# Patient Record
Sex: FEMALE | Race: WHITE | Employment: UNEMPLOYED | URBAN - METROPOLITAN AREA
[De-identification: names, ages, dates, MRNs, and addresses within clinical notes are randomized per-mention and may not be internally consistent; named-entity substitution may affect disease eponyms.]

---

## 2017-01-06 RX ORDER — LEVOTHYROXINE SODIUM 75 UG/1
TABLET ORAL
Qty: 90 TAB | Refills: 1 | Status: SHIPPED | OUTPATIENT
Start: 2017-01-06 | End: 2017-07-25 | Stop reason: SDUPTHER

## 2017-01-06 RX ORDER — CLOPIDOGREL BISULFATE 75 MG/1
75 TABLET ORAL DAILY
Qty: 90 TAB | Refills: 1 | Status: SHIPPED | OUTPATIENT
Start: 2017-01-06 | End: 2017-07-25 | Stop reason: SDUPTHER

## 2017-01-06 RX ORDER — FAMOTIDINE 40 MG/1
TABLET, FILM COATED ORAL
Qty: 180 TAB | Refills: 1 | Status: SHIPPED | OUTPATIENT
Start: 2017-01-06 | End: 2018-03-09 | Stop reason: SDUPTHER

## 2017-02-13 ENCOUNTER — OFFICE VISIT (OUTPATIENT)
Dept: INTERNAL MEDICINE CLINIC | Age: 77
End: 2017-02-13

## 2017-02-13 VITALS
TEMPERATURE: 97.7 F | WEIGHT: 154 LBS | HEIGHT: 62 IN | OXYGEN SATURATION: 98 % | HEART RATE: 53 BPM | BODY MASS INDEX: 28.34 KG/M2 | SYSTOLIC BLOOD PRESSURE: 145 MMHG | DIASTOLIC BLOOD PRESSURE: 84 MMHG | RESPIRATION RATE: 12 BRPM

## 2017-02-13 DIAGNOSIS — E03.9 HYPOTHYROIDISM, UNSPECIFIED TYPE: ICD-10-CM

## 2017-02-13 DIAGNOSIS — I10 ESSENTIAL HYPERTENSION: ICD-10-CM

## 2017-02-13 DIAGNOSIS — D50.9 IRON DEFICIENCY ANEMIA, UNSPECIFIED IRON DEFICIENCY ANEMIA TYPE: ICD-10-CM

## 2017-02-13 DIAGNOSIS — E55.9 VITAMIN D DEFICIENCY: ICD-10-CM

## 2017-02-13 DIAGNOSIS — R05.3 CHRONIC COUGH: Primary | ICD-10-CM

## 2017-02-13 DIAGNOSIS — E78.2 MIXED HYPERLIPIDEMIA: ICD-10-CM

## 2017-02-13 RX ORDER — FLUTICASONE FUROATE AND VILANTEROL TRIFENATATE 200; 25 UG/1; UG/1
1 POWDER RESPIRATORY (INHALATION) DAILY
Qty: 1 INHALER | Refills: 2 | Status: SHIPPED | OUTPATIENT
Start: 2017-02-13 | End: 2017-10-17

## 2017-02-13 NOTE — PROGRESS NOTES
HISTORY OF PRESENT ILLNESS    Chief Complaint   Patient presents with    Cholesterol Problem       Presents for follow-up    Hypertension  Hypertension ROS: taking medications as instructed, no medication side effects noted, no TIA's, no chest pain on exertion, no dyspnea on exertion, no swelling of ankles     reports that she has quit smoking. She smoked 1.00 pack per day. She quit smokeless tobacco use about 27 years ago. reports that she drinks about 5.0 oz of alcohol per week    BP Readings from Last 2 Encounters:   02/13/17 149/59   12/30/16 142/70     Reports ongoing cough for over 1 year. It is mildly productive. No SOB. Was given advair in 2003 and it helped for another cough. Hx of allergies. Went to [facility] in Dec and zpak helped. Hyperlipidemia  Currently she takes simvastatin 40 mg  ROS: taking medications as instructed, no medication side effects noted  No new myalgias, no joint pains, no weakness  No TIA's, no chest pain on exertion, no dyspnea on exertion, no swelling of ankles. Lab Results   Component Value Date/Time    Cholesterol, total 126 07/13/2016 12:00 AM    HDL Cholesterol 47 07/13/2016 12:00 AM    LDL, calculated 55 07/13/2016 12:00 AM    VLDL, calculated 24 07/13/2016 12:00 AM    Triglyceride 121 07/13/2016 12:00 AM     Hypothyroidism follow-up  Reports chronic fatigue  denies heat/cold intolerance, bowel/skin changes or CVS symptoms, losing hair, feeling excessive energy, tremulousness, palpitations. Thyroid medication has been unchanged since last medication check and labs.    Lab Results   Component Value Date/Time    TSH 2.400 07/13/2016 12:00 AM    T4, Free 1.87 08/12/2015 09:55 AM     Wt Readings from Last 3 Encounters:   02/13/17 154 lb (69.9 kg)   12/30/16 149 lb (67.6 kg)   07/13/16 150 lb (68 kg)         Review of Systems   All other systems reviewed and are negative, except as noted in HPI    Past Medical and Surgical History   has a past medical history of Basal cell carcinoma (7/2012, 7/2015); Chronic cough; Colon polyp (5/2010); Diverticulitis of colon (5/2010); DJD (degenerative joint disease), lumbar (`); Endometr hyperplasia w/atypia (1997); Family history of skin cancer; GERD (gastroesophageal reflux disease) (5/2010); H/O bone density study (2000?); Hematuria; Hiatal hernia (5/2010); Hypertension (1996); Hypomagnesemia; Hypothyroidism (2008); Iron deficiency anemia; Melanoma ([facility] [address] (12/2011); Mixed hyperlipidemia; Nephrolithiasis; Stroke ([facility] [address] (7/06/07); Sun-damaged skin; Sunburn, blistering; Vitamin D deficiency; and Zoster. She also has no past medical history of Arsenic suspected exposure; Radiation exposure; or Tanning bed exposure. has a past surgical history that includes edilberto and bso (1997); tonsillectomy; gi (5/2010); and colonoscopy (5/2010). reports that she has quit smoking. She smoked 1.00 pack per day. She quit smokeless tobacco use about 27 years ago. She reports that she drinks about 5.0 oz of alcohol per week  She reports that she does not use illicit drugs. family history includes Cancer in her brother, father, and mother; Hypertension in her brother. Physical Exam   Nursing note and vitals reviewed. Blood pressure 149/59, pulse (!) 53, temperature 97.7 °F (36.5 °C), temperature source Oral, resp. rate 12, height 5' 2\" (1.575 m), weight 154 lb (69.9 kg), SpO2 98 %. Constitutional:  No distress. Eyes: Conjunctivae are normal.   Ears:  Hearing grossly intact  Cardiovascular: Normal rate. regular rhythm, no murmurs or gallops  No edema  Pulmonary/Chest: Effort normal.   CTAB  Musculoskeletal: moves all 4 extremities   Neurological: Alert and oriented to person, place, and time. Skin: No rash noted. Psychiatric: Normal mood and affect. Behavior is normal.     ASSESSMENT and PLAN  Faraz Perdomo was seen today for cholesterol problem. Diagnoses and all orders for this visit:    Chronic cough- suspect this is due to allergies.   That said, I think CT is a good idea to eval for mass. Will also start trial of Breo for 1 month. Took advair in the past for cough and it helped  -     CT CHEST W WO CONT; Future    Hypothyroidism, unspecified type- Controlled on current regimen. Continue current medications as written in chart.  -     TSH 3RD GENERATION    Mixed hyperlipidemia- Controlled on current regimen. Continue current medications as written in chart.  -     LIPID PANEL  -     METABOLIC PANEL, COMPREHENSIVE    Iron deficiency anemia, unspecified iron deficiency anemia type  -     CBC W/O DIFF  -     IRON PROFILE    Essential hypertension- Controlled on current regimen. Continue current medications as written in chart. Vitamin D deficiency  -     VITAMIN D, 25 HYDROXY    Other orders  -     BREO ELLIPTA 200-25 mcg/dose inhaler; Take 1 Puff by inhalation daily. There are no Patient Instructions on file for this visit.    lab results and schedule of future lab studies reviewed with patient  reviewed medications and side effects in detail    Return to clinic for further evaluation if new symptoms develop    Follow-up Disposition: Not on File    Current Outpatient Prescriptions   Medication Sig    BREO ELLIPTA 200-25 mcg/dose inhaler Take 1 Puff by inhalation daily.  simvastatin (ZOCOR) 40 mg tablet Take 1 tablet by mouth  nightly    BYSTOLIC 5 mg tablet Take 1 tablet by mouth  daily for blood pressure ,  pulse, fatigue (replaces  atenolol)    ergocalciferol (ERGOCALCIFEROL) 50,000 unit capsule Take 1 capsule by mouth  every 7 days    levothyroxine (SYNTHROID) 75 mcg tablet Take 1 tablet by mouth  daily before breakfast    clopidogrel (PLAVIX) 75 mg tab Take 1 Tab by mouth daily.  famotidine (PEPCID) 40 mg tablet Take 1 tablet by mouth  twice a day    RESTASIS 0.05 % ophthalmic emulsion     cetirizine (ZYRTEC) 10 mg tablet Take  by mouth.     acetaminophen (TYLENOL ARTHRITIS PAIN) 650 mg CR tablet Take 650 mg by mouth every six (6) hours as needed. No current facility-administered medications for this visit.

## 2017-02-13 NOTE — MR AVS SNAPSHOT
Visit Information Date & Time Provider Department Dept. Phone Encounter #  
 2/13/2017  8:15 AM Sandra Bennett MD [facility] [address] [id number] Upcoming Health Maintenance Date Due DTaP/Tdap/Td series (1 - Tdap) 1/2/2011 MEDICARE YEARLY EXAM 7/14/2017 GLAUCOMA SCREENING Q2Y 6/10/2018 COLONOSCOPY 5/4/2020 Allergies as of 2/13/2017  Review Complete On: 2/13/2017 By: Sandra Bennett MD  
  
 Severity Noted Reaction Type Reactions Iron  09/14/2012    Diarrhea Pcn [Penicillins]  07/19/2012   Systemic Rash PCN only. Can take cephalosporins Sulfacetamide  07/19/2012    Swelling Current Immunizations  Reviewed on 12/14/2015 Name Date Hep A Vaccine 1/3/2010 Hepatitis B Vaccine 1/1/2010 Influenza High Dose Vaccine PF 10/13/2016, 10/30/2015, 10/14/2013 Influenza Vaccine 9/23/2014 Influenza Vaccine Whole 10/1/2012 Pneumococcal Conjugate (PCV-13) 7/27/2016 Pneumococcal Vaccine (Unspecified Type) 6/29/2010 TD Vaccine 1/1/2011 Not reviewed this visit You Were Diagnosed With   
  
 Codes Comments Chronic cough    -  Primary ICD-10-CM: P50 ICD-9-CM: 786.2 Hypothyroidism, unspecified type     ICD-10-CM: E03.9 ICD-9-CM: 244.9 Mixed hyperlipidemia     ICD-10-CM: E78.2 ICD-9-CM: 272.2 Iron deficiency anemia, unspecified iron deficiency anemia type     ICD-10-CM: D50.9 ICD-9-CM: 280.9 Essential hypertension     ICD-10-CM: I10 
ICD-9-CM: 401.9 Vitamin D deficiency     ICD-10-CM: E55.9 ICD-9-CM: 268.9 Vitals BP Pulse Temp Resp Height(growth percentile) Weight(growth percentile) 145/84 (BP 1 Location: Left arm, BP Patient Position: Sitting) (!) 53 97.7 °F (36.5 °C) (Oral) 12 5' 2\" (1.575 m) 154 lb (69.9 kg) SpO2 BMI OB Status Smoking Status 98% 28.17 kg/m2 Hysterectomy Former Smoker Vitals History BMI and BSA Data Body Mass Index Body Surface Area 28.17 kg/m 2 1.75 m 2 Preferred Pharmacy Pharmacy Name Phone [facility] [address] [phone] Your Updated Medication List  
  
   
This list is accurate as of: 2/13/17  8:51 AM.  Always use your most recent med list.  
  
  
  
  
 BREO ELLIPTA 200-25 mcg/dose inhaler Generic drug:  fluticasone-vilanterol Take 1 Puff by inhalation daily. BYSTOLIC 5 mg tablet Generic drug:  nebivolol Take 1 tablet by mouth  daily for blood pressure ,  pulse, fatigue (replaces  atenolol) clopidogrel 75 mg Tab Commonly known as:  PLAVIX Take 1 Tab by mouth daily. ergocalciferol 50,000 unit capsule Commonly known as:  ERGOCALCIFEROL Take 1 capsule by mouth  every 7 days  
  
 famotidine 40 mg tablet Commonly known as:  PEPCID Take 1 tablet by mouth  twice a day  
  
 levothyroxine 75 mcg tablet Commonly known as:  SYNTHROID Take 1 tablet by mouth  daily before breakfast  
  
 RESTASIS 0.05 % ophthalmic emulsion Generic drug:  cycloSPORINE  
  
 simvastatin 40 mg tablet Commonly known as:  ZOCOR Take 1 tablet by mouth  nightly TYLENOL ARTHRITIS PAIN 650 mg CR tablet Generic drug:  acetaminophen Take 650 mg by mouth every six (6) hours as needed. ZyrTEC 10 mg tablet Generic drug:  cetirizine Take  by mouth. Prescriptions Sent to Pharmacy Refills BREO ELLIPTA 200-25 mcg/dose inhaler 2 Sig: Take 1 Puff by inhalation daily. Class: Normal  
 Pharmacy: [facility] [address] Ph #: [phone] Route: Inhalation We Performed the Following CBC W/O DIFF [90596 CPT(R)] IRON PROFILE [id number] CPT(R)] LIPID PANEL [84080 CPT(R)] METABOLIC PANEL, COMPREHENSIVE [26940 CPT(R)] TSH 3RD GENERATION [26891 CPT(R)] VITAMIN D, 25 HYDROXY [id number] CPT(R)] To-Do List   
 02/13/2017 Imaging:  CT CHEST W WO CONT Referral Information Referral ID Referred By Referred To  
  
 [id number] May Bunn Not Available Visits Status Start Date End Date 1 New Request 2/13/17 2/13/18 If your referral has a status of pending review or denied, additional information will be sent to support the outcome of this decision. Introducing [facility]! Dear Daryle Bienenstock: 
Thank you for requesting a [employer] account. Our records indicate that you already have an active [employer] account. You can access your account anytime at [email or web address] Did you know that you can access your hospital and ER discharge instructions at any time in [employer]? You can also review all of your test results from your hospital stay or ER visit. Additional Information If you have questions, please visit the Frequently Asked Questions section of the [employer] website at [email or web address] Remember, [employer] is NOT to be used for urgent needs. For medical emergencies, dial 911. Now available from your iPhone and Android! Please provide this summary of care documentation to your next provider. Your primary care clinician is listed as Barbara Anthony. If you have any questions after today's visit, please call [phone].

## 2017-02-14 LAB
25(OH)D3+25(OH)D2 SERPL-MCNC: 41.6 NG/ML (ref 30–100)
ALBUMIN SERPL-MCNC: 4.3 G/DL (ref 3.5–4.8)
ALBUMIN/GLOB SERPL: 2.3 {RATIO} (ref 1.1–2.5)
ALP SERPL-CCNC: 72 IU/L (ref 39–117)
ALT SERPL-CCNC: 26 IU/L (ref 0–32)
AST SERPL-CCNC: 23 IU/L (ref 0–40)
BILIRUB SERPL-MCNC: 1 MG/DL (ref 0–1.2)
BUN SERPL-MCNC: 18 MG/DL (ref 8–27)
BUN/CREAT SERPL: 19 (ref 11–26)
CALCIUM SERPL-MCNC: 8.9 MG/DL (ref 8.7–10.3)
CHLORIDE SERPL-SCNC: 103 MMOL/L (ref 96–106)
CHOLEST SERPL-MCNC: 115 MG/DL (ref 100–199)
CO2 SERPL-SCNC: 23 MMOL/L (ref 18–29)
CREAT SERPL-MCNC: 0.97 MG/DL (ref 0.57–1)
ERYTHROCYTE [DISTWIDTH] IN BLOOD BY AUTOMATED COUNT: 14.8 % (ref 12.3–15.4)
GLOBULIN SER CALC-MCNC: 1.9 G/DL (ref 1.5–4.5)
GLUCOSE SERPL-MCNC: 97 MG/DL (ref 65–99)
HCT VFR BLD AUTO: 33.4 % (ref 34–46.6)
HDLC SERPL-MCNC: 36 MG/DL
HGB BLD-MCNC: 10.5 G/DL (ref 11.1–15.9)
INTERPRETATION, 910389: NORMAL
INTERPRETATION: NORMAL
IRON SATN MFR SERPL: 40 % (ref 15–55)
IRON SERPL-MCNC: 99 UG/DL (ref 27–139)
LDLC SERPL CALC-MCNC: 45 MG/DL (ref 0–99)
MCH RBC QN AUTO: 27.9 PG (ref 26.6–33)
MCHC RBC AUTO-ENTMCNC: 31.4 G/DL (ref 31.5–35.7)
MCV RBC AUTO: 89 FL (ref 79–97)
PDF IMAGE, 910387: NORMAL
PLATELET # BLD AUTO: 227 X10E3/UL (ref 150–379)
POTASSIUM SERPL-SCNC: 4.5 MMOL/L (ref 3.5–5.2)
PROT SERPL-MCNC: 6.2 G/DL (ref 6–8.5)
RBC # BLD AUTO: 3.76 X10E6/UL (ref 3.77–5.28)
SODIUM SERPL-SCNC: 143 MMOL/L (ref 134–144)
TIBC SERPL-MCNC: 248 UG/DL (ref 250–450)
TRIGL SERPL-MCNC: 172 MG/DL (ref 0–149)
TSH SERPL DL<=0.005 MIU/L-ACNC: 3.08 UIU/ML (ref 0.45–4.5)
UIBC SERPL-MCNC: 149 UG/DL (ref 118–369)
VLDLC SERPL CALC-MCNC: 34 MG/DL (ref 5–40)
WBC # BLD AUTO: 8.2 X10E3/UL (ref 3.4–10.8)

## 2017-03-05 RX ORDER — BENZONATATE 200 MG/1
200 CAPSULE ORAL
Qty: 21 CAP | Refills: 0 | Status: SHIPPED | OUTPATIENT
Start: 2017-03-05 | End: 2017-03-12

## 2017-03-09 RX ORDER — LEVOFLOXACIN 500 MG/1
500 TABLET, FILM COATED ORAL DAILY
Qty: 10 TAB | Refills: 0 | Status: SHIPPED | OUTPATIENT
Start: 2017-03-09 | End: 2017-03-19

## 2017-03-09 RX ORDER — PREDNISONE 20 MG/1
TABLET ORAL
Qty: 26 TAB | Refills: 0 | Status: SHIPPED | OUTPATIENT
Start: 2017-03-09 | End: 2017-03-25

## 2017-03-15 ENCOUNTER — HOSPITAL ENCOUNTER (OUTPATIENT)
Dept: CT IMAGING | Age: 77
Discharge: HOME | End: 2017-03-15
Attending: INTERNAL MEDICINE
Payer: MEDICARE

## 2017-03-15 DIAGNOSIS — R05.3 CHRONIC COUGH: ICD-10-CM

## 2017-03-15 PROCEDURE — 71260 CT THORAX DX C+: CPT

## 2017-03-15 PROCEDURE — 71270 CT THORAX DX C-/C+: CPT

## 2017-03-15 PROCEDURE — 74011636320 HC RX REV CODE- 636/320: Performed by: RADIOLOGY

## 2017-03-15 RX ADMIN — IOPAMIDOL 100 ML: 612 INJECTION, SOLUTION INTRAVENOUS at 14:49

## 2017-07-04 RX ORDER — CLINDAMYCIN HYDROCHLORIDE 300 MG/1
300 CAPSULE ORAL 3 TIMES DAILY
Qty: 30 CAP | Refills: 0 | Status: SHIPPED | OUTPATIENT
Start: 2017-07-04 | End: 2017-07-14

## 2017-07-26 RX ORDER — LEVOTHYROXINE SODIUM 75 UG/1
TABLET ORAL
Qty: 90 TAB | Refills: 1 | Status: SHIPPED | OUTPATIENT
Start: 2017-07-26 | End: 2017-11-19 | Stop reason: SDUPTHER

## 2017-07-26 RX ORDER — CLOPIDOGREL BISULFATE 75 MG/1
TABLET ORAL
Qty: 90 TAB | Refills: 1 | Status: SHIPPED | OUTPATIENT
Start: 2017-07-26 | End: 2018-01-27 | Stop reason: SDUPTHER

## 2017-08-11 ENCOUNTER — OFFICE VISIT (OUTPATIENT)
Dept: INTERNAL MEDICINE CLINIC | Age: 77
End: 2017-08-11

## 2017-08-11 VITALS
BODY MASS INDEX: 28.16 KG/M2 | DIASTOLIC BLOOD PRESSURE: 52 MMHG | HEIGHT: 62 IN | HEART RATE: 52 BPM | TEMPERATURE: 97.9 F | WEIGHT: 153 LBS | SYSTOLIC BLOOD PRESSURE: 135 MMHG | RESPIRATION RATE: 12 BRPM

## 2017-08-11 DIAGNOSIS — R22.9 SINGLE SKIN NODULE: Primary | ICD-10-CM

## 2017-08-11 DIAGNOSIS — Z85.828 HISTORY OF BASAL CELL CANCER: ICD-10-CM

## 2017-08-11 NOTE — PROGRESS NOTES
HISTORY OF PRESENT ILLNESS  Sydna Dance is a 69 y.o. female. HPI  Presents with nodule on left shin for 2 months. It was a bit red at first, so took clindamycin 7/4 due to concern of infection. This did not change it. It is still enlarging slowly and is a little red and a little warm. She scratched it, so it has a scab on it. Hx of Beata Magdalene Henderson and made appt for mid-September (first Maryjo Pain)  Saw Dr. Caroline Vazquez for [facility] in 2015    Review of Systems   Constitutional: Negative for chills, fever, malaise/fatigue and weight loss. Physical Exam   Constitutional: She is oriented to person, place, and time. She appears well-developed and well-nourished. No distress. Cardiovascular: Normal rate. Pulmonary/Chest: Effort normal.   Musculoskeletal:        Legs:  2 cm nodule w mild surrounding eryrthema, no significant warmth, no fluctuance. Neurological: She is alert and oriented to person, place, and time. Psychiatric: She has a normal mood and affect. Nursing note and vitals reviewed. ASSESSMENT and PLAN  Diagnoses and all orders for this visit:    1. Single skin nodule  2. History of basal cell cancer  It is a little inflamed, but this persists for about 2 month. Suspect BCC, r/o SCC. I have contacted Dr. Benjamin Browne office for evaluation.

## 2017-08-11 NOTE — PROGRESS NOTES
Patient has a skin lesion on her LLE for the last 2 months. States she made an appointment with Dr Leander Salmeron for a colonoscopy on 9/18/2017.

## 2017-08-22 ENCOUNTER — HOSPITAL ENCOUNTER (OUTPATIENT)
Dept: LAB | Age: 77
Discharge: HOME | End: 2017-08-22

## 2017-08-22 ENCOUNTER — OFFICE VISIT (OUTPATIENT)
Dept: DERMATOLOGY | Facility: AMBULATORY SURGERY CENTER | Age: 77
End: 2017-08-22

## 2017-08-22 VITALS
RESPIRATION RATE: 18 BRPM | OXYGEN SATURATION: 98 % | BODY MASS INDEX: 28.16 KG/M2 | WEIGHT: 153 LBS | HEIGHT: 62 IN | TEMPERATURE: 98 F | HEART RATE: 57 BPM | DIASTOLIC BLOOD PRESSURE: 70 MMHG | SYSTOLIC BLOOD PRESSURE: 125 MMHG

## 2017-08-22 DIAGNOSIS — D48.5 NEOPLASM OF UNCERTAIN BEHAVIOR OF SKIN OF LOWER LEG: Primary | ICD-10-CM

## 2017-08-22 DIAGNOSIS — Z85.828 HISTORY OF NONMELANOMA SKIN CANCER: ICD-10-CM

## 2017-08-22 DIAGNOSIS — L82.1 SEBORRHEIC KERATOSES: ICD-10-CM

## 2017-08-22 NOTE — PROGRESS NOTES
Name: Tracy Maza       Age: 69 y.o. Date: 8/22/2017    Chief Complaint:   Chief Complaint   Patient presents with    Skin Exam     lesion on left shin       Subjective:    HPI:  Ms. Jovan Villalba is a 69 y.o. female who presents for the evaluation of a lesion on the left anterior shin. She states that the lesion appeared 2 months ago. The patient has not had prior treatment for this lesion. Associated symptoms include persistent, slowly enlarging, tender bump. She was encouraged to make this visit by Dr. Max Briceno. The patient states she initially thought it was a staph infection due to the tenderness. She has a personal history of NMSC and history of Mohs. She presents with her . ROS: Consitutional: Negative  Dermatological : positive for - skin lesion changes      Social History     Social History    Marital status:      Spouse name: Alejandro Headings Number of children: 2    Years of education: N/A     Occupational History    Not on file. Social History Main Topics    Smoking status: Former Smoker     Packs/day: 1.00    Smokeless tobacco: Former User     Quit date: 1/1/1990    Alcohol use 5.0 oz/week     10 Glasses of wine per week    Drug use: No    Sexual activity: No     Other Topics Concern    Not on file     Social History Narrative       Family History   Problem Relation Age of Onset    Cancer Mother     Cancer Father     Hypertension Brother     Cancer Brother      myeloma       Past Medical History:   Diagnosis Date    Basal cell carcinoma 7/2012, 7/2015    Dr. Lawrence Harvey. saw Dr. Young Tomas cough     Colon polyp 5/2010    Dr. Grover Hammans, tubular adenoma    Diverticulitis of colon 5/2010    dx on colonoscopy by Dr. Grover Hammans. Took flagyl, cipro    DJD (degenerative joint disease), lumbar `    L5?  Endometr hyperplasia w/atypia 0    Family history of skin cancer     Brother - melanoma    GERD (gastroesophageal reflux disease) 5/2010    H/O bone density study 2000? normal per pt.  Hematuria     hx stones and \"nephritis\" since childhood    Hiatal hernia 5/2010    Hypertension 1996    Hypomagnesemia     level was 1.5 on mg ox 6/2012    Hypothyroidism 2008    TSH 1.1 6/7/12    Iron deficiency anemia     EGD, colon 5/2010.  hgb 11.3 6/2012    Melanoma ([facility] [address] 12/2011    Dr. Vijay Manjarrez.  right shoulder, left thigh. Dr. Shruti Fisher Mixed hyperlipidemia      Tg 166 LDL 87 HDL55 6/2012    Nephrolithiasis     Stroke ([facility] [address] 7/06/07    R facial droop and R weakness. completely resolved.  Sun-damaged skin     Sunburn, blistering     Vitamin D deficiency     improved to 35.3 6/2012    Zoster        Past Surgical History:   Procedure Laterality Date    HX COLONOSCOPY  5/2010    diverticuLITIS, tubular adenoma  Dr. Milligan Eye HX GI  5/2010    EGD  - hiatal hernia. Dr. Jaydon Young    endometrial pre-cancer on biopsy    HX TONSILLECTOMY         Current Outpatient Prescriptions   Medication Sig Dispense Refill    MULTIVITAMIN PO       GUAIFENESIN (MUCINEX PO) Take  by mouth.  clopidogrel (PLAVIX) 75 mg tab TAKE 1 TABLET EVERY DAY 90 Tab 1    levothyroxine (SYNTHROID) 75 mcg tablet TAKE 1 TABLET BY MOUTH  DAILY BEFORE BREAKFAST 90 Tab 1    simvastatin (ZOCOR) 40 mg tablet Take 1 tablet by mouth  nightly 90 Tab 1    BYSTOLIC 5 mg tablet Take 1 tablet by mouth  daily for blood pressure ,  pulse, fatigue (replaces  atenolol) 90 Tab 1    ergocalciferol (ERGOCALCIFEROL) 50,000 unit capsule Take 1 capsule by mouth  every 7 days 13 Cap 3    famotidine (PEPCID) 40 mg tablet Take 1 tablet by mouth  twice a day (Patient taking differently: Take 40 mg by mouth daily. Take 1 tablet by mouth  twice a day) 180 Tab 1    RESTASIS 0.05 % ophthalmic emulsion       cetirizine (ZYRTEC) 10 mg tablet Take  by mouth.  acetaminophen (TYLENOL ARTHRITIS PAIN) 650 mg CR tablet Take 650 mg by mouth every six (6) hours as needed.         BREO ELLIPTA 200-25 mcg/dose inhaler Take 1 Puff by inhalation daily. 1 Inhaler 2       Allergies   Allergen Reactions    Iron Diarrhea    Pcn [Penicillins] Rash     PCN only. Can take cephalosporins    Sulfacetamide Swelling         Objective:    Visit Vitals    /70 (BP 1 Location: Left arm, BP Patient Position: Sitting)    Pulse (!) 57    Temp 98 °F (36.7 °C) (Oral)    Resp 18    Ht 5' 2\" (1.575 m)    Wt 69.4 kg (153 lb)    SpO2 98%    BMI 27.98 kg/m2       Maximo Leon is a 69 y.o. female who appears well and in no distress. She is awake, alert, and oriented. There is no popliteal lymphadenopathy. A limited skin examination was completed including the left lower leg. She has scattered tan waxy macules consistent with seborrheic keratoses, a few that are pink with inflammation. There is a 1.2 x 1.0 cm erythematous papule with central crusting, tender to palpation. This is most consistent with squamous cell carcinoma. There is about another 1 cm of surrounding pink skin change that is mildly tender as well-consistent with inflammation. There is no evidence of infection. Assessment/Plan:  1. Neoplasm of Uncertain Behavior, left anterior shin. The differential diagnoses were discussed. A shave biopsy was advised to sample this lesion. The procedure was reviewed and verbal and written consent were obtained. The risks of pain, bleeding, infection, and scar were discussed. The patient is aware that this is a sample and is intended for diagnosis and not therapy of the skin lesion. I performed the procedure. The site was cleansed and anesthetized with 1% Lidocaine with Epinephrine 1:100,000. A shave biopsy was performed to sample the lesion. Drysol was used for hemostasis. The wound was bandaged and care reviewed. The specimen was sent to pathology. I will contact the patient with the results and any further treatment that may be necessary. She will use vinegar soaks to decrease inflammation. 2.Seborrheic keratoses. The diagnosis was reviewed and the patient was reassured that no treatment is needed for these benign lesions. 3.Personal history of skin cancer. I discussed sun protection, sunscreen use, the warning signs of skin cancer, the need for self-skin examinations, and the need for regular practitioner exams every 6 months. The patient should follow up sooner as needed if new, changing, or symptomatic skin lesions arise. [facility]   OFFICE PROCEDURE PROGRESS NOTE   Chart reviewed for the following:   [place], [address], have reviewed the History, Physical and updated the Allergic reactions for Blima Moat. TIME OUT performed immediately prior to start of procedure:   I, Jodie Mora, have performed the following reviews on Blima Moat   prior to the start of the procedure:     * Patient was identified by name and date of birth   * Agreement on procedure being performed was verified   * Risks and Benefits explained to the patient   * Procedure site verified and marked as necessary   * Patient was positioned for comfort   * Consent was signed and verified     Time: 0945  Date of procedure: 8/22/2017  Procedure performed by: Lew Mora  Provider assisted by: lpn   Patient assisted by: self   How tolerated by patient: tolerated the procedure well with no complications   Comments: none

## 2017-08-22 NOTE — MR AVS SNAPSHOT
Visit Information Date & Time Provider Department Dept. Phone Encounter #  
 8/22/2017  9:30 AM Breana Vaz NP [facility] [phone] [id number] Follow-up Instructions Return for Mohs surgery. Routing History Follow-up and Disposition History Upcoming Health Maintenance Date Due DTaP/Tdap/Td series (1 - Tdap) 1/2/2011 MEDICARE YEARLY EXAM 7/14/2017 INFLUENZA AGE 9 TO ADULT 8/1/2017 GLAUCOMA SCREENING Q2Y 6/10/2018 COLONOSCOPY 5/4/2020 Allergies as of 8/22/2017  Review Complete On: 8/22/2017 By: Breana Vaz NP Severity Noted Reaction Type Reactions Iron  09/14/2012    Diarrhea Pcn [Penicillins]  07/19/2012   Systemic Rash PCN only. Can take cephalosporins Sulfacetamide  07/19/2012    Swelling Current Immunizations  Reviewed on 12/14/2015 Name Date Hep A Vaccine 1/3/2010 Hepatitis B Vaccine 1/1/2010 Influenza High Dose Vaccine PF 10/13/2016, 10/30/2015, 10/14/2013 Influenza Vaccine 9/23/2014 Influenza Vaccine Whole 10/1/2012 Pneumococcal Conjugate (PCV-13) 7/27/2016 TD Vaccine 1/1/2011 ZZZ-RETIRED (DO NOT USE) Pneumococcal Vaccine (Unspecified Type) 6/29/2010 Not reviewed this visit You Were Diagnosed With   
  
 Codes Comments Neoplasm of uncertain behavior of skin of lower leg    -  Primary ICD-10-CM: D48.5 ICD-9-CM: 238.2 Seborrheic keratoses     ICD-10-CM: L82.1 ICD-9-CM: 702.19 History of nonmelanoma skin cancer     ICD-10-CM: Z02.915 ICD-9-CM: V10.83 Vitals BP Pulse Temp Resp Height(growth percentile) Weight(growth percentile) 125/70 (BP 1 Location: Left arm, BP Patient Position: Sitting) (!) 57 98 °F (36.7 °C) (Oral) 18 5' 2\" (1.575 m) 153 lb (69.4 kg) SpO2 BMI OB Status Smoking Status 98% 27.98 kg/m2 Hysterectomy Former Smoker BMI and BSA Data  Body Mass Index Body Surface Area  
 27.98 kg/m 2 1.74 m 2  
 Preferred Pharmacy Pharmacy Name Phone [facility] [address] [place] [address] [facility] [address] [phone] Your Updated Medication List  
  
   
This list is accurate as of: 8/22/17  2:05 PM.  Always use your most recent med list.  
  
  
  
  
 BREO ELLIPTA 200-25 mcg/dose inhaler Generic drug:  fluticasone-vilanterol Take 1 Puff by inhalation daily. BYSTOLIC 5 mg tablet Generic drug:  nebivolol Take 1 tablet by mouth  daily for blood pressure ,  pulse, fatigue (replaces  atenolol) clopidogrel 75 mg Tab Commonly known as:  PLAVIX TAKE 1 TABLET EVERY DAY  
  
 ergocalciferol 50,000 unit capsule Commonly known as:  ERGOCALCIFEROL Take 1 capsule by mouth  every 7 days  
  
 famotidine 40 mg tablet Commonly known as:  PEPCID Take 1 tablet by mouth  twice a day  
  
 levothyroxine 75 mcg tablet Commonly known as:  SYNTHROID  
TAKE 1 TABLET BY MOUTH  DAILY BEFORE BREAKFAST MUCINEX PO Take  by mouth. MULTIVITAMIN PO  
  
 RESTASIS 0.05 % ophthalmic emulsion Generic drug:  cycloSPORINE  
  
 simvastatin 40 mg tablet Commonly known as:  ZOCOR Take 1 tablet by mouth  nightly TYLENOL ARTHRITIS PAIN 650 mg CR tablet Generic drug:  acetaminophen Take 650 mg by mouth every six (6) hours as needed. ZyrTEC 10 mg tablet Generic drug:  cetirizine Take  by mouth. We Performed the Following BIOPSY OF SKIN LESION [20288 CPT(R)] SURGICAL PATHOLOGY [ICL8933 Custom] Comments: Favor SCC Follow-up Instructions Return for Mohs surgery. Introducing [facility] & HEALTH SERVICES! Dear Rashaad Workman: 
Thank you for requesting a [employer] account. Our records indicate that you already have an active [employer] account. You can access your account anytime at [email or web address] Did you know that you can access your hospital and ER discharge instructions at any time in [employer]? You can also review all of your test results from your hospital stay or ER visit. Additional Information If you have questions, please visit the Frequently Asked Questions section of the [employer] website at [email or web address] Remember, [employer] is NOT to be used for urgent needs. For medical emergencies, dial 911. Now available from your iPhone and Android! Please provide this summary of care documentation to your next provider. Your primary care clinician is listed as Kang Rocha. If you have any questions after today's visit, please call [phone].

## 2017-08-25 NOTE — PROGRESS NOTES
I spoke with the patient who is aware of the biopsy results of SCC and need for Mohs. She is scheduled for Oct 19 but would come sooner. She states the vinegar soaks are helping and the area is doing fine.

## 2017-09-14 ENCOUNTER — OFFICE VISIT (OUTPATIENT)
Dept: DERMATOLOGY | Facility: AMBULATORY SURGERY CENTER | Age: 77
End: 2017-09-14

## 2017-09-14 VITALS
DIASTOLIC BLOOD PRESSURE: 70 MMHG | TEMPERATURE: 98.1 F | HEART RATE: 58 BPM | WEIGHT: 153 LBS | OXYGEN SATURATION: 99 % | SYSTOLIC BLOOD PRESSURE: 118 MMHG | HEIGHT: 62 IN | RESPIRATION RATE: 16 BRPM | BODY MASS INDEX: 28.16 KG/M2

## 2017-09-14 DIAGNOSIS — C44.729 SQUAMOUS CELL CARCINOMA OF LOWER LEG, LEFT: Primary | ICD-10-CM

## 2017-09-14 NOTE — MR AVS SNAPSHOT
Visit Information Date & Time Provider Department Dept. Phone Encounter #  
 9/14/2017  9:30 AM Heriberto Rocha MD [facility] 8057 [phone] [id number] Upcoming Health Maintenance Date Due DTaP/Tdap/Td series (1 - Tdap) 1/2/2011 MEDICARE YEARLY EXAM 7/14/2017 INFLUENZA AGE 9 TO ADULT 8/1/2017 GLAUCOMA SCREENING Q2Y 6/10/2018 COLONOSCOPY 5/4/2020 Allergies as of 9/14/2017  Review Complete On: 9/14/2017 By: Heriberto Rocha MD  
  
 Severity Noted Reaction Type Reactions Iron  09/14/2012    Diarrhea Pcn [Penicillins]  07/19/2012   Systemic Rash PCN only. Can take cephalosporins Sulfacetamide  07/19/2012    Swelling Current Immunizations  Reviewed on 12/14/2015 Name Date Hep A Vaccine 1/3/2010 Hepatitis B Vaccine 1/1/2010 Influenza High Dose Vaccine PF 10/13/2016, 10/30/2015, 10/14/2013 Influenza Vaccine 9/23/2014 Influenza Vaccine Whole 10/1/2012 Pneumococcal Conjugate (PCV-13) 7/27/2016 TD Vaccine 1/1/2011 ZZZ-RETIRED (DO NOT USE) Pneumococcal Vaccine (Unspecified Type) 6/29/2010 Not reviewed this visit You Were Diagnosed With   
  
 Codes Comments Squamous cell carcinoma of lower leg, left    -  Primary ICD-10-CM: P00.865 ICD-9-CM: 173.72 Vitals BP Pulse Temp Resp Height(growth percentile) Weight(growth percentile) 118/70 (BP 1 Location: Right arm, BP Patient Position: Sitting) (!) 58 98.1 °F (36.7 °C) (Oral) 16 5' 2\" (1.575 m) 153 lb (69.4 kg) SpO2 BMI OB Status Smoking Status 99% 27.98 kg/m2 Hysterectomy Former Smoker BMI and BSA Data Body Mass Index Body Surface Area  
 27.98 kg/m 2 1.74 m 2 Preferred Pharmacy Pharmacy Name Phone Kailash Graham, [facility] - [address] [facility] [address] [place] [address] [phone] Your Updated Medication List  
  
   
This list is accurate as of: 9/14/17 11:08 AM.  Always use your most recent med list.  
  
  
  
  
 BREO ELLIPTA 200-25 mcg/dose inhaler Generic drug:  fluticasone-vilanterol Take 1 Puff by inhalation daily. BYSTOLIC 5 mg tablet Generic drug:  nebivolol Take 1 tablet by mouth  daily for blood pressure ,  pulse, fatigue (replaces  atenolol) clopidogrel 75 mg Tab Commonly known as:  PLAVIX TAKE 1 TABLET EVERY DAY  
  
 ergocalciferol 50,000 unit capsule Commonly known as:  ERGOCALCIFEROL Take 1 capsule by mouth  every 7 days  
  
 famotidine 40 mg tablet Commonly known as:  PEPCID Take 1 tablet by mouth  twice a day  
  
 levothyroxine 75 mcg tablet Commonly known as:  SYNTHROID  
TAKE 1 TABLET BY MOUTH  DAILY BEFORE BREAKFAST MUCINEX PO Take  by mouth. MULTIVITAMIN PO  
  
 RESTASIS 0.05 % ophthalmic emulsion Generic drug:  cycloSPORINE  
  
 simvastatin 40 mg tablet Commonly known as:  ZOCOR Take 1 tablet by mouth  nightly TYLENOL ARTHRITIS PAIN 650 mg CR tablet Generic drug:  acetaminophen Take 650 mg by mouth every six (6) hours as needed. ZyrTEC 10 mg tablet Generic drug:  cetirizine Take  by mouth. Patient Instructions WOUND CARE INSTRUCTIONS 1. Keep the dressing clean and dry and do not remove for 48 hours. 2. Then change the dressing once a day as follows: 
a. Wash hands before and after each dressing change. b. Remove dressing and wash site gently with mild soap and water, rinse, and pat dry. 
c. Apply an ointment (Bacitracin, Polysporin, Neosporin, Petroleum jelly or Aquaphor). d. Apply a non-stick (Telfa) dressing or Band-Aid to cover the wound. Do not remove dressing until appt next week 3. Watch for: BLEEDING: A small amount of drainage may occur. If bleeding occurs, elevate and rest the surgery site. Apply gauze and steady pressure for 15 minutes. If bleeding continues, call this office.  
 
INFECTION: Signs of infection include increased redness, pain, warmth, drainage of pus, and fever. If this occurs, call this office. 4. Special Instructions (follow any that are checked): ·  You have stitches that DO need to be removed. ·  Avoid bending at the waist and heavy lifting for two days. ·  Sleep with your head elevated for the next two nights. ·  Rest the surgery site and keep it elevated as much as possible for two days. ·  You may apply an ice-pack for 10-15 minutes every waking hour for the rest of the day. ·  Eat a soft diet and avoid hot food and hot drinks for the rest of the day. ·  Other instructions: Follow up as directed. Take Tylenol for pain as needed. Once the site is healed with no remaining bandages or open areas, protect your surgical site and scar from the sun, as this area will be more sensitive. Use a broad spectrum sunscreen SPF 30 or higher daily, and a chemical free product (one containing zinc oxide or titanium dioxide) is a good choice if the area is sensitive. You may begin to gently massage the surgical site in 2-3 weeks, rubbing in a circular motion along the scar. This can help reduce swelling and thickness of a scar. A scar cream may be used beginnning 1 month after the surgery. If you have any questions or concerns, please call our office Monday through Friday at [phone]. Introducing [facility] & HEALTH SERVICES! Dear Amy Weinberg: 
Thank you for requesting a [employer] account. Our records indicate that you already have an active [employer] account. You can access your account anytime at [email or web address] Did you know that you can access your hospital and ER discharge instructions at any time in [employer]? You can also review all of your test results from your hospital stay or ER visit. Additional Information If you have questions, please visit the Frequently Asked Questions section of the [employer] website at [email or web address] Remember, [employer] is NOT to be used for urgent needs. For medical emergencies, dial 911. Now available from your iPhone and Android! Please provide this summary of care documentation to your next provider. Your primary care clinician is listed as Yung Cornejo. If you have any questions after today's visit, please call [phone].

## 2017-09-14 NOTE — PROGRESS NOTES
This note is written by Lillian Ferrell, as dictated by Lawton Primrose. Dex Fenton MD.    CC: Squamous cell carcinoma on the left anterior shin     History of present illness:     Carmen Stone is a 69 y.o. female referred by Ivana Lyles DNP. 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 This is a new squamous cell carcinoma present for three months described as a non healing, growing, painful lesion with no prior treatment. She has been utilizing vinegar soaks in order to decrease inflammation. Biopsy confirmed the diagnosis of squamous cell carcinoma, and I reviewed the written pathology. She is feeling well and in her usual state of health today. She has no pain, no current illnesses, no other skin concerns. Her allergies, medications, medical, and social history are reviewed by me today. Exam:     She is an awake, alert, and oriented 69 y.o. female who appears well and in no distress. There is no preauricular, submandibular, or cervical lymphadenopathy. I examined her left shin. She has an 18 x 16 firm keratotic nodule on her left anterior shin. She confirms location. Assessment/plan:    1. Squamous cell carcinoma, left anterior shin. I discussed the diagnosis of squamous cell carcinoma and summarized the pathology report. Mohs surgery is indicated by site, size, histology, and rapid growth. The procedure was discussed, verbal and written consent were obtained. I performed the procedure. One stage was required to reach a tumor free plane. The surgical defect was managed with intermediate repair. There were no complications. She will follow up in five days for a wound check because she will be going on a cruise in the near future. She will also follow up as needed as the site heals. Indications, risks, and options were discussed with Carmen Stone preoperatively.  Risks including, but not limited to: pain, bleeding, infection, tumor recurrence, scarring and damage to motor and/or sensory nerves, were discussed. Mehreen Dodson chose Mohs surgery. Mehreen Dodson was an acceptable surgery candidate. Mehreen Dodson was placed in the appropriate position on the operating table in the Mohs surgery procedure room. The area was prepped and draped in the standard manner. Gentian violet was used to outline the clinical margins of the tumor. Local anesthesia was then obtained. The grossly visible tumor was then removed, an underlying layer was excised and mapped according to the Mohs technique, and the individual specimens examined microscopically. The process was repeated until microscopic examination of the tissue specimens confirmed a tumor-free plane. Hemostasis was obtained with electrosurgery and pressure. The wound was covered between stages with moist saline gauze. The wound management options of second intent healing, layered closure, local flap, and/or full thickness skin graft were discussed. Mehreen Dodson understands the aims, risks, alternatives, and possible complications and elects to proceed with an intermediate layered closure. Wound margins were made vertical, edges undermined in the subcutaneous plane, standing cones corrected at both poles followed by layered closure. The wound was closed with buried 4-0 polysorb suture in the subcutis to reduce tension on the skin edges, and skin edges were approximated with 4-0 prolene suture in the dermis to reduce tension on the epidermis. The final closure length was 35 mm. The wound was bandaged with Vaseline, Telfa, gauze and coban. Wound care instructions (written and verbal) and a follow up appointment were given to Mehreen Dodson before discharge. Mehreen Ddoson was discharged in good condition. 2. History of non melanoma skin cancer. I discussed the diagnosis and recommend routine examinations with Cristobal Lew DNP for surveillance.     The documentation recorded by the tres accurately reflects the service I personally performed and the decisions made by me. [facility]   OFFICE PROCEDURE PROGRESS NOTE     Chart reviewed for the following:     Wally Baure MD, have reviewed the History, Physical and updated the Allergic reactions for [address] [place] [address] performed immediately prior to start of procedure:     Wally Bauer MD, have performed the following reviews on Summer Styles prior to the start of the procedure:     * Patient was identified by name and date of birth   * Agreement on procedure being performed was verified   * Risks and Benefits explained to the patient   * Procedure site verified and marked as necessary   * Patient was positioned for comfort   * Consent was signed and verified     Time: 10:00 AM   Date of procedure: 9/14/2017  Procedure performed by: Valerie Mccartney.  Lashawn Bauer MD   Provider assisted by: LPN   Patient assisted by: self   How tolerated by patient: tolerated the procedure well with no complications   Comments: none

## 2017-09-14 NOTE — PATIENT INSTRUCTIONS
WOUND CARE INSTRUCTIONS    1. Keep the dressing clean and dry and do not remove for 48 hours. 2. Then change the dressing once a day as follows:  a. Wash hands before and after each dressing change. b. Remove dressing and wash site gently with mild soap and water, rinse, and pat dry.  c. Apply an ointment (Bacitracin, Polysporin, Neosporin, Petroleum jelly or Aquaphor). d. Apply a non-stick (Telfa) dressing or Band-Aid to cover the wound. Do not remove dressing until appt next week    3. Watch for:  BLEEDING: A small amount of drainage may occur. If bleeding occurs, elevate and rest the surgery site. Apply gauze and steady pressure for 15 minutes. If bleeding continues, call this office. INFECTION: Signs of infection include increased redness, pain, warmth, drainage of pus, and fever. If this occurs, call this office. 4. Special Instructions (follow any that are checked):  · [x] You have stitches that DO need to be removed. · [x] Avoid bending at the waist and heavy lifting for two days. · [x] Sleep with your head elevated for the next two nights. · [x] Rest the surgery site and keep it elevated as much as possible for two days. · [x] You may apply an ice-pack for 10-15 minutes every waking hour for the rest of the day. · [] Eat a soft diet and avoid hot food and hot drinks for the rest of the day. · [] Other instructions: Follow up as directed. Take Tylenol for pain as needed. Once the site is healed with no remaining bandages or open areas, protect your surgical site and scar from the sun, as this area will be more sensitive. Use a broad spectrum sunscreen SPF 30 or higher daily, and a chemical free product (one containing zinc oxide or titanium dioxide) is a good choice if the area is sensitive. You may begin to gently massage the surgical site in 2-3 weeks, rubbing in a circular motion along the scar. This can help reduce swelling and thickness of a scar.  A scar cream may be used beginnning 1 month after the surgery. If you have any questions or concerns, please call our office Monday through Friday at [phone].

## 2017-09-15 ENCOUNTER — TELEPHONE (OUTPATIENT)
Dept: INTERNAL MEDICINE CLINIC | Age: 77
End: 2017-09-15

## 2017-09-15 NOTE — TELEPHONE ENCOUNTER
Adherence call for Medication Therapy Monitoring    Patient identified as needing adherence counseling regarding her SImvastatin via [employer]. Called patient and confirmed that patient is taking the medication correctly and does not miss any doses. She does not feel that she needs any help in remembering to take this medication at this time. She was able to identify which medication she was taking for cholesterol and how to correctly take the medication.     Claim submitted via [employer] EDGAR Taners, PharmD, BCPS, CDE

## 2017-09-19 ENCOUNTER — OFFICE VISIT (OUTPATIENT)
Dept: DERMATOLOGY | Facility: AMBULATORY SURGERY CENTER | Age: 77
End: 2017-09-19

## 2017-09-19 VITALS
HEART RATE: 61 BPM | TEMPERATURE: 98.6 F | SYSTOLIC BLOOD PRESSURE: 124 MMHG | DIASTOLIC BLOOD PRESSURE: 62 MMHG | WEIGHT: 153 LBS | OXYGEN SATURATION: 99 % | BODY MASS INDEX: 28.16 KG/M2 | HEIGHT: 62 IN | RESPIRATION RATE: 16 BRPM

## 2017-09-19 DIAGNOSIS — Z48.89 ENCOUNTER FOR POSTOPERATIVE WOUND CHECK: Primary | ICD-10-CM

## 2017-09-19 RX ORDER — SODIUM PICOSULFATE, MAGNESIUM OXIDE, AND ANHYDROUS CITRIC ACID 10; 3.5; 12 MG/16.1G; G/16.1G; G/16.1G
POWDER, METERED ORAL
COMMUNITY
Start: 2017-09-18 | End: 2017-11-14 | Stop reason: HOSPADM

## 2017-09-19 NOTE — PROGRESS NOTES
This note was written by Celina Guillen, as dictated by Heriberto Rocha MD.     Wound check/suture removal:    Chief complaint: wound check. HPI: Dannielle Escobar presents for wound check following Mohs surgery performed on 09/14/17 by me. She had a squamous cell carcinoma on her left anterior shin. The surgical site was managed with an intermediate repair. She had moderate pain relieved once the presure bandage was removed on the second day, none since. She will be going on a cruise on 09/22/17. Exam: The surgical site was examined. There is not evidence of infection. There is not erythema. There is not edema. A/P:  Wound check. The surgical site is healing well. Additional care was reviewed. I recommended the use of a thin layer of Vaseline. Follow up will be as needed. She will have suture removal performed on 09/28/17 by the provider on the cruise ship. The documentation recorded by the scribe accurately reflects the service I personally performed and the decisions made by me.

## 2017-09-20 ENCOUNTER — TELEPHONE (OUTPATIENT)
Dept: DERMATOLOGY | Facility: AMBULATORY SURGERY CENTER | Age: 77
End: 2017-09-20

## 2017-09-20 RX ORDER — AZITHROMYCIN 250 MG/1
TABLET, FILM COATED ORAL
Qty: 6 TAB | Refills: 0 | Status: SHIPPED | OUTPATIENT
Start: 2017-09-20 | End: 2017-09-25

## 2017-10-14 RX ORDER — SIMVASTATIN 40 MG/1
TABLET, FILM COATED ORAL
Qty: 90 TAB | Refills: 1 | Status: SHIPPED | OUTPATIENT
Start: 2017-10-14 | End: 2018-03-09 | Stop reason: SDUPTHER

## 2017-10-17 NOTE — PROGRESS NOTES
[address]  Endoscopy Preprocedure Instructions      1. On the day of your surgery, please report to registration located on the 2nd floor of the  [facility]. yes    2. You must have a responsible adult to drive you to the hospital, stay at the hospital during your procedure and drive you home. If they leave your procedure will not be started (no exceptions). yes    3. Do not have anything to eat or drink (including water, gum, mints, coffee, and juice) after midnight. This does not apply to the medications you were instructed to take by your physician. yesIf you are currently taking Plavix, Coumadin, Aspirin, or other blood-thinning agents, contact your physician for special instructions. Yes,Pt stopped dose on 10/14/2017 per her MD  4. If you are having a procedure that requires bowel prep: We recommend that you have only clear liquids the day before your procedure and begin your bowel prep by 5:00 pm.  You may continue to drink clear liquids until midnight. If for any reason you are not able to complete your prep please notify your physician immediately. yes    5. Have a list of all current medications, including vitamins, herbal supplements and any other over the counter medications. yes    6. If you wear glasses, contacts, dentures and/or hearing aids, they may be removed prior to procedure, please bring a case to store them in. yes    7. You should understand that if you do not follow these instructions your procedure may be cancelled. If your physical condition changes (I.e. fever, cold or flu) please contact your doctor as soon as possible. 8. It is important that you be on time.   If for any reason you are unable to keep your appointment please call [phone] the day of or your physicians office prior to your scheduled procedure

## 2017-10-19 ENCOUNTER — ANESTHESIA EVENT (OUTPATIENT)
Dept: ENDOSCOPY | Age: 77
End: 2017-10-19
Payer: MEDICARE

## 2017-10-19 ENCOUNTER — HOSPITAL ENCOUNTER (OUTPATIENT)
Age: 77
Setting detail: OUTPATIENT SURGERY
Discharge: HOME | End: 2017-10-19
Attending: INTERNAL MEDICINE | Admitting: INTERNAL MEDICINE
Payer: MEDICARE

## 2017-10-19 ENCOUNTER — ANESTHESIA (OUTPATIENT)
Dept: ENDOSCOPY | Age: 77
End: 2017-10-19
Payer: MEDICARE

## 2017-10-19 VITALS
HEIGHT: 62 IN | RESPIRATION RATE: 16 BRPM | HEART RATE: 51 BPM | TEMPERATURE: 97.8 F | BODY MASS INDEX: 27.6 KG/M2 | DIASTOLIC BLOOD PRESSURE: 48 MMHG | WEIGHT: 150 LBS | SYSTOLIC BLOOD PRESSURE: 121 MMHG | OXYGEN SATURATION: 100 %

## 2017-10-19 PROCEDURE — 74011250636 HC RX REV CODE- 250/636: Performed by: INTERNAL MEDICINE

## 2017-10-19 PROCEDURE — 74011000250 HC RX REV CODE- 250

## 2017-10-19 PROCEDURE — 74011250637 HC RX REV CODE- 250/637: Performed by: INTERNAL MEDICINE

## 2017-10-19 PROCEDURE — 76040000019: Performed by: INTERNAL MEDICINE

## 2017-10-19 PROCEDURE — 74011250636 HC RX REV CODE- 250/636

## 2017-10-19 PROCEDURE — 76060000031 HC ANESTHESIA FIRST 0.5 HR: Performed by: INTERNAL MEDICINE

## 2017-10-19 RX ORDER — SODIUM CHLORIDE 9 MG/ML
50 INJECTION, SOLUTION INTRAVENOUS CONTINUOUS
Status: DISCONTINUED | OUTPATIENT
Start: 2017-10-19 | End: 2017-10-19 | Stop reason: HOSPADM

## 2017-10-19 RX ORDER — PROPOFOL 10 MG/ML
INJECTION, EMULSION INTRAVENOUS AS NEEDED
Status: DISCONTINUED | OUTPATIENT
Start: 2017-10-19 | End: 2017-10-19 | Stop reason: HOSPADM

## 2017-10-19 RX ORDER — EPINEPHRINE 0.1 MG/ML
1 INJECTION INTRACARDIAC; INTRAVENOUS
Status: DISCONTINUED | OUTPATIENT
Start: 2017-10-19 | End: 2017-10-19 | Stop reason: HOSPADM

## 2017-10-19 RX ORDER — SODIUM CHLORIDE 9 MG/ML
INJECTION, SOLUTION INTRAVENOUS
Status: DISCONTINUED | OUTPATIENT
Start: 2017-10-19 | End: 2017-10-19 | Stop reason: HOSPADM

## 2017-10-19 RX ORDER — NALOXONE HYDROCHLORIDE 0.4 MG/ML
0.4 INJECTION, SOLUTION INTRAMUSCULAR; INTRAVENOUS; SUBCUTANEOUS
Status: DISCONTINUED | OUTPATIENT
Start: 2017-10-19 | End: 2017-10-19 | Stop reason: HOSPADM

## 2017-10-19 RX ORDER — LIDOCAINE HYDROCHLORIDE 20 MG/ML
INJECTION, SOLUTION EPIDURAL; INFILTRATION; INTRACAUDAL; PERINEURAL AS NEEDED
Status: DISCONTINUED | OUTPATIENT
Start: 2017-10-19 | End: 2017-10-19 | Stop reason: HOSPADM

## 2017-10-19 RX ORDER — DEXTROMETHORPHAN/PSEUDOEPHED 2.5-7.5/.8
1.2 DROPS ORAL
Status: DISCONTINUED | OUTPATIENT
Start: 2017-10-19 | End: 2017-10-19 | Stop reason: HOSPADM

## 2017-10-19 RX ORDER — FLUMAZENIL 0.1 MG/ML
0.2 INJECTION INTRAVENOUS
Status: DISCONTINUED | OUTPATIENT
Start: 2017-10-19 | End: 2017-10-19 | Stop reason: HOSPADM

## 2017-10-19 RX ORDER — PROPOFOL 10 MG/ML
INJECTION, EMULSION INTRAVENOUS
Status: DISCONTINUED | OUTPATIENT
Start: 2017-10-19 | End: 2017-10-19 | Stop reason: HOSPADM

## 2017-10-19 RX ORDER — MIDAZOLAM HYDROCHLORIDE 1 MG/ML
.25-5 INJECTION, SOLUTION INTRAMUSCULAR; INTRAVENOUS
Status: DISCONTINUED | OUTPATIENT
Start: 2017-10-19 | End: 2017-10-19 | Stop reason: HOSPADM

## 2017-10-19 RX ORDER — ATROPINE SULFATE 0.1 MG/ML
0.4 INJECTION INTRAVENOUS
Status: DISCONTINUED | OUTPATIENT
Start: 2017-10-19 | End: 2017-10-19 | Stop reason: HOSPADM

## 2017-10-19 RX ADMIN — SODIUM CHLORIDE: 9 INJECTION, SOLUTION INTRAVENOUS at 09:00

## 2017-10-19 RX ADMIN — SIMETHICONE 80 MG: 20 SUSPENSION/ DROPS ORAL at 09:39

## 2017-10-19 RX ADMIN — LIDOCAINE HYDROCHLORIDE 60 MG: 20 INJECTION, SOLUTION EPIDURAL; INFILTRATION; INTRACAUDAL; PERINEURAL at 09:30

## 2017-10-19 RX ADMIN — SODIUM CHLORIDE 50 ML/HR: 900 INJECTION, SOLUTION INTRAVENOUS at 08:50

## 2017-10-19 RX ADMIN — PROPOFOL 100 MG: 10 INJECTION, EMULSION INTRAVENOUS at 09:30

## 2017-10-19 RX ADMIN — PROPOFOL 100 MCG/KG/MIN: 10 INJECTION, EMULSION INTRAVENOUS at 09:30

## 2017-10-19 NOTE — IP AVS SNAPSHOT
Alba Rosas 
 
 
 [address] [place] [address] 
[phone] Patient: Kalpana Pride MRN: [id number] VPE:6/21/9585 You are allergic to the following Allergen Reactions Iron Diarrhea Pcn (Penicillins) Rash PCN only. Can take cephalosporins Sulfacetamide Swelling Recent Documentation Height Weight BMI OB Status Smoking Status 1.575 m 68 kg 27.44 kg/m2 Hysterectomy Former Smoker Emergency Contacts Name Discharge Info Relation Home Work Mobile Joshua Mederos DISCHARGE CAREGIVER [3] Spouse [3] [phone] About your hospitalization You were admitted on:  October 19, 2017 You last received care in the:  [facility] ENDOSCOPY You were discharged on:  October 19, 2017 Unit phone number:  [phone] Why you were hospitalized Your primary diagnosis was:  Not on File Providers Seen During Your Hospitalizations Provider Role Specialty Primary office phone Lonnie Tucker MD Attending Provider Gastroenterology [phone] Your Primary Care Physician (PCP) Primary Care Physician Office Phone Office Fax [facility] [phone] Follow-up Information None Your Appointments Monday November 13, 2017 10:00 AM EST Office Visit with ELIN Kebede [address] [place] [address] [place] [phone] Current Discharge Medication List  
  
CONTINUE these medications which have CHANGED Dose & Instructions Dispensing Information Comments Morning Noon Evening Bedtime  
 famotidine 40 mg tablet Commonly known as:  PEPCID What changed:   
- how much to take 
- how to take this - when to take this 
- additional instructions Your last dose was: Your next dose is: Take 1 tablet by mouth  twice a day Quantity:  180 Tab Refills:  1 CONTINUE these medications which have NOT CHANGED Dose & Instructions Dispensing Information Comments Morning Noon Evening Bedtime BYSTOLIC 5 mg tablet Generic drug:  nebivolol Your last dose was: Your next dose is: Take 1 tablet by mouth  daily for blood pressure ,  pulse, fatigue (replaces  atenolol) Quantity:  90 Tab Refills:  1  
     
   
   
   
  
 clopidogrel 75 mg Tab Commonly known as:  PLAVIX Your last dose was: Your next dose is: TAKE 1 TABLET EVERY DAY Quantity:  90 Tab Refills:  1  
     
   
   
   
  
 ergocalciferol 50,000 unit capsule Commonly known as:  ERGOCALCIFEROL Your last dose was: Your next dose is: Take 1 capsule by mouth  every 7 days Quantity:  13 Cap Refills:  3  
     
   
   
   
  
 levothyroxine 75 mcg tablet Commonly known as:  SYNTHROID Your last dose was: Your next dose is: TAKE 1 TABLET BY MOUTH  DAILY BEFORE BREAKFAST Quantity:  90 Tab Refills:  1 MUCINEX PO Your last dose was: Your next dose is: Take  by mouth daily. Refills:  0 MULTIVITAMIN PO Your last dose was: Your next dose is:    
   
   
  Refills:  0 PREPOPIK 10 mg-3.5 gram-12 gram Pwpk Generic drug:  sod picosulf-mag ox-citric ac Your last dose was: Your next dose is:    
   
   
  Refills:  0  
     
   
   
   
  
 RESTASIS 0.05 % ophthalmic emulsion Generic drug:  cycloSPORINE Your last dose was: Your next dose is:    
   
   
  Refills:  0  
     
   
   
   
  
 simvastatin 40 mg tablet Commonly known as:  ZOCOR Your last dose was: Your next dose is: TAKE 1 TABLET BY MOUTH  NIGHTLY Quantity:  90 Tab Refills:  1 TYLENOL ARTHRITIS PAIN 650 mg CR tablet Generic drug:  acetaminophen Your last dose was: Your next dose is:    
   
   
 Dose:  650 mg Take 650 mg by mouth every six (6) hours as needed. Refills:  0 ZyrTEC 10 mg tablet Generic drug:  cetirizine Your last dose was: Your next dose is: Take  by mouth daily. Refills:  0 Discharge Instructions [facility]. [facility] [place] M.D. 
[phone] COLON DISCHARGE INSTRUCTIONS 
    
10/19/2017 Dalton Quintanilla :  1940 [facility] Medical Record Number:  [id number] COLONOSCOPY FINDINGS: 
Your colonoscopy showed: Mild diverticulosis in the sigmoid colon and small internal hemorrhoids, otherwise appeared within normal. 
 
DISCOMFORT: 
Redness at IV site- apply warm compress to area; if redness or soreness persist- contact your physician There may be a slight amount of blood passed from the rectum Gaseous discomfort- walking, belching will help relieve any discomfort You may not operate a vehicle for 12 hours You may not engage in an occupation involving machinery or appliances for rest of today You may not drink alcoholic beverages for at least 12 hours Avoid making any critical decisions for at least 24 hour DIET: 
 High fiber diet.  however -  remember your colon is empty and a heavy meal will produce gas. Avoid these foods:  vegetables, fried / greasy foods, carbonated drinks for today ACTIVITY: 
You may resume your normal daily activities it is recommended that you spend the remainder of the day resting -  avoid any strenuous activity. CALL M.D. ANY SIGN OF: Increasing pain, nausea, vomiting Abdominal distension (swelling) New increased bleeding (oral or rectal) Fever (chills) Pain in chest area Bloody discharge from nose or mouth Shortness of breath Follow-up Instructions: 
 Call Dr. Steele Has if any questions or problems. Telephone # [phone] Should have a repeat colonoscopy in 5 years only if in good health and willing to undergo colonoscopy. Discharge Orders None [facility]! Dear Pam Curran: 
Thank you for requesting a [employer] account. Our records indicate that you already have an active [employer] account. You can access your account anytime at [email or web address] Did you know that you can access your hospital and ER discharge instructions at any time in [employer]? You can also review all of your test results from your hospital stay or ER visit. Additional Information If you have questions, please visit the Frequently Asked Questions section of the [employer] website at [email or web address] Remember, [employer] is NOT to be used for urgent needs. For medical emergencies, dial 911. Now available from your iPhone and Android! General Information Please provide this summary of care documentation to your next provider. Patient Signature:  ____________________________________________________________ Date:  ____________________________________________________________  
  
Marga Artist Provider Signature:  ____________________________________________________________ Date:  ____________________________________________________________

## 2017-10-19 NOTE — ANESTHESIA POSTPROCEDURE EVALUATION
Post-Anesthesia Evaluation and Assessment    Patient: Lucy Nguyen MRN: [id number]  SSN: [id number]    YOB: 1940  Age: 69 y.o. Sex: female       Cardiovascular Function/Vital Signs  Visit Vitals    /55    Pulse (!) 52    Temp 36.6 °C (97.8 °F)    Resp 17    Ht 5' 2\" (1.575 m)    Wt 68 kg (150 lb)    SpO2 100%    BMI 27.44 kg/m2       Patient is status post MAC anesthesia for Procedure(s):  COLONOSCOPY. Nausea/Vomiting: None    Postoperative hydration reviewed and adequate. Pain:  Pain Scale 1: Numeric (0 - 10) (10/19/17 0958)  Pain Intensity 1: 0 (10/19/17 4627)   Managed    Neurological Status: At baseline    Mental Status and Level of Consciousness: Arousable    Pulmonary Status:   O2 Device: Room air (10/19/17 0958)   Adequate oxygenation and airway patent    Complications related to anesthesia: None    Post-anesthesia assessment completed.  No concerns    Signed By: Latrice Power MD     October 19, 2017

## 2017-10-19 NOTE — PERIOP NOTES
Received report from [facility]. See anesthesia record. ABD remains soft and non-tender post procedure. Pt has no complaints at this time and tolerated the procedure well.

## 2017-10-19 NOTE — ANESTHESIA PREPROCEDURE EVALUATION
Anesthetic History   No history of anesthetic complications            Review of Systems / Medical History  Patient summary reviewed, nursing notes reviewed and pertinent labs reviewed    Pulmonary  Within defined limits                 Neuro/Psych   Within defined limits           Cardiovascular    Hypertension                   GI/Hepatic/Renal     GERD           Endo/Other      Hypothyroidism  Arthritis     Other Findings            Physical Exam    Airway  Mallampati: II  TM Distance: 4 - 6 cm  Neck ROM: normal range of motion   Mouth opening: Normal     Cardiovascular    Rhythm: regular  Rate: normal         Dental  No notable dental hx       Pulmonary  Breath sounds clear to auscultation               Abdominal         Other Findings            Anesthetic Plan    ASA: 2  Anesthesia type: MAC            Anesthetic plan and risks discussed with: Patient

## 2017-10-19 NOTE — PROGRESS NOTES
Sujatha Gee  1940  [id number]    Situation:  Verbal report received from:   Karina Chery RN  Procedure: Procedure(s):  COLONOSCOPY    Background:    Preoperative diagnosis: HX OF COLON POLYPS  Postoperative diagnosis: hemorrhoids    :  Dr. Wilbert Tomlinson  Assistant(s): Endoscopy Technician-1: Lan Real  Endoscopy RN-1: Nicki Gloria RN    Specimens: * No specimens in log *  H. Pylori  no    Assessment:  Intra-procedure medications      Anesthesia gave intra-procedure sedation and medications, see anesthesia flow sheet yes    Intravenous fluids: NS@ KVO     Vital signs stable   yes    Abdominal assessment: round and soft   yes    Recommendation:  Discharge patient per MD order  yes.   Return to floor  outpatient  Family or Friend   spouse  Permission to share finding with family or friend yes

## 2017-10-19 NOTE — H&P
The patient is a 69year old female who presents with a complaint of Colon Polyp(s). Reason for encounter: consultation at the request of Dr. Efren Rodriguez). Note for \"Colon Polyp(s)\": She is referred by   [facility] to discuss polyp surveillance and schedule colonoscopy. Her last colonoscopy was in 2010 by Dr. Ector Thornton and had one polyp removed. She is on clopidogrel for history of CVA. She denies any bleeding or abdominal pain, she denies any weight loss or change in bowel habits. She has acid reflux symptoms that are controlled at the present time with famotidine. She denies any dysphagia or odynophagia, denies nausea or vomiting and denies any black stools. She had EGD in 2010 by Dr. Ector Thornton that showed a hiatal hernia. Problem List/Past Medical (Clay Burch; 9/18/2017 10:50 AM)  Hypertension    Hypercholesterolemia    Thyroid Disease      Past Surgical History (Clay Burch; 9/18/2017 10:50 AM)  Tonsillectomy    Hysterectomy      Allergies (Justin Romissyof; 9/18/2017 10:50 AM)  Penicillins    Sulfa Drugs    Iron (Ferrous Gluconate) *HEMATOPOIETIC AGENTS*      Medication History (Justin Kaufman; 9/18/2017 10:51 AM)  Levothyroxine Sodium  (88MCG Tablet, Oral) Active. Clopidogrel Bisulfate  (75MG Tablet, Oral) Active. Famotidine  (40MG Tablet, Oral) Active. Vitamin D2  (Oral once a week) Specific strength unknown - Active. Bystolic  (5MG Tablet, Oral) Active. Simvastatin  (40MG Tablet, Oral) Active. Restasis  (0.05% Emulsion, Ophthalmic) Active. Medications Reconciled     Family History (Clay Burch; 9/18/2017 10:51 AM)  Non-Contributory Family History      Social History (Clay Burch; 9/18/2017 10:50 AM)  Tobacco Use   Never smoker. Alcohol Use   Moderate alcohol use. Marital status   . Employment status   Retired.   Blood Transfusion   No.    Diagnostic Studies History (Clay Burch; 9/18/2017 10:50 AM)  Colonoscopy      Health Maintenance History (Clay Burch; 9/18/2017 10:50 AM)  Flu Vaccine  [2016]:  Pneumovax   did not know year        Review of Systems (Bobo Loera; 9/18/2017 10:52 AM)  General Not Present- Chronic Fatigue, Poor Appetite, Weight Gain and Weight Loss. Skin Not Present- Itching, Rash and Skin Color Changes. HEENT Not Present- Hearing Loss and Vertigo. Respiratory Not Present- Difficulty Breathing and TB exposure. Cardiovascular Not Present- Chest Pain, Use of Antibiotics before Dental Procedures and Use of Blood Thinners. Gastrointestinal Present- See HPI. Musculoskeletal Not Present- Arthritis, Hip Replacement Surgery and Knee Replacement Surgery. Neurological Not Present- Weakness. Psychiatric Not Present- Depression. Endocrine Not Present- Diabetes and Thyroid Problems. Hematology Not Present- Anemia. Vitals (Justin Kaufman; 9/18/2017 10:50 AM)  9/18/2017 10:48 AM  Weight: 154 lb   Height: 61 in   Body Surface Area: 1.69 m²   Body Mass Index: 29.1 kg/m²    Temp.: 97.5° F (Temporal)    Pulse: 60 (Regular)    Resp. : 16 (Unlabored)     BP: 152/72 (Sitting, Left Arm, Standard)              Physical Exam (Tomasz Newby MD; 9/22/2017 8:57 PM)  General  Mental Status - Alert. General Appearance - Cooperative, Pleasant, Not in acute distress. Orientation - Oriented X3. Build & Nutrition - Well nourished and Well developed. Integumentary  General Characteristics  Overall examination of the patient's skin reveals - no rashes, no bruises and no spider angiomas. Color - normal coloration of skin. Head and Neck  Neck  Global Assessment - full range of motion and supple, no bruit auscultated on the right, no bruit auscultated on the left, non-tender, no lymphadenopathy. Thyroid  Gland Characteristics - normal size and consistency. Eye  Eyeball - Left - No Exophthalmos. Eyeball - Right - No Exophthalmos. Sclera/Conjunctiva - Left - No Jaundice. Sclera/Conjunctiva - Right - No Jaundice.     Chest and Lung Exam  Chest and lung exam reveals  - quiet, even and easy respiratory effort with no use of accessory muscles. Auscultation  Breath sounds - Normal. Adventitious sounds - No Adventitious sounds. Cardiovascular  Auscultation  Rhythm - Regular, No Tachycardia, No Bradycardia . Heart Sounds - Normal heart sounds , S1 WNL and S2 WNL, No S3, No Summation Gallop. Murmurs & Other Heart Sounds - Auscultation of the heart reveals - No Murmurs. Abdomen  Inspection  Inspection of the abdomen reveals - Non-distended. Palpation/Percussion  Tenderness - Non-Tender. Rebound tenderness - No rebound. Liver - No hepatosplenomegaly. Abdominal Mass Palpable - No masses. Other Characteristics - No Ascites. Organomegally - None. Auscultation  Auscultation of the abdomen reveals - Bowel sounds normal, No Abdominal bruits and No Succussion splash. Rectal - Did not examine. Peripheral Vascular  Upper Extremity  Inspection - Left - Normal - No Clubbing, No Cyanosis, No Edema, Pulses Intact. Right - Normal - No Clubbing, No Cyanosis, No Edema, Pulses Intact. Palpation - Edema - Left - No edema. Right - No edema. Lower Extremity  Inspection - Left - Inspection Normal. Right - Inspection Normal. Palpation - Edema - Left - No edema. Right - No edema. Neurologic  Neurologic evaluation reveals  - Cranial nerves grossly intact, no focal neurologic deficits. Motor  Involuntary Movements - Asterixis - not present. Musculoskeletal  Global Assessment  Gait and Station - normal gait and station. Assessment & Plan (Tomasz Radford MD; 9/22/2017 8:59 PM)  History of colonic polyps (V12.72  Z86.010)  Impression: Had one polyp removed in 2010 and is due for repeat colonoscopy. Wants to use Prepopik. Current Plans  Discussed the risks and benefits of colonoscopy with the patient.   COLONOSCOPY, DIAGNOSTIC (16975) (Discussed risks and benefits with the patient to include:; perforation, post polypectomy, or post biopsy bleeding, missed lesions, and sedation reactions.)  Started Prepopik [id number]-GM, 1 (one) Packet Once, 1 Packet, 1 day starting 09/18/2017, No Refill. Local Order: Follow prep instructions  Cerebrovascular disease (437.9  I67.9)  Impression: Instructed to hold Clopidogrel 5 days before the procedure and check if ok with Dr. Sourav Vanegas and Dr. Wayne Jiménez. Esophageal reflux (530.81  K21.9)  Impression: Dietary and lifestyle recommendations were explained in detail and reinforced. Continue with prn famotidine. Current Plans  Pt Education - How to access health information online: discussed with patient and provided information. Patient is to call me for any questions or concerns.

## 2017-10-19 NOTE — DISCHARGE INSTRUCTIONS
[address] [facility]. Jada Brush M.D.  [phone]            COLON DISCHARGE INSTRUCTIONS       10/19/2017    Dalton Quintanilla  :  1940  [place] Medical Record Number:  [id number]      COLONOSCOPY FINDINGS:  Your colonoscopy showed: Mild diverticulosis in the sigmoid colon and small internal hemorrhoids, otherwise appeared within normal.    DISCOMFORT:  Redness at IV site- apply warm compress to area; if redness or soreness persist- contact your physician  There may be a slight amount of blood passed from the rectum  Gaseous discomfort- walking, belching will help relieve any discomfort  You may not operate a vehicle for 12 hours  You may not engage in an occupation involving machinery or appliances for rest of today  You may not drink alcoholic beverages for at least 12 hours  Avoid making any critical decisions for at least 24 hour  DIET:   High fiber diet. - however -  remember your colon is empty and a heavy meal will produce gas. Avoid these foods:  vegetables, fried / greasy foods, carbonated drinks for today     ACTIVITY:  You may resume your normal daily activities it is recommended that you spend the remainder of the day resting -  avoid any strenuous activity. CALL M.D. ANY SIGN OF:   Increasing pain, nausea, vomiting  Abdominal distension (swelling)  New increased bleeding (oral or rectal)  Fever (chills)  Pain in chest area  Bloody discharge from nose or mouth   Shortness of breath    Follow-up Instructions:   Call Dr. Jay Jade if any questions or problems. Telephone # [phone]  Should have a repeat colonoscopy in 5 years only if in good health and willing to undergo colonoscopy.

## 2017-10-19 NOTE — PROCEDURES
Russell Lebron M.D.  [phone]            10/19/2017          Colonoscopy Operative Report  Whit Barr  :  1940  [place] Medical Record Number:  [id number]      Indications:    Personal history of colon polyps (screening only)     :  Wendy Samson MD    Referring Provider: Jamie Ventura MD    Sedation:  MAC anesthesia    Pre-Procedural Exam:      Airway: clear,  No airway problems anticipated  Heart: RRR, without gallops or rubs  Lungs: clear bilaterally without wheezes, crackles, or rhonchi  Abdomen: soft, nontender, nondistended, bowel sounds present  Mental Status: awake, alert and oriented to person, place and time     Procedure Details:  After informed consent was obtained with all risks and benefits of procedure explained and preoperative exam completed, the patient was taken to the endoscopy suite and placed in the left lateral decubitus position. Upon sequential sedation as per above, a digital rectal exam was performed. The Olympus videocolonoscope  was inserted in the rectum and carefully advanced to the cecum, which was identified by the ileocecal valve and appendiceal orifice. The quality of preparation was good. The colonoscope was slowly withdrawn with careful inspection and evaluation between folds. Retroflexion in the rectum was performed. Findings:   Terminal Ileum: not intubated  Cecum: normal  Ascending Colon: normal  Transverse Colon: normal  Descending Colon: normal  Sigmoid: normal, mild diverticulosis  Rectum: no mucosal lesion appreciated  Grade 1 internal hemorrhoid(s); Interventions:  none    Specimen Removed:  none    Complications: None. EBL:  None. Impression:  Mild sigmoid diverticulosis, otherwise mucosa within normal throughout the colon. Small internal hemorrhoids    Recommendations:  -Repeat colonoscopy in 5 years, only if in good health and willing to undergo colonoscopy   -High fiber diet. -Resume normal medication(s). Discharge Disposition:  Home in the company of a  when able to ambulate.     Britney Cerrato MD  10/19/2017  9:46 AM

## 2017-11-13 ENCOUNTER — HOSPITAL ENCOUNTER (OUTPATIENT)
Dept: LAB | Age: 77
Discharge: HOME | End: 2017-11-13

## 2017-11-13 ENCOUNTER — OFFICE VISIT (OUTPATIENT)
Dept: DERMATOLOGY | Facility: AMBULATORY SURGERY CENTER | Age: 77
End: 2017-11-13

## 2017-11-13 VITALS
HEART RATE: 56 BPM | OXYGEN SATURATION: 99 % | WEIGHT: 151 LBS | RESPIRATION RATE: 18 BRPM | TEMPERATURE: 98.4 F | HEIGHT: 62 IN | SYSTOLIC BLOOD PRESSURE: 120 MMHG | DIASTOLIC BLOOD PRESSURE: 70 MMHG | BODY MASS INDEX: 27.79 KG/M2

## 2017-11-13 DIAGNOSIS — L90.5 SCAR CONDITION AND FIBROSIS OF SKIN: ICD-10-CM

## 2017-11-13 DIAGNOSIS — Z85.820 PERSONAL HISTORY OF MALIGNANT MELANOMA OF SKIN: ICD-10-CM

## 2017-11-13 DIAGNOSIS — D48.5 NEOPLASM OF UNCERTAIN BEHAVIOR OF SKIN OF BACK: Primary | ICD-10-CM

## 2017-11-13 DIAGNOSIS — L57.0 ACTINIC KERATOSIS: ICD-10-CM

## 2017-11-13 DIAGNOSIS — L81.4 LENTIGINES: ICD-10-CM

## 2017-11-13 DIAGNOSIS — Z85.828 HISTORY OF NONMELANOMA SKIN CANCER: ICD-10-CM

## 2017-11-13 DIAGNOSIS — Z85.828 FOLLOW-UP SURVEILLANCE OF SKIN CANCER, ENCOUNTER FOR: ICD-10-CM

## 2017-11-13 DIAGNOSIS — L82.1 SEBORRHEIC KERATOSES: ICD-10-CM

## 2017-11-13 DIAGNOSIS — Z08 FOLLOW-UP SURVEILLANCE OF SKIN CANCER, ENCOUNTER FOR: ICD-10-CM

## 2017-11-13 NOTE — PROGRESS NOTES
Name: Yesi Connelly       Age: 69 y.o. Date: 11/13/2017    Chief Complaint:   Chief Complaint   Patient presents with    Skin Exam       Subjective:    HPI  Ms. Yesi Connelly is a 69 y.o. female who presents for a full skin exam.  The patient's last skin exam was about one year ago with Dr. Chandni Melara at [facility]. I saw her here in August for a lesion on the left lower leg which was biopsy proven SCC. The patient does have current complaints related to her skin. She notes various scaly areas on the face. She has is aware of a new lesion that is a bump on the right shin. She also states while on her last cruise she developed irritation around the lips and the right commissure actually cracked open. The patient's pertinent skin history includes : multiple NMSC and melanoma x 2 - one on the right upper arm and one on the left thigh. AK with light therapy in the past, cryotherapy and 5-Fu (severe reaction around eyes). ROS: Constitutional: Negative. Dermatological : positive for - skin lesion changes      Social History     Social History    Marital status:      Spouse name: Brenda Mosley Number of children: 2    Years of education: N/A     Occupational History    Not on file. Social History Main Topics    Smoking status: Former Smoker     Packs/day: 1.00    Smokeless tobacco: Former User     Quit date: 1/1/1990    Alcohol use 0.6 oz/week     1 Glasses of wine per week      Comment: 3-4 x a week    Drug use: No    Sexual activity: No     Other Topics Concern    Not on file     Social History Narrative       Family History   Problem Relation Age of Onset    Cancer Mother     Cancer Father     Hypertension Brother     Cancer Brother      myeloma       Past Medical History:   Diagnosis Date    Basal cell carcinoma 7/2012, 7/2015    Dr. Juan Interiano.   saw Dr. Mirian Bardales cough     Colon polyp 5/2010    Dr. Justin Alexander, tubular adenoma    Diverticulitis of colon 5/2010    dx on colonoscopy by Dr. Sheila Hernandez. Took flagyl, cipro    DJD (degenerative joint disease), lumbar `    L5?  Endometr hyperplasia w/atypia 1997    Family history of skin cancer     Brother - melanoma    GERD (gastroesophageal reflux disease) 5/2010    H/O bone density study 2000?    normal per pt.  Hematuria     hx stones and \"nephritis\" since childhood    Hiatal hernia 5/2010    Hypertension 1996    Hypomagnesemia     level was 1.5 on mg ox 6/2012    Hypothyroidism 2008    TSH 1.1 6/7/12    Iron deficiency anemia     EGD, colon 5/2010.  hgb 11.3 6/2012    Melanoma ([facility] [address] 12/2011    Dr. Boris Kimble.  right shoulder, left thigh. Dr. [facility] [place] Mixed hyperlipidemia      Tg 166 LDL 87 HDL55 6/2012    Nephrolithiasis     Stroke ([facility] [address] 7/06/07    R facial droop and R weakness. completely resolved.  Sun-damaged skin     Sunburn, blistering     Vitamin D deficiency     improved to 35.3 6/2012    Zoster        Past Surgical History:   Procedure Laterality Date    COLONOSCOPY N/A 10/19/2017    COLONOSCOPY performed by Janel Clark MD at [address] [facility] HX COLONOSCOPY  5/2010    diverticuLITIS, tubular adenoma  Dr. Nascimento June  GI  5/2010    EGD  - hiatal hernia. Dr. Nascimento June HX [address]  09/14/2017    SCC L anterior mujica by Dr. Cindy Vazquez    endometrial pre-cancer on biopsy    HX TONSILLECTOMY         Current Outpatient Prescriptions   Medication Sig Dispense Refill    simvastatin (ZOCOR) 40 mg tablet TAKE 1 TABLET BY MOUTH  NIGHTLY 90 Tab 1    MULTIVITAMIN PO       GUAIFENESIN (MUCINEX PO) Take  by mouth daily.       clopidogrel (PLAVIX) 75 mg tab TAKE 1 TABLET EVERY DAY 90 Tab 1    levothyroxine (SYNTHROID) 75 mcg tablet TAKE 1 TABLET BY MOUTH  DAILY BEFORE BREAKFAST 90 Tab 1    BYSTOLIC 5 mg tablet Take 1 tablet by mouth  daily for blood pressure ,  pulse, fatigue (replaces  atenolol) 90 Tab 1    ergocalciferol (ERGOCALCIFEROL) 50,000 unit capsule Take 1 capsule by mouth every 7 days 13 Cap 3    famotidine (PEPCID) 40 mg tablet Take 1 tablet by mouth  twice a day (Patient taking differently: Take 40 mg by mouth daily. Take 1 tablet by mouth  twice a day) 180 Tab 1    RESTASIS 0.05 % ophthalmic emulsion       cetirizine (ZYRTEC) 10 mg tablet Take  by mouth daily.  acetaminophen (TYLENOL ARTHRITIS PAIN) 650 mg CR tablet Take 650 mg by mouth every six (6) hours as needed.  PREPOPIK 10 mg-3.5 gram-12 gram pwpk          Allergies   Allergen Reactions    Iron Diarrhea    Pcn [Penicillins] Rash     PCN only. Can take cephalosporins    Sulfacetamide Swelling         Objective:    Visit Vitals    /70 (BP 1 Location: Right arm, BP Patient Position: Sitting)    Pulse (!) 56    Temp 98.4 °F (36.9 °C) (Oral)    Resp 18    Ht 5' 2\" (1.575 m)    Wt 68.5 kg (151 lb)    SpO2 99%    BMI 27.62 kg/m2       Mundo Linton is a 69 y.o. female who appears well and in no distress. She is awake, alert, and oriented. There is no preauricular, submandibular, or cervical lymphadenopathy. A skin examination was performed including her scalp, face (including eyelids), ears, neck, chest, back, abdomen, upper extremities (including digits/nails), lower extremities, breasts, buttocks; genital skin was not examined. She has fair skin and light eyes. There are lentigines on sun exposed areas. She has diffuse thin scaled actinic keratoses noted on her forehead, eyebrows, cheeks, temples. There are more isolated actinic keratoses there are thin scaled as well on her chest, hands and upper back. She has multiple well-healed scars including one on the right upper arm and left thigh where her melanomas removed. There are no evidence of nodularity or pigment to suggest lesion recurrence in the scar surrounding skin. She has a well-healed scar on the left lateral calf where the most recent squamous cell carcinoma was removed without evidence of recurrence as well.   There are scattered cherry angiomas and seborrheic keratoses. There is a confluently brown irregular shaped macule on the right upper back which was photographed for further comparison. On the left upper back there is a pink, partially ulcerated macule with surrounding brown rim- differentials include basal cell carcinoma versus traumatized seborrheic keratosis. Right upper back - monitor      Left upper back, biopsied today    Assessment/Plan:  1. Neoplasm of Uncertain Behavior, left upper back. The differential diagnoses were discussed. A shave biopsy was advised to sample this lesion. The procedure was reviewed and verbal and written consent were obtained. The risks of pain, bleeding, infection, and scar were discussed. The patient is aware that this is a sample and is intended for diagnosis and not therapy of the skin lesion. I performed the procedure. The site was cleansed and anesthetized with 1% Lidocaine with Epinephrine 1:100,000. A shave biopsy was performed to sample the lesion. Drysol was used for hemostasis. The wound was bandaged and care reviewed. The specimen was sent to pathology. I will contact the patient with the results and any further treatment that may be necessary. Curettage if positive. 2. Actinic Keratoses. The diagnosis of this precancerous lesion related to sun exposure was reviewed. Verbal consent was obtained. I treated 10 lesions with cryotherapy and post-cryotherapy care was reviewed. She will return for pdt on the face. (90 min incubation, day 0, 4 wks and 6 months recommended). 3. Seborrheic keratoses. The diagnosis was reviewed and the patient was reassured that no treatment is needed for these benign lesions. 4. Personal history of skin cancer, melanoma and non melanoma skin cancers. I discussed sun protection, sunscreen use, the warning signs of skin cancer, the need for self-skin examinations, and the need for regular practitioner exams every 6 months.   The patient should follow up sooner as needed if new, changing, or symptomatic skin lesions arise. 5.Cherry angiomas. The diagnosis was reviewed and the patient was reassured that no treatment is needed for these benign lesions. 6.Solar lentigos. The diagnosis and relationship to sun exposure was reviewed. Sun protection advised. [facility]   OFFICE PROCEDURE PROGRESS NOTE   Chart reviewed for the following:   Blayne DEL TORO, [address], have reviewed the History, Physical and updated the Allergic reactions for Dagoberto Calvert. TIME OUT performed immediately prior to start of procedure:   Jodie DEL TORO, have performed the following reviews on Dagoberto Calvert   prior to the start of the procedure:     * Patient was identified by name and date of birth   * Agreement on procedure being performed was verified   * Risks and Benefits explained to the patient   * Procedure site verified and marked as necessary   * Patient was positioned for comfort   * Consent was signed and verified     Time: 1100  Date of procedure: 11/13/2017  Procedure performed by: Caryn Acosta DNP  Provider assisted by: Arely Braswell   Patient assisted by: self   How tolerated by patient: tolerated the procedure well with no complications   Comments: none

## 2017-11-13 NOTE — PROGRESS NOTES
Chief Complaint   Patient presents with    Skin Exam     1. Have you been to the ER, urgent care clinic since your last visit? Hospitalized since your last visit? No    2. Have you seen or consulted any other health care providers outside of the [address] [place] [address] since your last visit? Include any pap smears or colon screening.  No

## 2017-11-13 NOTE — MR AVS SNAPSHOT
Visit Information Date & Time Provider Department Dept. Phone Encounter #  
 11/13/2017 10:00 AM Ruben Higgins, [address] [phone] [id number] Your Appointments 11/14/2017  9:30 AM  
ROUTINE CARE with Sandra Bennett MD  
[facility] [address]) Appt Note: f/u  
 [address] [facility] 99 [id number] [phone]  
  
   
 [address] [place] [id number] Upcoming Health Maintenance Date Due DTaP/Tdap/Td series (1 - Tdap) 1/2/2011 MEDICARE YEARLY EXAM 7/14/2017 Influenza Age 5 to Adult 8/1/2017 GLAUCOMA SCREENING Q2Y 6/10/2018 COLONOSCOPY 10/19/2027 Allergies as of 11/13/2017  Review Complete On: 11/13/2017 By: Alden Gonzáles Severity Noted Reaction Type Reactions Iron  09/14/2012    Diarrhea Pcn [Penicillins]  07/19/2012   Systemic Rash PCN only. Can take cephalosporins Sulfacetamide  07/19/2012    Swelling Current Immunizations  Reviewed on 12/14/2015 Name Date Hep A Vaccine 1/3/2010 Hepatitis B Vaccine 1/1/2010 Influenza High Dose Vaccine PF 10/13/2016, 10/30/2015, 10/14/2013 Influenza Vaccine 9/23/2014 Influenza Vaccine Whole 10/1/2012 Pneumococcal Conjugate (PCV-13) 7/27/2016 TD Vaccine 1/1/2011 ZZZ-RETIRED (DO NOT USE) Pneumococcal Vaccine (Unspecified Type) 6/29/2010 Not reviewed this visit Vitals BP Pulse Temp Resp Height(growth percentile) Weight(growth percentile) 120/70 (BP 1 Location: Right arm, BP Patient Position: Sitting) (!) 56 98.4 °F (36.9 °C) (Oral) 18 5' 2\" (1.575 m) 151 lb (68.5 kg) SpO2 BMI OB Status Smoking Status 99% 27.62 kg/m2 Hysterectomy Former Smoker BMI and BSA Data Body Mass Index Body Surface Area  
 27.62 kg/m 2 1.73 m 2 Preferred Pharmacy Pharmacy Name Phone [address] [place] [address] [phone] Your Updated Medication List  
  
   
This list is accurate as of: 11/13/17 10:03 AM.  Always use your most recent med list.  
  
  
  
  
 BYSTOLIC 5 mg tablet Generic drug:  nebivolol Take 1 tablet by mouth  daily for blood pressure ,  pulse, fatigue (replaces  atenolol) clopidogrel 75 mg Tab Commonly known as:  PLAVIX TAKE 1 TABLET EVERY DAY  
  
 ergocalciferol 50,000 unit capsule Commonly known as:  ERGOCALCIFEROL Take 1 capsule by mouth  every 7 days  
  
 famotidine 40 mg tablet Commonly known as:  PEPCID Take 1 tablet by mouth  twice a day  
  
 levothyroxine 75 mcg tablet Commonly known as:  SYNTHROID  
TAKE 1 TABLET BY MOUTH  DAILY BEFORE BREAKFAST MUCINEX PO Take  by mouth daily. MULTIVITAMIN PO  
  
 PREPOPIK 10 mg-3.5 gram-12 gram Pwpk Generic drug:  sod picosulf-mag ox-citric ac RESTASIS 0.05 % ophthalmic emulsion Generic drug:  cycloSPORINE  
  
 simvastatin 40 mg tablet Commonly known as:  ZOCOR  
TAKE 1 TABLET BY MOUTH  NIGHTLY  
  
 TYLENOL ARTHRITIS PAIN 650 mg Tber Generic drug:  acetaminophen Take 650 mg by mouth every six (6) hours as needed. ZyrTEC 10 mg tablet Generic drug:  cetirizine Take  by mouth daily. Patient Instructions Self Skin Exam and Sunscreens Early detection and treatment is essential in the treatment of all forms of skin cancer. If caught early, all forms of skin cancer are curable. In addition to your regular visits, you should perform a monthly skin examination. Over time, you become familiar with what is normally found on your skin and can identify new or suspicious spots. One of the screening tools you can use to assess your skin is to follow the ABCDEs: 
 
A= Asymmetry (One half is unlike the other half) B= Border (An irregular, scalloped or poorly defined edge) C= Color (Is varied from one area to another, has shades of tan, brown/ black,       white, red or blue) D= Diameter (Spots larger than 6mm or a pencil eraser) E= Evolving (New spots or one that is changing in size, shape, or color) A follow- up interval will be customized based on your history of skin cancer or level of skin damage and risk factors. In any case, if you notice a suspicious or new spot, an appointment should be arranged between regular visits. Everyone should use sunscreen and sun-safe practices, which is especially important for those with a personal or family history of skin cancer. Suggestions for this include: 1. Use daily moisturizers containing SPF 30 or higher. 2. Wear long sleeve clothing with UPF ratings and a broad-brimmed hat. 3. Apply sunscreen with SPF 30 or higher to all sun exposed areas if you are going to be in the sun. A broad spectrum UVA/ UVB sunscreen is best.  Dont forget to REAPPLY every two hours or more often if swimming or sweating! 4. Avoid outside activities during peak sun hours, especially in the summer (10am- 2pm). 5. DO NOT use tanning beds. Using sunscreen and sun-safe practices can help reduce the likelihood of developing skin cancer or additional skin cancers in those previously diagnosed. Introducing [facility] & HEALTH SERVICES! Dear Stone Mantilla: 
Thank you for requesting a [employer] account. Our records indicate that you already have an active [employer] account. You can access your account anytime at [email or web address] Did you know that you can access your hospital and ER discharge instructions at any time in [employer]? You can also review all of your test results from your hospital stay or ER visit. Additional Information If you have questions, please visit the Frequently Asked Questions section of the [employer] website at [email or web address] Remember, [employer] is NOT to be used for urgent needs. For medical emergencies, dial 911. Now available from your iPhone and Android! Please provide this summary of care documentation to your next provider. Your primary care clinician is listed as Uche Hernandez. If you have any questions after today's visit, please call [phone].

## 2017-11-14 ENCOUNTER — OFFICE VISIT (OUTPATIENT)
Dept: INTERNAL MEDICINE CLINIC | Age: 77
End: 2017-11-14

## 2017-11-14 VITALS
HEIGHT: 62 IN | BODY MASS INDEX: 28.49 KG/M2 | HEART RATE: 53 BPM | RESPIRATION RATE: 12 BRPM | DIASTOLIC BLOOD PRESSURE: 58 MMHG | TEMPERATURE: 97.8 F | SYSTOLIC BLOOD PRESSURE: 140 MMHG | WEIGHT: 154.8 LBS

## 2017-11-14 DIAGNOSIS — E78.2 MIXED HYPERLIPIDEMIA: ICD-10-CM

## 2017-11-14 DIAGNOSIS — Z00.00 MEDICARE ANNUAL WELLNESS VISIT, SUBSEQUENT: Primary | ICD-10-CM

## 2017-11-14 DIAGNOSIS — C43.60 MALIGNANT MELANOMA OF UPPER EXTREMITY, INCLUDING SHOULDER, UNSPECIFIED LATERALITY (HCC): ICD-10-CM

## 2017-11-14 DIAGNOSIS — N32.81 OAB (OVERACTIVE BLADDER): ICD-10-CM

## 2017-11-14 DIAGNOSIS — E55.9 VITAMIN D DEFICIENCY: ICD-10-CM

## 2017-11-14 DIAGNOSIS — I10 ESSENTIAL HYPERTENSION: ICD-10-CM

## 2017-11-14 DIAGNOSIS — D50.9 IRON DEFICIENCY ANEMIA, UNSPECIFIED IRON DEFICIENCY ANEMIA TYPE: ICD-10-CM

## 2017-11-14 DIAGNOSIS — E03.9 HYPOTHYROIDISM, UNSPECIFIED TYPE: ICD-10-CM

## 2017-11-14 DIAGNOSIS — J30.1 NON-SEASONAL ALLERGIC RHINITIS DUE TO POLLEN, UNSPECIFIED CHRONICITY: ICD-10-CM

## 2017-11-14 RX ORDER — MONTELUKAST SODIUM 10 MG/1
10 TABLET ORAL DAILY
Qty: 30 TAB | Refills: 2 | Status: SHIPPED | OUTPATIENT
Start: 2017-11-14 | End: 2017-12-07 | Stop reason: SDUPTHER

## 2017-11-14 NOTE — MR AVS SNAPSHOT
Visit Information Date & Time Provider Department Dept. Phone Encounter #  
 11/14/2017  9:30 AM Pura Fischer MD [facility] [address] [id number] Upcoming Health Maintenance Date Due DTaP/Tdap/Td series (1 - Tdap) 1/1/2021* GLAUCOMA SCREENING Q2Y 6/10/2018 MEDICARE YEARLY EXAM 11/15/2018 COLONOSCOPY 10/19/2027 *Topic was postponed. The date shown is not the original due date. Allergies as of 11/14/2017  Review Complete On: 11/14/2017 By: Pura Fischer MD  
  
 Severity Noted Reaction Type Reactions Iron  09/14/2012    Diarrhea Pcn [Penicillins]  07/19/2012   Systemic Rash PCN only. Can take cephalosporins Sulfacetamide  07/19/2012    Swelling Current Immunizations  Reviewed on 12/14/2015 Name Date Hep A Vaccine 1/3/2010 Hepatitis B Vaccine 1/1/2010 Influenza High Dose Vaccine PF 10/13/2016, 10/30/2015, 10/14/2013 Influenza Vaccine 9/23/2014 Influenza Vaccine Whole 10/1/2012 Pneumococcal Conjugate (PCV-13) 7/27/2016 TD Vaccine 1/1/2011 ZZZ-RETIRED (DO NOT USE) Pneumococcal Vaccine (Unspecified Type) 6/29/2010 Not reviewed this visit You Were Diagnosed With   
  
 Codes Comments Medicare annual wellness visit, subsequent    -  Primary ICD-10-CM: Z00.00 ICD-9-CM: V70.0 Mixed hyperlipidemia     ICD-10-CM: E78.2 ICD-9-CM: 272.2 Essential hypertension     ICD-10-CM: I10 
ICD-9-CM: 401.9 Hypothyroidism, unspecified type     ICD-10-CM: E03.9 ICD-9-CM: 435. 9 Vitamin D deficiency     ICD-10-CM: E55.9 ICD-9-CM: 268.9 Iron deficiency anemia, unspecified iron deficiency anemia type     ICD-10-CM: D50.9 ICD-9-CM: 280.9 Non-seasonal allergic rhinitis due to pollen, unspecified chronicity     ICD-10-CM: J30.1 ICD-9-CM: 477.0 Vitals BP Pulse Temp Resp Height(growth percentile) Weight(growth percentile) 140/58 (BP 1 Location: Left arm, BP Patient Position: Sitting) (!) 53 97.8 °F (36.6 °C) (Oral) 12 5' 2\" (1.575 m) 154 lb 12.8 oz (70.2 kg) BMI OB Status Smoking Status 28.31 kg/m2 Hysterectomy Former Smoker Vitals History BMI and BSA Data Body Mass Index Body Surface Area  
 28.31 kg/m 2 1.75 m 2 Preferred Pharmacy Pharmacy Name Phone [address] [phone] Your Updated Medication List  
  
   
This list is accurate as of: 11/14/17 10:09 AM.  Always use your most recent med list.  
  
  
  
  
 BYSTOLIC 5 mg tablet Generic drug:  nebivolol Take 1 tablet by mouth  daily for blood pressure ,  pulse, fatigue (replaces  atenolol) clopidogrel 75 mg Tab Commonly known as:  PLAVIX TAKE 1 TABLET EVERY DAY  
  
 ergocalciferol 50,000 unit capsule Commonly known as:  ERGOCALCIFEROL Take 1 capsule by mouth  every 7 days  
  
 famotidine 40 mg tablet Commonly known as:  PEPCID Take 1 tablet by mouth  twice a day  
  
 levothyroxine 75 mcg tablet Commonly known as:  SYNTHROID  
TAKE 1 TABLET BY MOUTH  DAILY BEFORE BREAKFAST  
  
 mirabegron ER 50 mg ER tablet Commonly known as:  MYRBETRIQ Take 1 Tab by mouth daily. Had dry mouth on anticholinergic  Indications: Bladder Hyperactivity  
  
 montelukast 10 mg tablet Commonly known as:  SINGULAIR Take 1 Tab by mouth daily. MUCINEX PO Take  by mouth daily. MULTIVITAMIN PO  
  
 RESTASIS 0.05 % ophthalmic emulsion Generic drug:  cycloSPORINE  
  
 simvastatin 40 mg tablet Commonly known as:  ZOCOR  
TAKE 1 TABLET BY MOUTH  NIGHTLY  
  
 TYLENOL ARTHRITIS PAIN 650 mg Tber Generic drug:  acetaminophen Take 650 mg by mouth every six (6) hours as needed. ZyrTEC 10 mg tablet Generic drug:  cetirizine Take  by mouth daily. Prescriptions Sent to Pharmacy Refills mirabegron ER (MYRBETRIQ) 50 mg ER tablet 1 Sig: Take 1 Tab by mouth daily. Had dry mouth on anticholinergic  Indications: Bladder Hyperactivity Class: Normal  
 Pharmacy: [facility] [address] [facility] [place] [address] Ph #: [phone] Route: Oral  
 montelukast (SINGULAIR) 10 mg tablet 2 Sig: Take 1 Tab by mouth daily. Class: Normal  
 Pharmacy: [facility] [address] [facility] [place] [address] Ph #: [phone] Route: Oral  
  
We Performed the Following CBC W/O DIFF [65549 CPT(R)] LIPID PANEL [76006 CPT(R)] METABOLIC PANEL, COMPREHENSIVE [48779 CPT(R)] TSH 3RD GENERATION [77864 CPT(R)] VITAMIN D, 25 HYDROXY [id number] CPT(R)] Introducing [facility]! Dear Patricio Luis: 
Thank you for requesting a [employer] account. Our records indicate that you already have an active [employer] account. You can access your account anytime at [email or web address] Did you know that you can access your hospital and ER discharge instructions at any time in [employer]? You can also review all of your test results from your hospital stay or ER visit. Additional Information If you have questions, please visit the Frequently Asked Questions section of the [employer] website at [email or web address] Remember, [employer] is NOT to be used for urgent needs. For medical emergencies, dial 911. Now available from your iPhone and Android! Please provide this summary of care documentation to your next provider. Your primary care clinician is listed as Luis F Toth. If you have any questions after today's visit, please call [phone].

## 2017-11-14 NOTE — PROGRESS NOTES
This is a Subsequent Medicare Annual Wellness Visit providing Personalized Prevention Plan Services (PPPS) (Performed 12 months after initial AWV and PPPS )    I have reviewed the patient's medical history in detail and updated the computerized patient record. Her brother  of myeloma, heart disease    Saw dermatology for melanoma f/u. Note reviewed. Chronic fatigue. Wakes up 4-5 x at night to urinate.  OAB. Tried anticholinergic in the past and had dry mouth    Hypertension  Hypertension ROS: taking medications as instructed, no medication side effects noted, no TIA's, no chest pain on exertion, no dyspnea on exertion, no swelling of ankles     reports that she has quit smoking. She smoked 1.00 pack per day. She quit smokeless tobacco use about 27 years ago. reports that she drinks about 0.6 oz of alcohol per week    BP Readings from Last 2 Encounters:   17 140/58   17 120/70     Hyperlipidemia  Currently she takes simvastatin 40 mg  ROS: taking medications as instructed, no medication side effects noted  No new myalgias, no joint pains, no weakness  No TIA's, no chest pain on exertion, no dyspnea on exertion, no swelling of ankles. Lab Results   Component Value Date/Time    Cholesterol, total 115 2017 09:01 AM    HDL Cholesterol 36 2017 09:01 AM    LDL, calculated 45 2017 09:01 AM    VLDL, calculated 34 2017 09:01 AM    Triglyceride 172 2017 09:01 AM     Hypothyroidism follow-up  Reports fatigue - chronic  denies heat/cold intolerance, bowel/skin changes or CVS symptoms, losing hair, feeling excessive energy, tremulousness, palpitations. Thyroid medication has been unchanged since last medication check and labs. Lab Results   Component Value Date/Time    TSH 3.080 2017 09:01 AM    T4, Free 1.87 2015 09:55 AM         History     Past Medical History:   Diagnosis Date    Basal cell carcinoma 2012, 2015    Dr. Tico Sal.   saw Dr. Lissa River  Chronic cough     Colon polyp 5/2010    Dr. Kandi Lee, tubular adenoma    Diverticulitis of colon 5/2010    dx on colonoscopy by Dr. Kandi Lee. Took flagyl, cipro    DJD (degenerative joint disease), lumbar `    L5?  Endometr hyperplasia w/atypia 1997    Family history of skin cancer     Brother - melanoma    GERD (gastroesophageal reflux disease) 5/2010    H/O bone density study 2000?    normal per pt.  Hematuria     hx stones and \"nephritis\" since childhood    Hiatal hernia 5/2010    Hypertension 1996    Hypomagnesemia     level was 1.5 on mg ox 6/2012    Hypothyroidism 2008    TSH 1.1 6/7/12    Iron deficiency anemia     EGD, colon 5/2010.  hgb 11.3 6/2012    Melanoma ([facility] [address] 12/2011    Dr. Meche Ang.  right shoulder, left thigh. Dr. Dajuan Barrera Mixed hyperlipidemia      Tg 166 LDL 87 HDL55 6/2012    Nephrolithiasis     Stroke ([facility] [address] 7/06/07    R facial droop and R weakness. completely resolved.  Sun-damaged skin     Sunburn, blistering     Vitamin D deficiency     improved to 35.3 6/2012    Zoster        Past Surgical History:   Procedure Laterality Date    COLONOSCOPY N/A 10/19/2017    COLONOSCOPY performed by Tomas Banks MD at [facility] COLONOSCOPY  5/2010    diverticuLITIS, tubular adenoma  Dr. Jennifer Noel HX GI  5/2010    EGD  - hiatal hernia. Dr. Jennifer Noel HX [address]  09/14/2017    SCC L anterior mujica by Dr. Missy Samuel    endometrial pre-cancer on biopsy    HX TONSILLECTOMY         Current Outpatient Prescriptions   Medication Sig    simvastatin (ZOCOR) 40 mg tablet TAKE 1 TABLET BY MOUTH  NIGHTLY    MULTIVITAMIN PO     GUAIFENESIN (MUCINEX PO) Take  by mouth daily.     clopidogrel (PLAVIX) 75 mg tab TAKE 1 TABLET EVERY DAY    levothyroxine (SYNTHROID) 75 mcg tablet TAKE 1 TABLET BY MOUTH  DAILY BEFORE BREAKFAST    BYSTOLIC 5 mg tablet Take 1 tablet by mouth  daily for blood pressure ,  pulse, fatigue (replaces  atenolol)    ergocalciferol (ERGOCALCIFEROL) 50,000 unit capsule Take 1 capsule by mouth  every 7 days    famotidine (PEPCID) 40 mg tablet Take 1 tablet by mouth  twice a day (Patient taking differently: Take 40 mg by mouth daily. Take 1 tablet by mouth  twice a day)    RESTASIS 0.05 % ophthalmic emulsion     cetirizine (ZYRTEC) 10 mg tablet Take  by mouth daily.  acetaminophen (TYLENOL ARTHRITIS PAIN) 650 mg CR tablet Take 650 mg by mouth every six (6) hours as needed. No current facility-administered medications for this visit. Allergies   Allergen Reactions    Iron Diarrhea    Pcn [Penicillins] Rash     PCN only. Can take cephalosporins    Sulfacetamide Swelling       Family History   Problem Relation Age of Onset    Cancer Mother     Cancer Father     Hypertension Brother     Cancer Brother      myeloma        reports that she has quit smoking. She smoked 1.00 pack per day. She quit smokeless tobacco use about 27 years ago. reports that she drinks about 0.6 oz of alcohol per week       Depression Risk Factor Screening:       Alcohol Risk Factor Screening: On any occasion during the past 3 months, have you had more than 3 drinks containing alcohol? No    Do you average more than 14 drinks per week? No      Functional Ability and Level of Safety:     Hearing Loss   mild    Activities of Daily Living   Self-care. Requires assistance with: no ADLs    Fall Risk     Fall Risk Assessment, last 12 mths 8/11/2017   Able to walk? Yes   Fall in past 12 months? No         Abuse Screen   Patient is not abused    Review of Systems   A comprehensive review of systems was negative except for that written in the HPI. Physical Examination     Evaluation of Cognitive Function:  Mood/affect:  neutral, happy  Appearance: age appropriate  Family member/caregiver input: none    Blood pressure 140/58, pulse (!) 53, temperature 97.8 °F (36.6 °C), temperature source Oral, resp.  rate 12, height 5' 2\" (1.575 m), weight 154 lb 12.8 oz (70.2 kg). General appearance: alert, cooperative, no distress, appears stated age  Neck: supple, symmetrical, trachea midline, no adenopathy, thyroid: not enlarged, symmetric, no tenderness/mass/nodules, no carotid bruit and no JVD  Lungs: clear to auscultation bilaterally  Heart: regular rate and rhythm, S1, S2 normal, no murmur, click, rub or gallop  Extremities: extremities normal, atraumatic, no cyanosis or edema    Patient Care Team:  Jamie Ventura MD as PCP - General (Internal Medicine)  See Paul NP as Nurse Practitioner (Nurse Practitioner)      Advice/Referrals/Counseling   Education and counseling provided. See below for specific orders    Assessment/Plan   Diagnoses and all orders for this visit:    1. Medicare annual wellness visit, subsequent    2. Mixed hyperlipidemia - Controlled on current regimen. Continue current medications as written in chart.  -     LIPID PANEL  -     METABOLIC PANEL, COMPREHENSIVE    3. Essential hypertension- Controlled on current regimen. Continue current medications as written in chart    4. Hypothyroidism, unspecified type- chronic fatigue  -     TSH 3RD GENERATION    5. Vitamin D deficiency  -     VITAMIN D, 25 HYDROXY    6. Iron deficiency anemia, unspecified iron deficiency anemia type - stable likely  -     CBC W/O DIFF    7. Non-seasonal allergic rhinitis due to pollen, unspecified chronicity- Controlled on current regimen. Continue current medications as written in chart.  -     montelukast (SINGULAIR) 10 mg tablet; Take 1 Tab by mouth daily. 8. Malignant melanoma of upper extremity, including shoulder, unspecified laterality ([address]  Stable per recent exam    9. OAB (overactive bladder)- caused 4-5 x noturia and fatigue  -     Start mirabegron ER (MYRBETRIQ) 50 mg ER tablet; Take 1 Tab by mouth daily.  Had dry mouth on anticholinergic  Indications: Bladder Hyperactivity      current treatment plan is effective, no change in therapy. Potential medication side effects were discussed with the patient; let me know if any occur.   Return for yearly Annual Wellness Visits

## 2017-11-15 LAB
25(OH)D3+25(OH)D2 SERPL-MCNC: 47.5 NG/ML (ref 30–100)
ALBUMIN SERPL-MCNC: 4.2 G/DL (ref 3.5–4.8)
ALBUMIN/GLOB SERPL: 1.8 {RATIO} (ref 1.2–2.2)
ALP SERPL-CCNC: 47 IU/L (ref 39–117)
ALT SERPL-CCNC: 11 IU/L (ref 0–32)
AST SERPL-CCNC: 17 IU/L (ref 0–40)
BILIRUB SERPL-MCNC: 0.9 MG/DL (ref 0–1.2)
BUN SERPL-MCNC: 20 MG/DL (ref 8–27)
BUN/CREAT SERPL: 20 (ref 12–28)
CALCIUM SERPL-MCNC: 9 MG/DL (ref 8.7–10.3)
CHLORIDE SERPL-SCNC: 103 MMOL/L (ref 96–106)
CHOLEST SERPL-MCNC: 109 MG/DL (ref 100–199)
CO2 SERPL-SCNC: 26 MMOL/L (ref 18–29)
CREAT SERPL-MCNC: 1 MG/DL (ref 0.57–1)
ERYTHROCYTE [DISTWIDTH] IN BLOOD BY AUTOMATED COUNT: 15 % (ref 12.3–15.4)
GFR SERPLBLD CREATININE-BSD FMLA CKD-EPI: 55 ML/MIN/1.73
GFR SERPLBLD CREATININE-BSD FMLA CKD-EPI: 63 ML/MIN/1.73
GLOBULIN SER CALC-MCNC: 2.4 G/DL (ref 1.5–4.5)
GLUCOSE SERPL-MCNC: 94 MG/DL (ref 65–99)
HCT VFR BLD AUTO: 34.6 % (ref 34–46.6)
HDLC SERPL-MCNC: 39 MG/DL
HGB BLD-MCNC: 10.5 G/DL (ref 11.1–15.9)
INTERPRETATION, 910389: NORMAL
INTERPRETATION: NORMAL
LDLC SERPL CALC-MCNC: 40 MG/DL (ref 0–99)
MCH RBC QN AUTO: 26.6 PG (ref 26.6–33)
MCHC RBC AUTO-ENTMCNC: 30.3 G/DL (ref 31.5–35.7)
MCV RBC AUTO: 88 FL (ref 79–97)
PDF IMAGE, 910387: NORMAL
PLATELET # BLD AUTO: 204 X10E3/UL (ref 150–379)
POTASSIUM SERPL-SCNC: 4.5 MMOL/L (ref 3.5–5.2)
PROT SERPL-MCNC: 6.6 G/DL (ref 6–8.5)
RBC # BLD AUTO: 3.95 X10E6/UL (ref 3.77–5.28)
SODIUM SERPL-SCNC: 142 MMOL/L (ref 134–144)
TRIGL SERPL-MCNC: 152 MG/DL (ref 0–149)
TSH SERPL DL<=0.005 MIU/L-ACNC: 5.72 UIU/ML (ref 0.45–4.5)
VLDLC SERPL CALC-MCNC: 30 MG/DL (ref 5–40)
WBC # BLD AUTO: 8.6 X10E3/UL (ref 3.4–10.8)

## 2017-11-15 NOTE — PROGRESS NOTES
I spoke with the patient she states she is doing well from the recent biopsy. She understands a diagnosis of superficial basal cell carcinoma. We discussed the option of imiquimod versus curettage. She thinks she would like to curettage and likely come in on the day her  has Mohs surgery. She will call back to schedule.

## 2017-11-19 DIAGNOSIS — E03.9 HYPOTHYROIDISM, UNSPECIFIED TYPE: Primary | ICD-10-CM

## 2017-11-19 RX ORDER — LEVOTHYROXINE SODIUM 88 UG/1
TABLET ORAL
Qty: 90 TAB | Refills: 1 | Status: SHIPPED | COMMUNITY
Start: 2017-11-19 | End: 2017-11-20 | Stop reason: SDUPTHER

## 2017-11-19 NOTE — PROGRESS NOTES
Increase Levothyroxine (Synthroid) to 88 mcg. Return to clinic in 3 months to repeat your blood work.

## 2017-11-20 RX ORDER — LEVOTHYROXINE SODIUM 88 UG/1
TABLET ORAL
Qty: 90 TAB | Refills: 1 | Status: SHIPPED | OUTPATIENT
Start: 2017-11-20 | End: 2017-11-27 | Stop reason: SDUPTHER

## 2017-11-27 RX ORDER — LEVOTHYROXINE SODIUM 88 UG/1
88 TABLET ORAL
Qty: 90 TAB | Refills: 1 | Status: SHIPPED | COMMUNITY
Start: 2017-11-27 | End: 2018-06-25 | Stop reason: SDUPTHER

## 2017-11-29 DIAGNOSIS — E03.9 HYPOTHYROIDISM, UNSPECIFIED TYPE: Primary | ICD-10-CM

## 2017-12-07 DIAGNOSIS — N32.81 OAB (OVERACTIVE BLADDER): ICD-10-CM

## 2017-12-07 DIAGNOSIS — J30.1 NON-SEASONAL ALLERGIC RHINITIS DUE TO POLLEN, UNSPECIFIED CHRONICITY: ICD-10-CM

## 2017-12-07 RX ORDER — MONTELUKAST SODIUM 10 MG/1
10 TABLET ORAL DAILY
Qty: 90 TAB | Refills: 2 | Status: SHIPPED | COMMUNITY
Start: 2017-12-07 | End: 2018-06-25 | Stop reason: SDUPTHER

## 2017-12-11 ENCOUNTER — TELEPHONE (OUTPATIENT)
Dept: INTERNAL MEDICINE CLINIC | Age: 77
End: 2017-12-11

## 2017-12-11 RX ORDER — AZITHROMYCIN 250 MG/1
TABLET, FILM COATED ORAL
Qty: 6 TAB | Refills: 0 | Status: SHIPPED | OUTPATIENT
Start: 2017-12-11 | End: 2017-12-16

## 2017-12-11 NOTE — TELEPHONE ENCOUNTER
Pt has called 3 times today wanting a response from the nurse or dr about her email.   She would like a callback at [phone]

## 2018-01-10 RX ORDER — NEBIVOLOL HYDROCHLORIDE 5 MG/1
TABLET ORAL
Qty: 90 TAB | Refills: 1 | Status: SHIPPED | OUTPATIENT
Start: 2018-01-10 | End: 2018-06-14 | Stop reason: SDUPTHER

## 2018-01-24 ENCOUNTER — OFFICE VISIT (OUTPATIENT)
Dept: DERMATOLOGY | Facility: AMBULATORY SURGERY CENTER | Age: 78
End: 2018-01-24

## 2018-01-24 ENCOUNTER — HOSPITAL ENCOUNTER (OUTPATIENT)
Dept: LAB | Age: 78
Discharge: HOME | End: 2018-01-24

## 2018-01-24 VITALS
DIASTOLIC BLOOD PRESSURE: 68 MMHG | BODY MASS INDEX: 26.87 KG/M2 | RESPIRATION RATE: 14 BRPM | WEIGHT: 146 LBS | TEMPERATURE: 98 F | OXYGEN SATURATION: 97 % | SYSTOLIC BLOOD PRESSURE: 140 MMHG | HEIGHT: 62 IN | HEART RATE: 59 BPM

## 2018-01-24 DIAGNOSIS — C44.519 BASAL CELL CARCINOMA OF BACK: ICD-10-CM

## 2018-01-24 DIAGNOSIS — L82.0 INFLAMED SEBORRHEIC KERATOSIS: Primary | ICD-10-CM

## 2018-01-24 DIAGNOSIS — D48.5 NEOPLASM OF UNCERTAIN BEHAVIOR OF SKIN OF BACK: ICD-10-CM

## 2018-01-24 PROCEDURE — 88342 IMHCHEM/IMCYTCHM 1ST ANTB: CPT | Performed by: NURSE PRACTITIONER

## 2018-01-24 NOTE — MR AVS SNAPSHOT
[address] [facility] [address] Kristel Garcia [facility] [address] [place] [address] 
[phone] Patient: Alonso Levi MRN: [id number] [id number] Visit Information Date & Time Provider Department Dept. Phone Encounter #  
 1/24/2018 10:30 AM ELIN Fierro57 [phone] [id number] Your Appointments 1/24/2018 10:30 AM  
Office Visit with ELIN Fierro Appt Note: est pt scraping on some areas of back [facility] [address] Kristel Garcia [facility] [address]  
[facility] [address] [facility] [address] Upcoming Health Maintenance Date Due DTaP/Tdap/Td series (1 - Tdap) 1/1/2021* GLAUCOMA SCREENING Q2Y 6/10/2018 MEDICARE YEARLY EXAM 11/15/2018 COLONOSCOPY 10/19/2027 *Topic was postponed. The date shown is not the original due date. Allergies as of 1/24/2018  Review Complete On: 1/24/2018 By: Eliana Morales Severity Noted Reaction Type Reactions Iron  09/14/2012    Diarrhea Pcn [Penicillins]  07/19/2012   Systemic Rash PCN only. Can take cephalosporins Sulfacetamide  07/19/2012    Swelling Current Immunizations  Reviewed on 12/14/2015 Name Date Hep A Vaccine 1/3/2010 Hepatitis B Vaccine 1/1/2010 Influenza High Dose Vaccine PF 10/13/2016, 10/30/2015, 10/14/2013 Influenza Vaccine 9/23/2014 Influenza Vaccine Whole 10/1/2012 Pneumococcal Conjugate (PCV-13) 7/27/2016 TD Vaccine 1/1/2011 ZZZ-RETIRED (DO NOT USE) Pneumococcal Vaccine (Unspecified Type) 6/29/2010 Not reviewed this visit Vitals BP Pulse Temp Resp Height(growth percentile) Weight(growth percentile) 140/68 (BP 1 Location: Left arm, BP Patient Position: Sitting) (!) 59 98 °F (36.7 °C) (Oral) 14 5' 2\" (1.575 m) 146 lb (66.2 kg) SpO2 BMI OB Status Smoking Status 97% 26.7 kg/m2 Hysterectomy Former Smoker BMI and BSA Data Body Mass Index Body Surface Area  
 26.7 kg/m 2 1.7 m 2 Preferred Pharmacy Pharmacy Name Phone [facility]ail[facility] [place] [id number] [facility] [address] [place] [address] [phone] Your Updated Medication List  
  
   
This list is accurate as of: 1/24/18 10:23 AM.  Always use your most recent med list.  
  
  
  
  
 BYSTOLIC 5 mg tablet Generic drug:  nebivolol TAKE 1 TABLET DAILY FOR BLOOD PRESSURE ,  PULSE, FATIGUE (REPLACES  ATENOLOL) clopidogrel 75 mg Tab Commonly known as:  PLAVIX TAKE 1 TABLET EVERY DAY  
  
 ergocalciferol 50,000 unit capsule Commonly known as:  ERGOCALCIFEROL Take 1 capsule by mouth  every 7 days  
  
 famotidine 40 mg tablet Commonly known as:  PEPCID Take 1 tablet by mouth  twice a day  
  
 levothyroxine 88 mcg tablet Commonly known as:  SYNTHROID Take 1 Tab by mouth Daily (before breakfast). Increased dose 11/19/17  
  
 mirabegron ER 50 mg ER tablet Commonly known as:  MYRBETRIQ Take 1 Tab by mouth daily. Had dry mouth on anticholinergic  Indications: Bladder Hyperactivity  
  
 montelukast 10 mg tablet Commonly known as:  SINGULAIR Take 1 Tab by mouth daily. MUCINEX PO Take  by mouth daily. MULTIVITAMIN PO  
  
 RESTASIS 0.05 % ophthalmic emulsion Generic drug:  cycloSPORINE  
  
 simvastatin 40 mg tablet Commonly known as:  ZOCOR  
TAKE 1 TABLET BY MOUTH  NIGHTLY  
  
 TYLENOL ARTHRITIS PAIN 650 mg Tber Generic drug:  acetaminophen Take 650 mg by mouth every six (6) hours as needed. ZyrTEC 10 mg tablet Generic drug:  cetirizine Take  by mouth daily. Patient Instructions Self Skin Exam and Sunscreens Early detection and treatment is essential in the treatment of all forms of skin cancer. If caught early, all forms of skin cancer are curable. In addition to your regular visits, you should perform a monthly skin examination. Over time, you become familiar with what is normally found on your skin and can identify new or suspicious spots. One of the screening tools you can use to assess your skin is to follow the ABCDEs: 
 
A= Asymmetry (One half is unlike the other half) B= Border (An irregular, scalloped or poorly defined edge) C= Color (Is varied from one area to another, has shades of tan, brown/ black,       white, red or blue) D= Diameter (Spots larger than 6mm or a pencil eraser) E= Evolving (New spots or one that is changing in size, shape, or color) A follow- up interval will be customized based on your history of skin cancer or level of skin damage and risk factors. In any case, if you notice a suspicious or new spot, an appointment should be arranged between regular visits. Everyone should use sunscreen and sun-safe practices, which is especially important for those with a personal or family history of skin cancer. Suggestions for this include: 1. Use daily moisturizers containing SPF 30 or higher. 2. Wear long sleeve clothing with UPF ratings and a broad-brimmed hat. 3. Apply sunscreen with SPF 30 or higher to all sun exposed areas if you are going to be in the sun. A broad spectrum UVA/ UVB sunscreen is best.  Dont forget to REAPPLY every two hours or more often if swimming or sweating! 4. Avoid outside activities during peak sun hours, especially in the summer (10am- 2pm). 5. DO NOT use tanning beds. Using sunscreen and sun-safe practices can help reduce the likelihood of developing skin cancer or additional skin cancers in those previously diagnosed. Introducing [facility]! Dear Yoni Barrett: 
Thank you for requesting a [employer] account. Our records indicate that you already have an active [employer] account. You can access your account anytime at [email or web address] Did you know that you can access your hospital and ER discharge instructions at any time in [employer]? You can also review all of your test results from your hospital stay or ER visit. Additional Information If you have questions, please visit the Frequently Asked Questions section of the [employer] website at [email or web address] Remember, [employer] is NOT to be used for urgent needs. For medical emergencies, dial 911. Now available from your iPhone and Android! Please provide this summary of care documentation to your next provider. Your primary care clinician is listed as Norma Topete. If you have any questions after today's visit, please call [phone].

## 2018-01-24 NOTE — PROGRESS NOTES
Chief Complaint   Patient presents with    Skin Exam     scraping areas on back     1. Have you been to the ER, urgent care clinic since your last visit? Hospitalized since your last visit? No    2. Have you seen or consulted any other health care providers outside of the [address] [place] [address] since your last visit? Include any pap smears or colon screening.  No

## 2018-01-24 NOTE — PROGRESS NOTES
Name: Dominga Laughlin       Age: 69 y.o. Date: 1/24/2018    Chief Complaint: had concerns including Skin Exam.    Subjective:    HPI:  Ms. Heather Reyes is a 69 y.o. female who presents for the treatment of the previously biopsied lesion on the right upper back. The biopsy confirms Basal Cell Carcinoma. The patient has not had any problems since the last visit. She reports a lesion on the right forearm that has popped up since last visit and is bothersome due to the raised nature. ROS: Consitutional: Negative, Neuro: Negative for tingling/ numbness  Dermatological : Positive : lesion change right forearm, new growth with bother due to raised nature    Social History     Social History    Marital status:      Spouse name: Tu Gustafson Number of children: 2    Years of education: N/A     Occupational History    Not on file. Social History Main Topics    Smoking status: Former Smoker     Packs/day: 1.00    Smokeless tobacco: Former User     Quit date: 1/1/1990    Alcohol use 0.6 oz/week     1 Glasses of wine per week      Comment: 3-4 x a week    Drug use: No    Sexual activity: No     Other Topics Concern    Not on file     Social History Narrative       Family History   Problem Relation Age of Onset    Cancer Mother     Cancer Father     Hypertension Brother     Cancer Brother      myeloma       Past Medical History:   Diagnosis Date    Basal cell carcinoma 7/2012, 7/2015    Dr. Felipa Main. saw Dr. Ovalle Pro cough     Colon polyp 5/2010    Dr. Maria Finney, tubular adenoma    Diverticulitis of colon 5/2010    dx on colonoscopy by Dr. Maria Finney. Took flagyl, cipro    DJD (degenerative joint disease), lumbar `    L5?  Endometr hyperplasia w/atypia 1997    Family history of skin cancer     Brother - melanoma    GERD (gastroesophageal reflux disease) 5/2010    H/O bone density study 2000?    normal per pt.       Hematuria     hx stones and \"nephritis\" since childhood    Hiatal hernia 5/2010    Hypertension 1996    Hypomagnesemia     level was 1.5 on mg ox 6/2012    Hypothyroidism 2008    TSH 1.1 6/7/12    Iron deficiency anemia     EGD, colon 5/2010.  hgb 11.3 6/2012    Melanoma ([facility] [address] 12/2011    Dr. Lexi Monroe.  right shoulder, left thigh. Dr. Johnson Santana Mixed hyperlipidemia      Tg 166 LDL 87 HDL55 6/2012    Nephrolithiasis     Stroke ([facility] [address] 7/06/07    R facial droop and R weakness. completely resolved.  Sun-damaged skin     Sunburn, blistering     Vitamin D deficiency     improved to 35.3 6/2012    Zoster        Past Surgical History:   Procedure Laterality Date    COLONOSCOPY N/A 10/19/2017    COLONOSCOPY performed by Minesh Garnica MD at [address] [facility] COLONOSCOPY  5/2010    diverticuLITIS, tubular adenoma  Dr. Ector Barber HX GI  5/2010    EGD  - hiatal hernia. Dr. Ector Barber HX [address]  09/14/2017    SCC L anterior mujica by Dr. Lucy Higgins    endometrial pre-cancer on biopsy    HX TONSILLECTOMY         Current Outpatient Prescriptions   Medication Sig Dispense Refill    BYSTOLIC 5 mg tablet TAKE 1 TABLET DAILY FOR BLOOD PRESSURE ,  PULSE, FATIGUE (REPLACES  ATENOLOL) 90 Tab 1    levothyroxine (SYNTHROID) 88 mcg tablet Take 1 Tab by mouth Daily (before breakfast). Increased dose 11/19/17 90 Tab 1    simvastatin (ZOCOR) 40 mg tablet TAKE 1 TABLET BY MOUTH  NIGHTLY 90 Tab 1    MULTIVITAMIN PO       GUAIFENESIN (MUCINEX PO) Take  by mouth daily.  clopidogrel (PLAVIX) 75 mg tab TAKE 1 TABLET EVERY DAY 90 Tab 1    ergocalciferol (ERGOCALCIFEROL) 50,000 unit capsule Take 1 capsule by mouth  every 7 days 13 Cap 3    famotidine (PEPCID) 40 mg tablet Take 1 tablet by mouth  twice a day (Patient taking differently: Take 40 mg by mouth daily. Take 1 tablet by mouth  twice a day) 180 Tab 1    RESTASIS 0.05 % ophthalmic emulsion       cetirizine (ZYRTEC) 10 mg tablet Take  by mouth daily.       acetaminophen (TYLENOL ARTHRITIS PAIN) 650 mg CR tablet Take 650 mg by mouth every six (6) hours as needed.  mirabegron ER (MYRBETRIQ) 50 mg ER tablet Take 1 Tab by mouth daily. Had dry mouth on anticholinergic  Indications: Bladder Hyperactivity 90 Tab 3    montelukast (SINGULAIR) 10 mg tablet Take 1 Tab by mouth daily. 90 Tab 2       Allergies   Allergen Reactions    Iron Diarrhea    Pcn [Penicillins] Rash     PCN only. Can take cephalosporins    Sulfacetamide Swelling         Objective:    Visit Vitals    /68 (BP 1 Location: Left arm, BP Patient Position: Sitting)    Pulse (!) 59    Temp 98 °F (36.7 °C) (Oral)    Resp 14    Ht 5' 2\" (1.575 m)    Wt 66.2 kg (146 lb)    SpO2 97%    BMI 26.7 kg/m2       Ruthie Christopher is a 69 y.o. female who appears well and in no distress. She is awake, alert, and oriented. The back and right forearm were examined. There is a pink keratotic papule on the right inferior forearm consistent with an inflamed Seborrheic keratosis. There is a 10 x 8 mm pink shiny macule, well healed from biopsy on the left upper back. On the right upper back there is a 11 x 8 mm brown, irregular macule. This lesion appears slightly more irregular when compared to prior photo. Assessment/Plan:  1. Inflamed seborrheic keratoses. The diagnosis and treatment with liquid nitrogen cryotherapy were reviewed. The risk or persistence or recurrence of the keratosis and the potential for pigment change at the treated site were reviewed. Verbal consent was obtained. I treated 1 lesions with cryotherapy and care was reviewed. 2. Basal cell carcinoma of the left upper back. Curettage was advised to address this lesion with malignant destruction. The procedure was reviewed and verbal and written consent were obtained. The risks of pain, bleeding, infection, scar, and recurrence of the lesion were discussed. I performed the procedure. The site was cleansed and anesthetized with 1% Lidocaine with Epinephrine 1:100,000.   Curettage was performed by me to include a 2 mm margin, resulting in a post curettage defect size of 14 x 12 mm. Drysol was used for hemostasis. The wound was bandaged and care reviewed. The specimen was sent to pathology. I will contact the patient with the results and any further treatment that may be necessary. 3. Neoplasm of Uncertain Behavior, right upper back. The differential diagnoses were discussed. A shave removal was advised to address this lesion. The procedure was reviewed and verbal and written consent were obtained. The risks of pain, bleeding, infection, recurrence and scar were discussed. I performed the procedure. The site was cleansed and anesthetized with 1% Lidocaine with Epinephrine 1:100,000. A shave removal was performed to remove the lesion in its clinical entirety. Drysol was used for hemostasis. The wound was bandaged and care reviewed. The specimen was sent to pathology. I will contact the patient with the results and any further treatment that may be necessary. 4. History of non melanoma skin cancers. [facility]   OFFICE PROCEDURE PROGRESS NOTE   Chart reviewed for the following:   Arianne DEL TORO, [address], have reviewed the History, Physical and updated the Allergic reactions for Sascha Sluder. TIME OUT performed immediately prior to start of procedure:   Jodie DEL TORO, have performed the following reviews on Sascha Sluder   prior to the start of the procedure:     * Patient was identified by name and date of birth   * Agreement on procedure being performed was verified   * Risks and Benefits explained to the patient   * Procedure site verified and marked as necessary   * Patient was positioned for comfort   * Consent was signed and verified     Time: 2998  Date of procedure: 1/24/2018  Procedure performed by: Beth Salinas.  Edwina Dorsey  Provider assisted by: Miguel Angel Peterson   Patient assisted by: self   How tolerated by patient: tolerated the procedure well with no complications   Comments: none

## 2018-01-28 RX ORDER — CLOPIDOGREL BISULFATE 75 MG/1
TABLET ORAL
Qty: 90 TAB | Refills: 1 | Status: SHIPPED | OUTPATIENT
Start: 2018-01-28 | End: 2018-06-25 | Stop reason: SDUPTHER

## 2018-01-30 PROBLEM — D03.59 MELANOMA IN SITU OF BACK (HCC): Status: ACTIVE | Noted: 2018-01-01

## 2018-01-30 NOTE — PROGRESS NOTES
I spoke with the patient and she is aware of the diagnosis of MMi. She understands the need for excision. She is currently scheduled for April 2nd but would come sooner if schedule allows.

## 2018-02-07 LAB
T4 FREE SERPL-MCNC: 1.75 NG/DL (ref 0.82–1.77)
TSH SERPL DL<=0.005 MIU/L-ACNC: 0.56 UIU/ML (ref 0.45–4.5)

## 2018-02-23 ENCOUNTER — OFFICE VISIT (OUTPATIENT)
Dept: DERMATOLOGY | Facility: AMBULATORY SURGERY CENTER | Age: 78
End: 2018-02-23

## 2018-02-23 DIAGNOSIS — L57.0 ACTINIC KERATOSIS: Primary | ICD-10-CM

## 2018-02-23 NOTE — PROGRESS NOTES
No chief complaint on file. There were no vitals taken for this visit. Dixie Landry was seen today for Shon-U therapy of actinic keratoses located on the face and ears. The treatment and post-procedure care were reviewed, verbal and written consent were obtained. The skin was prepped in the usual manner using alcohol wipes to degrease the surface. The Levulan was applied to the face and ears, and incubated for 90 minutes. After 90 minutes the skin was illuminated with Shon-U light for 1000 seconds. Eye protection was used. Dixie Landry experienced severe symptoms of burning during illumination. A fan was used during the procedure to assist in the reduction or tolerance of symptoms. Sunscreen was applied to the skin, a hat was worn, and Dixie Landry was discharged in good condition. Follow up will be 4 weeks. [facility]   OFFICE PROCEDURE PROGRESS NOTE   Chart reviewed for the following:   Blayne DEL TORO, [address], have reviewed the History, Physical and updated the Allergic reactions for Dixie Landry. TIME OUT performed immediately prior to start of procedure:   Jodie DEL TORO, have performed the following reviews on Dixie Landry   prior to the start of the procedure:     * Patient was identified by name and date of birth   * Agreement on procedure being performed was verified   * Risks and Benefits explained to the patient   * Procedure site verified and marked as necessary   * Patient was positioned for comfort   * Consent was signed and verified     Time: 0825  Date of procedure: 2/23/2018  Procedure performed by: Paula Fenton.  Kalpana Rayo  Provider assisted by: lpn   Patient assisted by: self   How tolerated by patient: tolerated the procedure well with no complications   Comments: none

## 2018-02-28 ENCOUNTER — OFFICE VISIT (OUTPATIENT)
Dept: DERMATOLOGY | Facility: AMBULATORY SURGERY CENTER | Age: 78
End: 2018-02-28

## 2018-02-28 ENCOUNTER — HOSPITAL ENCOUNTER (OUTPATIENT)
Dept: LAB | Age: 78
Discharge: HOME | End: 2018-02-28

## 2018-02-28 VITALS
SYSTOLIC BLOOD PRESSURE: 140 MMHG | DIASTOLIC BLOOD PRESSURE: 80 MMHG | HEART RATE: 63 BPM | HEIGHT: 62 IN | TEMPERATURE: 98.4 F | OXYGEN SATURATION: 97 % | BODY MASS INDEX: 26.87 KG/M2 | RESPIRATION RATE: 16 BRPM | WEIGHT: 146 LBS

## 2018-02-28 DIAGNOSIS — D03.59 MELANOMA IN SITU OF BACK (HCC): Primary | ICD-10-CM

## 2018-02-28 NOTE — PATIENT INSTRUCTIONS
WOUND CARE INSTRUCTIONS    1. Keep the dressing clean and dry and do not remove for 48 hours. 2. Then change the dressing once a day as follows:  a. Wash hands before and after each dressing change. b. Remove dressing and wash site gently with mild soap and water, rinse, and pat dry.  c. Apply an ointment (Bacitracin, Polysporin, Neosporin, Petroleum jelly or Aquaphor). d. Apply a non-stick (Telfa) dressing or Band-Aid to cover the wound. Remove pressure bandage on Friday, then wash site gently. Leave steri-strips in place until it naturally begins to peel off, about 4-6 days, then remove. After 7 days, if steri-strips remain, they may be removed. Vaseline may be applied at this point for one week. 3. Watch for:  BLEEDING: A small amount of drainage may occur. If bleeding occurs, elevate and rest the surgery site. Apply gauze and steady pressure for 15 minutes. If bleeding continues, call this office. INFECTION: Signs of infection include increased redness, pain, warmth, drainage of pus, and fever. If this occurs, call this office. 4. Special Instructions (follow any that are checked):  · [x] You have stitches that DO NOT need to be removed. · [x] Avoid bending at the waist and heavy lifting for two days. · [] Sleep with your head elevated for the next two nights. · [] Rest the surgery site and keep it elevated as much as possible for two days. · [x] You may apply an ice-pack for 10-15 minutes every waking hour for the rest of the day. · [] Eat a soft diet and avoid hot food and hot drinks for the rest of the day. · [] Other instructions: Follow up as directed. Take Tylenol or Ibuprofen for pain as needed. Once the site is healed with no remaining bandages or open areas, protect your surgical site and scar from the sun, as this area will be more sensitive.   Use a broad spectrum sunscreen SPF 30 or higher daily, and a chemical free product (one containing zinc oxide or titanium dioxide) is a good choice if the area is sensitive. You may begin to gently massage the surgical site in 2-3 weeks, rubbing in a circular motion along the scar. This can help reduce swelling and thickness of a scar. A scar cream may be used beginnning 1 month after the surgery. If you have any questions or concerns, please call our office Monday through Friday at [phone].

## 2018-02-28 NOTE — PROGRESS NOTES
Written by Edmar Trinh, as dictated by Dr. Vipin Renee. Ulises Liu MD.    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 female referred by Faisal Chavira DNP. She has a melanoma in situ on the right upper back. This is a new melanoma in situ present for an unknown amount of time due to the location described as a lesion that was monitored by Faisal Chavira DNP with no prior treatment. 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 She is feeling well and in her usual state of health today. She has no pain, no current illnesses, no other skin concerns. Her allergies, medications, medical, and social history are reviewed by me today. She takes the blood thinner Plavix. Exam:    She is an awake, alert, and oriented 69 y.o. female who appears well and in no distress.  I examined her right upper back. She has a 12 x 8 mm pink scar with focal pigmentation at the upper edge on her right upper back. She confirms the location    Assessment/Plan:    1. Melanoma in situ, right upper back. Excision was advised for removal and therapy of this 12 x 8 mm lesion on the right upper back.  The excision procedure was reviewed and verbal and written consents were obtained.  The risks of pain, bleeding, infection, recurrence of the lesion, and scar were discussed.  I performed the procedure.  The site was cleansed and anesthetized with 1% lidocaine with epinephrine 1:100,000.  The lesion was excised with a 5 mm margin in an elliptical manner to a depth of subcutaneous tissue.  An intermediate repair was performed after the excision. 4-0 polysorb suture was used for approximation of deep tissues and a second layer of 4-0 polysorb suture was used to approximate the skin edges. The closure length was 48 mm.  The wound was bandaged and care reviewed.  The specimen was sent to pathology.  I will contact the patient with the results and any further treatment that may be necessary. The documentation recorded by the scribe accurately reflects the service I personally performed and the decisions made by me. [facility] [place] [facility]   OFFICE PROCEDURE PROGRESS NOTE     Chart reviewed for the following:     Deloris Barker MD, have reviewed the History, Physical and updated the Allergic reactions for [address] [place] [address] performed immediately prior to start of procedure:     Deloris Barker MD, have performed the following reviews on Esteban Mckenna prior to the start of the procedure:     * Patient was identified by name and date of birth   * Agreement on procedure being performed was verified   * Risks and Benefits explained to the patient   * Procedure site verified and marked as necessary   * Patient was positioned for comfort   * Consent was signed and verified     Time: 9:20 AM   Date of procedure: 2/28/2018  Procedure performed by: King Choudhury.  Amaris Barker MD   Provider assisted by: LPN   Patient assisted by: self   How tolerated by patient: tolerated the procedure well with no complications   Comments: none

## 2018-03-09 RX ORDER — FAMOTIDINE 40 MG/1
TABLET, FILM COATED ORAL
Qty: 180 TAB | Refills: 1 | Status: SHIPPED | OUTPATIENT
Start: 2018-03-09 | End: 2019-04-03 | Stop reason: SDUPTHER

## 2018-03-09 RX ORDER — SIMVASTATIN 40 MG/1
TABLET, FILM COATED ORAL
Qty: 90 TAB | Refills: 1 | Status: SHIPPED | OUTPATIENT
Start: 2018-03-09 | End: 2018-06-25 | Stop reason: SDUPTHER

## 2018-03-14 NOTE — PROGRESS NOTES
I called her 3/14 with report, I called her home # and spoke with her regarding the result of clear margins. She is healing well.

## 2018-04-02 ENCOUNTER — OFFICE VISIT (OUTPATIENT)
Dept: DERMATOLOGY | Facility: AMBULATORY SURGERY CENTER | Age: 78
End: 2018-04-02

## 2018-04-02 DIAGNOSIS — L57.0 ACTINIC KERATOSES: Primary | ICD-10-CM

## 2018-04-02 RX ORDER — FLUOROURACIL 50 MG/G
CREAM TOPICAL 2 TIMES DAILY
Qty: 40 G | Refills: 4 | Status: SHIPPED | OUTPATIENT
Start: 2018-04-02 | End: 2018-10-23 | Stop reason: ALTCHOICE

## 2018-04-02 NOTE — PROGRESS NOTES
Rachel Cantor was seen today for Shon-U therapy of actinic keratoses located on the face. The treatment and post-procedure care were reviewed, verbal and written consent were obtained. The skin was prepped in the usual manner using alcohol wipes to degrease the surface. The Levulan was applied to the face and incubated for 60 minutes. After 60 minutes the skin was illuminated with the Shon-U light for 1000 seconds. Eye protection was used. Rachel Cantor experienced mild symptoms of stinging during illumination. Sunscreen was applied to the skin, a hat was worn, and Rachel Cantor was discharged in good condition. Follow up will be as needed.

## 2018-04-20 RX ORDER — ERGOCALCIFEROL 1.25 MG/1
CAPSULE ORAL
Qty: 13 CAP | Refills: 3 | Status: SHIPPED | OUTPATIENT
Start: 2018-04-20 | End: 2019-01-18 | Stop reason: SDUPTHER

## 2018-05-09 ENCOUNTER — OFFICE VISIT (OUTPATIENT)
Dept: DERMATOLOGY | Facility: AMBULATORY SURGERY CENTER | Age: 78
End: 2018-05-09

## 2018-05-09 ENCOUNTER — HOSPITAL ENCOUNTER (OUTPATIENT)
Dept: LAB | Age: 78
Discharge: HOME | End: 2018-05-09

## 2018-05-09 VITALS
RESPIRATION RATE: 16 BRPM | OXYGEN SATURATION: 97 % | DIASTOLIC BLOOD PRESSURE: 68 MMHG | HEART RATE: 62 BPM | TEMPERATURE: 97.6 F | SYSTOLIC BLOOD PRESSURE: 130 MMHG

## 2018-05-09 DIAGNOSIS — L82.1 OTHER SEBORRHEIC KERATOSIS: ICD-10-CM

## 2018-05-09 DIAGNOSIS — L57.0 ACTINIC KERATOSIS: ICD-10-CM

## 2018-05-09 DIAGNOSIS — D18.01 CHERRY ANGIOMA: ICD-10-CM

## 2018-05-09 DIAGNOSIS — Z85.828 HISTORY OF NONMELANOMA SKIN CANCER: ICD-10-CM

## 2018-05-09 DIAGNOSIS — D48.5 NEOPLASM OF UNCERTAIN BEHAVIOR OF SKIN OF BACK: ICD-10-CM

## 2018-05-09 DIAGNOSIS — D48.5 NEOPLASM OF UNCERTAIN BEHAVIOR OF SKIN OF LOWER LEG: Primary | ICD-10-CM

## 2018-05-09 DIAGNOSIS — L81.4 LENTIGINES: ICD-10-CM

## 2018-05-09 DIAGNOSIS — Z85.820 PERSONAL HISTORY OF MALIGNANT MELANOMA OF SKIN: ICD-10-CM

## 2018-05-09 NOTE — MR AVS SNAPSHOT
[address] [facility] [address] [place] [address] [address] [place] [id number] Patient: Lupillo Salazar MRN: [id number]:[id number] Visit Information Date & Time Provider Department Dept. Phone Encounter #  
 5/9/2018  2:15 PM ELIN Mercer [phone] Your Appointments 5/9/2018  2:15 PM  
Office Visit with ELIN Mercer [address] [place] [address]) Appt Note: osvaldo treatment; osvaldo treatment [facility] [address] [facility] [id number]  
[address] [facility] [address] [place] [id number] 5/18/2018  8:45 AM  
ACUTE CARE with Lesley Wallace MD  
[facility] [address]) Appt Note: fasting labs and health questions [address] [facility] [phone]  
[phone]  
  
   
 [address] [facility] [address] Upcoming Health Maintenance Date Due  
 GLAUCOMA SCREENING Q2Y 6/10/2018 DTaP/Tdap/Td series (1 - Tdap) 1/1/2021* Influenza Age 5 to Adult 8/1/2018 MEDICARE YEARLY EXAM 11/15/2018 COLONOSCOPY 10/19/2027 *Topic was postponed. The date shown is not the original due date. Allergies as of 5/9/2018  Review Complete On: 5/9/2018 By: Karishma Thomas LPN Severity Noted Reaction Type Reactions Iron  09/14/2012    Diarrhea Pcn [Penicillins]  07/19/2012   Systemic Rash PCN only. Can take cephalosporins Sulfacetamide  07/19/2012    Swelling Current Immunizations  Reviewed on 12/14/2015 Name Date Hep A Vaccine 1/3/2010 Hepatitis B Vaccine 1/1/2010 Influenza High Dose Vaccine PF 10/13/2016, 10/30/2015, 10/14/2013 Influenza Vaccine 9/23/2014 Influenza Vaccine Whole 10/1/2012 Pneumococcal Conjugate (PCV-13) 7/27/2016 TD Vaccine 1/1/2011 ZZZ-RETIRED (DO NOT USE) Pneumococcal Vaccine (Unspecified Type) 6/29/2010 Not reviewed this visit Vitals BP Pulse Temp Resp SpO2 OB Status 130/68 62 97.6 °F (36.4 °C) 16 97% Hysterectomy Smoking Status Former Smoker Preferred Pharmacy Pharmacy Name Phone [facility] [phone] [facility] [address] [phone] Your Updated Medication List  
  
   
This list is accurate as of 5/9/18  1:54 PM.  Always use your most recent med list.  
  
  
  
  
 BYSTOLIC 5 mg tablet Generic drug:  nebivolol TAKE 1 TABLET DAILY FOR BLOOD PRESSURE ,  PULSE, FATIGUE (REPLACES  ATENOLOL) clopidogrel 75 mg Tab Commonly known as:  PLAVIX TAKE 1 TABLET EVERY DAY  
  
 famotidine 40 mg tablet Commonly known as:  PEPCID  
TAKE 1 TABLET BY MOUTH  TWICE A DAY  
  
 fluorouracil 5 % chemo cream  
Commonly known as:  EFUDEX Apply  to affected area two (2) times a day. levothyroxine 88 mcg tablet Commonly known as:  SYNTHROID Take 1 Tab by mouth Daily (before breakfast). Increased dose 11/19/17  
  
 mirabegron ER 50 mg ER tablet Commonly known as:  MYRBETRIQ Take 1 Tab by mouth daily. Had dry mouth on anticholinergic  Indications: Bladder Hyperactivity  
  
 montelukast 10 mg tablet Commonly known as:  SINGULAIR Take 1 Tab by mouth daily. MUCINEX PO Take  by mouth daily. MULTIVITAMIN PO  
  
 RESTASIS 0.05 % ophthalmic emulsion Generic drug:  cycloSPORINE  
  
 simvastatin 40 mg tablet Commonly known as:  ZOCOR  
TAKE 1 TABLET EVERY NIGHT  
  
 TYLENOL ARTHRITIS PAIN 650 mg Tber Generic drug:  acetaminophen Take 650 mg by mouth every six (6) hours as needed. VITAMIN D2 50,000 unit capsule Generic drug:  ergocalciferol TAKE 1 CAPSULE BY MOUTH  EVERY 7 DAYS ZyrTEC 10 mg tablet Generic drug:  cetirizine Take  by mouth daily. Patient Instructions Self Skin Exam and Sunscreens Early detection and treatment is essential in the treatment of all forms of skin cancer. If caught early, all forms of skin cancer are curable. In addition to your regular visits, you should perform a monthly skin examination. Over time, you become familiar with what is normally found on your skin and can identify new or suspicious spots. One of the screening tools you can use to assess your skin is to follow the ABCDEs: 
 
A= Asymmetry (One half is unlike the other half) B= Border (An irregular, scalloped or poorly defined edge) C= Color (Is varied from one area to another, has shades of tan, brown/ black,       white, red or blue) D= Diameter (Spots larger than 6mm or a pencil eraser) E= Evolving (New spots or one that is changing in size, shape, or color) A follow- up interval will be customized based on your history of skin cancer or level of skin damage and risk factors. In any case, if you notice a suspicious or new spot, an appointment should be arranged between regular visits. Everyone should use sunscreen and sun-safe practices, which is especially important for those with a personal or family history of skin cancer. Suggestions for this include: 1. Use daily moisturizers containing SPF 30 or higher. 2. Wear long sleeve clothing with UPF ratings and a broad-brimmed hat. 3. Apply sunscreen with SPF 30 or higher to all sun exposed areas if you are going to be in the sun. A broad spectrum UVA/ UVB sunscreen is best.  Dont forget to REAPPLY every two hours or more often if swimming or sweating! 4. Avoid outside activities during peak sun hours, especially in the summer (10am- 2pm). 5. DO NOT use tanning beds. Using sunscreen and sun-safe practices can help reduce the likelihood of developing skin cancer or additional skin cancers in those previously diagnosed. Introducing [facility]! Dear Elva Steven: Thank you for requesting a [employer] account. Our records indicate that you already have an active [employer] account. You can access your account anytime at [email or web address] Did you know that you can access your hospital and ER discharge instructions at any time in [employer]? You can also review all of your test results from your hospital stay or ER visit. Additional Information If you have questions, please visit the Frequently Asked Questions section of the [employer] website at [email or web address] Remember, [employer] is NOT to be used for urgent needs. For medical emergencies, dial 911. Now available from your iPhone and Android! Please provide this summary of care documentation to your next provider. Your primary care clinician is listed as Agatha Merrill. If you have any questions after today's visit, please call [phone].

## 2018-05-09 NOTE — PROGRESS NOTES
Written by Jcarlos Frey, as dictated by Blayne Guevara, [address]. Name: Jorge Shaw       Age: 69 y.o. Date: 5/9/2018    Chief Complaint: No chief complaint on file. Subjective:    HPI  Ms. Jorge Shaw is a 69 y.o. female who presents for a full skin exam.  The patient's last skin exam was on 11/13/17 and the patient does have current complaints related to her skin. She reports rough lesions she can feel on her right arm and a tender lesion on her left lower leg. The patient's pertinent skin history includes : Melanoma in situ on the right upper back treated on 2/28/18, Melanoma on the right upper arm and left thigh, NMSCs, AKs addressed with PDT in February and April of 2018    ROS: Constitutional: Negative. Dermatological : positive for - skin lesion changes      Social History     Social History    Marital status:      Spouse name: Deborah Anne Number of children: 2    Years of education: N/A     Occupational History    Not on file. Social History Main Topics    Smoking status: Former Smoker     Packs/day: 1.00    Smokeless tobacco: Former User     Quit date: 1/1/1990    Alcohol use 0.6 oz/week     1 Glasses of wine per week      Comment: 3-4 x a week    Drug use: No    Sexual activity: No     Other Topics Concern    Not on file     Social History Narrative       Family History   Problem Relation Age of Onset    Cancer Mother     Cancer Father     Hypertension Brother     Cancer Brother      myeloma       Past Medical History:   Diagnosis Date    Basal cell carcinoma 7/2012, 7/2015    Dr. Britni Martin. saw Dr. Naomie Tellez cough     Colon polyp 5/2010    Dr. Beverly Shaikh, tubular adenoma    Diverticulitis of colon 5/2010    dx on colonoscopy by Dr. Beverly Shaikh. Took flagyl, cipro    DJD (degenerative joint disease), lumbar `    L5?     Endometr hyperplasia w/atypia 0    Family history of skin cancer     Brother - melanoma    GERD (gastroesophageal reflux disease) 5/2010    H/O bone density study 2000?    normal per pt.  Hematuria     hx stones and \"nephritis\" since childhood    Hiatal hernia 5/2010    Hypertension 1996    Hypomagnesemia     level was 1.5 on mg ox 6/2012    Hypothyroidism 2008    TSH 1.1 6/7/12    Iron deficiency anemia     EGD, colon 5/2010.  hgb 11.3 6/2012    Melanoma ([facility] [address] 12/2011    Dr. Master Luciano.  right shoulder, left thigh. Dr. Nikko Oconnell in situ of back [facility]) 01/2018    Dr Karma Velazquez Mixed hyperlipidemia      Tg 166 LDL 87 HDL55 6/2012    Nephrolithiasis     Stroke ([facility] [address] 7/06/07    R facial droop and R weakness. completely resolved.  Sun-damaged skin     Sunburn, blistering     Vitamin D deficiency     improved to 35.3 6/2012    Zoster        Past Surgical History:   Procedure Laterality Date    COLONOSCOPY N/A 10/19/2017    COLONOSCOPY performed by Scarlett Werner MD at [address] [facility] HX COLONOSCOPY  5/2010    diverticuLITIS, tubular adenoma  Dr. Kenna Rivers HX GI  5/2010    EGD  - hiatal hernia. Dr. Kenna Rivers HX MOHS PROCEDURES  09/14/2017    SCC L anterior mujica by Dr. Isis Quigley    endometrial pre-cancer on biopsy    HX TONSILLECTOMY         Current Outpatient Prescriptions   Medication Sig Dispense Refill    VITAMIN D2 50,000 unit capsule TAKE 1 CAPSULE BY MOUTH  EVERY 7 DAYS 13 Cap 3    fluorouracil (EFUDEX) 5 % chemo cream Apply  to affected area two (2) times a day. 40 g 4    famotidine (PEPCID) 40 mg tablet TAKE 1 TABLET BY MOUTH  TWICE A  Tab 1    simvastatin (ZOCOR) 40 mg tablet TAKE 1 TABLET EVERY NIGHT 90 Tab 1    clopidogrel (PLAVIX) 75 mg tab TAKE 1 TABLET EVERY DAY 90 Tab 1    BYSTOLIC 5 mg tablet TAKE 1 TABLET DAILY FOR BLOOD PRESSURE ,  PULSE, FATIGUE (REPLACES  ATENOLOL) 90 Tab 1    mirabegron ER (MYRBETRIQ) 50 mg ER tablet Take 1 Tab by mouth daily.  Had dry mouth on anticholinergic  Indications: Bladder Hyperactivity 90 Tab 3    montelukast (SINGULAIR) 10 mg tablet Take 1 Tab by mouth daily. 90 Tab 2    levothyroxine (SYNTHROID) 88 mcg tablet Take 1 Tab by mouth Daily (before breakfast). Increased dose 11/19/17 90 Tab 1    MULTIVITAMIN PO       GUAIFENESIN (MUCINEX PO) Take  by mouth daily.  RESTASIS 0.05 % ophthalmic emulsion       cetirizine (ZYRTEC) 10 mg tablet Take  by mouth daily.  acetaminophen (TYLENOL ARTHRITIS PAIN) 650 mg CR tablet Take 650 mg by mouth every six (6) hours as needed. Allergies   Allergen Reactions    Iron Diarrhea    Pcn [Penicillins] Rash     PCN only. Can take cephalosporins    Sulfacetamide Swelling         Objective:    Visit Vitals    /68    Pulse 62    Temp 97.6 °F (36.4 °C)    Resp 16    SpO2 97%       Delaney Ford is a 69 y.o. female who appears well and in no distress. She is awake, alert, and oriented. There is no preauricular, submandibular, or cervical lymphadenopathy. A skin examination was performed including her scalp, face (including eyelids), ears, neck, chest, back, abdomen, upper extremities (including digits/nails), lower extremities, breasts, buttocks; genital skin was not examined. She has well healed surgical sites without evidence of lesion recurrences. There are scattered waxy macules and keratotic papules consistent with seborrheic keratoses. She has scattered red papules consistent with cherry angiomas. There are lentigines on sun exposed areas. She has thin scaled actinic keratoses on her right temple x 2 and right upper arm x 1. She has a 10 x 5 mm pink patch on her right upper back consistent with basal cell carcinoma. She has a 7 x 4 mm pink macule on her mid upper back consistent with basal cell carcinoma. She has a 9 x 5 mm pink maculopapular lesion on her right mid back consistent with basal cell carcinoma. She has a 7 x 4 mm pink macule on the mid central back consistent with basal cell carcinoma.  She has a 6 x 4 mm pink and ulcerated papule on her left anterior shin consistent with squamous cell carcinoma. Back      Right leg = monitor    Left shin      Assessment/Plan:    Personal history of melanoma and nonmelanoma skin cancers. I discussed sun protection, sunscreen use, the warning signs of skin cancer, the need for self-skin examinations, and the need for regular practitioner exams every 6 months. The patient should follow up sooner as needed if new, changing, or symptomatic skin lesions arise. Seborrheic keratoses. The diagnosis was reviewed and the patient was reassured that no treatment is needed for these benign lesions. Cherry angiomas. The diagnosis was reviewed and the patient was reassured that no treatment is needed for these benign lesions. Solar lentigos. The diagnosis and relationship to sun exposure was reviewed. Sun protection advised. Actinic Keratoses. The diagnosis of this precancerous lesion related to sun exposure was reviewed. Verbal consent was obtained. I treated 3 lesions with cryotherapy and post-cryotherapy care was reviewed. Neoplasm of Uncertain Behavior, right upper back, favor BCC. The differential diagnoses were discussed. A shave biopsy followed by curettage was advised to address this lesion with malignant destruction. The procedure was reviewed and verbal and written consent were obtained. The risks of pain, bleeding, infection, scar, and recurrence of the lesion were discussed. I performed the procedure. The site was cleansed and anesthetized with 1% Lidocaine with Epinephrine 1:100,000. A shave removal was performed to remove the lesion in its clinical entirety followed by curettage of the base and a 2 mm margin, resulting in a post curettage defect size of 14 x 9 mm. Drysol was used for hemostasis. The wound was bandaged and care reviewed. The specimen was sent to pathology. I will contact the patient with the results and any further treatment that may be necessary.     Neoplasm of Uncertain Behavior, mid upper back, favor BCC. The differential diagnoses were discussed. A shave biopsy followed by curettage was advised to address this lesion with malignant destruction. The procedure was reviewed and verbal and written consent were obtained. The risks of pain, bleeding, infection, scar, and recurrence of the lesion were discussed. I performed the procedure. The site was cleansed and anesthetized with 1% Lidocaine with Epinephrine 1:100,000. A shave removal was performed to remove the lesion in its clinical entirety followed by curettage of the base and a 2 mm margin, resulting in a post curettage defect size of 11 x 8 mm. Drysol was used for hemostasis. The wound was bandaged and care reviewed. The specimen was sent to pathology. I will contact the patient with the results and any further treatment that may be necessary. Neoplasm of Uncertain Behavior, right mid back, favor BCC. The differential diagnoses were discussed. A shave biopsy followed by curettage was advised to address this lesion with malignant destruction. The procedure was reviewed and verbal and written consent were obtained. The risks of pain, bleeding, infection, scar, and recurrence of the lesion were discussed. I performed the procedure. The site was cleansed and anesthetized with 1% Lidocaine with Epinephrine 1:100,000. A shave removal was performed to remove the lesion in its clinical entirety followed by curettage of the base and a 2 mm margin, resulting in a post curettage defect size of 13 x 9 mm. Drysol was used for hemostasis. The wound was bandaged and care reviewed. The specimen was sent to pathology. I will contact the patient with the results and any further treatment that may be necessary. Neoplasm of Uncertain Behavior, mid central back, favor BCC. The differential diagnoses were discussed. A shave biopsy followed by curettage was advised to address this lesion with malignant destruction.   The procedure was reviewed and verbal and written consent were obtained. The risks of pain, bleeding, infection, scar, and recurrence of the lesion were discussed. I performed the procedure. The site was cleansed and anesthetized with 1% Lidocaine with Epinephrine 1:100,000. A shave removal was performed to remove the lesion in its clinical entirety followed by curettage of the base and a 2 mm margin, resulting in a post curettage defect size of 11 x 8 mm. Drysol was used for hemostasis. The wound was bandaged and care reviewed. The specimen was sent to pathology. I will contact the patient with the results and any further treatment that may be necessary. Neoplasm of Uncertain Behavior, left anterior shin, favor SCC. The differential diagnoses were discussed. A shave biopsy followed by curettage was advised to address this lesion with malignant destruction. The procedure was reviewed and verbal and written consent were obtained. The risks of pain, bleeding, infection, scar, and recurrence of the lesion were discussed. I performed the procedure. The site was cleansed and anesthetized with 1% Lidocaine with Epinephrine 1:100,000. A shave removal was performed to remove the lesion in its clinical entirety followed by curettage of the base and a 2 mm margin, resulting in a post curettage defect size of 10 x 8 mm. Drysol was used for hemostasis. The wound was bandaged and care reviewed. The specimen was sent to pathology. I will contact the patient with the results and any further treatment that may be necessary. This plan was reviewed with the patient and patient agrees. All questions were answered. This scribe documentation was reviewed by me and accurately reflects the examination and decisions made by me.

## 2018-05-11 NOTE — PROGRESS NOTES
I spoke with the pt who states the areas are healing well. She understands all 5 were abnormal, 4 BCC and one likely SCC. All were curetted at the visit so no further treatment is needed at this time.

## 2018-05-18 ENCOUNTER — OFFICE VISIT (OUTPATIENT)
Dept: INTERNAL MEDICINE CLINIC | Age: 78
End: 2018-05-18

## 2018-05-18 VITALS
TEMPERATURE: 97.8 F | SYSTOLIC BLOOD PRESSURE: 123 MMHG | RESPIRATION RATE: 18 BRPM | HEART RATE: 56 BPM | DIASTOLIC BLOOD PRESSURE: 76 MMHG | OXYGEN SATURATION: 95 % | BODY MASS INDEX: 27.23 KG/M2 | WEIGHT: 148 LBS | HEIGHT: 62 IN

## 2018-05-18 DIAGNOSIS — E03.9 HYPOTHYROIDISM, UNSPECIFIED TYPE: ICD-10-CM

## 2018-05-18 DIAGNOSIS — D50.9 IRON DEFICIENCY ANEMIA, UNSPECIFIED IRON DEFICIENCY ANEMIA TYPE: ICD-10-CM

## 2018-05-18 DIAGNOSIS — I10 ESSENTIAL HYPERTENSION: Primary | ICD-10-CM

## 2018-05-18 NOTE — PROGRESS NOTES
HISTORY OF PRESENT ILLNESS    Chief Complaint   Patient presents with    Follow-up     fasting lab     Cough     c/o productive cough        Presents for follow-up    She is taking benefiber and wants to start \"Power Greens\" powder. Reports productive cough for 2 weeks  Was in the [place].  also with cough. She is overall much better. Tried Nyquil as well. Hypertension  Hypertension ROS: taking medications as instructed, no medication side effects noted, no TIA's, no chest pain on exertion, no dyspnea on exertion, no swelling of ankles     reports that she has quit smoking. She smoked 1.00 pack per day. She quit smokeless tobacco use about 28 years ago. reports that she drinks about 0.6 oz of alcohol per week    BP Readings from Last 2 Encounters:   05/18/18 123/76   05/09/18 130/68       Hypothyroidism follow-up  Reports no fatigue  denies heat/cold intolerance, bowel/skin changes or CVS symptoms, losing hair, feeling excessive energy, tremulousness, palpitations. Thyroid medication has been unchanged since last medication check and labs. Lab Results   Component Value Date/Time    TSH 0.555 02/06/2018 08:31 AM    T4, Free 1.75 02/06/2018 08:31 AM     Wt Readings from Last 3 Encounters:   05/18/18 148 lb (67.1 kg)   02/28/18 146 lb (66.2 kg)   01/24/18 146 lb (66.2 kg)       Review of Systems   All other systems reviewed and are negative, except as noted in HPI    Past Medical and Surgical History   has a past medical history of Basal cell carcinoma (7/2012, 7/2015); Chronic cough; Colon polyp (5/2010); Diverticulitis of colon (5/2010); DJD (degenerative joint disease), lumbar (`); Endometr hyperplasia w/atypia (1997); Family history of skin cancer; GERD (gastroesophageal reflux disease) (5/2010); H/O bone density study (2000?); Hematuria; Hiatal hernia (5/2010); Hypertension (1996); Hypomagnesemia; Hypothyroidism (2008); Iron deficiency anemia; Melanoma ([facility] [address] (12/2011);  Melanoma in situ of back [facility]) (01/2018); Mixed hyperlipidemia; Nephrolithiasis; Stroke ([facility] [address] (7/06/07); Sun-damaged skin; Sunburn, blistering; Vitamin D deficiency; and Zoster. She also has no past medical history of Arsenic suspected exposure; Malignant hyperthermia due to anesthesia; Nausea & vomiting; Nicotine vapor product user; Non-nicotine vapor product user; Pacemaker; Radiation exposure; Sleep apnea; or Tanning bed exposure. has a past surgical history that includes hx edilberto and bso (1997); hx tonsillectomy; hx gi (5/2010); hx colonoscopy (5/2010); hx mohs procedure (09/14/2017); and colonoscopy (N/A, 10/19/2017). reports that she has quit smoking. She smoked 1.00 pack per day. She quit smokeless tobacco use about 28 years ago. She reports that she drinks about 0.6 oz of alcohol per week  She reports that she does not use illicit drugs. family history includes Cancer in her brother, father, and mother; Hypertension in her brother. Physical Exam   Nursing note and vitals reviewed. Blood pressure 123/76, pulse (!) 56, temperature 97.8 °F (36.6 °C), temperature source Oral, resp. rate 18, height 5' 2\" (1.575 m), weight 148 lb (67.1 kg), SpO2 95 %. Constitutional:  No distress. Eyes: Conjunctivae are normal.   Ears:  Hearing grossly intact  Cardiovascular: Normal rate. regular rhythm, no murmurs or gallops  No edema  Pulmonary/Chest: Effort normal.   CTAB  Musculoskeletal: moves all 4 extremities   Neurological: Alert and oriented to person, place, and time. Skin: No rash noted. Psychiatric: Normal mood and affect. Behavior is normal.     ASSESSMENT and PLAN  Diagnoses and all orders for this visit:    1. Essential hypertension- Controlled on current regimen. Continue current medications as written in chart  -     METABOLIC PANEL, COMPREHENSIVE    2. Hypothyroidism, unspecified type- Controlled on current regimen.   Continue current medications as written in chart.  -     TSH 3RD GENERATION  -     T4, FREE    3. Iron deficiency anemia, unspecified iron deficiency anemia type  -     CBC WITH AUTOMATED DIFF  -     IRON PROFILE    4. Cough- improving URI.      lab results and schedule of future lab studies reviewed with patient  reviewed medications and side effects in detail  Return to clinic for further evaluation if new symptoms develop      Current Outpatient Prescriptions   Medication Sig    VITAMIN D2 50,000 unit capsule TAKE 1 CAPSULE BY MOUTH  EVERY 7 DAYS    fluorouracil (EFUDEX) 5 % chemo cream Apply  to affected area two (2) times a day.  famotidine (PEPCID) 40 mg tablet TAKE 1 TABLET BY MOUTH  TWICE A DAY    simvastatin (ZOCOR) 40 mg tablet TAKE 1 TABLET EVERY NIGHT    clopidogrel (PLAVIX) 75 mg tab TAKE 1 TABLET EVERY DAY    BYSTOLIC 5 mg tablet TAKE 1 TABLET DAILY FOR BLOOD PRESSURE ,  PULSE, FATIGUE (REPLACES  ATENOLOL)    montelukast (SINGULAIR) 10 mg tablet Take 1 Tab by mouth daily.  levothyroxine (SYNTHROID) 88 mcg tablet Take 1 Tab by mouth Daily (before breakfast). Increased dose 11/19/17    MULTIVITAMIN PO     GUAIFENESIN (MUCINEX PO) Take  by mouth daily.  RESTASIS 0.05 % ophthalmic emulsion     cetirizine (ZYRTEC) 10 mg tablet Take  by mouth daily.  acetaminophen (TYLENOL ARTHRITIS PAIN) 650 mg CR tablet Take 650 mg by mouth every six (6) hours as needed. No current facility-administered medications for this visit.

## 2018-05-18 NOTE — MR AVS SNAPSHOT
[facility] 
 
 
 [address] Cher[place] Leitz [address] [facility] 
[phone] Patient: Kevin Maurer MRN: [id number] Visit Information Date & Time Provider Department Dept. Phone Encounter #  
 5/18/2018  8:45 AM Danette Unger MD [facility] [address]  [id number] Your Appointments 11/12/2018 10:00 AM  
Office Visit with EILN Trujillo [address] [place] [address]) Appt Note: 6 Months Skin exam  
 [facility] [id number]  
[facility] [address] [place] [id number] Upcoming Health Maintenance Date Due  
 GLAUCOMA SCREENING Q2Y 6/10/2018 DTaP/Tdap/Td series (1 - Tdap) 1/1/2021* Influenza Age 5 to Adult 8/1/2018 MEDICARE YEARLY EXAM 11/15/2018 COLONOSCOPY 10/19/2027 *Topic was postponed. The date shown is not the original due date. Allergies as of 5/18/2018  Review Complete On: 5/18/2018 By: Danette Unger MD  
  
 Severity Noted Reaction Type Reactions Iron  09/14/2012    Diarrhea Pcn [Penicillins]  07/19/2012   Systemic Rash PCN only. Can take cephalosporins Sulfacetamide  07/19/2012    Swelling Current Immunizations  Reviewed on 12/14/2015 Name Date Hep A Vaccine 1/3/2010 Hepatitis B Vaccine 1/1/2010 Influenza High Dose Vaccine PF 10/13/2016, 10/30/2015, 10/14/2013 Influenza Vaccine 9/23/2014 Influenza Vaccine Whole 10/1/2012 Pneumococcal Conjugate (PCV-13) 7/27/2016 TD Vaccine 1/1/2011 ZZZ-RETIRED (DO NOT USE) Pneumococcal Vaccine (Unspecified Type) 6/29/2010 Not reviewed this visit You Were Diagnosed With   
  
 Codes Comments Essential hypertension    -  Primary ICD-10-CM: I10 
ICD-9-CM: 401.9 Hypothyroidism, unspecified type     ICD-10-CM: E03.9 ICD-9-CM: 244.9 Iron deficiency anemia, unspecified iron deficiency anemia type     ICD-10-CM: D50.9 ICD-9-CM: 280.9 Vitals BP Pulse Temp Resp Height(growth percentile) Weight(growth percentile) 123/76 (BP 1 Location: Left arm, BP Patient Position: Sitting) (!) 56 97.8 °F (36.6 °C) (Oral) 18 5' 2\" (1.575 m) 148 lb (67.1 kg) SpO2 BMI OB Status Smoking Status 95% 27.07 kg/m2 Hysterectomy Former Smoker BMI and BSA Data Body Mass Index Body Surface Area  
 27.07 kg/m 2 1.71 m 2 [facility] [facility] [address] [facility] [address] [phone] Your Updated Medication List  
  
   
This list is accurate as of 5/18/18  9:27 AM.  Always use your most recent med list.  
  
  
  
  
 BYSTOLIC 5 mg tablet Generic drug:  nebivolol TAKE 1 TABLET DAILY FOR BLOOD PRESSURE ,  PULSE, FATIGUE (REPLACES  ATENOLOL) clopidogrel 75 mg Tab Commonly known as:  PLAVIX TAKE 1 TABLET EVERY DAY  
  
 famotidine 40 mg tablet Commonly known as:  PEPCID  
TAKE 1 TABLET BY MOUTH  TWICE A DAY  
  
 fluorouracil 5 % chemo cream  
Commonly known as:  EFUDEX Apply  to affected area two (2) times a day. levothyroxine 88 mcg tablet Commonly known as:  SYNTHROID Take 1 Tab by mouth Daily (before breakfast). Increased dose 11/19/17  
  
 montelukast 10 mg tablet Commonly known as:  SINGULAIR Take 1 Tab by mouth daily. MUCINEX PO Take  by mouth daily. MULTIVITAMIN PO  
  
 RESTASIS 0.05 % ophthalmic emulsion Generic drug:  cycloSPORINE  
  
 simvastatin 40 mg tablet Commonly known as:  ZOCOR  
TAKE 1 TABLET EVERY NIGHT  
  
 TYLENOL ARTHRITIS PAIN 650 mg Tber Generic drug:  acetaminophen Take 650 mg by mouth every six (6) hours as needed. VITAMIN D2 50,000 unit capsule Generic drug:  ergocalciferol TAKE 1 CAPSULE BY MOUTH  EVERY 7 DAYS ZyrTEC 10 mg tablet Generic drug:  cetirizine Take  by mouth daily. We Performed the Following CBC WITH AUTOMATED DIFF [55013 CPT(R)] IRON PROFILE [id number] CPT(R)] METABOLIC PANEL, COMPREHENSIVE [37354 CPT(R)] T4, FREE [id number] CPT(R)] TSH 3RD GENERATION [30134 CPT(R)] Introducing [facility] SERVICES! Dear Elliot Carlton: 
Thank you for requesting a [employer] account. Our records indicate that you already have an active [employer] account. You can access your account anytime at [email or web address] Did you know that you can access your hospital and ER discharge instructions at any time in [employer]? You can also review all of your test results from your hospital stay or ER visit. Additional Information If you have questions, please visit the Frequently Asked Questions section of the [employer] website at [email or web address] Remember, [employer] is NOT to be used for urgent needs. For medical emergencies, dial 911. Now available from your iPhone and Android! Please provide this summary of care documentation to your next provider. Your primary care clinician is listed as Georgina Cruz. If you have any questions after today's visit, please call [phone].

## 2018-05-21 LAB
ALBUMIN SERPL-MCNC: 4.5 G/DL (ref 3.5–4.8)
ALBUMIN/GLOB SERPL: 1.9 {RATIO} (ref 1.2–2.2)
ALP SERPL-CCNC: 56 IU/L (ref 39–117)
ALT SERPL-CCNC: 8 IU/L (ref 0–32)
AST SERPL-CCNC: 17 IU/L (ref 0–40)
BASOPHILS # BLD AUTO: 0 X10E3/UL (ref 0–0.2)
BASOPHILS NFR BLD AUTO: 0 %
BILIRUB SERPL-MCNC: 1.3 MG/DL (ref 0–1.2)
BUN SERPL-MCNC: 22 MG/DL (ref 8–27)
BUN/CREAT SERPL: 23 (ref 12–28)
CALCIUM SERPL-MCNC: 9.4 MG/DL (ref 8.7–10.3)
CHLORIDE SERPL-SCNC: 101 MMOL/L (ref 96–106)
CO2 SERPL-SCNC: 24 MMOL/L (ref 18–29)
CREAT SERPL-MCNC: 0.94 MG/DL (ref 0.57–1)
EOSINOPHIL # BLD AUTO: 0.1 X10E3/UL (ref 0–0.4)
EOSINOPHIL NFR BLD AUTO: 1 %
ERYTHROCYTE [DISTWIDTH] IN BLOOD BY AUTOMATED COUNT: 14.8 % (ref 12.3–15.4)
GFR SERPLBLD CREATININE-BSD FMLA CKD-EPI: 59 ML/MIN/1.73
GFR SERPLBLD CREATININE-BSD FMLA CKD-EPI: 68 ML/MIN/1.73
GLOBULIN SER CALC-MCNC: 2.4 G/DL (ref 1.5–4.5)
GLUCOSE SERPL-MCNC: 88 MG/DL (ref 65–99)
HCT VFR BLD AUTO: 36.8 % (ref 34–46.6)
HGB BLD-MCNC: 10.8 G/DL (ref 11.1–15.9)
IMM GRANULOCYTES # BLD: 0.3 X10E3/UL (ref 0–0.1)
IMM GRANULOCYTES NFR BLD: 3 %
INTERPRETATION: NORMAL
IRON SATN MFR SERPL: 31 % (ref 15–55)
IRON SERPL-MCNC: 80 UG/DL (ref 27–139)
LYMPHOCYTES # BLD AUTO: 1.7 X10E3/UL (ref 0.7–3.1)
LYMPHOCYTES NFR BLD AUTO: 15 %
MCH RBC QN AUTO: 26.9 PG (ref 26.6–33)
MCHC RBC AUTO-ENTMCNC: 29.3 G/DL (ref 31.5–35.7)
MCV RBC AUTO: 92 FL (ref 79–97)
MONOCYTES # BLD AUTO: 1.9 X10E3/UL (ref 0.1–0.9)
MONOCYTES NFR BLD AUTO: 16 %
NEUTROPHILS # BLD AUTO: 7.7 X10E3/UL (ref 1.4–7)
NEUTROPHILS NFR BLD AUTO: 65 %
PLATELET # BLD AUTO: 198 X10E3/UL (ref 150–379)
POTASSIUM SERPL-SCNC: 4.4 MMOL/L (ref 3.5–5.2)
PROT SERPL-MCNC: 6.9 G/DL (ref 6–8.5)
RBC # BLD AUTO: 4.02 X10E6/UL (ref 3.77–5.28)
SODIUM SERPL-SCNC: 142 MMOL/L (ref 134–144)
SPECIMEN STATUS REPORT, ROLRST: NORMAL
T4 FREE SERPL-MCNC: 1.84 NG/DL (ref 0.82–1.77)
TIBC SERPL-MCNC: 259 UG/DL (ref 250–450)
TSH SERPL DL<=0.005 MIU/L-ACNC: 0.64 UIU/ML (ref 0.45–4.5)
UIBC SERPL-MCNC: 179 UG/DL (ref 118–369)
WBC # BLD AUTO: 11.7 X10E3/UL (ref 3.4–10.8)

## 2018-06-16 RX ORDER — NEBIVOLOL HYDROCHLORIDE 5 MG/1
TABLET ORAL
Qty: 90 TAB | Refills: 1 | Status: SHIPPED | OUTPATIENT
Start: 2018-06-16 | End: 2019-01-18 | Stop reason: SDUPTHER

## 2018-06-25 DIAGNOSIS — J30.1 NON-SEASONAL ALLERGIC RHINITIS DUE TO POLLEN: ICD-10-CM

## 2018-06-25 RX ORDER — SIMVASTATIN 40 MG/1
TABLET, FILM COATED ORAL
Qty: 90 TAB | Refills: 1 | Status: SHIPPED | OUTPATIENT
Start: 2018-06-25 | End: 2019-01-03

## 2018-06-25 RX ORDER — CLOPIDOGREL BISULFATE 75 MG/1
TABLET ORAL
Qty: 90 TAB | Refills: 1 | Status: SHIPPED | OUTPATIENT
Start: 2018-06-25 | End: 2018-12-19 | Stop reason: ALTCHOICE

## 2018-06-25 RX ORDER — LEVOTHYROXINE SODIUM 88 UG/1
TABLET ORAL
Qty: 90 TAB | Refills: 1 | Status: SHIPPED | OUTPATIENT
Start: 2018-06-25 | End: 2019-01-18 | Stop reason: SDUPTHER

## 2018-06-25 RX ORDER — MONTELUKAST SODIUM 10 MG/1
TABLET ORAL
Qty: 90 TAB | Refills: 2 | Status: SHIPPED | OUTPATIENT
Start: 2018-06-25 | End: 2018-10-19

## 2018-08-15 NOTE — MR AVS SNAPSHOT
[address] [facility] [address] [place] [phone] Patient: Esteban Mckenna MRN: [id number] Visit Information Date & Time Provider Department Dept. Phone Encounter #  
 2/28/2018  9:00 AM MD Be Silva [id number] [phone] Your Appointments 3/28/2018  2:15 PM  
Office Visit with ELIN Ferguson [address] [place] [address]) Appt Note: osvaldo treatment [facility] [phone]  
  
   
 [facility] [address] [place] [id number]  
  
    
 4/2/2018  3:30 PM  
PROCEDURE with MD Be Silva 8057 [address] [place] [address]) Appt Note: melanoma in-situ right upper back - Jodie's patient [facility] [id number]  
[address] [place] [id number] Upcoming Health Maintenance Date Due DTaP/Tdap/Td series (1 - Tdap) 1/1/2021* GLAUCOMA SCREENING Q2Y 6/10/2018 MEDICARE YEARLY EXAM 11/15/2018 COLONOSCOPY 10/19/2027 *Topic was postponed. The date shown is not the original due date. Allergies as of 2/28/2018  Review Complete On: 2/28/2018 By: Adriel Morales Severity Noted Reaction Type Reactions Iron  09/14/2012    Diarrhea Pcn [Penicillins]  07/19/2012   Systemic Rash PCN only. Can take cephalosporins Sulfacetamide  07/19/2012    Swelling Current Immunizations  Reviewed on 12/14/2015 Name Date Hep A Vaccine 1/3/2010 Hepatitis B Vaccine 1/1/2010 Influenza High Dose Vaccine PF 10/13/2016, 10/30/2015, 10/14/2013 Influenza Vaccine 9/23/2014 Influenza Vaccine Whole 10/1/2012 Pneumococcal Conjugate (PCV-13) 7/27/2016 TD Vaccine 1/1/2011  ZZZ-RETIRED (DO NOT USE) Pneumococcal Vaccine (Unspecified Type) Hospital Medicine   Progress note   Team: [facility] A Salud Portillo MD   Admit Date: 8/9/2018   FAITH 8/15/2018   Code status: Full Code   Principal Problem: Uremia     Interval hx:  No events overnight.  Patient is complaining of some nausea this morning.  Hgb stable this a.m.  Afebrile.  Patient awaiting outpatient HD chair.       ROS   Constitutional: Positive for activity change and fatigue. Negative for fever and unexpected weight change.   Respiratory:  Negative for shortness of breath, cough and wheezing.    Cardiovascular: Negative for chest pain, palpitations and leg swelling.   Gastrointestinal:  Negative for abdominal pain.   Genitourinary: Negative for difficulty urinating and dysuria.   Musculoskeletal: Positive for arthralgias, back pain and gait problem ([facility] bound).   Neurological: Positive for weakness (L sided, s/p CVA). Negative for seizures, light-headedness and headaches.   Psychiatric/Behavioral: Negative for agitation and confusion.            PEx   Temp:  [98.2 °F (36.8 °C)-99.7 °F (37.6 °C)]   Pulse:  [63-81]   Resp:  [14-20]   BP: (135-208)/(62-84)   SpO2:  [93 %-99 %]      I & O (Last 24H):     Intake/Output Summary (Last 24 hours) at 8/15/2018 0825  Last data filed at 8/15/2018 0338  Gross per 24 hour   Intake 180 ml   Output --   Net 180 ml       Constitutional: NAD, Appears well-developed and well-nourished. Chronically ill, temporal wasting   Mental status: Alert and oriented to person, place, and time.  Head: Normocephalic and atraumatic.   Mouth/Throat: Oropharynx is clear and moist.   Eyes: EOM are normal. PERRL, No scleral icterus.   Neck: Normal range of motion. Neck supple. +HD cath in right neck  Cardiovascular: RRR, S1, S2 normal, +KIERSTEN grade 2/6, no click, rub or gallop  Pulmonary/Chest: Effort normal, CTAB. No respiratory distress. No wheezes, rales, or rhonchi, +O2 via NC (chronic)  Abdominal: Soft, NT, ND. +BS, no masses, no organomegaly  Musculoskeletal: Normal range of  6/29/2010 Not reviewed this visit You Were Diagnosed With   
  
 Codes Comments Melanoma in situ of back ([address]    -  Primary ICD-10-CM: D03.59 
ICD-9-CM: 172.5 Vitals BP Pulse Temp Resp Height(growth percentile) Weight(growth percentile) 140/80 (BP 1 Location: Left arm, BP Patient Position: Sitting) 63 98.4 °F (36.9 °C) (Oral) 16 5' 2\" (1.575 m) 146 lb (66.2 kg) SpO2 BMI OB Status Smoking Status 97% 26.7 kg/m2 Hysterectomy Former Smoker BMI and BSA Data Body Mass Index Body Surface Area  
 26.7 kg/m 2 1.7 m 2 [id number] [address] [phone] Your Updated Medication List  
  
   
This list is accurate as of 2/28/18  9:32 AM.  Always use your most recent med list.  
  
  
  
  
 BYSTOLIC 5 mg tablet Generic drug:  nebivolol TAKE 1 TABLET DAILY FOR BLOOD PRESSURE ,  PULSE, FATIGUE (REPLACES  ATENOLOL) clopidogrel 75 mg Tab Commonly known as:  PLAVIX TAKE 1 TABLET EVERY DAY  
  
 ergocalciferol 50,000 unit capsule Commonly known as:  ERGOCALCIFEROL Take 1 capsule by mouth  every 7 days  
  
 famotidine 40 mg tablet Commonly known as:  PEPCID Take 1 tablet by mouth  twice a day  
  
 levothyroxine 88 mcg tablet Commonly known as:  SYNTHROID Take 1 Tab by mouth Daily (before breakfast). Increased dose 11/19/17  
  
 mirabegron ER 50 mg ER tablet Commonly known as:  MYRBETRIQ Take 1 Tab by mouth daily. Had dry mouth on anticholinergic  Indications: Bladder Hyperactivity  
  
 montelukast 10 mg tablet Commonly known as:  SINGULAIR Take 1 Tab by mouth daily. MUCINEX PO Take  by mouth daily. MULTIVITAMIN PO  
  
 RESTASIS 0.05 % ophthalmic emulsion Generic drug:  cycloSPORINE  
  
 simvastatin 40 mg tablet Commonly known as:  ZOCOR  
TAKE 1 TABLET BY MOUTH  NIGHTLY  
  
 TYLENOL ARTHRITIS PAIN 650 mg Tber motion.  Ext: no c/c/e  Neurological: L sided weakness, chronic from CVA  Skin: Skin is warm and dry. No rashes or lesions  Psychiatric: Normal mood and affect. Behavior is normal.       Recent Results (from the past 24 hour(s))   POCT glucose    Collection Time: 08/14/18  9:34 AM   Result Value Ref Range    POCT Glucose 239 (H) 70 - 110 mg/dL   POCT glucose    Collection Time: 08/14/18 12:28 PM   Result Value Ref Range    POCT Glucose 227 (H) 70 - 110 mg/dL   POCT glucose    Collection Time: 08/14/18  5:57 PM   Result Value Ref Range    POCT Glucose 186 (H) 70 - 110 mg/dL   POCT glucose    Collection Time: 08/15/18  1:25 AM   Result Value Ref Range    POCT Glucose 186 (H) 70 - 110 mg/dL   POCT glucose    Collection Time: 08/15/18  5:34 AM   Result Value Ref Range    POCT Glucose 180 (H) 70 - 110 mg/dL   CBC with Automated Differential    Collection Time: 08/15/18  6:16 AM   Result Value Ref Range    WBC 4.73 3.90 - 12.70 K/uL    RBC 2.53 (L) 4.00 - 5.40 M/uL    Hemoglobin 7.6 (L) 12.0 - 16.0 g/dL    Hematocrit 24.5 (L) 37.0 - 48.5 %    MCV 97 82 - 98 fL    MCH 30.0 27.0 - 31.0 pg    MCHC 31.0 (L) 32.0 - 36.0 g/dL    RDW 12.1 11.5 - 14.5 %    Platelets 153 150 - 350 K/uL    MPV 11.0 9.2 - 12.9 fL    Immature Granulocytes 0.2 0.0 - 0.5 %    Gran # (ANC) 3.0 1.8 - 7.7 K/uL    Immature Grans (Abs) 0.01 0.00 - 0.04 K/uL    Lymph # 0.7 (L) 1.0 - 4.8 K/uL    Mono # 0.8 0.3 - 1.0 K/uL    Eos # 0.2 0.0 - 0.5 K/uL    Baso # 0.01 0.00 - 0.20 K/uL    nRBC 0 0 /100 WBC    Gran% 63.9 38.0 - 73.0 %    Lymph% 15.6 (L) 18.0 - 48.0 %    Mono% 16.5 (H) 4.0 - 15.0 %    Eosinophil% 3.6 0.0 - 8.0 %    Basophil% 0.2 0.0 - 1.9 %    Differential Method Automated    Renal function panel    Collection Time: 08/15/18  6:16 AM   Result Value Ref Range    Glucose 176 (H) 70 - 110 mg/dL    Sodium 140 136 - 145 mmol/L    Potassium 4.3 3.5 - 5.1 mmol/L    Chloride 101 95 - 110 mmol/L    CO2 29 23 - 29 mmol/L    BUN, Bld 46 (H) 6 - 20 mg/dL     Calcium 9.5 8.7 - 10.5 mg/dL    Creatinine 2.9 (H) 0.5 - 1.4 mg/dL    Albumin 3.3 (L) 3.5 - 5.2 g/dL    Phosphorus 4.2 2.7 - 4.5 mg/dL    eGFR if African American 19.7 (A) >60 mL/min/1.73 m^2    eGFR if non  17.1 (A) >60 mL/min/1.73 m^2    Anion Gap 10 8 - 16 mmol/L       Recent Labs   Lab  08/13/18   2241  08/14/18   0934  08/14/18   1228  08/14/18   1757  08/15/18   0125  08/15/18   0534   POCTGLUCOSE  180*  239*  227*  186*  186*  180*       Hemoglobin A1C   Date Value Ref Range Status   08/10/2018 5.6 4.0 - 5.6 % Final     Comment:     ADA Screening Guidelines:  5.7-6.4%  Consistent with prediabetes  >or=6.5%  Consistent with diabetes  High levels of fetal hemoglobin interfere with the HbA1C  assay. Heterozygous hemoglobin variants (HbS, HgC, etc)do  not significantly interfere with this assay.   However, presence of multiple variants may affect accuracy.     04/25/2018 5.8 (H) 4.0 - 5.6 % Final     Comment:     According to ADA guidelines, hemoglobin A1c <7.0% represents  optimal control in non-pregnant diabetic patients. Different  metrics may apply to specific patient populations.   Standards of Medical Care in Diabetes-2016.  For the purpose of screening for the presence of diabetes:  <5.7%     Consistent with the absence of diabetes  5.7-6.4%  Consistent with increasing risk for diabetes   (prediabetes)  >or=6.5%  Consistent with diabetes  Currently, no consensus exists for use of hemoglobin A1c  for diagnosis of diabetes for children.  This Hemoglobin A1c assay has significant interference with fetal   hemoglobin   (HbF). The results are invalid for patients with abnormal amounts of   HbF,   including those with known Hereditary Persistence   of Fetal Hemoglobin. Heterozygous hemoglobin variants (HbAS, HbAC,   HbAD, HbAE, HbA2) do not significantly interfere with this assay;   however, presence of multiple variants in a sample may impact the %   interference.     02/22/2018 4.8 4.0 - 5.6 %  Generic drug:  acetaminophen Take 650 mg by mouth every six (6) hours as needed. ZyrTEC 10 mg tablet Generic drug:  cetirizine Take  by mouth daily. Patient Instructions WOUND CARE INSTRUCTIONS 1. Keep the dressing clean and dry and do not remove for 48 hours. 2. Then change the dressing once a day as follows: 
a. Wash hands before and after each dressing change. b. Remove dressing and wash site gently with mild soap and water, rinse, and pat dry. 
c. Apply an ointment (Bacitracin, Polysporin, Neosporin, Petroleum jelly or Aquaphor). d. Apply a non-stick (Telfa) dressing or Band-Aid to cover the wound. Remove pressure bandage on Friday, then wash site gently. Leave steri-strips in place until it naturally begins to peel off, about 4-6 days, then remove. After 7 days, if steri-strips remain, they may be removed. Vaseline may be applied at this point for one week. 3. Watch for: BLEEDING: A small amount of drainage may occur. If bleeding occurs, elevate and rest the surgery site. Apply gauze and steady pressure for 15 minutes. If bleeding continues, call this office. INFECTION: Signs of infection include increased redness, pain, warmth, drainage of pus, and fever. If this occurs, call this office. 4. Special Instructions (follow any that are checked): 
· [x] You have stitches that DO NOT need to be removed. · [x] Avoid bending at the waist and heavy lifting for two days. · [] Sleep with your head elevated for the next two nights. · [] Rest the surgery site and keep it elevated as much as possible for two days. · [x] You may apply an ice-pack for 10-15 minutes every waking hour for the rest of the day. · [] Eat a soft diet and avoid hot food and hot drinks for the rest of the day. · [] Other instructions: Follow up as directed. Take Tylenol or Ibuprofen for pain as needed.  
 
 
Once the site is healed with no remaining bandages or open areas, protect Final     Comment:     According to ADA guidelines, hemoglobin A1c <7.0% represents  optimal control in non-pregnant diabetic patients. Different  metrics may apply to specific patient populations.   Standards of Medical Care in Diabetes-2016.  For the purpose of screening for the presence of diabetes:  <5.7%     Consistent with the absence of diabetes  5.7-6.4%  Consistent with increasing risk for diabetes   (prediabetes)  >or=6.5%  Consistent with diabetes  Currently, no consensus exists for use of hemoglobin A1c  for diagnosis of diabetes for children.  This Hemoglobin A1c assay has significant interference with fetal   hemoglobin   (HbF). The results are invalid for patients with abnormal amounts of   HbF,   including those with known Hereditary Persistence   of Fetal Hemoglobin. Heterozygous hemoglobin variants (HbAS, HbAC,   HbAD, HbAE, HbA2) do not significantly interfere with this assay;   however, presence of multiple variants in a sample may impact the %   interference.          Active Hospital Problems    Diagnosis  POA    *Uremia [N19]  Yes    ESRD (end stage renal disease) [N18.6]  Yes    Hypertensive urgency [I16.0]  Yes    Type 2 diabetes mellitus with hyperglycemia, without long-term current use of insulin [E11.65]  Yes    Hyperkalemia [E87.5]  Yes    Acute kidney failure [N17.9]  Yes    Fatigue [R53.83]  Yes    Essential hypertension [I10]  Yes    Anemia in stage 4 chronic kidney disease [N18.4, D63.1]  Yes    History of stroke [Z86.73]  Not Applicable     s/p R-MCA stroke with R-putaminal hemorrhagic transformation in 8/2016 and 11/2016 (s/p hemicraniotomy at [facility]) with residual L hemiparesis, on AED s/p CVA        Stage 4 chronic kidney disease [N18.4]  Yes      Resolved Hospital Problems   No resolved problems to display.         sodium chloride 0.9%   Intravenous Once    sodium chloride 0.9%   Intravenous Once    aspirin  81 mg Oral Daily    atorvastatin  40 mg Oral Daily     your surgical site and scar from the sun, as this area will be more sensitive. Use a broad spectrum sunscreen SPF 30 or higher daily, and a chemical free product (one containing zinc oxide or titanium dioxide) is a good choice if the area is sensitive. You may begin to gently massage the surgical site in 2-3 weeks, rubbing in a circular motion along the scar. This can help reduce swelling and thickness of a scar. A scar cream may be used beginnning 1 month after the surgery. If you have any questions or concerns, please call our office Monday through Friday at [phone]. Introducing [facility] & [facility] SERVICES! Dear Wilmer Sharp: 
Thank you for requesting a [employer] account. Our records indicate that you already have an active [employer] account. You can access your account anytime at [email or web address] Did you know that you can access your hospital and ER discharge instructions at any time in [employer]? You can also review all of your test results from your hospital stay or ER visit. Additional Information If you have questions, please visit the Frequently Asked Questions section of the [employer] website at [email or web address] Remember, [employer] is NOT to be used for urgent needs. For medical emergencies, dial 911. Now available from your iPhone and Android! Please provide this summary of care documentation to your next provider. Your primary care clinician is listed as Ambar Vergara. If you have any questions after today's visit, please call [phone]. carvedilol  25 mg Oral BID    epoetin josh (PROCRIT) injection  5,000 Units Subcutaneous Every Mon, Wed, Fri    furosemide  40 mg Oral Daily    hydrALAZINE  50 mg Oral TID    levETIRAcetam  500 mg Oral Q8H    NIFEdipine  90 mg Oral QHS    polyethylene glycol  17 g Oral Daily    sodium bicarbonate  1,300 mg Oral BID    tuberculin  5 Units Intradermal Once     sodium chloride, sodium chloride 0.9%, sodium chloride 0.9%, sodium chloride 0.9%, sodium chloride 0.9%, acetaminophen, acetaminophen, albuterol-ipratropium, bisacodyl, [COMPLETED] calcium gluconate IVPB **AND** calcium gluconate IVPB, dextrose 50%, dextrose 50%, glucagon (human recombinant), glucose, glucose, heparin (porcine), insulin aspart U-100, labetalol, ondansetron, prochlorperazine, sodium chloride 0.9%    Assessment and Plan for problems addressed today:   1. Uremia with ESRD  - with nausea, vomiting, fatigue, BUN higher than baseline, overall worsening renal function  - nephrology consulted, DOMONIQUE placed R permacat for HD, first HD session done 8/10/2018  - awaiting further nepro recs  - outpatient HD lab ordered. SW has been alerted for HD chair set up  - family had questions about home HD, discussed case with Nephrology fellow, at this time will need outpatient HD first then eventually can be transfer to home HD by her outpatient nephrologist if able at that time      2. Fatigue   - given uremia   - CTM post HD     3. Hyperkalemia, resolved with HD   - 5.8 on admit with peaked T waves on EKG  - BMP in am       4. Type 2 diabetes mellitus with hyperglycemia, without long-term current use of insulin   - diet controlled, diabetic diet, low dose SSI  - A1c 5.6      5. Essential hypertension with Hypertensive Urgency at admission, improved  - Uncontrolled, multiple doses of IV hydralazine in ED unsuccessful  - continue home medications: coreg 25 mg bid (increased), lasix 40 mg daily, nifedipine 90 mg daily  - decreased hydralazine to 50mg TID  -  given HD initiation, will monitor can D/C or decrease med if needed     6. Anemia in stage 4 chronic kidney disease  - s/p 1 unit PRBCs, transfuse for hemoglobin less than 7  - EPO per nephrology  - on iron supplementation   - CBC in am       7. History of stroke   - left sided weakness, turn q2h, fall risk, aspiration risk  - continue ASA, statin, keppra          Diet: renal  DVT PPx: SCD  Code status: FULL    Discharge plan and follow up: awaiting outpatient HD chair, C arranged, phenergan for nausea, hopefully d/c once insurance approval    Salud Portillo MD  [facility] Medicine Staff  [phone] pager

## 2018-08-17 ENCOUNTER — HOSPITAL ENCOUNTER (OUTPATIENT)
Dept: LAB | Age: 78
Discharge: HOME | End: 2018-08-17

## 2018-08-17 ENCOUNTER — OFFICE VISIT (OUTPATIENT)
Dept: DERMATOLOGY | Facility: AMBULATORY SURGERY CENTER | Age: 78
End: 2018-08-17

## 2018-08-17 VITALS
BODY MASS INDEX: 27.23 KG/M2 | HEIGHT: 62 IN | OXYGEN SATURATION: 98 % | HEART RATE: 61 BPM | DIASTOLIC BLOOD PRESSURE: 68 MMHG | TEMPERATURE: 98.4 F | WEIGHT: 148 LBS | RESPIRATION RATE: 14 BRPM | SYSTOLIC BLOOD PRESSURE: 148 MMHG

## 2018-08-17 DIAGNOSIS — L81.4 LENTIGINES: ICD-10-CM

## 2018-08-17 DIAGNOSIS — L72.0 MILIAL CYST: ICD-10-CM

## 2018-08-17 DIAGNOSIS — D48.5 NEOPLASM OF UNCERTAIN BEHAVIOR OF SKIN OF NECK: Primary | ICD-10-CM

## 2018-08-17 DIAGNOSIS — Z85.828 FOLLOW-UP SURVEILLANCE OF SKIN CANCER, ENCOUNTER FOR: ICD-10-CM

## 2018-08-17 DIAGNOSIS — Z85.820 PERSONAL HISTORY OF MALIGNANT MELANOMA OF SKIN: ICD-10-CM

## 2018-08-17 DIAGNOSIS — D48.5 NEOPLASM OF UNCERTAIN BEHAVIOR OF SKIN OF LOWER LEG: ICD-10-CM

## 2018-08-17 DIAGNOSIS — Z08 FOLLOW-UP SURVEILLANCE OF SKIN CANCER, ENCOUNTER FOR: ICD-10-CM

## 2018-08-17 DIAGNOSIS — Z85.828 HISTORY OF NONMELANOMA SKIN CANCER: ICD-10-CM

## 2018-08-17 DIAGNOSIS — L90.5 SCAR: ICD-10-CM

## 2018-08-17 DIAGNOSIS — D48.5 NEOPLASM OF UNCERTAIN BEHAVIOR OF SKIN OF SHOULDER: ICD-10-CM

## 2018-08-17 DIAGNOSIS — L57.0 ACTINIC KERATOSIS: ICD-10-CM

## 2018-08-17 NOTE — MR AVS SNAPSHOT
[address] [facility] [address] [place] [address] [address] 
[phone] Patient: Jose Manuel Reyes MRN: [id number] QHD:[id number] Visit Information Date & Time Provider Department Dept. Phone Encounter #  
 8/17/2018 10:30 AM ELIN Diaz 8057 [phone] Your Appointments 8/17/2018 10:30 AM  
Office Visit with ELIN Diaz 8057 [facility] Appt Note: est pt skin exam  
 [facility] [address]  
[phone]  
  
   
 [address] [address]  
  
    
 11/12/2018 10:00 AM  
Office Visit with ELIN Diaz [address] [facility] Appt Note: 6 Months Skin exam  
 [facility] [address]  
[phone] Upcoming Health Maintenance Date Due  
 GLAUCOMA SCREENING Q2Y 6/10/2018 Influenza Age 5 to Adult 8/1/2018 DTaP/Tdap/Td series (1 - Tdap) 1/1/2021* MEDICARE YEARLY EXAM 11/15/2018 COLONOSCOPY 10/19/2027 *Topic was postponed. The date shown is not the original due date. Allergies as of 8/17/2018  Review Complete On: 8/17/2018 By: Krista Sher Severity Noted Reaction Type Reactions Iron  09/14/2012    Diarrhea Pcn [Penicillins]  07/19/2012   Systemic Rash PCN only. Can take cephalosporins Sulfacetamide  07/19/2012    Swelling Current Immunizations  Reviewed on 12/14/2015 Name Date Hep A Vaccine 1/3/2010 Hepatitis B Vaccine 1/1/2010 Influenza High Dose Vaccine PF 10/13/2016, 10/30/2015, 10/14/2013 Influenza Vaccine 9/23/2014 Influenza Vaccine Whole 10/1/2012 Pneumococcal Conjugate (PCV-13) 7/27/2016 TD Vaccine 1/1/2011 ZZZ-RETIRED (DO NOT USE) Pneumococcal Vaccine (Unspecified Type) 6/29/2010 Not reviewed this visit Vitals BP Pulse Temp Resp Height(growth percentile) Weight(growth percentile) 148/68 (BP 1 Location: Left arm, BP Patient Position: Sitting) 61 98.4 °F (36.9 °C) (Oral) 14 5' 2\" (1.575 m) 148 lb (67.1 kg) SpO2 BMI OB Status Smoking Status 98% 27.07 kg/m2 Hysterectomy Former Smoker BMI and BSA Data Body Mass Index Body Surface Area  
 27.07 kg/m 2 1.71 m 2 Preferred Pharmacy Pharmacy Name Phone [facility] [place] - [address] [facility] [address] [phone] Your Updated Medication List  
  
   
This list is accurate as of 8/17/18 10:07 AM.  Always use your most recent med list.  
  
  
  
  
 BYSTOLIC 5 mg tablet Generic drug:  nebivolol TAKE 1 TABLET DAILY FOR BLOOD PRESSURE,  PULSE, FATIGUE (REPLACES  ATENOLOL) clopidogrel 75 mg Tab Commonly known as:  PLAVIX TAKE 1 TABLET EVERY DAY  
  
 famotidine 40 mg tablet Commonly known as:  PEPCID  
TAKE 1 TABLET BY MOUTH  TWICE A DAY  
  
 fluorouracil 5 % chemo cream  
Commonly known as:  EFUDEX Apply  to affected area two (2) times a day. levothyroxine 88 mcg tablet Commonly known as:  SYNTHROID  
TAKE 1 TABLET BY MOUTH DAILY (BEFORE BREAKFAST). INCREASED DOSE 11/19/17  
  
 montelukast 10 mg tablet Commonly known as:  SINGULAIR  
TAKE 1 TABLET EVERY DAY  
  
 MUCINEX PO Take  by mouth daily. MULTIVITAMIN PO  
  
 RESTASIS 0.05 % ophthalmic emulsion Generic drug:  cycloSPORINE  
  
 simvastatin 40 mg tablet Commonly known as:  ZOCOR  
TAKE 1 TABLET EVERY NIGHT  
  
 TYLENOL ARTHRITIS PAIN 650 mg Tber Generic drug:  acetaminophen Take 650 mg by mouth every six (6) hours as needed. VITAMIN D2 50,000 unit capsule Generic drug:  ergocalciferol TAKE 1 CAPSULE BY MOUTH  EVERY 7 DAYS ZyrTEC 10 mg tablet Generic drug:  cetirizine Take  by mouth daily. Patient Instructions Self Skin Exam and Sunscreens Early detection and treatment is essential in the treatment of all forms of skin cancer. If caught early, all forms of skin cancer are curable. In addition to your regular visits, you should perform a monthly skin examination. Over time, you become familiar with what is normally found on your skin and can identify new or suspicious spots. One of the screening tools you can use to assess your skin is to follow the ABCDEs: 
 
A= Asymmetry (One half is unlike the other half) B= Border (An irregular, scalloped or poorly defined edge) C= Color (Is varied from one area to another, has shades of tan, brown/ black,       white, red or blue) D= Diameter (Spots larger than 6mm or a pencil eraser) E= Evolving (New spots or one that is changing in size, shape, or color) A follow- up interval will be customized based on your history of skin cancer or level of skin damage and risk factors. In any case, if you notice a suspicious or new spot, an appointment should be arranged between regular visits. Everyone should use sunscreen and sun-safe practices, which is especially important for those with a personal or family history of skin cancer. Suggestions for this include: 1. Use daily moisturizers containing SPF 30 or higher. 2. Wear long sleeve clothing with UPF ratings and a broad-brimmed hat. 3. Apply sunscreen with SPF 30 or higher to all sun exposed areas if you are going to be in the sun. A broad spectrum UVA/ UVB sunscreen is best.  Dont forget to REAPPLY every two hours or more often if swimming or sweating! 4. Avoid outside activities during peak sun hours, especially in the summer (10am- 2pm). 5. DO NOT use tanning beds. Using sunscreen and sun-safe practices can help reduce the likelihood of developing skin cancer or additional skin cancers in those previously diagnosed. Introducing [facility] & HEALTH SERVICES! Dear Patricio Luis: Thank you for requesting a [employer] account. Our records indicate that you already have an active [employer] account. You can access your account anytime at [email or web address] Did you know that you can access your hospital and ER discharge instructions at any time in [employer]? You can also review all of your test results from your hospital stay or ER visit. Additional Information If you have questions, please visit the Frequently Asked Questions section of the [employer] website at [email or web address] Remember, [employer] is NOT to be used for urgent needs. For medical emergencies, dial 911. Now available from your iPhone and Android! Please provide this summary of care documentation to your next provider. Your primary care clinician is listed as Lola Lemon. If you have any questions after today's visit, please call [phone].

## 2018-08-17 NOTE — PROGRESS NOTES
Name: Donavon Ambrose       Age: 69 y.o. Date: 8/17/2018    Chief Complaint:   Chief Complaint   Patient presents with    Skin Exam     spot on left leg       Subjective:    HPI:  Ms. Lisa Irby is a 69 y.o. female who presents for the evaluation of a lesion on the left postauricular, left shoulder, left hand, left chest, back, and chin. Each of these has occurred over multiple weeks. Some have been present longer. Multiple of these do crust up and her hairdresser specifically noticed one behind the left ear. She is a complex history of multiple melanomas as well as nonmelanoma skin cancers and burden of actinic keratoses managed with different modalities. She has also a pink area on the left lateral calf that has been persistent and is mildly tender. ROS: Consitutional: Negative  Dermatological : positive for - skin lesion changes      Social History     Social History    Marital status:      Spouse name: Tay Enriquez Number of children: 2    Years of education: N/A     Occupational History    Not on file. Social History Main Topics    Smoking status: Former Smoker     Packs/day: 1.00    Smokeless tobacco: Former User     Quit date: 1/1/1990    Alcohol use 0.6 oz/week     1 Glasses of wine per week      Comment: 3-4 x a week    Drug use: No    Sexual activity: No     Other Topics Concern    Not on file     Social History Narrative       Family History   Problem Relation Age of Onset    Cancer Mother     Cancer Father     Hypertension Brother     Cancer Brother      myeloma       Past Medical History:   Diagnosis Date    Basal cell carcinoma 7/2012, 7/2015    Dr. Jimena Arroyo. saw Dr. Adan Chacko cough     Colon polyp 5/2010    Dr. Fela Granda, tubular adenoma    Diverticulitis of colon 5/2010    dx on colonoscopy by Dr. Fela Granda. Took flagyl, cipro    DJD (degenerative joint disease), lumbar `    L5?     Endometr hyperplasia w/atypia 1997    Family history of skin cancer Brother - melanoma    GERD (gastroesophageal reflux disease) 5/2010    H/O bone density study 2000?    normal per pt.  Hematuria     hx stones and \"nephritis\" since childhood    Hiatal hernia 5/2010    Hypertension 1996    Hypomagnesemia     level was 1.5 on mg ox 6/2012    Hypothyroidism 2008    TSH 1.1 6/7/12    Iron deficiency anemia     EGD, colon 5/2010.  hgb 11.3 6/2012    Melanoma ([facility] [address] 12/2011    Dr. Aureliano Jones.  right shoulder, left thigh. Dr. Odilon Sanchez in situ of back [facility]) 01/2018    Dr Hilda Tabares Mixed hyperlipidemia      Tg 166 LDL 87 HDL55 6/2012    Nephrolithiasis     Stroke ([facility] [address] 7/06/07    R facial droop and R weakness. completely resolved.  Sun-damaged skin     Sunburn, blistering     Vitamin D deficiency     improved to 35.3 6/2012    Zoster        Past Surgical History:   Procedure Laterality Date    COLONOSCOPY N/A 10/19/2017    COLONOSCOPY performed by Shoaib Martin MD at [id number] HX COLONOSCOPY  5/2010    diverticuLITIS, tubular adenoma  Dr. Trxiie Chaudhary HX GI  5/2010    EGD  - hiatal hernia. Dr. Trixie Chaudhary HX [address]  09/14/2017    SCC L anterior mujica by Dr. Gerry Rivera    endometrial pre-cancer on biopsy    HX TONSILLECTOMY         Current Outpatient Prescriptions   Medication Sig Dispense Refill    levothyroxine (SYNTHROID) 88 mcg tablet TAKE 1 TABLET BY MOUTH DAILY (BEFORE BREAKFAST). INCREASED DOSE 11/19/17 90 Tab 1    clopidogrel (PLAVIX) 75 mg tab TAKE 1 TABLET EVERY DAY 90 Tab 1    montelukast (SINGULAIR) 10 mg tablet TAKE 1 TABLET EVERY DAY 90 Tab 2    simvastatin (ZOCOR) 40 mg tablet TAKE 1 TABLET EVERY NIGHT 90 Tab 1    BYSTOLIC 5 mg tablet TAKE 1 TABLET DAILY FOR BLOOD PRESSURE,  PULSE, FATIGUE (REPLACES  ATENOLOL) 90 Tab 1    VITAMIN D2 50,000 unit capsule TAKE 1 CAPSULE BY MOUTH  EVERY 7 DAYS 13 Cap 3    fluorouracil (EFUDEX) 5 % chemo cream Apply  to affected area two (2) times a day.  40 g 4    famotidine (PEPCID) 40 mg tablet TAKE 1 TABLET BY MOUTH  TWICE A  Tab 1    MULTIVITAMIN PO       GUAIFENESIN (MUCINEX PO) Take  by mouth daily.  RESTASIS 0.05 % ophthalmic emulsion       cetirizine (ZYRTEC) 10 mg tablet Take  by mouth daily.  acetaminophen (TYLENOL ARTHRITIS PAIN) 650 mg CR tablet Take 650 mg by mouth every six (6) hours as needed. Allergies   Allergen Reactions    Iron Diarrhea    Pcn [Penicillins] Rash     PCN only. Can take cephalosporins    Sulfacetamide Swelling         Objective:    Visit Vitals    /68 (BP 1 Location: Left arm, BP Patient Position: Sitting)    Pulse 61    Temp 98.4 °F (36.9 °C) (Oral)    Resp 14    Ht 5' 2\" (1.575 m)    Wt 67.1 kg (148 lb)    SpO2 98%    BMI 27.07 kg/m2       Alexandria Walls is a 69 y.o. female who appears well and in no distress. She is awake, alert, and oriented. There is no preauricular, submandibular, or cervical lymphadenopathy. A limited skin examination was completed including left chest, back, left shoulder, neck, the chin, left calf. .  She has lentigines on sun exposed areas. There is a milia cyst on the right chin  Which is a 1 mm firm white papule. On the left shoulder there is a white scar with an adjacent pink macule with crusting, concerning for basal cell carcinoma. This would likely be recurrent. On the left postauricular skin there is a white scar as well with a  pink macule with some hemorrhagic crusting, also concerning for basal cell carcinoma. The pink area of recurrence is greater than 1 cm. On the left lateral posterior calf there is a 1.5 x 0.8 centimeter keratotic pink papule, concerning for squamous cell carcinoma versus seborrheic keratosis that is inflamed. She has multiple well-healed scars including the most recent melanoma in situ excision on the upper back that are without evidence of recent lesion recurrence  Other than noted above.   Thicker scaled actinic keratosis noted on the left neck and left hand. Diffuse thinner scaled actinic keratosis noted in the knees examined areas as well. Assessment/Plan: 1. Neoplasm of Uncertain Behavior, left postauricular. The differential diagnoses were discussed. A shave biopsy was advised to sample this lesion. The procedure was reviewed and verbal and written consent were obtained. The risks of pain, bleeding, infection, and scar were discussed. The patient is aware that this is a sample and is intended for diagnosis and not therapy of the skin lesion. I performed the procedure. The site was cleansed and anesthetized with 1% Lidocaine with Epinephrine 1:100,000. A shave biopsy was performed to sample the lesion. Drysol was used for hemostasis. The wound was bandaged and care reviewed. The specimen was sent to pathology. I will contact the patient with the results and any further treatment that may be necessary. 2.Neoplasm of Uncertain Behavior, left shoulder. The differential diagnoses were discussed. A shave biopsy was advised to sample this lesion. The procedure was reviewed and verbal and written consent were obtained. The risks of pain, bleeding, infection, and scar were discussed. The patient is aware that this is a sample and is intended for diagnosis and not therapy of the skin lesion. I performed the procedure. The site was cleansed and anesthetized with 1% Lidocaine with Epinephrine 1:100,000. A shave biopsy was performed to sample the lesion. Drysol was used for hemostasis. The wound was bandaged and care reviewed. The specimen was sent to pathology. I will contact the patient with the results and any further treatment that may be necessary. 3.Neoplasm of Uncertain Behavior, left lateral calf. The differential diagnoses were discussed. A shave biopsy was advised to sample this lesion. The procedure was reviewed and verbal and written consent were obtained.   The risks of pain, bleeding, infection, and scar were discussed. The patient is aware that this is a sample and is intended for diagnosis and not therapy of the skin lesion. I performed the procedure. The site was cleansed and anesthetized with 1% Lidocaine with Epinephrine 1:100,000. A shave biopsy was performed to sample the lesion. Drysol was used for hemostasis. The wound was bandaged and care reviewed. The specimen was sent to pathology. I will contact the patient with the results and any further treatment that may be necessary. 4.Personal history of skin cancer. I discussed sun protection, sunscreen use, the warning signs of skin cancer, the need for self-skin examinations, and the need for regular practitioner exams every 4 months. The patient should follow up sooner as needed if new, changing, or symptomatic skin lesions arise. 5.Actinic Keratoses. The diagnosis of this precancerous lesion related to sun exposure was reviewed. Verbal consent was obtained. I treated 2 lesions with cryotherapy and post-cryotherapy care was reviewed. She will also use 5-Fu neck, chest, hands. 6.Solar lentigos. The diagnosis and relationship to sun exposure was reviewed. Sun protection advised. 7.Milia. The diagnosis was reviewed and the patient requests removal. Risks of bruising and scabbing were reviewed as well as recurrence. After verbal permission, the area was cleansed with Alcohol and the material within each was removed with an 11 blade and comedone extractor successfully. I treated 1 cysts. Care for these areas were reviewed and the patient tolerated this well. [facility]   OFFICE PROCEDURE PROGRESS NOTE   Chart reviewed for the following:   IB[address]ne, [address], have reviewed the History, Physical and updated the Allergic reactions for Alexandria Walls. TIME OUT performed immediately prior to start of procedure:   IJodie, have performed the following reviews on Patricio Arceole Mally Kennedy   prior to the start of the procedure:     * Patient was identified by name and date of birth   * Agreement on procedure being performed was verified   * Risks and Benefits explained to the patient   * Procedure site verified and marked as necessary   * Patient was positioned for comfort   * Consent was signed and verified     Time: 0754  Date of procedure: 8/17/2018  Procedure performed by: Eren Cameron.  Elliot Gillis  Provider assisted by: lpn   Patient assisted by: self   How tolerated by patient: tolerated the procedure well with no complications   Comments: none

## 2018-10-19 ENCOUNTER — HOSPITAL ENCOUNTER (EMERGENCY)
Age: 78
Discharge: HOME | End: 2018-10-19
Attending: EMERGENCY MEDICINE
Payer: MEDICARE

## 2018-10-19 ENCOUNTER — TELEPHONE (OUTPATIENT)
Dept: INTERNAL MEDICINE CLINIC | Age: 78
End: 2018-10-19

## 2018-10-19 VITALS
WEIGHT: 151.01 LBS | RESPIRATION RATE: 16 BRPM | TEMPERATURE: 98.3 F | OXYGEN SATURATION: 95 % | BODY MASS INDEX: 27.79 KG/M2 | HEIGHT: 62 IN | SYSTOLIC BLOOD PRESSURE: 194 MMHG | HEART RATE: 66 BPM | DIASTOLIC BLOOD PRESSURE: 76 MMHG

## 2018-10-19 DIAGNOSIS — L03.115 CELLULITIS OF RIGHT LOWER EXTREMITY WITHOUT FOOT: Primary | ICD-10-CM

## 2018-10-19 PROCEDURE — 74011250637 HC RX REV CODE- 250/637: Performed by: EMERGENCY MEDICINE

## 2018-10-19 PROCEDURE — 99283 EMERGENCY DEPT VISIT LOW MDM: CPT

## 2018-10-19 RX ORDER — CEPHALEXIN 500 MG/1
500 CAPSULE ORAL 3 TIMES DAILY
Qty: 21 CAP | Refills: 0 | Status: SHIPPED | OUTPATIENT
Start: 2018-10-19 | End: 2018-10-26

## 2018-10-19 RX ORDER — CEPHALEXIN 250 MG/1
500 CAPSULE ORAL
Status: COMPLETED | OUTPATIENT
Start: 2018-10-19 | End: 2018-10-19

## 2018-10-19 RX ORDER — IBUPROFEN 800 MG/1
800 TABLET ORAL
Status: COMPLETED | OUTPATIENT
Start: 2018-10-19 | End: 2018-10-19

## 2018-10-19 RX ORDER — MUPIROCIN 20 MG/G
OINTMENT TOPICAL 3 TIMES DAILY
Qty: 22 G | Refills: 0 | Status: SHIPPED | OUTPATIENT
Start: 2018-10-19 | End: 2018-12-06 | Stop reason: ALTCHOICE

## 2018-10-19 RX ORDER — ACETAMINOPHEN 500 MG
1000 TABLET ORAL
Status: COMPLETED | OUTPATIENT
Start: 2018-10-19 | End: 2018-10-19

## 2018-10-19 RX ADMIN — IBUPROFEN 800 MG: 800 TABLET ORAL at 14:31

## 2018-10-19 RX ADMIN — CEPHALEXIN 500 MG: 250 CAPSULE ORAL at 14:32

## 2018-10-19 RX ADMIN — ACETAMINOPHEN 1000 MG: 500 TABLET ORAL at 14:32

## 2018-10-19 NOTE — DISCHARGE INSTRUCTIONS

## 2018-10-19 NOTE — TELEPHONE ENCOUNTER
Spoke with  Evita Alexandre and advised that he should continue to ER with issue regarding wife's leg as planned.

## 2018-10-19 NOTE — ED PROVIDER NOTES
The history is provided by the patient. Leg Pain This is a new problem. The current episode started yesterday. The problem occurs constantly. The problem has been gradually worsening. The pain is present in the right lower leg. The quality of the pain is described as aching and dull. The pain is mild. Associated symptoms comments: Redness and warmth. She has tried nothing for the symptoms. There has been no history of extremity trauma. Past Medical History:  
Diagnosis Date  Basal cell carcinoma 7/2012, 7/2015 Dr. Claudetta App. saw Dr. Donato Meadows  Chronic cough  Colon polyp 5/2010 Dr. Haddad Has, tubular adenoma  Diverticulitis of colon 5/2010  
 dx on colonoscopy by Dr. Haddad Has. Took flagyl, cipro  DJD (degenerative joint disease), lumbar `  
 L5?  Endometr hyperplasia w/atypia 1997  Family history of skin cancer Brother - melanoma  GERD (gastroesophageal reflux disease) 5/2010  
 H/O bone density study 2000?  
 normal per pt.  Hematuria   
 hx stones and \"nephritis\" since childhood  Hiatal hernia 5/2010  Hypertension 1996  Hypomagnesemia   
 level was 1.5 on mg ox 6/2012  Hypothyroidism 2008 TSH 1.1 6/7/12  Iron deficiency anemia   
 EGD, colon 5/2010.  hgb 11.3 6/2012  Melanoma ([facility] [address] 12/2011 Dr. Keenan Kulkarni.  right shoulder, left thigh. Dr. Claudetta App  Melanoma in situ of back ([facility] [address] 01/2018 Dr Kyaw Mack  Mixed hyperlipidemia Tg 166 LDL 87 HDL55 6/2012  Nephrolithiasis  Stroke ([facility] [address] 7/06/07 R facial droop and R weakness. completely resolved.  Sun-damaged skin  Sunburn, blistering  Vitamin D deficiency   
 improved to 35.3 6/2012  Zoster Past Surgical History:  
Procedure Laterality Date  HX COLONOSCOPY  5/2010  
 diverticuLITIS, tubular adenoma  Dr. Haddad Has  HX GI  5/2010 EGD  - hiatal hernia. Dr. Haddad Has  HX MOHS PROCEDURES  09/14/2017 SCC L anterior mujica by Dr. Donato Meadows [facility] endometrial pre-cancer on biopsy  HX TONSILLECTOMY Family History:  
Problem Relation Age of Onset  Cancer Mother  Cancer Father  Hypertension Brother  Cancer Brother   
     myeloma Social History Socioeconomic History  Marital status:  Spouse name: Varghese Gonsales  Number of children: 2  
 Years of education: Not on file  Highest education level: Not on file Social Needs  Financial resource strain: Not on file  Food insecurity - worry: Not on file  Food insecurity - inability: Not on file  Transportation needs - medical: Not on file  Transportation needs - non-medical: Not on file Occupational History  Not on file Tobacco Use  Smoking status: Former Smoker Packs/day: 1.00  Smokeless tobacco: Former User Quit date: 1/1/1990 Substance and Sexual Activity  Alcohol use: Yes Alcohol/week: 0.6 oz Types: 1 Glasses of wine per week Comment: 3-4 x a week  Drug use: No  
 Sexual activity: No  
Other Topics Concern  Not on file Social History Narrative  Not on file ALLERGIES: Iron; Pcn [penicillins]; and Sulfacetamide Review of Systems All other systems reviewed and are negative. Vitals:  
 10/19/18 1417 BP: (!) 208/81 Resp: 14 Temp: 98.3 °F (36.8 °C) SpO2: 96% Weight: 68.5 kg (151 lb 0.2 oz) Height: 5' 2\" (1.575 m) Physical Exam  
Constitutional: She appears well-developed. No distress. HENT:  
Head: Normocephalic and atraumatic. Mouth/Throat: Oropharynx is clear and moist and mucous membranes are normal.  
Eyes: Conjunctivae, EOM and lids are normal.  
Neck: Neck supple. Cardiovascular: Normal rate and regular rhythm. Pulmonary/Chest: Effort normal. No respiratory distress. Abdominal: Normal appearance. She exhibits no distension. Musculoskeletal: She exhibits no deformity. Neurological: She is alert. No cranial nerve deficit. Skin: Skin is warm and dry. Right anterior lower leg with well demarcated erythema and warmth in 10 cm area circumferential to a punctate central abrasion. Posterior leg spared, no distal swelling or pitting edema Psychiatric: Her behavior is normal.  
Nursing note and vitals reviewed. MDM 
  
 
69 y.o. female presents with a well demarcated erythema and warmth with pain surrounding a small skin defect that is c/w cellulitis. Not circumferential, no posterior tenderness or abnormalities, do not suspect DVT in this clinical setting. Provided supportive care measures. Will double cover with PO and topical ABx for empiric strep coverage. Plan to follow up with PCP as needed and return precautions discussed for worsening or new concerning symptoms. Procedures

## 2018-10-19 NOTE — ED TRIAGE NOTES
Pt ambulates to treatment area she states that for the past day she has had pain and a warm area to the right lower leg that became more painful this afternoon even while she had it elevated. She called Dr Chanel Murray office and they advised to come to the ED. Pt denies any history of a blood clot or any SOB

## 2018-10-19 NOTE — TELEPHONE ENCOUNTER
Patient  needs to speak with nurse soon regarding his wife and her Right Leg from the ankle to the knee swollen and red they are worried.  Please call him at [phone]

## 2018-10-23 ENCOUNTER — PATIENT OUTREACH (OUTPATIENT)
Dept: INTERNAL MEDICINE CLINIC | Age: 78
End: 2018-10-23

## 2018-10-23 NOTE — PROGRESS NOTES
ED Discharge Follow-Up Date/Time:     10/23/2018 1:32 PM 
 
Patient presented to [facility]  ED on 10/19/18 and was diagnosed with Cellulitis of right lower extremity without foot. Top Challenges reviewed with the provider Care gaps Method of communication with provider : none Nurse Navigator(NN) contacted the  patient  by telephone to perform post ED discharge assessment. Verified name and  with patient as identifiers. Provided introduction to self, and explanation of the Nurse Navigator role. Patient reported assessment: my leg was red & hot, but it's improving now. Only slightly red & warm to touch. Denies drainage, pain, temp or edema. Medication(s):  
New Medications at Discharge: cephALEXin (KEFLEX) 500 mg capsule, mupirocin (BACTROBAN) 2 % ointment Changed Medications at Discharge: no 
Discontinued Medications at Discharge: no There were no barriers to obtaining medications identified at this time. Reviewed discharge instructions and red flags with  patient who voiced understanding. Patient given an opportunity to ask questions and does not have any further questions or concerns at this time. The patient agrees to contact the PCP office for questions related to their healthcare. Patient reminded that there are physicians on call 24 hours a day / 7 days a week (M-F 5pm to 8am and from Friday 5pm until Monday 8am for the weekend) should the patient have questions or concerns. NN provided contact information for future reference. Offered follow up appointment with PCP: yes [facility] follow up appointment(s):  
Future Appointments Date Time Provider Vaibhav Vasquez 10/26/2018  1:30 PM Ralph Esposito MD [address] [id number]  
2018 11:30 AM ELIN Brady Non-[facility] follow up appointment(s): ? [employer]:  information provided as a resource  
 www. [employer] 9 AM- 9 PM.   Phone [phone] Goals  Attends follow-up appointments as directed.  Knowledge and adherence of prescribed medication (ie. action, side effects, missed dose, etc.).

## 2018-10-26 ENCOUNTER — OFFICE VISIT (OUTPATIENT)
Dept: INTERNAL MEDICINE CLINIC | Age: 78
End: 2018-10-26

## 2018-10-26 VITALS
OXYGEN SATURATION: 97 % | HEIGHT: 62 IN | RESPIRATION RATE: 18 BRPM | HEART RATE: 60 BPM | DIASTOLIC BLOOD PRESSURE: 68 MMHG | BODY MASS INDEX: 28.34 KG/M2 | WEIGHT: 154 LBS | TEMPERATURE: 98 F | SYSTOLIC BLOOD PRESSURE: 150 MMHG

## 2018-10-26 DIAGNOSIS — L03.115 CELLULITIS OF RIGHT LEG: Primary | ICD-10-CM

## 2018-10-26 DIAGNOSIS — I10 ESSENTIAL HYPERTENSION: ICD-10-CM

## 2018-10-26 RX ORDER — IRBESARTAN 75 MG/1
75 TABLET ORAL
Qty: 30 TAB | Refills: 2 | Status: SHIPPED | OUTPATIENT
Start: 2018-10-26 | End: 2018-10-29

## 2018-10-26 NOTE — PROGRESS NOTES
Bella Cortes is a 69 y.o. female Chief Complaint Patient presents with  
[facility] Follow Up  
  10/19/2018 [facility] ER for cellulitis right leg pain  and blood pressure 1. Have you been to the ER, urgent care clinic since your last visit? Hospitalized since your last visit? 10/19/2018 [facility] ER Cellulitis right leg pain and blood pressure M 
2. Have you seen or consulted any other health care providers outside of the [address] [place] [address] since your last visit? Include any pap smears or colon screening. No 
 
 
Visit Vitals /82 (BP 1 Location: Left arm, BP Patient Position: Sitting) Pulse 60 Temp 98 °F (36.7 °C) (Oral) Resp 18 Ht 5' 2\" (1.575 m) Wt 154 lb (69.9 kg) SpO2 97% BMI 28.17 kg/m² Health Maintenance Due Topic Date Due  Shingrix Vaccine Age 50> (1 of 2) 03/23/1991  GLAUCOMA SCREENING Q2Y  06/10/2018  Influenza Age 5 to Adult  08/01/2018 Medication Reconciliation completed, changes noted.   Please  Update medication list.

## 2018-10-27 NOTE — PROGRESS NOTES
HISTORY OF PRESENT ILLNESS Chief Complaint Patient presents with  
[facility] Follow Up  
  10/19/2018 [facility] ER for cellulitis right leg pain  and blood pressure Presents for follow-up of ER visit 10/19/18 Was seen for significant redness and swelling of her right anterior shin. Was diagnosed with cellulitis. She is taking Keflex. Symptoms are much improved with only mild tenderness in that region. Denies fevers or chills. Denies any other side effects. Hypertension- taking Bystolic 5 mg daily. BP Was very high in the ER Hypertension ROS: taking medications as instructed, no medication side effects noted, no TIA's, no chest pain on exertion, no dyspnea on exertion, no swelling of ankles     reports that she has quit smoking. She smoked 1.00 pack per day. She quit smokeless tobacco use about 28 years ago. reports that she drinks about 0.6 oz of alcohol per week. BP Readings from Last 2 Encounters:  
10/26/18 150/68  
10/19/18 194/76 Review of Systems All other systems reviewed and are negative, except as noted in HPI Past Medical and Surgical History 
 has a past medical history of Basal cell carcinoma, Chronic cough, Colon polyp, Diverticulitis of colon, DJD (degenerative joint disease), lumbar, Endometr hyperplasia w/atypia, Family history of skin cancer, GERD (gastroesophageal reflux disease), H/O bone density study, Hematuria, Hiatal hernia, Hypertension, Hypomagnesemia, Hypothyroidism, Iron deficiency anemia, Melanoma ([address] Melanoma in situ of back ([address] Mixed hyperlipidemia, Nephrolithiasis, Stroke ([address] Sun-damaged skin, Sunburn, blistering, Vitamin D deficiency, and Zoster. has a past surgical history that includes hx edilberto and bso (1997); hx tonsillectomy; hx gi (5/2010); hx colonoscopy (5/2010); hx mohs procedure (09/14/2017); and COLONOSCOPY (N/A, 10/19/2017). reports that she has quit smoking. She smoked 1.00 pack per day.  She quit smokeless tobacco use about 28 years ago. She reports that she drinks about 0.6 oz of alcohol per week. She reports that she does not use drugs. family history includes Cancer in her brother, father, and mother; Hypertension in her brother. Physical Exam  
Nursing note and vitals reviewed. Blood pressure 150/68, pulse 60, temperature 98 °F (36.7 °C), temperature source Oral, resp. rate 18, height 5' 2\" (1.575 m), weight 154 lb (69.9 kg), SpO2 97 %. Constitutional:  No distress. Eyes: Conjunctivae are normal.  
Ears:  Hearing grossly intact Cardiovascular: Normal rate. regular rhythm, no murmurs or gallops No edema Pulmonary/Chest: Effort normal.   CTAB Musculoskeletal: moves all 4 extremities Very mild tenderness of right tibial region. No erythema or warmth. Neurological: Alert and oriented to person, place, and time. Skin: No rash noted. Psychiatric: Normal mood and affect. Behavior is normal.  
 
ASSESSMENT and PLAN Diagnoses and all orders for this visit: 1. Cellulitis of right leg Excellent improvement with Keflex and bacitracin. Completing Keflex tomorrow. Call me if it worsens and we can do another course without being seen. To emergency room if fevers or chills or progressive rapid symptoms. 2. Essential hypertension Blood pressure is remarkably elevated. Cannot increase diastolic because of risk of bradycardia. Will add Irbesartan. Side effects discussed. -     irbesartan (AVAPRO) 75 mg tablet; Take 1 Tab by mouth nightly. lab results and schedule of future lab studies reviewed with patient 
reviewed medications and side effects in detail Return to clinic for further evaluation Current Outpatient Medications Medication Sig  
 irbesartan (AVAPRO) 75 mg tablet Take 1 Tab by mouth nightly.  mupirocin (BACTROBAN) 2 % ointment Apply  to affected area three (3) times daily. Apply to area for 7 days  levothyroxine (SYNTHROID) 88 mcg tablet TAKE 1 TABLET BY MOUTH DAILY (BEFORE BREAKFAST). INCREASED DOSE 11/19/17  clopidogrel (PLAVIX) 75 mg tab TAKE 1 TABLET EVERY DAY  simvastatin (ZOCOR) 40 mg tablet TAKE 1 TABLET EVERY NIGHT  
 BYSTOLIC 5 mg tablet TAKE 1 TABLET DAILY FOR BLOOD PRESSURE,  PULSE, FATIGUE (REPLACES  ATENOLOL)  VITAMIN D2 50,000 unit capsule TAKE 1 CAPSULE BY MOUTH  EVERY 7 DAYS  famotidine (PEPCID) 40 mg tablet TAKE 1 TABLET BY MOUTH  TWICE A DAY  MULTIVITAMIN PO   
 RESTASIS 0.05 % ophthalmic emulsion two (2) times a day.  acetaminophen (TYLENOL ARTHRITIS PAIN) 650 mg CR tablet Take 650 mg by mouth every six (6) hours as needed. No current facility-administered medications for this visit.

## 2018-11-06 RX ORDER — AMLODIPINE BESYLATE 2.5 MG/1
2.5 TABLET ORAL DAILY
Qty: 30 TAB | Refills: 0 | Status: SHIPPED | OUTPATIENT
Start: 2018-11-06 | End: 2018-12-30 | Stop reason: SDUPTHER

## 2018-11-26 ENCOUNTER — HOSPITAL ENCOUNTER (OUTPATIENT)
Dept: LAB | Age: 78
Discharge: HOME | End: 2018-11-26

## 2018-11-26 ENCOUNTER — OFFICE VISIT (OUTPATIENT)
Dept: DERMATOLOGY | Facility: AMBULATORY SURGERY CENTER | Age: 78
End: 2018-11-26

## 2018-11-26 VITALS
SYSTOLIC BLOOD PRESSURE: 140 MMHG | HEIGHT: 62 IN | DIASTOLIC BLOOD PRESSURE: 80 MMHG | BODY MASS INDEX: 28.34 KG/M2 | OXYGEN SATURATION: 99 % | HEART RATE: 64 BPM | TEMPERATURE: 98.4 F | WEIGHT: 154 LBS

## 2018-11-26 DIAGNOSIS — Z85.820 PERSONAL HISTORY OF MALIGNANT MELANOMA OF SKIN: ICD-10-CM

## 2018-11-26 DIAGNOSIS — D48.5 NEOPLASM OF UNCERTAIN BEHAVIOR OF SKIN OF SHOULDER: ICD-10-CM

## 2018-11-26 DIAGNOSIS — R23.8 VENOUS LAKE: ICD-10-CM

## 2018-11-26 DIAGNOSIS — L81.4 LENTIGINES: ICD-10-CM

## 2018-11-26 DIAGNOSIS — L57.0 ACTINIC KERATOSES: ICD-10-CM

## 2018-11-26 DIAGNOSIS — L82.1 SEBORRHEIC KERATOSES: ICD-10-CM

## 2018-11-26 DIAGNOSIS — B35.1 ONYCHOMYCOSIS: ICD-10-CM

## 2018-11-26 DIAGNOSIS — Z85.828 HISTORY OF NONMELANOMA SKIN CANCER: ICD-10-CM

## 2018-11-26 DIAGNOSIS — D18.01 CHERRY ANGIOMA: ICD-10-CM

## 2018-11-26 DIAGNOSIS — L73.8 SEBACEOUS HYPERPLASIA: ICD-10-CM

## 2018-11-26 DIAGNOSIS — D48.5 NEOPLASM OF UNCERTAIN BEHAVIOR OF SKIN OF UPPER ARM: Primary | ICD-10-CM

## 2018-11-28 NOTE — PROGRESS NOTES
I spoke with the patient and she is doing well from the biopsy. She understands the benign results of SK and lentigo - no further tx needed.

## 2018-12-06 ENCOUNTER — HOSPITAL ENCOUNTER (EMERGENCY)
Age: 78
Discharge: HOME | End: 2018-12-07
Attending: EMERGENCY MEDICINE
Payer: MEDICARE

## 2018-12-06 ENCOUNTER — APPOINTMENT (OUTPATIENT)
Dept: GENERAL RADIOLOGY | Age: 78
End: 2018-12-06
Attending: EMERGENCY MEDICINE
Payer: MEDICARE

## 2018-12-06 DIAGNOSIS — I48.0 PAROXYSMAL ATRIAL FIBRILLATION (HCC): Primary | ICD-10-CM

## 2018-12-06 LAB
ALBUMIN SERPL-MCNC: 4.1 G/DL (ref 3.5–5)
ALBUMIN/GLOB SERPL: 1.1 {RATIO} (ref 1.1–2.2)
ALP SERPL-CCNC: 100 U/L (ref 45–117)
ALT SERPL-CCNC: 30 U/L (ref 12–78)
ANION GAP SERPL CALC-SCNC: 13 MMOL/L (ref 5–15)
AST SERPL-CCNC: 26 U/L (ref 15–37)
BASOPHILS # BLD: 0 K/UL (ref 0–0.1)
BASOPHILS NFR BLD: 0 % (ref 0–1)
BILIRUB SERPL-MCNC: 0.9 MG/DL (ref 0.2–1)
BUN SERPL-MCNC: 18 MG/DL (ref 6–20)
BUN/CREAT SERPL: 18 (ref 12–20)
CALCIUM SERPL-MCNC: 9.1 MG/DL (ref 8.5–10.1)
CHLORIDE SERPL-SCNC: 103 MMOL/L (ref 97–108)
CK MB CFR SERPL CALC: ABNORMAL % (ref 0–2.5)
CK MB SERPL-MCNC: <1 NG/ML (ref 5–25)
CK SERPL-CCNC: 24 U/L (ref 26–192)
CO2 SERPL-SCNC: 25 MMOL/L (ref 21–32)
COMMENT, HOLDF: NORMAL
CREAT SERPL-MCNC: 1.02 MG/DL (ref 0.55–1.02)
DIFFERENTIAL METHOD BLD: ABNORMAL
EOSINOPHIL # BLD: 0.2 K/UL (ref 0–0.4)
EOSINOPHIL NFR BLD: 2 % (ref 0–7)
ERYTHROCYTE [DISTWIDTH] IN BLOOD BY AUTOMATED COUNT: 14.9 % (ref 11.5–14.5)
GLOBULIN SER CALC-MCNC: 3.8 G/DL (ref 2–4)
GLUCOSE SERPL-MCNC: 156 MG/DL (ref 65–100)
HCT VFR BLD AUTO: 38.6 % (ref 35–47)
HGB BLD-MCNC: 11.5 G/DL (ref 11.5–16)
IMM GRANULOCYTES # BLD: 0 K/UL (ref 0–0.04)
IMM GRANULOCYTES NFR BLD AUTO: 0 % (ref 0–0.5)
LYMPHOCYTES # BLD: 1.8 K/UL (ref 0.8–3.5)
LYMPHOCYTES NFR BLD: 16 % (ref 12–49)
MAGNESIUM SERPL-MCNC: 1.5 MG/DL (ref 1.6–2.4)
MCH RBC QN AUTO: 28 PG (ref 26–34)
MCHC RBC AUTO-ENTMCNC: 29.8 G/DL (ref 30–36.5)
MCV RBC AUTO: 93.9 FL (ref 80–99)
METAMYELOCYTES NFR BLD MANUAL: 3 %
MONOCYTES # BLD: 1.1 K/UL (ref 0–1)
MONOCYTES NFR BLD: 10 % (ref 5–13)
NEUTS BAND NFR BLD MANUAL: 5 %
NEUTS SEG # BLD: 7.9 K/UL (ref 1.8–8)
NEUTS SEG NFR BLD: 64 % (ref 32–75)
PLATELET # BLD AUTO: 198 K/UL (ref 150–400)
PMV BLD AUTO: 10.5 FL (ref 8.9–12.9)
POTASSIUM SERPL-SCNC: 3.9 MMOL/L (ref 3.5–5.1)
PROT SERPL-MCNC: 7.9 G/DL (ref 6.4–8.2)
RBC # BLD AUTO: 4.11 M/UL (ref 3.8–5.2)
RBC MORPH BLD: ABNORMAL
RBC MORPH BLD: ABNORMAL
SAMPLES BEING HELD,HOLD: NORMAL
SODIUM SERPL-SCNC: 141 MMOL/L (ref 136–145)
TROPONIN I SERPL-MCNC: <0.05 NG/ML
TSH SERPL DL<=0.05 MIU/L-ACNC: 1 UIU/ML (ref 0.36–3.74)
WBC # BLD AUTO: 11.4 K/UL (ref 3.6–11)
XXWBCSUS: 1

## 2018-12-06 PROCEDURE — 84484 ASSAY OF TROPONIN QUANT: CPT

## 2018-12-06 PROCEDURE — 85025 COMPLETE CBC W/AUTO DIFF WBC: CPT

## 2018-12-06 PROCEDURE — 71045 X-RAY EXAM CHEST 1 VIEW: CPT

## 2018-12-06 PROCEDURE — 83735 ASSAY OF MAGNESIUM: CPT

## 2018-12-06 PROCEDURE — 36415 COLL VENOUS BLD VENIPUNCTURE: CPT

## 2018-12-06 PROCEDURE — 74011000250 HC RX REV CODE- 250: Performed by: EMERGENCY MEDICINE

## 2018-12-06 PROCEDURE — 93005 ELECTROCARDIOGRAM TRACING: CPT

## 2018-12-06 PROCEDURE — 82550 ASSAY OF CK (CPK): CPT

## 2018-12-06 PROCEDURE — 80053 COMPREHEN METABOLIC PANEL: CPT

## 2018-12-06 PROCEDURE — 96361 HYDRATE IV INFUSION ADD-ON: CPT

## 2018-12-06 PROCEDURE — 84443 ASSAY THYROID STIM HORMONE: CPT

## 2018-12-06 PROCEDURE — 74011250636 HC RX REV CODE- 250/636: Performed by: EMERGENCY MEDICINE

## 2018-12-06 PROCEDURE — 96374 THER/PROPH/DIAG INJ IV PUSH: CPT

## 2018-12-06 PROCEDURE — 99285 EMERGENCY DEPT VISIT HI MDM: CPT

## 2018-12-06 RX ORDER — DILTIAZEM HYDROCHLORIDE 5 MG/ML
10 INJECTION INTRAVENOUS
Status: COMPLETED | OUTPATIENT
Start: 2018-12-06 | End: 2018-12-06

## 2018-12-06 RX ORDER — LEVOCETIRIZINE DIHYDROCHLORIDE 5 MG/1
5 TABLET, FILM COATED ORAL
COMMUNITY
End: 2022-08-30

## 2018-12-06 RX ORDER — MONTELUKAST SODIUM 10 MG/1
10 TABLET ORAL DAILY
COMMUNITY
End: 2019-07-12 | Stop reason: SDUPTHER

## 2018-12-06 RX ADMIN — DILTIAZEM HYDROCHLORIDE 10 MG: 5 INJECTION INTRAVENOUS at 23:20

## 2018-12-06 RX ADMIN — SODIUM CHLORIDE 500 ML: 900 INJECTION, SOLUTION INTRAVENOUS at 23:20

## 2018-12-07 VITALS
RESPIRATION RATE: 14 BRPM | DIASTOLIC BLOOD PRESSURE: 56 MMHG | HEART RATE: 84 BPM | TEMPERATURE: 99 F | OXYGEN SATURATION: 96 % | SYSTOLIC BLOOD PRESSURE: 129 MMHG

## 2018-12-07 LAB
ATRIAL RATE: 131 BPM
ATRIAL RATE: 87 BPM
CALCULATED P AXIS, ECG09: 47 DEGREES
CALCULATED R AXIS, ECG10: 14 DEGREES
CALCULATED R AXIS, ECG10: 20 DEGREES
CALCULATED T AXIS, ECG11: 30 DEGREES
CALCULATED T AXIS, ECG11: 44 DEGREES
DIAGNOSIS, 93000: NORMAL
DIAGNOSIS, 93000: NORMAL
P-R INTERVAL, ECG05: 154 MS
Q-T INTERVAL, ECG07: 294 MS
Q-T INTERVAL, ECG07: 380 MS
QRS DURATION, ECG06: 72 MS
QRS DURATION, ECG06: 74 MS
QTC CALCULATION (BEZET), ECG08: 441 MS
QTC CALCULATION (BEZET), ECG08: 457 MS
VENTRICULAR RATE, ECG03: 135 BPM
VENTRICULAR RATE, ECG03: 87 BPM

## 2018-12-07 NOTE — ED PROVIDER NOTES
HPI   70 yo WF presents with palpitations onset 10:30pm after lying down to go to sleep. No hx of afib. Denies fever, chills, vomiting, diarrhea, cp. C/o mild sob. Pt is on plavix, no other blood thinners. Pt is on day 3 of new bp med, amlodipine. Past Medical History:   Diagnosis Date    Basal cell carcinoma 7/2012, 7/2015    Dr. Nigel Spencer. saw Dr. Mary Bartlett Cellulitis 2018    Chronic cough     Colon polyp 5/2010    Dr. Detsiney Browne, tubular adenoma    Diverticulitis of colon 5/2010    dx on colonoscopy by Dr. Destiney Browne. Took flagyl, cipro    DJD (degenerative joint disease), lumbar `    L5?  Endometr hyperplasia w/atypia 1997    Family history of skin cancer     Brother - melanoma    GERD (gastroesophageal reflux disease) 5/2010    H/O bone density study 2000?    normal per pt.  Hematuria     hx stones and \"nephritis\" since childhood    Hiatal hernia 5/2010    Hypertension 1996    Hypomagnesemia     level was 1.5 on mg ox 6/2012    Hypothyroidism 2008    TSH 1.1 6/7/12    Iron deficiency anemia     EGD, colon 5/2010.  hgb 11.3 6/2012    Melanoma ([facility] 12/2011    Dr. Mariaelena Duenas.  right shoulder, left thigh. Dr. Aravind Perez in situ of [facility]) 01/2018    Dr Amaury Harrell Mixed hyperlipidemia      Tg 166 LDL 87 HDL55 6/2012    Nephrolithiasis     Stroke ([facility] 7/06/07    R facial droop and R weakness. completely resolved.  Sun-damaged skin     Sunburn, blistering     Vitamin D deficiency     improved to 35.3 6/2012    Zoster        Past Surgical History:   Procedure Laterality Date    COLONOSCOPY N/A 10/19/2017    COLONOSCOPY performed by Mando Richardson MD at [address] [facility] HX COLONOSCOPY  5/2010    diverticuLITIS, tubular adenoma  Dr. Jose Eduardo Mckeon HX GI  5/2010    EGD  - hiatal hernia.   Dr. Jose Eduardo Mckeon HX [address]  09/14/2017    SCC L anterior mujica by Dr. Richar Vasquez    endometrial pre-cancer on biopsy    HX TONSILLECTOMY           Family History:   Problem Relation Age of Onset    Cancer Mother     Cancer Father     Hypertension Brother     Cancer Brother         myeloma       Social History     Socioeconomic History    Marital status:      Spouse name: Geronimo Sanz Number of children: 2    Years of education: Not on file    Highest education level: Not on file   Social Needs    Financial resource strain: Not on file    Food insecurity - worry: Not on file    Food insecurity - inability: Not on file   [employer] needs - medical: Not on file   [employer] needs - non-medical: Not on file   Occupational History    Not on file   Tobacco Use    Smoking status: Former Smoker     Packs/day: 1.00    Smokeless tobacco: Former User     Quit date: 1/1/1990   Substance and Sexual Activity    Alcohol use: Yes     Alcohol/week: 0.6 oz     Types: 1 Glasses of wine per week     Comment: 3-4 x a week    Drug use: No    Sexual activity: No   Other Topics Concern    Not on file   Social History Narrative    Not on file         ALLERGIES: Irbesartan; Iron; Pcn [penicillins]; and Sulfacetamide    Review of Systems   Constitutional: Negative for chills and fever. Respiratory: Positive for shortness of breath. Negative for cough. Cardiovascular: Positive for palpitations. Negative for chest pain and leg swelling. Gastrointestinal: Negative for abdominal pain, diarrhea, nausea and vomiting. Skin: Negative for rash. All other systems reviewed and are negative.       Vitals:    12/06/18 2245 12/06/18 2250   BP: 159/81 159/81   Pulse: (!) 141 (!) 139   Resp: 17 20   Temp:  99 °F (37.2 °C)   SpO2:  97%            Physical Exam   Physical Examination: General appearance - alert, well appearing, and in no distress, oriented to person, place, and time and normal appearing weight  Eyes - pupils equal and reactive, extraocular eye movements intact  Neck - supple, no significant adenopathy  Chest - clear to auscultation, no wheezes, rales or rhonchi, symmetric air entry  Heart -irregular tachycardia, normal S1, S2, no murmurs, rubs, clicks or gallops  Abdomen - soft, nontender, nondistended, no masses or organomegaly  Back exam - full range of motion, no tenderness, palpable spasm or pain on motion  Neurological - alert, oriented, normal speech, no focal findings or movement disorder noted  Musculoskeletal - no joint tenderness, deformity or swelling  Extremities - peripheral pulses normal, no pedal edema, no clubbing or cyanosis  Skin - normal coloration and turgor, no rashes, no suspicious skin lesions noted  MDM  Number of Diagnoses or Management Options  Paroxysmal atrial fibrillation [facility]):      Amount and/or Complexity of Data Reviewed  Clinical lab tests: ordered and reviewed  Tests in the radiology section of CPT®: ordered and reviewed  Decide to obtain previous medical records or to obtain history from someone other than the patient: yes  Obtain history from someone other than the patient: yes (spouse)  Review and summarize past medical records: yes  Independent visualization of images, tracings, or specimens: yes    Critical Care  Total time providing critical care: 30-74 minutes    Patient Progress  Patient progress: improved         Procedures  ED EKG interpretation:  Rhythm: atrial fib; and irregular. Rate (approx.): 135; Axis: normal; NSST  EKG documented by Rachael Carrillo MD    Pt spontaneously converted to NSR, symptoms resolved. ED EKG interpretation:  Rhythm: normal sinus rhythm; and regular . Rate (approx.): 87; Axis: normal; P wave: normal; QRS interval: normal ; ST/T wave: normal;  EKG documented by Rachael Carrillo MD    Will not start OACs given pt is on plavix. She has had reactions to several BP meds and given HR controlled, will let cardiology decide if BP meds need to be changed. Pt is followed by Dr. Cam Glez. Pt agrees to return to ED for worsening/recurrent symptoms.     Total critical care time spent exclusive of procedures:  30 min

## 2018-12-07 NOTE — DISCHARGE INSTRUCTIONS
Atrial Fibrillation: Care Instructions  Your Care Instructions    Atrial fibrillation is an irregular and often fast heartbeat. Treating this condition is important for several reasons. It can cause blood clots, which can travel from your heart to your brain and cause a stroke. If you have a fast heartbeat, you may feel lightheaded, dizzy, and weak. An irregular heartbeat can also increase your risk for heart failure. Atrial fibrillation is often the result of another heart condition, such as high blood pressure or coronary artery disease. Making changes to improve your heart condition will help you stay healthy and active. Follow-up care is a key part of your treatment and safety. Be sure to make and go to all appointments, and call your doctor if you are having problems. It's also a good idea to know your test results and keep a list of the medicines you take. How can you care for yourself at home? Medicines    · Take your medicines exactly as prescribed. Call your doctor if you think you are having a problem with your medicine. You will get more details on the specific medicines your doctor prescribes.     · If your doctor has given you a blood thinner to prevent a stroke, be sure you get instructions about how to take your medicine safely. Blood thinners can cause serious bleeding problems.     · Do not take any vitamins, over-the-counter drugs, or herbal products without talking to your doctor first.    Lifestyle changes    · Do not smoke. Smoking can increase your chance of a stroke and heart attack. If you need help quitting, talk to your doctor about stop-smoking programs and medicines. These can increase your chances of quitting for good.     · Eat a heart-healthy diet.     · Stay at a healthy weight. Lose weight if you need to.     · Limit alcohol to 2 drinks a day for men and 1 drink a day for women. Too much alcohol can cause health problems.     · Avoid colds and flu.  Get a pneumococcal vaccine shot. If you have had one before, ask your doctor whether you need another dose. Get a flu shot every year. If you must be around people with colds or flu, wash your hands often. Activity    · If your doctor recommends it, get more exercise. Walking is a good choice. Bit by bit, increase the amount you walk every day. Try for at least 30 minutes on most days of the week. You also may want to swim, bike, or do other activities. Your doctor may suggest that you join a cardiac rehabilitation program so that you can have help increasing your physical activity safely.     · Start light exercise if your doctor says it is okay. Even a small amount will help you get stronger, have more energy, and manage stress. Walking is an easy way to get exercise. Start out by walking a little more than you did in the hospital. Gradually increase the amount you walk.     · When you exercise, watch for signs that your heart is working too hard. You are pushing too hard if you cannot talk while you are exercising. If you become short of breath or dizzy or have chest pain, sit down and rest immediately.     · Check your pulse regularly. Place two fingers on the artery at the palm side of your wrist, in line with your thumb. If your heartbeat seems uneven or fast, talk to your doctor. When should you call for help? Call 911 anytime you think you may need emergency care. For example, call if:    · You have symptoms of a heart attack. These may include:  ? Chest pain or pressure, or a strange feeling in the chest.  ? Sweating. ? Shortness of breath. ? Nausea or vomiting. ? Pain, pressure, or a strange feeling in the back, neck, jaw, or upper belly or in one or both shoulders or arms. ? Lightheadedness or sudden weakness. ? A fast or irregular heartbeat. After you call 911, the  may tell you to chew 1 adult-strength or 2 to 4 low-dose aspirin. Wait for an ambulance.  Do not try to drive yourself.     · You have symptoms of a stroke. These may include:  ? Sudden numbness, tingling, weakness, or loss of movement in your face, arm, or leg, especially on only one side of your body. ? Sudden vision changes. ? Sudden trouble speaking. ? Sudden confusion or trouble understanding simple statements. ? Sudden problems with walking or balance. ? A sudden, severe headache that is different from past headaches.     · You passed out (lost consciousness).    Call your doctor now or seek immediate medical care if:    · You have new or increased shortness of breath.     · You feel dizzy or lightheaded, or you feel like you may faint.     · Your heart rate becomes irregular.     · You can feel your heart flutter in your chest or skip heartbeats. Tell your doctor if these symptoms are new or worse.    Watch closely for changes in your health, and be sure to contact your doctor if you have any problems. Where can you learn more? Go to http://gabi-vielka.info/. Enter U020 in the search box to learn more about \"Atrial Fibrillation: Care Instructions. \"  Current as of: December 6, 2017  Content Version: 11.8  © 5982-5561 [employer]. Care instructions adapted under license by [employer] (which disclaims liability or warranty for this information). If you have questions about a medical condition or this instruction, always ask your healthcare professional. [address] any warranty or liability for your use of this information. We hope that we have addressed all of your medical concerns. The examination and treatment you received in the Emergency Department were for an emergent problem and were not intended as complete care. It is important that you follow up with your healthcare provider(s) for ongoing care. If your symptoms worsen or do not improve as expected, and you are unable to reach your usual health care provider(s), you should return to the Emergency Department. Today's healthcare is undergoing tremendous change, and patient satisfaction surveys are one of the many tools to assess the quality of medical care. You may receive a survey from the [employer] organization regarding your experience in the Emergency Department. I hope that your experience has been completely positive, particularly the medical care that I provided. As such, please participate in the survey; anything less than excellent does not meet my expectations or intentions. [facility] participate in nationally recognized quality of care measures. If your blood pressure is greater than 120/80, as reported below, we urge that you seek medical care to address the potential of high blood pressure, commonly known as hypertension. Hypertension can be hereditary or can be caused by certain medical conditions, pain, stress, or \"white coat syndrome. \"       Please make an appointment with your health care provider(s) for follow up of your Emergency Department visit. VITALS:   Patient Vitals for the past 8 hrs:   Temp Pulse Resp BP SpO2   12/06/18 2345 -- 86 18 131/58 95 %   12/06/18 2335 -- 91 18 145/58 97 %   12/06/18 2324 -- 90 18 -- 94 %   12/06/18 2321 -- 97 23 -- 96 %   12/06/18 2250 99 °F (37.2 °C) (!) 139 20 159/81 97 %   12/06/18 2245 -- (!) 141 17 159/81 --          Thank you for allowing us to provide you with medical care today. We realize that you have many choices for your emergency care needs. Please choose us in the future for any continued health care needs. David Apodaca, [address]: [phone]            Recent Results (from the past 24 hour(s))   EKG, 12 LEAD, INITIAL    Collection Time: 12/06/18 10:45 PM   Result Value Ref Range    Ventricular Rate 135 BPM    Atrial Rate 131 BPM    QRS Duration 72 ms    Q-T Interval 294 ms    QTC Calculation (Bezet) 441 ms    Calculated R Axis 20 degrees    Calculated T Axis 44 degrees    Diagnosis       Atrial fibrillation with rapid ventricular response  Nonspecific ST and T wave abnormality  Abnormal ECG  When compared with ECG of 15-MAY-2015 11:09,  MANUAL COMPARISON REQUIRED, DATA IS UNCONFIRMED     SAMPLES BEING HELD    Collection Time: 12/06/18 10:57 PM   Result Value Ref Range    SAMPLES BEING HELD 1LAV 1PST 1GRN 1BLUE 1GOLD 1RED     COMMENT        Add-on orders for these samples will be processed based on acceptable specimen integrity and analyte stability, which may vary by analyte. TSH 3RD GENERATION    Collection Time: 12/06/18 10:57 PM   Result Value Ref Range    TSH 1.00 0.36 - 3.74 uIU/mL   CBC WITH AUTOMATED DIFF    Collection Time: 12/06/18 10:57 PM   Result Value Ref Range    WBC 11.4 (H) 3.6 - 11.0 K/uL    RBC 4.11 3.80 - 5.20 M/uL    HGB 11.5 11.5 - 16.0 g/dL    HCT 38.6 35.0 - 47.0 %    MCV 93.9 80.0 - 99.0 FL    MCH 28.0 26.0 - 34.0 PG    MCHC 29.8 (L) 30.0 - 36.5 g/dL    RDW 14.9 (H) 11.5 - 14.5 %    PLATELET 699 396 - 713 K/uL    MPV 10.5 8.9 - 12.9 FL    NEUTROPHILS 64 32 - 75 %    BAND NEUTROPHILS 5 %    LYMPHOCYTES 16 12 - 49 %    MONOCYTES 10 5 - 13 %    EOSINOPHILS 2 0 - 7 %    BASOPHILS 0 0 - 1 %    METAMYELOCYTES 3 %    IMMATURE GRANULOCYTES 0 0.0 - 0.5 %    ABS. NEUTROPHILS 7.9 1.8 - 8.0 K/UL    ABS. LYMPHOCYTES 1.8 0.8 - 3.5 K/UL    ABS. MONOCYTES 1.1 (H) 0.0 - 1.0 K/UL    ABS. EOSINOPHILS 0.2 0.0 - 0.4 K/UL    ABS. BASOPHILS 0.0 0.0 - 0.1 K/UL    ABS. IMM.  GRANS. 0.0 0.00 - 0.04 K/UL    DF AUTOMATED      RBC COMMENTS POIKILOCYTOSIS  1+        RBC COMMENTS TEARDROP CELLS  PRESENT        XXWBCSUS 1     METABOLIC PANEL, COMPREHENSIVE    Collection Time: 12/06/18 10:57 PM   Result Value Ref Range    Sodium 141 136 - 145 mmol/L    Potassium 3.9 3.5 - 5.1 mmol/L    Chloride 103 97 - 108 mmol/L    CO2 25 21 - 32 mmol/L    Anion gap 13 5 - 15 mmol/L    Glucose 156 (H) 65 - 100 mg/dL    BUN 18 6 - 20 MG/DL    Creatinine 1.02 0.55 - 1.02 MG/DL    BUN/Creatinine ratio 18 12 - 20      GFR est AA >60 >60 ml/min/1.73m2    GFR est non-AA 53 (L) >60 ml/min/1.73m2    Calcium 9.1 8.5 - 10.1 MG/DL    Bilirubin, total 0.9 0.2 - 1.0 MG/DL    ALT (SGPT) 30 12 - 78 U/L    AST (SGOT) 26 15 - 37 U/L    Alk. phosphatase 100 45 - 117 U/L    Protein, total 7.9 6.4 - 8.2 g/dL    Albumin 4.1 3.5 - 5.0 g/dL    Globulin 3.8 2.0 - 4.0 g/dL    A-G Ratio 1.1 1.1 - 2.2     CK W/ CKMB & INDEX    Collection Time: 12/06/18 10:57 PM   Result Value Ref Range    CK 24 (L) 26 - 192 U/L    CK - MB <1.0 <3.6 NG/ML    CK-MB Index Cannot be calculated 0.0 - 2.5     MAGNESIUM    Collection Time: 12/06/18 10:57 PM   Result Value Ref Range    Magnesium 1.5 (L) 1.6 - 2.4 mg/dL   TROPONIN I    Collection Time: 12/06/18 10:57 PM   Result Value Ref Range    Troponin-I, Qt. <0.05 <0.05 ng/mL   EKG, 12 LEAD, INITIAL    Collection Time: 12/06/18 11:37 PM   Result Value Ref Range    Ventricular Rate 87 BPM    Atrial Rate 87 BPM    P-R Interval 154 ms    QRS Duration 74 ms    Q-T Interval 380 ms    QTC Calculation (Bezet) 457 ms    Calculated P Axis 47 degrees    Calculated R Axis 14 degrees    Calculated T Axis 30 degrees    Diagnosis       Normal sinus rhythm  Normal ECG  When compared with ECG of 06-DEC-2018 22:45,  MANUAL COMPARISON REQUIRED, DATA IS UNCONFIRMED         Xr Chest Port    Result Date: 12/6/2018  EXAM:  XR CHEST PORT INDICATION: Hypertension and palpitations. Bilateral upper cavity pain. COMPARISON: Chest views on 7/13/2016. TECHNIQUE: Upright portable chest AP view FINDINGS: Cardiac monitoring wires overlie the thorax. The cardiomediastinal and hilar contours are within normal limits. The pulmonary vasculature is within normal limits. The lungs and pleural spaces are clear. Bones are osteopenic. IMPRESSION: No acute process on portable chest. No change.

## 2018-12-07 NOTE — ED NOTES
Patient medicated per order. Intravenous fluids were administered, normal saline 500 ml's. Patient tolerated well. Patient updated on plan of care. Patient verbalized good understanding. Will continue to monitor. Call bell within reach.

## 2018-12-07 NOTE — ED TRIAGE NOTES
At 10pm pt woke up with palpitations and pain in both arms. Pt reports taking blood pressure 175/80 and her HR was 135. Pt denies chest pain, numbness, or tingling. Pt felt lightheaded. Pt reports right leg beginning to hurt on the car ride here.  Pt has just begun a new medication for BP

## 2018-12-07 NOTE — ED NOTES
Radiology at bedside for xray. Patient given Cardizem bolus. Patient's HR dropped to 93.  Cardizem drip held until discussed with MD.

## 2018-12-10 ENCOUNTER — PATIENT OUTREACH (OUTPATIENT)
Dept: INTERNAL MEDICINE CLINIC | Age: 78
End: 2018-12-10

## 2018-12-10 NOTE — PROGRESS NOTES
ED Discharge Follow-Up Date/Time:     12/10/2018 2:44 PM 
 
Patient presented to [facility]  ED on 18 and was diagnosed with Paroxysmal atrial fibrillation . Top Challenges reviewed with the provider Seeing cardio Method of communication with provider : none Nurse Navigator(NN) contacted the  patient  by telephone to perform post ED discharge assessment. Verified name and  with patient as identifiers. Provided introduction to self, and explanation of the Nurse Navigator role. Patient reported assessment: pt reported she is doing well, no further issues. Denies CP, SOB, rapid HR or pain/swelling with legs/feet. Pt reported that she had accidentally forgot to put her bystolic in her pill box. Reports has all her med in her pill box correctly Medication(s):  
New Medications at Discharge: n/a Changed Medications at Discharge: n/a Discontinued Medications at Discharge: n/a There were no barriers to obtaining medications identified at this time. Reviewed discharge instructions and red flags with  patient who voiced understanding. Patient given an opportunity to ask questions and does not have any further questions or concerns at this time. The patient agrees to contact the PCP office for questions related to their healthcare. Patient reminded that there are physicians on call 24 hours a day / 7 days a week (M-F 5pm to 8am and from Friday 5pm until Monday 8am for the weekend) should the patient have questions or concerns. NN provided contact information for future reference. Offered follow up appointment with PCP: yes [facility] follow up appointment(s):  
Future Appointments Date Time Provider Vaibhav Vasquez 2018 10:30 AM Yvette Esposito MD [address] [address]  
3/11/2019 11:00 AM ELIN Davidson Non-[facility] follow up appointment(s): cardio Dr. Arcelia Booker [address] [facility]:  n/a  
 [email or web address] 9 AM- 9 PM.   Phone [phone]

## 2018-12-19 ENCOUNTER — OFFICE VISIT (OUTPATIENT)
Dept: INTERNAL MEDICINE CLINIC | Age: 78
End: 2018-12-19

## 2018-12-19 VITALS
HEIGHT: 62 IN | DIASTOLIC BLOOD PRESSURE: 69 MMHG | WEIGHT: 149 LBS | OXYGEN SATURATION: 96 % | HEART RATE: 62 BPM | TEMPERATURE: 98.1 F | RESPIRATION RATE: 16 BRPM | SYSTOLIC BLOOD PRESSURE: 117 MMHG | BODY MASS INDEX: 27.42 KG/M2

## 2018-12-19 DIAGNOSIS — Z71.89 ADVANCED CARE PLANNING/COUNSELING DISCUSSION: ICD-10-CM

## 2018-12-19 DIAGNOSIS — E03.9 HYPOTHYROIDISM, UNSPECIFIED TYPE: ICD-10-CM

## 2018-12-19 DIAGNOSIS — R10.2 SUPRAPUBIC PAIN: ICD-10-CM

## 2018-12-19 DIAGNOSIS — E78.2 MIXED HYPERLIPIDEMIA: ICD-10-CM

## 2018-12-19 DIAGNOSIS — D50.9 IRON DEFICIENCY ANEMIA, UNSPECIFIED IRON DEFICIENCY ANEMIA TYPE: ICD-10-CM

## 2018-12-19 DIAGNOSIS — Z00.00 MEDICARE ANNUAL WELLNESS VISIT, SUBSEQUENT: Primary | ICD-10-CM

## 2018-12-19 DIAGNOSIS — I10 ESSENTIAL HYPERTENSION: ICD-10-CM

## 2018-12-19 DIAGNOSIS — E55.9 VITAMIN D DEFICIENCY: ICD-10-CM

## 2018-12-19 DIAGNOSIS — R73.01 IFG (IMPAIRED FASTING GLUCOSE): ICD-10-CM

## 2018-12-19 RX ORDER — ALUMINUM ZIRCONIUM OCTACHLOROHYDREX GLY 16 G/100G
1 GEL TOPICAL DAILY
Qty: 30 CAP | Refills: 2
Start: 2018-12-19 | End: 2019-03-25

## 2018-12-19 NOTE — PROGRESS NOTES
Patient presents today for medication evaluation and to get labs done. Patient stated will not get a flu vaccine.

## 2018-12-19 NOTE — PROGRESS NOTES
This is a Subsequent Medicare Annual Wellness Visit providing Personalized Prevention Plan Services (PPPS) (Performed 12 months after initial AWV and PPPS )    I have reviewed the patient's medical history in detail and updated the computerized patient record. Seen in ER for Afib. Saw cardiology. Was not taking Bystolic. Changed plavix to Xarelto. Reports suprapubic pressure when sitting    Hypertension  Hypertension ROS: taking medications as instructed, no medication side effects noted, no TIA's, no chest pain on exertion, no dyspnea on exertion, no swelling of ankles     reports that she has quit smoking. She smoked 1.00 pack per day. She quit smokeless tobacco use about 28 years ago. reports that she drinks about 0.6 oz of alcohol per week. BP Readings from Last 2 Encounters:   12/19/18 117/69   12/07/18 129/56       Hypothyroidism follow-up  denies heat/cold intolerance, bowel/skin changes or CVS symptoms, losing hair, feeling excessive energy, tremulousness, palpitations. Thyroid medication has been unchanged since last medication check and labs. Lab Results   Component Value Date/Time    TSH 1.00 12/06/2018 10:57 PM    T4, Free 1.84 (H) 05/18/2018 09:52 AM     Wt Readings from Last 3 Encounters:   12/19/18 149 lb (67.6 kg)   11/26/18 154 lb (69.9 kg)   10/26/18 154 lb (69.9 kg)         History     Past Medical History:   Diagnosis Date    Basal cell carcinoma 7/2012, 7/2015    Dr. Armaan Milligan. saw Dr. Sally Naik Cellulitis 2018    Chronic cough     Colon polyp 5/2010    Dr. Moreno Guillermo, tubular adenoma    Diverticulitis of colon 5/2010    dx on colonoscopy by Dr. Moreno Guillermo. Took flagyl, cipro    DJD (degenerative joint disease), lumbar `    L5?  Endometr hyperplasia w/atypia 1997    Family history of skin cancer     Brother - melanoma    GERD (gastroesophageal reflux disease) 5/2010    H/O bone density study 2000?    normal per pt.       Hematuria     hx stones and \"nephritis\" since childhood  Hiatal hernia 5/2010    Hypertension 1996    Hypomagnesemia     level was 1.5 on mg ox 6/2012    Hypothyroidism 2008    TSH 1.1 6/7/12    Iron deficiency anemia     EGD, colon 5/2010.  hgb 11.3 6/2012    Melanoma ([facility] [address] 12/2011    Dr. Monica Hayes.  right shoulder, left thigh. Dr. Donaldo Meek in situ of back [facility]) 01/2018    Dr Leandra Darby Mixed hyperlipidemia      Tg 166 LDL 87 HDL55 6/2012    Nephrolithiasis     Stroke ([facility] [address] 7/06/07    R facial droop and R weakness. completely resolved.  Sun-damaged skin     Sunburn, blistering     Vitamin D deficiency     improved to 35.3 6/2012    Zoster        Past Surgical History:   Procedure Laterality Date    COLONOSCOPY N/A 10/19/2017    COLONOSCOPY performed by Damir Stewart MD at [address] [facility] HX COLONOSCOPY  5/2010    diverticuLITIS, tubular adenoma  Dr. Kolton Agudelo HX GI  5/2010    EGD  - hiatal hernia. Dr. Kolton Agudelo HX MOHS PROCEDURES  09/14/2017    SCC L anterior mujica by Dr. Shadia Johnson    endometrial pre-cancer on biopsy    HX TONSILLECTOMY         Current Outpatient Medications   Medication Sig    rivaroxaban (XARELTO) 20 mg tab tablet Take  by mouth daily.  ALIGN 4 mg cap Take 1 Cap by mouth daily.  montelukast (SINGULAIR) 10 mg tablet Take 10 mg by mouth daily.  levocetirizine (XYZAL) 5 mg tablet Take 5 mg by mouth.  amLODIPine (NORVASC) 2.5 mg tablet Take 1 Tab by mouth daily.  levothyroxine (SYNTHROID) 88 mcg tablet TAKE 1 TABLET BY MOUTH DAILY (BEFORE BREAKFAST). INCREASED DOSE 11/19/17    simvastatin (ZOCOR) 40 mg tablet TAKE 1 TABLET EVERY NIGHT    BYSTOLIC 5 mg tablet TAKE 1 TABLET DAILY FOR BLOOD PRESSURE,  PULSE, FATIGUE (REPLACES  ATENOLOL)    VITAMIN D2 50,000 unit capsule TAKE 1 CAPSULE BY MOUTH  EVERY 7 DAYS    famotidine (PEPCID) 40 mg tablet TAKE 1 TABLET BY MOUTH  TWICE A DAY    MULTIVITAMIN PO     RESTASIS 0.05 % ophthalmic emulsion two (2) times a day.     acetaminophen (TYLENOL ARTHRITIS PAIN) 650 mg CR tablet Take 650 mg by mouth every six (6) hours as needed. No current facility-administered medications for this visit. Allergies   Allergen Reactions    Irbesartan Diarrhea    Iron Diarrhea    Pcn [Penicillins] Rash     PCN only. Can take cephalosporins    Sulfacetamide Swelling       Family History   Problem Relation Age of Onset    Cancer Mother     Cancer Father     Hypertension Brother     Cancer Brother         myeloma        reports that she has quit smoking. She smoked 1.00 pack per day. She quit smokeless tobacco use about 28 years ago. reports that she drinks about 0.6 oz of alcohol per week. Depression Risk Factor Screening:       Alcohol Risk Factor Screening: On any occasion during the past 3 months, have you had more than 3 drinks containing alcohol? No    Do you average more than 14 drinks per week? No      Functional Ability and Level of Safety:     Hearing Loss   mild    Activities of Daily Living   Self-care. Requires assistance with: no ADLs    Fall Risk     Fall Risk Assessment, last 12 mths 10/26/2018   Able to walk? Yes   Fall in past 12 months? No         Abuse Screen   Patient is not abused    Review of Systems   A comprehensive review of systems was negative except for that written in the HPI. Physical Examination     Evaluation of Cognitive Function:  Mood/affect:  neutral, happy  Appearance: age appropriate  Family member/caregiver input: none    Blood pressure 117/69, pulse 62, temperature 98.1 °F (36.7 °C), temperature source Oral, resp. rate 16, height 5' 2\" (1.575 m), weight 149 lb (67.6 kg), SpO2 96 %.   General appearance: alert, cooperative, no distress, appears stated age  Neck: supple, symmetrical, trachea midline, no adenopathy, thyroid: not enlarged, symmetric, no tenderness/mass/nodules, no carotid bruit and no JVD  Lungs: clear to auscultation bilaterally  Heart: regular rate and rhythm, S1, S2 normal, no murmur, click, rub or gallop  Extremities: extremities normal, atraumatic, no cyanosis or edema    Patient Care Team:  Elizabeth Dozier MD as PCP - General (Internal Medicine)  Nida Schwartz NP as Nurse Practitioner (Nurse Practitioner)  Marycruz Vázquez RN as Ambulatory Care Navigator (Internal Medicine)      Advice/Referrals/Counseling   Education and counseling provided. See below for specific orders    Assessment/Plan   Diagnoses and all orders for this visit:    1. Medicare annual wellness visit, subsequent    2. Advanced care planning/counseling discussion    3. Hypothyroidism, unspecified type  Controlled on current regimen. Continue current medications as written in chart. 4. Iron deficiency anemia, unspecified iron deficiency anemia type  Cbc is normal 2 weeekas a    5. Essential hypertension  Controlled on current regimen. Continue current medications as written in chart. 6. Suprapubic pain- chronic hematuria. Recommend eval  -     URINALYSIS W/ RFLX MICROSCOPIC  -     REFERRAL TO UROLOGY    7. Mixed hyperlipidemia- Controlled on current regimen. Continue current medications as written in chart.  -     LIPID PANEL    8. Vitamin D deficiency    9. IFG (impaired fasting glucose)- The patient is asked to make an attempt to improve diet and exercise patterns to aid in medical management of this problem. -     HEMOGLOBIN A1C WITH EAG    For IBS  -     ALIGN 4 mg cap; Take 1 Cap by mouth daily. Devon Yee Potential medication side effects were discussed with the patient; let me know if any occur. Return for yearly Annual Wellness Visits                Discussed the patient's BMI with her. The BMI follow up plan is as follows:     dietary management education, guidance, and counseling  encourage exercise  monitor weight  prescribed dietary intake    An After Visit Summary was printed and given to the patient.

## 2018-12-19 NOTE — PATIENT INSTRUCTIONS
Body Mass Index: Care Instructions  Your Care Instructions    Body mass index (BMI) can help you see if your weight is raising your risk for health problems. It uses a formula to compare how much you weigh with how tall you are. · A BMI lower than 18.5 is considered underweight. · A BMI between 18.5 and 24.9 is considered healthy. · A BMI between 25 and 29.9 is considered overweight. A BMI of 30 or higher is considered obese. If your BMI is in the normal range, it means that you have a lower risk for weight-related health problems. If your BMI is in the overweight or obese range, you may be at increased risk for weight-related health problems, such as high blood pressure, heart disease, stroke, arthritis or joint pain, and diabetes. If your BMI is in the underweight range, you may be at increased risk for health problems such as fatigue, lower protection (immunity) against illness, muscle loss, bone loss, hair loss, and hormone problems. BMI is just one measure of your risk for weight-related health problems. You may be at higher risk for health problems if you are not active, you eat an unhealthy diet, or you drink too much alcohol or use tobacco products. Follow-up care is a key part of your treatment and safety. Be sure to make and go to all appointments, and call your doctor if you are having problems. It's also a good idea to know your test results and keep a list of the medicines you take. How can you care for yourself at home? · Practice healthy eating habits. This includes eating plenty of fruits, vegetables, whole grains, lean protein, and low-fat dairy. · If your doctor recommends it, get more exercise. Walking is a good choice. Bit by bit, increase the amount you walk every day. Try for at least 30 minutes on most days of the week. · Do not smoke. Smoking can increase your risk for health problems. If you need help quitting, talk to your doctor about stop-smoking programs and medicines. These can increase your chances of quitting for good. · Limit alcohol to 2 drinks a day for men and 1 drink a day for women. Too much alcohol can cause health problems. If you have a BMI higher than 25  · Your doctor may do other tests to check your risk for weight-related health problems. This may include measuring the distance around your waist. A waist measurement of more than 40 inches in men or 35 inches in women can increase the risk of weight-related health problems. · Talk with your doctor about steps you can take to stay healthy or improve your health. You may need to make lifestyle changes to lose weight and stay healthy, such as changing your diet and getting regular exercise. If you have a BMI lower than 18.5  · Your doctor may do other tests to check your risk for health problems. · Talk with your doctor about steps you can take to stay healthy or improve your health. You may need to make lifestyle changes to gain or maintain weight and stay healthy, such as getting more healthy foods in your diet and doing exercises to build muscle. Where can you learn more? Go to http://gabi-vielka.info/. Enter S176 in the search box to learn more about \"Body Mass Index: Care Instructions. \"  Current as of: October 13, 2016  Content Version: 11.4  © 4001-4230 [employer]. Care instructions adapted under license by [employer] (which disclaims liability or warranty for this information). If you have questions about a medical condition or this instruction, always ask your healthcare professional. [address] any warranty or liability for your use of this information.

## 2018-12-19 NOTE — ACP (ADVANCE CARE PLANNING)
Advance Care Planning (ACP) Note    Date of ACP Conversation: 12/19/2018  Persons included in Conversation: patient  Length of ACP Conversation in minutes: <16 minutes (Non-Billable)    Conversation requested by:   Patient    Authorized Decision Maker (if patient is incapable of making informed decisions): This person is:  Healthcare Agent/Medical Power of  under Advance Directive    General ACP for ALL Patients with Decision Making Capacity: yes    Advance Directive Conversation with Patients who have not yet planned:  Importance of advance care planning, including choosing a healthcare agent to communicate patient's healthcare decisions if patient lost the ability to make decisions, such as after a sudden illness or accident  Explored patient's values, goals, and care preferences as related to these situations    Review of Existing Advance Directive: (Select questions covered)  Does this advance directive still reflect your preferences?   Yes    Interventions Provided:  Recommended review of completed ACP document annually or upon change in health status

## 2018-12-20 LAB
APPEARANCE UR: CLEAR
BACTERIA #/AREA URNS HPF: ABNORMAL /[HPF]
BILIRUB UR QL STRIP: NEGATIVE
CASTS URNS MICRO: ABNORMAL
CASTS URNS QL MICRO: PRESENT /LPF
CHOLEST SERPL-MCNC: 82 MG/DL (ref 100–199)
COLOR UR: YELLOW
EPI CELLS #/AREA URNS HPF: ABNORMAL /HPF
EST. AVERAGE GLUCOSE BLD GHB EST-MCNC: 120 MG/DL
GLUCOSE UR QL: NEGATIVE
HBA1C MFR BLD: 5.8 % (ref 4.8–5.6)
HDLC SERPL-MCNC: 33 MG/DL
HGB UR QL STRIP: ABNORMAL
INTERPRETATION, 910389: NORMAL
KETONES UR QL STRIP: NEGATIVE
LDLC SERPL CALC-MCNC: 23 MG/DL (ref 0–99)
LEUKOCYTE ESTERASE UR QL STRIP: ABNORMAL
MICRO URNS: ABNORMAL
MUCOUS THREADS URNS QL MICRO: PRESENT
NITRITE UR QL STRIP: NEGATIVE
PH UR STRIP: 5.5 [PH] (ref 5–7.5)
PROT UR QL STRIP: ABNORMAL
RBC #/AREA URNS HPF: ABNORMAL /HPF
SP GR UR: 1.02 (ref 1–1.03)
TRIGL SERPL-MCNC: 129 MG/DL (ref 0–149)
UROBILINOGEN UR STRIP-MCNC: 0.2 MG/DL (ref 0.2–1)
VLDLC SERPL CALC-MCNC: 26 MG/DL (ref 5–40)
WBC #/AREA URNS HPF: ABNORMAL /HPF

## 2018-12-30 RX ORDER — AMLODIPINE BESYLATE 2.5 MG/1
2.5 TABLET ORAL DAILY
Qty: 30 TAB | Refills: 0 | Status: SHIPPED | OUTPATIENT
Start: 2018-12-30 | End: 2019-01-03 | Stop reason: ALTCHOICE

## 2018-12-30 RX ORDER — AMLODIPINE BESYLATE 2.5 MG/1
2.5 TABLET ORAL DAILY
Qty: 90 TAB | Refills: 1 | Status: SHIPPED | COMMUNITY
Start: 2018-12-30 | End: 2019-07-12 | Stop reason: SDUPTHER

## 2019-01-03 RX ORDER — CIPROFLOXACIN 500 MG/1
500 TABLET ORAL 2 TIMES DAILY
Qty: 14 TAB | Refills: 0 | Status: SHIPPED | OUTPATIENT
Start: 2019-01-03 | End: 2019-01-10

## 2019-01-03 RX ORDER — SIMVASTATIN 20 MG/1
20 TABLET, FILM COATED ORAL
Qty: 90 TAB | Refills: 3 | Status: SHIPPED | COMMUNITY
Start: 2019-01-03 | End: 2019-06-26 | Stop reason: ALTCHOICE

## 2019-01-18 RX ORDER — NEBIVOLOL HYDROCHLORIDE 5 MG/1
TABLET ORAL
Qty: 90 TAB | Refills: 1 | Status: ON HOLD | OUTPATIENT
Start: 2019-01-18 | End: 2019-03-25

## 2019-01-23 RX ORDER — CEFDINIR 300 MG/1
300 CAPSULE ORAL 2 TIMES DAILY
Qty: 20 CAP | Refills: 0 | Status: SHIPPED | OUTPATIENT
Start: 2019-01-23 | End: 2019-02-02

## 2019-03-07 RX ORDER — CEPHALEXIN 500 MG/1
500 CAPSULE ORAL 4 TIMES DAILY
Qty: 28 CAP | Refills: 0 | Status: SHIPPED | OUTPATIENT
Start: 2019-03-07 | End: 2019-03-25

## 2019-03-11 ENCOUNTER — OFFICE VISIT (OUTPATIENT)
Dept: DERMATOLOGY | Facility: AMBULATORY SURGERY CENTER | Age: 79
End: 2019-03-11

## 2019-03-11 ENCOUNTER — HOSPITAL ENCOUNTER (OUTPATIENT)
Dept: LAB | Age: 79
Discharge: HOME | End: 2019-03-11

## 2019-03-11 VITALS
WEIGHT: 149 LBS | BODY MASS INDEX: 27.42 KG/M2 | DIASTOLIC BLOOD PRESSURE: 68 MMHG | SYSTOLIC BLOOD PRESSURE: 114 MMHG | OXYGEN SATURATION: 99 % | TEMPERATURE: 98.6 F | HEIGHT: 62 IN | HEART RATE: 61 BPM

## 2019-03-11 DIAGNOSIS — D22.9 MULTIPLE BENIGN NEVI: ICD-10-CM

## 2019-03-11 DIAGNOSIS — L82.1 SEBORRHEIC KERATOSES: ICD-10-CM

## 2019-03-11 DIAGNOSIS — L81.4 LENTIGINES: ICD-10-CM

## 2019-03-11 DIAGNOSIS — Z85.828 HISTORY OF NONMELANOMA SKIN CANCER: ICD-10-CM

## 2019-03-11 DIAGNOSIS — D48.5 NEOPLASM OF UNCERTAIN BEHAVIOR OF SKIN OF CHEEK: Primary | ICD-10-CM

## 2019-03-11 DIAGNOSIS — Z85.820 PERSONAL HISTORY OF MALIGNANT MELANOMA OF SKIN: ICD-10-CM

## 2019-03-11 DIAGNOSIS — L57.0 ACTINIC KERATOSES: ICD-10-CM

## 2019-03-11 RX ORDER — MUPIROCIN 20 MG/G
OINTMENT TOPICAL DAILY
COMMUNITY
End: 2019-03-25

## 2019-03-11 NOTE — PROGRESS NOTES
Written by Magdalene Pastrana, as dictated by Pete Gerber, [address]. Name: Josselyn Romero       Age: 69 y.o. Date: 3/11/2019    Chief Complaint:   Chief Complaint   Patient presents with    Skin Exam     back of leg/face       Subjective:    HPI  Ms. Josselyn Romero is a 69 y.o. female who presents for a partial skin exam.  The patient's last skin exam was on 11/26/18 and the patient does have current complaints related to her skin. She reports multiple scaly lesions on the face. She notes a pink and slightly tender lesion on the left cheek, new since last visit. She states the lesions on her lower legs are unchanged. She reports cellulitis of the left lower leg/ ankle. She is feeling well and in her usual state of health today. She has no current illnesses, no other skin concerns. Her allergies, medications, medical, and social history are reviewed by me today. The patient's pertinent skin history includes : personal history of melanomas, NMSCs, AKs addressed with PDT in February and April of 2018  -[facility], right shoulder, cryotherapy, 11/26/18  -SCCi, left lateral calf, Mohs, 10/2018  -BCC, left postauricular, Mohs, 10/2018  -BCC, left shoulder, Mohs, 10/2018  -BCC, right upper back, curettage, 05/09/18  -BCC, mid upper back, curettage, 05/09/18  -BCC, right mid back, curettage, 05/09/18  -BCC, mid central back, curettage, 05/09/18  -SCC, left anterior shin, curettage, 05/09/18  -MMi, right upper back, excision, 02/28/18  -BCC, left upper back, curettage, 01/24/18  -SCC, left anterior shin, Mohs, 09/14/17  -BCC, right lateral forehead, Mohs, 11/09/15  -BCC, right superior scaphoid fossa, Mohs, 07/28/15  -MM right upper arm and left thigh per pt prior to first visit    ROS: Constitutional: Negative.     Dermatological : positive for - skin lesion changes    Social History     Socioeconomic History    Marital status:      Spouse name: Harish Vega Number of children: 2    Years of education: Not on file    Highest education level: Not on file   Social Needs    Financial resource strain: Not on file    Food insecurity - worry: Not on file    Food insecurity - inability: Not on file    Transportation needs - medical: Not on file    Transportation needs - non-medical: Not on file   Occupational History    Not on file   Tobacco Use    Smoking status: Former Smoker     Packs/day: 1.00    Smokeless tobacco: Former User     Quit date: 1/1/1990   Substance and Sexual Activity    Alcohol use: Yes     Alcohol/week: 0.6 oz     Types: 1 Glasses of wine per week     Comment: 3-4 x a week    Drug use: No    Sexual activity: No   Other Topics Concern    Not on file   Social History Narrative    Not on file       Family History   Problem Relation Age of Onset    Cancer Mother     Cancer Father     Hypertension Brother     Cancer Brother         myeloma       Past Medical History:   Diagnosis Date    Basal cell carcinoma 7/2012, 7/2015    Dr. Esequiel Fitzgerald. saw Dr. Chinmay Christie Cellulitis 2018    Chronic cough     Colon polyp 5/2010    Dr. Donal Echavarria, tubular adenoma    Diverticulitis of colon 5/2010    dx on colonoscopy by Dr. Donal Echavarria. Took flagyl, cipro    DJD (degenerative joint disease), lumbar `    L5?  Endometr hyperplasia w/atypia 1997    Family history of skin cancer     Brother - melanoma    GERD (gastroesophageal reflux disease) 5/2010    H/O bone density study 2000?    normal per pt.  Hematuria     hx stones and \"nephritis\" since childhood    Hiatal hernia 5/2010    Hypertension 1996    Hypomagnesemia     level was 1.5 on mg ox 6/2012    Hypothyroidism 2008    TSH 1.1 6/7/12    Iron deficiency anemia     EGD, colon 5/2010.  hgb 11.3 6/2012    Melanoma ([facility] [address] 12/2011    Dr. Po Kruger.  right shoulder, left thigh.  Dr. Wilcox Speaker in situ [facility] back [facility]) 01/2018    Dr Darin Dowling Mixed hyperlipidemia      Tg 166 LDL 87 HDL55 6/2012    Nephrolithiasis     Stroke ([facility] [address] 7/06/07    R facial droop and R weakness. completely resolved.  Sun-damaged skin     Sunburn, blistering     Vitamin D deficiency     improved to 35.3 6/2012    Zoster        Past Surgical History:   Procedure Laterality Date    COLONOSCOPY N/A 10/19/2017    COLONOSCOPY performed by Scarlett Werner MD at [address] [facility] COLONOSCOPY  5/2010    diverticuLITIS, tubular adenoma  Dr. Kenna Rivers  GI  5/2010    EGD  - hiatal hernia. Dr. Kenna Rivers  MOHS PROCEDURES  09/14/2017    SCC L anterior mujica by Dr. Isis Quigley    endometrial pre-cancer on biopsy    HX TONSILLECTOMY         Current Outpatient Medications   Medication Sig Dispense Refill    mupirocin (BACTROBAN) 2 % ointment Apply  to affected area daily.  cephALEXin (KEFLEX) 500 mg capsule Take 1 Cap by mouth four (4) times daily. 28 Cap 0    levothyroxine (SYNTHROID) 88 mcg tablet TAKE 1 TABLET EVERY DAY BEFORE BREAKFAST 90 Tab 1    BYSTOLIC 5 mg tablet TAKE 1 TABLET DAILY FOR BLOOD PRESSURE,  PULSE, FATIGUE (REPLACES  ATENOLOL) 90 Tab 1    VITAMIN D2 50,000 unit capsule TAKE 1 CAPSULE BY MOUTH  EVERY 7 DAYS 13 Cap 3    simvastatin (ZOCOR) 20 mg tablet Take 1 Tab by mouth nightly. Decreased dose 1/3/19 90 Tab 3    amLODIPine (NORVASC) 2.5 mg tablet Take 1 Tab by mouth daily. 90 Tab 1    rivaroxaban (XARELTO) 20 mg tab tablet Take  by mouth daily.  ALIGN 4 mg cap Take 1 Cap by mouth daily. 30 Cap 2    montelukast (SINGULAIR) 10 mg tablet Take 10 mg by mouth daily.  levocetirizine (XYZAL) 5 mg tablet Take 5 mg by mouth.  famotidine (PEPCID) 40 mg tablet TAKE 1 TABLET BY MOUTH  TWICE A  Tab 1    MULTIVITAMIN PO       RESTASIS 0.05 % ophthalmic emulsion two (2) times a day.  acetaminophen (TYLENOL ARTHRITIS PAIN) 650 mg CR tablet Take 650 mg by mouth every six (6) hours as needed. Allergies   Allergen Reactions    Irbesartan Diarrhea    Iron Diarrhea    Pcn [Penicillins] Rash     PCN only.   Can take cephalosporins    Sulfacetamide Swelling         Objective:    Visit Vitals  /68 (BP 1 Location: Left arm, BP Patient Position: Sitting)   Pulse 61   Temp 98.6 °F (37 °C) (Oral)   Ht 5' 2\" (1.575 m)   Wt 149 lb (67.6 kg)   SpO2 99%   BMI 27.25 kg/m²       Summer Styles is a 69 y.o. female who appears well and in no distress. She is awake, alert, and oriented. There is no preauricular, submandibular, or cervical lymphadenopathy. A skin examination was performed including her s face (including eyelids), ears, and lower extremities. There are well-healed scars without evidence of lesion recurrence. There is a 9 x 4 mm erythematous pink papule on the left cheek, concerning for aquamous cell carcinoma vs ISK. There are thin-scaled actinic keratoses on the right cheek x2, right ear x2, right temple x2, and right eyebrow. She has scattered waxy macules and keratotic papules consistent with seborrheic keratoses. She has pink intradermal nevi and brown junctional nevi, no concerning features for severe atypia. There are lentigines on sun exposed areas. The lesions on the left popliteal and right shin are clinically unchanged from last visit's photo. Left cheek    Assessment/Plan:    1. Personal history of melanoma and nonmelanoma skin cancer. I discussed sun protection, sunscreen use, the warning signs of skin cancer, the need for self-skin examinations, and the need for regular practitioner exams every 3 months. The patient should follow up sooner as needed if new, changing, or symptomatic skin lesions arise. 2. Neoplasm of Uncertain Behavior, left cheek, R/O BCC. The differential diagnoses were discussed. A shave biopsy was advised to sample this lesion. The procedure was reviewed and verbal and written consent were obtained. The risks of pain, bleeding, infection, and scar were discussed.   The patient is aware that this is a sample and is intended for diagnosis and not therapy of the skin lesion. I performed the procedure. The site was cleansed and anesthetized with 1% Lidocaine with Epinephrine 1:100,000. A shave biopsy was performed to sample the lesion. Drysol was used for hemostasis. The wound was bandaged and care reviewed. The specimen was sent to pathology. I will contact the patient with the results and any further treatment that may be necessary. 3. Actinic Keratoses. The diagnosis of this precancerous lesion related to sun exposure was reviewed. Verbal consent was obtained. I treated 7 lesions with cryotherapy and post-cryotherapy care was reviewed. 4. Seborrheic keratoses. The diagnosis was reviewed and the patient was reassured that no treatment is needed for these benign lesions. 5. Normal nevi. The diagnosis of normal nevi was reviewed. I discussed sun protection, sunscreen use, the warning signs of skin cancer, the need for self-skin examinations, and the need for regular practitioner exams every 3 months. The patient should follow up sooner as needed if new, changing, or symptomatic skin lesions arise. 6. Solar lentigos. The diagnosis and relationship to sun exposure was reviewed. Sun protection advised. We will continue to monitor the lesions on the left popliteal and right shin. This plan was reviewed with the patient and patient agrees. All questions were answered. This scribe documentation was reviewed by me and accurately reflects the examination and decisions made by me. [facility]   OFFICE PROCEDURE PROGRESS NOTE   Chart reviewed for the following:   Blayne DEL TORO, [address], have reviewed the History, Physical and updated the Allergic reactions for Delaney Ford. TIME OUT performed immediately prior to start of procedure:   Jodie DEL TORO, have performed the following reviews on Delaney Ford   prior to the start of the procedure:     * Patient was identified by name and date of birth   * Agreement on procedure being performed was verified   * Risks and Benefits explained to the patient   * Procedure site verified and marked as necessary   * Patient was positioned for comfort   * Consent was signed and verified     Time: 12:00 PM  Date of procedure: 3/11/2019  Procedure performed by: Rashawn Szymanski.  Alex Lim  Provider assisted by: Jose Riggs RN   Patient assisted by: self   How tolerated by patient: tolerated the procedure well with no complications   Comments: none

## 2019-03-20 ENCOUNTER — TELEPHONE (OUTPATIENT)
Dept: DERMATOLOGY | Facility: AMBULATORY SURGERY CENTER | Age: 79
End: 2019-03-20

## 2019-03-20 NOTE — TELEPHONE ENCOUNTER
1:16 PM  RN left  with [facility]ology asking for return call to discuss Lab results for Flavio Arnold collected on 3/11/19 and not yet returned. RN will follow up if no return call by end of business day 3/20/2019.

## 2019-03-20 NOTE — TELEPHONE ENCOUNTER
1:53 PM  Amrita from [facility]ology returned RN's call and stated that lab result is still pending at this time. Lindsey Moreno stated she would send an email to Dr. Paxton Hannah' group regarding status of results so that [facility] may get some update today (3/20/19). RN will follow up.

## 2019-03-24 ENCOUNTER — TELEPHONE (OUTPATIENT)
Dept: INTERNAL MEDICINE CLINIC | Age: 79
End: 2019-03-24

## 2019-03-24 DIAGNOSIS — R05.9 COUGH: Primary | ICD-10-CM

## 2019-03-24 RX ORDER — CODEINE PHOSPHATE AND GUAIFENESIN 10; 100 MG/5ML; MG/5ML
10 SOLUTION ORAL
Qty: 120 ML | OUTPATIENT
Start: 2019-03-24 | End: 2019-03-25

## 2019-03-24 NOTE — TELEPHONE ENCOUNTER
Gemalisa Sat 3/23 with a 1 day history of relatively severe cough and fever now of 100.5. Feels mild myalgias. Denies any nausea and vomiting. Mild sinus congestion. No sick contacts. Difficulty sleeping because of cough. She does not feel short of breath. She does not typically get influenza vaccination. She says she is feeling overall moderately ill, but really the cough is her main concern because it keeps her awake. I agreed to call in Robitussin with codeine. Advised that she could have influenza and it may be a good idea to get tested at a urgent care clinic. She will see how he does overnight. I can see her Monday if symptoms are certainly not improving.

## 2019-03-25 ENCOUNTER — HOSPITAL ENCOUNTER (OUTPATIENT)
Age: 79
Setting detail: OBSERVATION
Discharge: HOME | End: 2019-03-26
Attending: EMERGENCY MEDICINE | Admitting: INTERNAL MEDICINE
Payer: MEDICARE

## 2019-03-25 ENCOUNTER — APPOINTMENT (OUTPATIENT)
Dept: NON INVASIVE DIAGNOSTICS | Age: 79
End: 2019-03-25
Attending: INTERNAL MEDICINE
Payer: MEDICARE

## 2019-03-25 ENCOUNTER — APPOINTMENT (OUTPATIENT)
Dept: GENERAL RADIOLOGY | Age: 79
End: 2019-03-25
Attending: EMERGENCY MEDICINE
Payer: MEDICARE

## 2019-03-25 DIAGNOSIS — I50.9 ACUTE CONGESTIVE HEART FAILURE, UNSPECIFIED HEART FAILURE TYPE (HCC): ICD-10-CM

## 2019-03-25 DIAGNOSIS — R77.8 ELEVATED TROPONIN: ICD-10-CM

## 2019-03-25 DIAGNOSIS — J18.9 COMMUNITY ACQUIRED PNEUMONIA, UNSPECIFIED LATERALITY: Primary | ICD-10-CM

## 2019-03-25 PROBLEM — E86.0 DEHYDRATION: Status: ACTIVE | Noted: 2019-03-25

## 2019-03-25 PROBLEM — D72.829 LEUKOCYTOSIS: Status: ACTIVE | Noted: 2019-03-25

## 2019-03-25 PROBLEM — R05.9 COUGH: Status: ACTIVE | Noted: 2019-03-25

## 2019-03-25 PROBLEM — D03.59 MELANOMA IN SITU OF BACK (HCC): Status: RESOLVED | Noted: 2018-01-01 | Resolved: 2019-03-25

## 2019-03-25 PROBLEM — D64.9 ANEMIA: Status: ACTIVE | Noted: 2019-03-25

## 2019-03-25 LAB
ALBUMIN SERPL-MCNC: 3.3 G/DL (ref 3.5–5)
ALBUMIN/GLOB SERPL: 0.9 {RATIO} (ref 1.1–2.2)
ALP SERPL-CCNC: 75 U/L (ref 45–117)
ALT SERPL-CCNC: 17 U/L (ref 12–78)
ANION GAP SERPL CALC-SCNC: 9 MMOL/L (ref 5–15)
APPEARANCE UR: CLEAR
AST SERPL-CCNC: 18 U/L (ref 15–37)
ATRIAL RATE: 88 BPM
B PERT DNA SPEC QL NAA+PROBE: NOT DETECTED
BACTERIA URNS QL MICRO: ABNORMAL /HPF
BASOPHILS # BLD: 0 K/UL (ref 0–0.1)
BASOPHILS NFR BLD: 0 % (ref 0–1)
BILIRUB SERPL-MCNC: 2.4 MG/DL (ref 0.2–1)
BILIRUB UR QL: NEGATIVE
BNP SERPL-MCNC: 4801 PG/ML (ref 0–450)
BUN SERPL-MCNC: 22 MG/DL (ref 6–20)
BUN/CREAT SERPL: 18 (ref 12–20)
C PNEUM DNA SPEC QL NAA+PROBE: NOT DETECTED
CALCIUM SERPL-MCNC: 8.5 MG/DL (ref 8.5–10.1)
CALCULATED P AXIS, ECG09: 44 DEGREES
CALCULATED R AXIS, ECG10: 15 DEGREES
CALCULATED T AXIS, ECG11: 22 DEGREES
CHLORIDE SERPL-SCNC: 101 MMOL/L (ref 97–108)
CHOLEST SERPL-MCNC: 58 MG/DL
CO2 SERPL-SCNC: 29 MMOL/L (ref 21–32)
COLOR UR: ABNORMAL
COMMENT, HOLDF: NORMAL
CREAT SERPL-MCNC: 1.19 MG/DL (ref 0.55–1.02)
D DIMER PPP FEU-MCNC: 0.54 MG/L FEU (ref 0–0.65)
DIAGNOSIS, 93000: NORMAL
DIFFERENTIAL METHOD BLD: ABNORMAL
ECHO AO ASC DIAM: 2.86 CM
ECHO AO ROOT DIAM: 2.88 CM
ECHO AV AREA PEAK VELOCITY: 1.5 CM2
ECHO AV AREA VTI: 2 CM2
ECHO AV AREA/BSA PEAK VELOCITY: 0.9 CM2/M2
ECHO AV AREA/BSA VTI: 1.2 CM2/M2
ECHO AV MEAN GRADIENT: 7.9 MMHG
ECHO AV PEAK GRADIENT: 21 MMHG
ECHO AV PEAK VELOCITY: 229.15 CM/S
ECHO AV REGURGITANT PHT: 750 CM
ECHO AV VTI: 38.75 CM
ECHO IVC SNIFF: 2.11 CM
ECHO LA AREA 4C: 12.9 CM2
ECHO LA MAJOR AXIS: 3.62 CM
ECHO LA TO AORTIC ROOT RATIO: 1.25
ECHO LA VOL 2C: 49.5 ML (ref 22–52)
ECHO LA VOL 4C: 25.66 ML (ref 22–52)
ECHO LA VOL BP: 36.8 ML (ref 22–52)
ECHO LA VOL/BSA BIPLANE: 21.94 ML/M2 (ref 16–28)
ECHO LA VOLUME INDEX A2C: 29.52 ML/M2 (ref 16–28)
ECHO LA VOLUME INDEX A4C: 15.3 ML/M2 (ref 16–28)
ECHO LV E' LATERAL VELOCITY: 11.89 CENTIMETER/SECOND
ECHO LV E' SEPTAL VELOCITY: 7.02 CENTIMETER/SECOND
ECHO LV INTERNAL DIMENSION DIASTOLIC: 4.69 CM (ref 3.9–5.3)
ECHO LV INTERNAL DIMENSION SYSTOLIC: 3.19 CM
ECHO LV IVSD: 1.2 CM (ref 0.6–0.9)
ECHO LV MASS 2D: 214.3 G (ref 67–162)
ECHO LV MASS INDEX 2D: 127.8 G/M2 (ref 43–95)
ECHO LV POSTERIOR WALL DIASTOLIC: 0.97 CM (ref 0.6–0.9)
ECHO LVOT DIAM: 1.87 CM
ECHO LVOT PEAK GRADIENT: 6.2 MMHG
ECHO LVOT PEAK VELOCITY: 124.32 CM/S
ECHO LVOT SV: 79 ML
ECHO LVOT VTI: 28.64 CM
ECHO MV A VELOCITY: 53.33 CM/S
ECHO MV AREA PHT: 4.6 CM2
ECHO MV E DECELERATION TIME (DT): 163.2 MS
ECHO MV E VELOCITY: 85.92 CM/S
ECHO MV E/A RATIO: 1.61
ECHO MV PRESSURE HALF TIME (PHT): 47.3 MS
ECHO PV MAX VELOCITY: 123.29 CM/S
ECHO PV PEAK GRADIENT: 6.1 MMHG
ECHO RV INTERNAL DIMENSION: 3.36 CM
ECHO RV TAPSE: 2.24 CM (ref 1.5–2)
ECHO TV REGURGITANT MAX VELOCITY: 320.2 CM/S
ECHO TV REGURGITANT PEAK GRADIENT: 41 MMHG
EOSINOPHIL # BLD: 0 K/UL (ref 0–0.4)
EOSINOPHIL NFR BLD: 0 % (ref 0–7)
EPITH CASTS URNS QL MICRO: ABNORMAL /LPF
ERYTHROCYTE [DISTWIDTH] IN BLOOD BY AUTOMATED COUNT: 15.3 % (ref 11.5–14.5)
FERRITIN SERPL-MCNC: 86 NG/ML (ref 26–388)
FLUAV AG NPH QL IA: NEGATIVE
FLUAV H1 2009 PAND RNA SPEC QL NAA+PROBE: NOT DETECTED
FLUAV H1 RNA SPEC QL NAA+PROBE: NOT DETECTED
FLUAV H3 RNA SPEC QL NAA+PROBE: NOT DETECTED
FLUAV SUBTYP SPEC NAA+PROBE: NOT DETECTED
FLUBV AG NOSE QL IA: NEGATIVE
FLUBV RNA SPEC QL NAA+PROBE: NOT DETECTED
FOLATE SERPL-MCNC: 77 NG/ML (ref 5–21)
GLOBULIN SER CALC-MCNC: 3.6 G/DL (ref 2–4)
GLUCOSE SERPL-MCNC: 134 MG/DL (ref 65–100)
GLUCOSE UR STRIP.AUTO-MCNC: NEGATIVE MG/DL
HADV DNA SPEC QL NAA+PROBE: NOT DETECTED
HAPTOGLOB SERPL-MCNC: 156 MG/DL (ref 30–200)
HCOV 229E RNA SPEC QL NAA+PROBE: NOT DETECTED
HCOV HKU1 RNA SPEC QL NAA+PROBE: NOT DETECTED
HCOV NL63 RNA SPEC QL NAA+PROBE: NOT DETECTED
HCOV OC43 RNA SPEC QL NAA+PROBE: NOT DETECTED
HCT VFR BLD AUTO: 32.9 % (ref 35–47)
HDLC SERPL-MCNC: 22 MG/DL
HDLC SERPL: 2.6 {RATIO} (ref 0–5)
HGB BLD-MCNC: 9.5 G/DL (ref 11.5–16)
HGB UR QL STRIP: ABNORMAL
HMPV RNA SPEC QL NAA+PROBE: NOT DETECTED
HPIV1 RNA SPEC QL NAA+PROBE: NOT DETECTED
HPIV2 RNA SPEC QL NAA+PROBE: NOT DETECTED
HPIV3 RNA SPEC QL NAA+PROBE: NOT DETECTED
HPIV4 RNA SPEC QL NAA+PROBE: NOT DETECTED
IRON SATN MFR SERPL: 8 % (ref 20–50)
IRON SERPL-MCNC: 15 UG/DL (ref 35–150)
KETONES UR QL STRIP.AUTO: ABNORMAL MG/DL
LACTATE BLD-SCNC: 1.16 MMOL/L (ref 0.4–2)
LDH SERPL L TO P-CCNC: 148 U/L (ref 81–246)
LDLC SERPL CALC-MCNC: 17.2 MG/DL (ref 0–100)
LEUKOCYTE ESTERASE UR QL STRIP.AUTO: ABNORMAL
LIPID PROFILE,FLP: NORMAL
LYMPHOCYTES # BLD: 1.2 K/UL (ref 0.8–3.5)
LYMPHOCYTES NFR BLD: 6 % (ref 12–49)
M PNEUMO DNA SPEC QL NAA+PROBE: NOT DETECTED
MCH RBC QN AUTO: 27 PG (ref 26–34)
MCHC RBC AUTO-ENTMCNC: 28.9 G/DL (ref 30–36.5)
MCV RBC AUTO: 93.5 FL (ref 80–99)
MONOCYTES # BLD: 3.1 K/UL (ref 0–1)
MONOCYTES NFR BLD: 15 % (ref 5–13)
MUCOUS THREADS URNS QL MICRO: ABNORMAL /LPF
NEUTS BAND NFR BLD MANUAL: 6 %
NEUTS SEG # BLD: 16.1 K/UL (ref 1.8–8)
NEUTS SEG NFR BLD: 73 % (ref 32–75)
NITRITE UR QL STRIP.AUTO: NEGATIVE
P-R INTERVAL, ECG05: 124 MS
PH UR STRIP: 5.5 [PH] (ref 5–8)
PISA AR MAX VEL: 227.19 CM/S
PLATELET # BLD AUTO: 154 K/UL (ref 150–400)
PMV BLD AUTO: 11.3 FL (ref 8.9–12.9)
POTASSIUM SERPL-SCNC: 3.9 MMOL/L (ref 3.5–5.1)
PROT SERPL-MCNC: 6.9 G/DL (ref 6.4–8.2)
PROT UR STRIP-MCNC: 30 MG/DL
Q-T INTERVAL, ECG07: 394 MS
QRS DURATION, ECG06: 84 MS
QTC CALCULATION (BEZET), ECG08: 476 MS
RBC # BLD AUTO: 3.52 M/UL (ref 3.8–5.2)
RBC #/AREA URNS HPF: ABNORMAL /HPF (ref 0–5)
RBC MORPH BLD: ABNORMAL
RBC MORPH BLD: ABNORMAL
RETICS # AUTO: 0.07 M/UL (ref 0.02–0.08)
RETICS/RBC NFR AUTO: 2.5 % (ref 0.7–2.1)
RSV RNA SPEC QL NAA+PROBE: NOT DETECTED
RV+EV RNA SPEC QL NAA+PROBE: NOT DETECTED
SAMPLES BEING HELD,HOLD: NORMAL
SODIUM SERPL-SCNC: 139 MMOL/L (ref 136–145)
SP GR UR REFRACTOMETRY: 1.01 (ref 1–1.03)
TIBC SERPL-MCNC: 187 UG/DL (ref 250–450)
TRIGL SERPL-MCNC: 94 MG/DL (ref ?–150)
TROPONIN I SERPL-MCNC: 0.09 NG/ML
TROPONIN I SERPL-MCNC: 0.1 NG/ML
TSH SERPL DL<=0.05 MIU/L-ACNC: 0.33 UIU/ML (ref 0.36–3.74)
UROBILINOGEN UR QL STRIP.AUTO: 0.2 EU/DL (ref 0.2–1)
VENTRICULAR RATE, ECG03: 88 BPM
VIT B12 SERPL-MCNC: 1591 PG/ML (ref 193–986)
VLDLC SERPL CALC-MCNC: 18.8 MG/DL
WBC # BLD AUTO: 20.4 K/UL (ref 3.6–11)
WBC URNS QL MICRO: ABNORMAL /HPF (ref 0–4)

## 2019-03-25 PROCEDURE — 74011250636 HC RX REV CODE- 250/636: Performed by: EMERGENCY MEDICINE

## 2019-03-25 PROCEDURE — 85045 AUTOMATED RETICULOCYTE COUNT: CPT

## 2019-03-25 PROCEDURE — 87040 BLOOD CULTURE FOR BACTERIA: CPT

## 2019-03-25 PROCEDURE — 87804 INFLUENZA ASSAY W/OPTIC: CPT

## 2019-03-25 PROCEDURE — 97530 THERAPEUTIC ACTIVITIES: CPT

## 2019-03-25 PROCEDURE — 85379 FIBRIN DEGRADATION QUANT: CPT

## 2019-03-25 PROCEDURE — 87899 AGENT NOS ASSAY W/OPTIC: CPT

## 2019-03-25 PROCEDURE — 85025 COMPLETE CBC W/AUTO DIFF WBC: CPT

## 2019-03-25 PROCEDURE — 74011250637 HC RX REV CODE- 250/637: Performed by: EMERGENCY MEDICINE

## 2019-03-25 PROCEDURE — 65390000012 HC CONDITION CODE 44 OBSERVATION

## 2019-03-25 PROCEDURE — 94640 AIRWAY INHALATION TREATMENT: CPT

## 2019-03-25 PROCEDURE — 80061 LIPID PANEL: CPT

## 2019-03-25 PROCEDURE — 97116 GAIT TRAINING THERAPY: CPT

## 2019-03-25 PROCEDURE — 87086 URINE CULTURE/COLONY COUNT: CPT

## 2019-03-25 PROCEDURE — 82728 ASSAY OF FERRITIN: CPT

## 2019-03-25 PROCEDURE — 81001 URINALYSIS AUTO W/SCOPE: CPT

## 2019-03-25 PROCEDURE — 93005 ELECTROCARDIOGRAM TRACING: CPT

## 2019-03-25 PROCEDURE — 71046 X-RAY EXAM CHEST 2 VIEWS: CPT

## 2019-03-25 PROCEDURE — 36415 COLL VENOUS BLD VENIPUNCTURE: CPT

## 2019-03-25 PROCEDURE — 77030013140 HC MSK NEB VYRM -A

## 2019-03-25 PROCEDURE — 83540 ASSAY OF IRON: CPT

## 2019-03-25 PROCEDURE — 82746 ASSAY OF FOLIC ACID SERUM: CPT

## 2019-03-25 PROCEDURE — 74011250636 HC RX REV CODE- 250/636: Performed by: INTERNAL MEDICINE

## 2019-03-25 PROCEDURE — 97161 PT EVAL LOW COMPLEX 20 MIN: CPT

## 2019-03-25 PROCEDURE — 65270000029 HC RM PRIVATE

## 2019-03-25 PROCEDURE — 80053 COMPREHEN METABOLIC PANEL: CPT

## 2019-03-25 PROCEDURE — 82607 VITAMIN B-12: CPT

## 2019-03-25 PROCEDURE — 83605 ASSAY OF LACTIC ACID: CPT

## 2019-03-25 PROCEDURE — 83010 ASSAY OF HAPTOGLOBIN QUANT: CPT

## 2019-03-25 PROCEDURE — 74011000250 HC RX REV CODE- 250: Performed by: EMERGENCY MEDICINE

## 2019-03-25 PROCEDURE — 84443 ASSAY THYROID STIM HORMONE: CPT

## 2019-03-25 PROCEDURE — 93306 TTE W/DOPPLER COMPLETE: CPT

## 2019-03-25 PROCEDURE — 83615 LACTATE (LD) (LDH) ENZYME: CPT

## 2019-03-25 PROCEDURE — 99284 EMERGENCY DEPT VISIT MOD MDM: CPT

## 2019-03-25 PROCEDURE — 99283 EMERGENCY DEPT VISIT LOW MDM: CPT

## 2019-03-25 PROCEDURE — 96361 HYDRATE IV INFUSION ADD-ON: CPT

## 2019-03-25 PROCEDURE — 96365 THER/PROPH/DIAG IV INF INIT: CPT

## 2019-03-25 PROCEDURE — 74011250637 HC RX REV CODE- 250/637: Performed by: INTERNAL MEDICINE

## 2019-03-25 PROCEDURE — 87633 RESP VIRUS 12-25 TARGETS: CPT

## 2019-03-25 PROCEDURE — 83880 ASSAY OF NATRIURETIC PEPTIDE: CPT

## 2019-03-25 PROCEDURE — 84484 ASSAY OF TROPONIN QUANT: CPT

## 2019-03-25 PROCEDURE — 87449 NOS EACH ORGANISM AG IA: CPT

## 2019-03-25 RX ORDER — DEXTROSE, SODIUM CHLORIDE, AND POTASSIUM CHLORIDE 5; .45; .15 G/100ML; G/100ML; G/100ML
75 INJECTION INTRAVENOUS CONTINUOUS
Status: DISCONTINUED | OUTPATIENT
Start: 2019-03-25 | End: 2019-03-26 | Stop reason: HOSPADM

## 2019-03-25 RX ORDER — LEVOTHYROXINE SODIUM 88 UG/1
88 TABLET ORAL
Status: DISCONTINUED | OUTPATIENT
Start: 2019-03-26 | End: 2019-03-26 | Stop reason: HOSPADM

## 2019-03-25 RX ORDER — NEBIVOLOL 5 MG/1
5 TABLET ORAL
COMMUNITY
End: 2019-07-12 | Stop reason: SDUPTHER

## 2019-03-25 RX ORDER — CYCLOSPORINE 0.5 MG/ML
1 EMULSION OPHTHALMIC 2 TIMES DAILY
Status: DISCONTINUED | OUTPATIENT
Start: 2019-03-25 | End: 2019-03-25

## 2019-03-25 RX ORDER — ERGOCALCIFEROL 1.25 MG/1
50000 CAPSULE ORAL
COMMUNITY
End: 2019-06-26 | Stop reason: ALTCHOICE

## 2019-03-25 RX ORDER — SIMVASTATIN 20 MG/1
20 TABLET, FILM COATED ORAL
Status: DISCONTINUED | OUTPATIENT
Start: 2019-03-25 | End: 2019-03-26 | Stop reason: HOSPADM

## 2019-03-25 RX ORDER — ACETAMINOPHEN 325 MG/1
650 TABLET ORAL
Status: DISCONTINUED | OUTPATIENT
Start: 2019-03-25 | End: 2019-03-26 | Stop reason: HOSPADM

## 2019-03-25 RX ORDER — ONDANSETRON 2 MG/ML
4 INJECTION INTRAMUSCULAR; INTRAVENOUS
Status: DISCONTINUED | OUTPATIENT
Start: 2019-03-25 | End: 2019-03-26 | Stop reason: HOSPADM

## 2019-03-25 RX ORDER — LEVOFLOXACIN 5 MG/ML
750 INJECTION, SOLUTION INTRAVENOUS
Status: COMPLETED | OUTPATIENT
Start: 2019-03-25 | End: 2019-03-25

## 2019-03-25 RX ORDER — IPRATROPIUM BROMIDE AND ALBUTEROL SULFATE 2.5; .5 MG/3ML; MG/3ML
3 SOLUTION RESPIRATORY (INHALATION) AS NEEDED
Status: DISCONTINUED | OUTPATIENT
Start: 2019-03-25 | End: 2019-03-26 | Stop reason: HOSPADM

## 2019-03-25 RX ORDER — FAMOTIDINE 20 MG/1
40 TABLET, FILM COATED ORAL 2 TIMES DAILY
Status: DISCONTINUED | OUTPATIENT
Start: 2019-03-25 | End: 2019-03-25

## 2019-03-25 RX ORDER — NEBIVOLOL 5 MG/1
5 TABLET ORAL DAILY
Status: DISCONTINUED | OUTPATIENT
Start: 2019-03-25 | End: 2019-03-25

## 2019-03-25 RX ORDER — BENZONATATE 100 MG/1
100 CAPSULE ORAL
COMMUNITY
End: 2019-04-09 | Stop reason: ALTCHOICE

## 2019-03-25 RX ORDER — DIPHENHYDRAMINE HCL 25 MG
25 CAPSULE ORAL
Status: DISCONTINUED | OUTPATIENT
Start: 2019-03-25 | End: 2019-03-26 | Stop reason: HOSPADM

## 2019-03-25 RX ORDER — LEVOFLOXACIN 500 MG/1
500 TABLET, FILM COATED ORAL DAILY
COMMUNITY
End: 2019-04-09 | Stop reason: ALTCHOICE

## 2019-03-25 RX ORDER — BENZONATATE 100 MG/1
100 CAPSULE ORAL
Status: DISCONTINUED | OUTPATIENT
Start: 2019-03-25 | End: 2019-03-26 | Stop reason: HOSPADM

## 2019-03-25 RX ORDER — FAMOTIDINE 20 MG/1
20 TABLET, FILM COATED ORAL DAILY
Status: DISCONTINUED | OUTPATIENT
Start: 2019-03-26 | End: 2019-03-26 | Stop reason: HOSPADM

## 2019-03-25 RX ORDER — LORATADINE 10 MG/1
10 TABLET ORAL DAILY
Status: DISCONTINUED | OUTPATIENT
Start: 2019-03-25 | End: 2019-03-26 | Stop reason: HOSPADM

## 2019-03-25 RX ORDER — LEVOFLOXACIN 5 MG/ML
750 INJECTION, SOLUTION INTRAVENOUS
Status: DISCONTINUED | OUTPATIENT
Start: 2019-03-27 | End: 2019-03-26

## 2019-03-25 RX ORDER — ACETAMINOPHEN 500 MG
1000 TABLET ORAL ONCE
Status: COMPLETED | OUTPATIENT
Start: 2019-03-25 | End: 2019-03-25

## 2019-03-25 RX ORDER — MONTELUKAST SODIUM 10 MG/1
10 TABLET ORAL DAILY
Status: DISCONTINUED | OUTPATIENT
Start: 2019-03-25 | End: 2019-03-26 | Stop reason: HOSPADM

## 2019-03-25 RX ORDER — THERA TABS 400 MCG
1 TAB ORAL DAILY
Status: DISCONTINUED | OUTPATIENT
Start: 2019-03-25 | End: 2019-03-26 | Stop reason: HOSPADM

## 2019-03-25 RX ORDER — NEBIVOLOL 5 MG/1
5 TABLET ORAL
Status: DISCONTINUED | OUTPATIENT
Start: 2019-03-25 | End: 2019-03-26 | Stop reason: HOSPADM

## 2019-03-25 RX ADMIN — SIMVASTATIN 20 MG: 20 TABLET, FILM COATED ORAL at 21:21

## 2019-03-25 RX ADMIN — BENZONATATE 100 MG: 100 CAPSULE ORAL at 23:38

## 2019-03-25 RX ADMIN — IPRATROPIUM BROMIDE AND ALBUTEROL SULFATE 3 ML: .5; 3 SOLUTION RESPIRATORY (INHALATION) at 07:17

## 2019-03-25 RX ADMIN — LEVOFLOXACIN 750 MG: 5 INJECTION, SOLUTION INTRAVENOUS at 07:44

## 2019-03-25 RX ADMIN — DEXTROSE MONOHYDRATE, SODIUM CHLORIDE, AND POTASSIUM CHLORIDE 75 ML/HR: 50; 4.5; 1.49 INJECTION, SOLUTION INTRAVENOUS at 11:25

## 2019-03-25 RX ADMIN — RIVAROXABAN 15 MG: 15 TABLET, FILM COATED ORAL at 16:29

## 2019-03-25 RX ADMIN — IPRATROPIUM BROMIDE AND ALBUTEROL SULFATE 3 ML: .5; 3 SOLUTION RESPIRATORY (INHALATION) at 08:27

## 2019-03-25 RX ADMIN — LORATADINE 10 MG: 10 TABLET ORAL at 11:26

## 2019-03-25 RX ADMIN — SODIUM CHLORIDE 500 ML: 900 INJECTION, SOLUTION INTRAVENOUS at 07:59

## 2019-03-25 RX ADMIN — FAMOTIDINE 40 MG: 20 TABLET ORAL at 11:27

## 2019-03-25 RX ADMIN — MONTELUKAST 10 MG: 10 TABLET, FILM COATED ORAL at 13:45

## 2019-03-25 RX ADMIN — NEBIVOLOL HYDROCHLORIDE 5 MG: 5 TABLET ORAL at 21:21

## 2019-03-25 RX ADMIN — BENZONATATE 100 MG: 100 CAPSULE ORAL at 14:39

## 2019-03-25 RX ADMIN — THERA TABS 1 TABLET: TAB at 11:27

## 2019-03-25 RX ADMIN — ACETAMINOPHEN 1000 MG: 500 TABLET ORAL at 09:03

## 2019-03-25 RX ADMIN — IPRATROPIUM BROMIDE AND ALBUTEROL SULFATE 3 ML: .5; 3 SOLUTION RESPIRATORY (INHALATION) at 07:46

## 2019-03-25 NOTE — ED NOTES
TIME OUT completed with YUDY HAIDER signed by all parties and original sent with patient to [facility]. Pt stable for transfer. Pt sent on 3 L NC with saline lock.

## 2019-03-25 NOTE — ED NOTES
TRANSFER - OUT REPORT: 
 
Verbal report given to Raheem Manzano RN(name) on [facility] [facility]  being transferred to [facility] room 521(unit) for routine progression of care Report consisted of patients Situation, Background, Assessment and  
Recommendations(SBAR). Information from the following report(s) SBAR, Kardex, ED Summary, [facility] and Recent Results was reviewed with the receiving nurse. Lines:    
 
Opportunity for questions and clarification was provided. Patient transported with: 
 Monitor O2 @ 3 liters

## 2019-03-25 NOTE — PROGRESS NOTES
Pharmacy Dosing Services:  
 
Xarelto dose changed from 20 mg daily to 15 mg daily per P&T protocol.  
- Scr 1.19, CrCl 35 ml/min (15-50 ml/min) - Indication: AFib (confirmed with family and through PCP office note) Thank you, Alysia Patino, PharmD

## 2019-03-25 NOTE — ED TRIAGE NOTES
Patient complains of shortness of breath. Patient was diagnosed yesterday at urgent care with pneumonia. Patient reports cough and sore throat that started this past week.

## 2019-03-25 NOTE — PROGRESS NOTES
TRANSFER - IN REPORT: 
 
Verbal report received from Venice Gomez RN(name) on [place] [facility]  being received from [facility] ED(unit) for routine progression of care Report consisted of patients Situation, Background, Assessment and  
Recommendations(SBAR). Information from the following report(s) SBAR, Kardex, Intake/Output, MAR, Accordion and Recent Results was reviewed with the receiving nurse. Opportunity for questions and clarification was provided. Assessment completed upon patients arrival to unit and care assumed.

## 2019-03-25 NOTE — PROGRESS NOTES
physical Therapy EVALUATION/DISCHARGE with 6 minute walk test 
Patient: Marcio Werner (60 y.o. female) Date: 3/25/2019 Primary Diagnosis: Pneumonia [J18.9] Precautions:     
ASSESSMENT : 
Based on the objective data described below, the patient admitted for pneumonia presents with decreased endurance and productive cough at times. Pt ambulated about 160 feet with gait belt and stand by assist/contact guard assist without difficulty and did not desaturate on room air with activity. Pt states she has had no difficulty getting to the bathroom but does get tired quicker. Pt is close to baseline mobility. Pt usually ambulates independently, and has not fallen in the past year. Pt was instructed in B LE exercises, encouraged to be OOB twice daily and to walk with assist. 
 
Further skilled acute physical therapy is not indicated at this time. PLAN : 
Discharge Recommendations: None Further Equipment Recommendations for Discharge: none SUBJECTIVE:  
Patient stated I'm doing better.  OBJECTIVE DATA SUMMARY:  
HISTORY:   
Past Medical History:  
Diagnosis Date  Basal cell carcinoma 7/2012, 7/2015 Dr. Celeste Wolff. saw Dr. Xavier Patterson  Diverticulitis of colon 5/2010  
 dx on colonoscopy by Dr. Tammy Arreola. Took flagyl, cipro  DJD (degenerative joint disease), lumbar `  
 L5?  Endometr hyperplasia w/atypia 1997  GERD (gastroesophageal reflux disease) 5/2010  
 Hiatal hernia 5/2010  Hypertension 1996  Hypothyroidism 2008 TSH 1.1 6/7/12  Iron deficiency anemia   
 EGD, colon 5/2010.  hgb 11.3 6/2012  Melanoma ([address] mulitple  Mixed hyperlipidemia Tg 166 LDL 87 HDL55 6/2012  Nephrolithiasis  TIA (transient ischemic attack) 2007 R droop and weakness  Zoster Past Surgical History:  
Procedure Laterality Date  COLONOSCOPY N/A 10/19/2017 COLONOSCOPY performed by Shoaib Martin MD at [address] [facility] COLONOSCOPY  5/2010 diverticuLITIS, tubular adenoma  Dr. Debra Willett  HX GI  5/2010 EGD  - hiatal hernia. Dr. Debra Willett  HX MOHS PROCEDURES  09/14/2017 SCC L anterior mujica by Dr. Nikolay Harrell  HX MALLORY AND BSO  1997  
 endometrial pre-cancer on biopsy  HX TONSILLECTOMY Prior Level of Function/Home Situation: Pt was an independent community distance ambulator, was independent in ADL's, and has not fallen in the past year. Personal factors and/or comorbidities impacting plan of care: melanoma, HTN, anemia Home Situation Home Environment: Private residence # Steps to Enter: 4 Rails to Enter: Yes Hand Rails : Bilateral 
Wheelchair Ramp: No 
One/Two Story Residence: Two story, live on 1st floor(patient lives on first floor) Living Alone: No 
Support Systems: Family member(s), Spouse/Significant Other/Partner Patient Expects to be Discharged to[de-identified] Private residence Current DME Used/Available at Home: Raised toilet seat Tub or Shower Type: Shower EXAMINATION/PRESENTATION/DECISION MAKING:  
Critical Behavior: 
Neurologic State: Alert Orientation Level: Oriented X4 Cognition: Appropriate decision making, Appropriate for age attention/concentration, Appropriate safety awareness, Follows commands Hearing: Auditory Auditory Impairment: None Skin:  intact Edema: not noted Range Of Motion: 
AROM: Within functional limits PROM: Within functional limits Strength:   
Strength: Within functional limits Tone & Sensation:  
Tone: Normal 
  
  
  
  
Sensation: Intact Coordination: 
Coordination: Within functional limits Vision:  
  
Functional Mobility: 
Bed Mobility: 
Rolling: Independent Supine to Sit: Independent Sit to Supine: Independent Scooting: Independent Transfers: 
Sit to Stand: Stand-by assistance Stand to Sit: Stand-by assistance Balance:  
Sitting: Intact Standing: Intact Ambulation/Gait Training: 
Distance (ft): 160 Feet (ft) Assistive Device: Gait belt Ambulation - Level of Assistance: Stand-by assistance;Contact guard assistance Gait Description (WDL): Exceptions to [facility] Stairs: 
  
  
Documentation for home O2: 
  
ROOM AIR  
 AT REST 
 O2 SATS 94 HR 
81 ROOM AIR WITH ACTIVITY 02 SATS 92 HR 
84  
(2    ) LITERS OF O2 WITH ACTIVITY O2 SATS 96 HR 
84  
(2    )LITERS OF 02 PATIENT LEFT COMFORTABLY SITTING/SUPINE 02 SATS 
98 HR 
82 Therapeutic Exercises:  
Instructed in ankle pumps and heel glides and encouraged to exercise hourly Functional Measure: 
Barthel Index: 
 
Bathin Bladder: 10 Bowels: 10 
Groomin Dressing: 10 Feeding: 10 Mobility: 10 Stairs: 5 Toilet Use: 5 Transfer (Bed to Chair and Back): 10 Total: 75/100 Percentage of impairment  
0% 1-19% 20-39% 40-59% 60-79% 80-99% 100% Barthel Score 0-100 100 99-80 79-60 59-40 20-39 1-19 
 0 The Barthel ADL Index: Guidelines 1. The index should be used as a record of what a patient does, not as a record of what a patient could do. 2. The main aim is to establish degree of independence from any help, physical or verbal, however minor and for whatever reason. 3. The need for supervision renders the patient not independent. 4. A patient's performance should be established using the best available evidence. Asking the patient, friends/relatives and nurses are the usual sources, but direct observation and common sense are also important. However direct testing is not needed. 5. Usually the patient's performance over the preceding 24-48 hours is important, but occasionally longer periods will be relevant. 6. Middle categories imply that the patient supplies over 50 per cent of the effort. 7. Use of aids to be independent is allowed. Chilo Castellanos., Barthel, D.W. (6956). Functional evaluation: the Barthel Index. [facility] (14)2. CALVIN Manzano, Fabrizio De Oliveira., [facility]., [place], [address] (1999). Measuring the change indisability after inpatient rehabilitation; comparison of the responsiveness of the Barthel Index and Functional Hood Measure. Journal of Neurology, Neurosurgery, and Psychiatry, 66(4), 696-707. ELVIRA Witt, KEEGAN Bailon, & Kameron Conner M.A. (2004.) Assessment of post-stroke quality of life in cost-effectiveness studies: The usefulness of the Barthel Index and the EuroQoL-5D. [facility], 13, 483-43 Physical Therapy Evaluation Charge Determination History Examination Presentation Decision-Making HIGH Complexity :3+ comorbidities / personal factors will impact the outcome/ POC  LOW Complexity : 1-2 Standardized tests and measures addressing body structure, function, activity limitation and / or participation in recreation  LOW Complexity : Stable, uncomplicated  Other outcome measures Barthel  LOW Based on the above components, the patient evaluation is determined to be of the following complexity level: LOW Pain: 
Pain Scale 1: Numeric (0 - 10) Pain Intensity 1: 0 Activity Tolerance:  
good Please refer to the flowsheet for vital signs taken during this treatment. After treatment:  
[]   Patient left in no apparent distress sitting up in chair 
[x]   Patient left in no apparent distress in bed 
[x]   Call bell left within reach [x]   Nursing notified 
[x]   Caregiver present 
[]   Bed alarm activated COMMUNICATION/EDUCATION:  
Communication/Collaboration: 
[x]   Fall prevention education was provided and the patient/caregiver indicated understanding. [x]   Patient/family have participated as able and agree with findings and recommendations. []   Patient is unable to participate in plan of care at this time. Findings and recommendations were discussed with: Registered Nurse Thank you for this referral. 
Harper Salazar, PT 
 Time Calculation: 23 mins Patient

## 2019-03-25 NOTE — PROGRESS NOTES
3/25/2019 Reason for Admission:   PNA RRAT Score:     18 Do you (patient/family) have any concerns for transition/discharge? None voiced Plan for utilizing home health:     No 
 
Likelihood of readmission? Mod/yellow Transition of Care Plan:   Return home with  3/25/2019   CARE MANAGEMENT NOTE:  CM is following pt for initial discharge planning. EMR reviewed. CM met with pt and her , Milka Stevens ([phone]) at bedside to obtain hx for this needs assessment. Reportedly, pt and her  reside in a two story home with bedroom on the ground floor. There are three entry steps thru the garage entrance. PTA, pt was ambulatory, indepn with ADLs, and drives. She has RX drug coverage and pt obtains medications from Kitty Corona at [facility]. Pt does not have home healthcare nor DME for home use. PCP is Dr. Jaxon Child. CM notified Nurse Navigator of pt's admission. Plan is for pt to return home without any anticipated post discharge needs at this writing. Lucian

## 2019-03-25 NOTE — ED NOTES
Dr. Sylvester Motley at bedside to explain results and plan for admission. Pt and spouse are in agreement with plan of care.

## 2019-03-25 NOTE — PROGRESS NOTES
[facility]: MED RECONCILIATION Information obtained from: Patient's medication list  
 
 
Allergies: Irbesartan; Iron; Pcn [penicillins]; and Sulfacetamide Prior to Admission Medications:  
 
Medication Documentation Review Audit Reviewed by Faby Levy PHARMD (Pharmacist) on 03/25/19 at [id number] Medication Sig Documenting Provider Last Dose Status Taking?  
acetaminophen (TYLENOL ARTHRITIS PAIN) 650 mg CR tablet Take 1,300 mg by mouth nightly. Provider, Historical 3/24/2019 Unknown time Active Yes Med Note Shanti    Mon Mar 25, 2019 11:58 AM) amLODIPine (NORVASC) 2.5 mg tablet Take 1 Tab by mouth daily. Loc Garcia MD 3/24/2019 Unknown time Active Yes  
benzonatate (TESSALON) 100 mg capsule Take 100 mg by mouth three (3) times daily as needed for Cough. Elida Dhillon MD 3/24/2019 Unknown time Active Yes  
ergocalciferol (ERGOCALCIFEROL) 50,000 unit capsule Take 50,000 Units by mouth every Wednesday. Provider, Historical  Active Yes  
famotidine (PEPCID) 40 mg tablet TAKE 1 TABLET BY MOUTH  TWICE A DAY Cornelio Sauer NP 3/24/2019 Unknown time Active Yes  
L.acid/L.casei/B.bif/B.oleg/FOS (PROBIOTIC BLEND PO) Take 1 Tab by mouth three (3) times daily. Provider, Historical  Active Yes  
levocetirizine (XYZAL) 5 mg tablet Take 5 mg by mouth nightly. Elida Dhillon MD 3/24/2019 Unknown time Active Yes  
levoFLOXacin (LEVAQUIN) 500 mg tablet Take 500 mg by mouth daily. Elida Dhillon MD 3/24/2019 Unknown time Active Yes  
levothyroxine (SYNTHROID) 88 mcg tablet TAKE 1 TABLET EVERY DAY BEFORE Hardik Garay MD 3/25/2019 Unknown time Active Yes  
montelukast (SINGULAIR) 10 mg tablet Take 10 mg by mouth daily. Elida Dhillon MD 3/24/2019 Unknown time Active Yes  
mv-mn/folic acid/calcium/vit K (ONE-A-DAY WOMEN'S 50 PLUS PO) Take 1 Tab by mouth daily. Provider, Historical  Active Yes  
nebivolol (BYSTOLIC) 5 mg tablet Take 5 mg by mouth nightly. Provider, Historical  Active Yes rivaroxaban (XARELTO) 20 mg tab tablet Take 20 mg by mouth daily (with dinner). Provider, Historical 3/24/2019 Unknown time Active Yes  
simvastatin (ZOCOR) 20 mg tablet Take 1 Tab by mouth nightly. Decreased dose 1/3/19 Berlin Viramontes MD 3/24/2019 Unknown time Active Yes [address] [place], [place]   Contact: [id number]

## 2019-03-25 NOTE — PROGRESS NOTES
Bedside and Verbal shift change report given to Demetria Severe, RN (oncoming nurse) by Kalee Christine RN (offgoing nurse). Report included the following information SBAR, Kardex, Intake/Output, MAR and Accordion.

## 2019-03-25 NOTE — ED PROVIDER NOTES
The history is provided by the patient and the spouse. No  was used. Shortness of Breath This is a new problem. The problem occurs rarely. The current episode started 6 to 12 hours ago. The problem has been gradually worsening. Associated symptoms include a fever, sore throat, cough and sputum production. Pertinent negatives include no headaches, no coryza, no rhinorrhea, no swollen glands, no ear pain, no neck pain, no hemoptysis, no wheezing, no PND, no orthopnea, no chest pain, no syncope, no vomiting, no abdominal pain, no rash, no leg pain, no leg swelling and no claudication. It is unknown what precipitated the problem. She has tried nothing for the symptoms. She has had no prior hospitalizations. She has had prior ED visits. She has had no prior ICU admissions. Associated medical issues do not include asthma, COPD, pneumonia, chronic lung disease, PE, CAD, heart failure, past MI, DVT or recent surgery. Past Medical History:  
Diagnosis Date  Basal cell carcinoma 7/2012, 7/2015 Dr. Wilbert Brady. saw Dr. Adams Brilliant  Cellulitis 2018  Chronic cough  Colon polyp 5/2010 Dr. Rigoberto Akers, tubular adenoma  Diverticulitis of colon 5/2010  
 dx on colonoscopy by Dr. Rigoberto Akers. Took flagyl, cipro  DJD (degenerative joint disease), lumbar `  
 L5?  Endometr hyperplasia w/atypia 1997  Family history of skin cancer Brother - melanoma  GERD (gastroesophageal reflux disease) 5/2010  
 H/O bone density study 2000?  
 normal per pt.  Hematuria   
 hx stones and \"nephritis\" since childhood  Hiatal hernia 5/2010  Hypertension 1996  Hypomagnesemia   
 level was 1.5 on mg ox 6/2012  Hypothyroidism 2008 TSH 1.1 6/7/12  Iron deficiency anemia   
 EGD, colon 5/2010.  hgb 11.3 6/2012  [facility] [address] 12/2011 Dr. Qian Sanderson.  right shoulder, left thigh. Dr. Wilbert Brady  [facility] 01[facility]2018 Dr Shashi Fritz  Mixed hyperlipidemia Tg 166 LDL 87 HDL55 6/2012  Nephrolithiasis  Stroke ([address] 7/06/07 R facial droop and R weakness. completely resolved.  Sun-damaged skin  Sunburn, blistering  Vitamin D deficiency   
 improved to 35.3 6/2012  Zoster Past Surgical History:  
Procedure Laterality Date  COLONOSCOPY N/A 10/19/2017 COLONOSCOPY performed by Shoaib Martin MD at [address] HX COLONOSCOPY  5/2010  
 diverticuLITIS, tubular adenoma  Dr. Tammy Arreola  HX GI  5/2010 EGD  - hiatal hernia. Dr. Tammy Arreola  HX MOHS PROCEDURES  09/14/2017 SCC L anterior mujica by Dr. Xavier Patterson  HX MALLORY AND BSO  1997  
 endometrial pre-cancer on biopsy  HX TONSILLECTOMY Family History:  
Problem Relation Age of Onset  Cancer Mother  Cancer Father  Hypertension Brother  Cancer Brother   
     myeloma Social History Socioeconomic History  Marital status:  Spouse name: Yg Sommers  Number of children: 2  
 Years of education: Not on file  Highest education level: Not on file Occupational History  Not on file Social Needs  Financial resource strain: Not on file  Food insecurity:  
  Worry: Not on file Inability: Not on file  Transportation needs:  
  Medical: Not on file Non-medical: Not on file Tobacco Use  Smoking status: Former Smoker Packs/day: 1.00  Smokeless tobacco: Former User Quit date: 1/1/1990 Substance and Sexual Activity  Alcohol use: Yes Alcohol/week: 0.6 oz Types: 1 Glasses of wine per week Comment: 3-4 x a week  Drug use: No  
 Sexual activity: Never Lifestyle  Physical activity:  
  Days per week: Not on file Minutes per session: Not on file  Stress: Not on file Relationships  Social connections:  
  Talks on phone: Not on file Gets together: Not on file Attends Christianity service: Not on file Active member of club or organization: Not on file Attends meetings of clubs or organizations: Not on file Relationship status: Not on file  Intimate partner violence:  
  Fear of current or ex partner: Not on file Emotionally abused: Not on file Physically abused: Not on file Forced sexual activity: Not on file Other Topics Concern  Not on file Social History Narrative  Not on file ALLERGIES: Irbesartan; Iron; Pcn [penicillins]; and Sulfacetamide Review of Systems Constitutional: Positive for fever. Negative for activity change and chills. HENT: Positive for sore throat. Negative for ear pain, nosebleeds, rhinorrhea, trouble swallowing and voice change. Eyes: Negative for visual disturbance. Respiratory: Positive for cough, sputum production and shortness of breath. Negative for hemoptysis and wheezing. Cardiovascular: Negative for chest pain, palpitations, orthopnea, claudication, leg swelling, syncope and PND. Gastrointestinal: Negative for abdominal pain, constipation, diarrhea, nausea and vomiting. Genitourinary: Negative for difficulty urinating, dysuria, hematuria and urgency. Musculoskeletal: Negative for back pain, neck pain and neck stiffness. Skin: Negative for color change and rash. Allergic/Immunologic: Negative for immunocompromised state. Neurological: Negative for dizziness, seizures, syncope, weakness, light-headedness, numbness and headaches. Psychiatric/Behavioral: Negative for behavioral problems, confusion, hallucinations, self-injury and suicidal ideas. Vitals:  
 03/25/19 3158 03/25/19 2252 SpO2: 95% 96% Physical Exam  
Constitutional: She is oriented to person, place, and time. She appears well-developed and well-nourished. No distress. HENT:  
Head: Normocephalic and atraumatic. Eyes: Pupils are equal, round, and reactive to light. Neck: Normal range of motion. Neck supple. Cardiovascular: Normal rate, regular rhythm and normal heart sounds.  Exam reveals no gallop and no friction rub. No murmur heard. Pulmonary/Chest: Effort normal. No respiratory distress. She has decreased breath sounds. She has wheezes. She has rhonchi. Abdominal: Soft. Bowel sounds are normal. She exhibits no distension. There is no tenderness. There is no rebound and no guarding. Musculoskeletal: Normal range of motion. Neurological: She is alert and oriented to person, place, and time. Skin: Skin is warm. No rash noted. She is not diaphoretic. Psychiatric: She has a normal mood and affect. Her behavior is normal. Judgment and thought content normal.  
Nursing note and vitals reviewed. MDM Number of Diagnoses or Management Options Acute congestive heart failure, unspecified heart failure type [facility]):  
Community acquired pneumonia, unspecified laterality:  
Elevated troponin:  
Diagnosis management comments: ED EKG interpretation: 
Rhythm: normal sinus rhythm; and regular . Rate (approx.): 88; Axis: normal; P wave: normal; QRS interval: normal ; ST/T wave: normal; Other findings: normal. This EKG was interpreted by Job Velazquez MD,ED Physician. Total critical care time spent exclusive of procedures:  45min This is a 69-year-old female with past medical history, review of systems, physical exam as above, presenting with complaints of dyspnea. Patient states she was evaluated at an urgent care facility, for 3 days of fever, cough, malaise, prescribed Levaquin for a presumed pneumonia. Patient states she became more dyspneic overnight, arriving with room air saturations in the mid 80s. Patient denies history of pulmonary disease, states former smoker, denies recent travel, endorses known sick contacts.  Physical exam is remarkable for a mildly dyspneic appearing elderly female, in no acute distress, with diminished breath sounds, mild and expiratory wheezes, rhonchi bilaterally, left worse than right, with a soft nontender abdomen, regular rate and rhythm without murmurs gallops or rubs. Suspect worsening to be acquired pneumonia, with new oxygen requirement. Plan to offer stacked nebulizers, IV antibiotics, obtain CMP, CBC, cardiac enzymes, BNP, chest x-ray, lactic acid, blood cultures. We will make a disposition based the patient's diagnostics and response to therapy, however I anticipate she will require admission for further care and evaluation. Procedures 8:07 AM 
Patient discussed with Dr. Mayr Tomlinson [facility]) via [facility], he will admit the patient.

## 2019-03-25 NOTE — PROGRESS NOTES
Pharmacy Dosing Services:  
 
Pepcid dose changed from 40 mg BID to 20 mg daily per P&T protocol.  
- Scr 1.19, CrCl 35 ml/min Thank you, Jo Gracia, PharmD

## 2019-03-25 NOTE — H&P
[facility] Hospitalist Admission Note NAME:  Alexandria Walls :   1940 MRN:  [id number] PCP:  Anita Daley MD  
 
Date/Time:  3/25/2019 10:34 AM 
 
  
  
Subjective: CHIEF COMPLAINT: dypsnea HISTORY OF PRESENT ILLNESS:    
Ms. Joanne Coronel is a 67 y.o.  female who presented to the Emergency Department complaining of dyspnea. I has worsened over the last 2 days. Symptoms started days ago with sore throat. Then she spent the next day out in the cold at a .  The next day she had cough. The next day she went to urgent med, Dx with levaquin and sent home. She took one dose, didn't feel better and came to our ER. Chest xray is mildly abnormal.  We will admit her for management. Past Medical History:  
Diagnosis Date  Basal cell carcinoma 2012, 2015 Dr. Barrera Whitlock. saw Dr. Lamine Reid  Diverticulitis of colon 2010  
 dx on colonoscopy by Dr. Lonny Ferrer. Took flagyl, cipro  DJD (degenerative joint disease), lumbar `  
 L5?  Endometr hyperplasia w/atypia   GERD (gastroesophageal reflux disease) 2010  
 Hiatal hernia 2010  Hypertension   Hypothyroidism  TSH 1.1 12  Iron deficiency anemia   
 EGD, colon 2010.  hgb 11.3 2012  Melanoma ([address] mulitple  Mixed hyperlipidemia Tg 166 LDL 87 HDL55 2012  Nephrolithiasis  TIA (transient ischemic attack)  R droop and weakness  Zoster Past Surgical History:  
Procedure Laterality Date  COLONOSCOPY N/A 10/19/2017 COLONOSCOPY performed by Liz Betts MD at [address] HX COLONOSCOPY  2010  
 diverticuLITIS, tubular adenoma  Dr. Lonny Ferrer  HX GI  2010 EGD  - hiatal hernia. Dr. Lonny Ferrer  HX MOHS PROCEDURES  2017 SCC L anterior [place] by Dr. Raman Going  HX MALLORY AND BSO    
 endometrial pre-cancer on biopsy  HX TONSILLECTOMY Social History Tobacco Use  Smoking status: Former Smoker Packs/day: 1.00  Smokeless tobacco: Former User Quit date: 1/1/1990 Substance Use Topics  Alcohol use: Yes Alcohol/week: 0.6 oz Types: 1 Glasses of wine per week Comment: 3-4 x a week Family History Problem Relation Age of Onset  Cancer Mother  Cancer Father  Hypertension Brother  Cancer Brother   
     myeloma Allergies Allergen Reactions  Irbesartan Diarrhea  Iron Diarrhea  Pcn [Penicillins] Rash PCN only. Can take cephalosporins  Sulfacetamide Swelling Prior to Admission medications Medication Sig Start Date End Date Taking? Authorizing Provider  
levoFLOXacin (LEVAQUIN) 500 mg tablet Take 500 mg by mouth daily. Yes Other, MD Elida  
benzonatate (TESSALON) 100 mg capsule Take 100 mg by mouth three (3) times daily as needed for Cough. Yes Jacquie, MD Elida  
levothyroxine (SYNTHROID) 88 mcg tablet TAKE 1 TABLET EVERY DAY BEFORE BREAKFAST 1/18/19  Yes Juan M Esposito MD  
BYSTOLIC 5 mg tablet TAKE 1 TABLET DAILY FOR BLOOD PRESSURE,  PULSE, FATIGUE (REPLACES  ATENOLOL) 1/18/19  Yes Christina Brice NP  
VITAMIN D2 50,000 unit capsule TAKE 1 CAPSULE BY MOUTH  EVERY 7 DAYS 1/18/19  Yes Juan M Esposito MD  
simvastatin (ZOCOR) 20 mg tablet Take 1 Tab by mouth nightly. Decreased dose 1/3/19 1/3/19  Yes Chacorta Michael MD  
amLODIPine (NORVASC) 2.5 mg tablet Take 1 Tab by mouth daily. 12/30/18  Yes Juan M Esposito MD  
rivaroxaban (XARELTO) 20 mg tab tablet Take  by mouth daily. Yes Provider, Historical  
montelukast (SINGULAIR) 10 mg tablet Take 10 mg by mouth daily. Yes Other, MD Elida  
levocetirizine (XYZAL) 5 mg tablet Take 5 mg by mouth. Yes Other, MD Elida  
famotidine (PEPCID) 40 mg tablet TAKE 1 TABLET BY MOUTH  TWICE A DAY 3/9/18  Yes Christina Brice NP  
MULTIVITAMIN PO  7/9/17  Yes Provider, Historical  
RESTASIS 0.05 % ophthalmic emulsion two (2) times a day.  11/21/15  Yes Provider, Historical  
 acetaminophen (TYLENOL ARTHRITIS PAIN) 650 mg CR tablet Take 650 mg by mouth every six (6) hours as needed. Yes Provider, Historical  
 
 
Review of Systems: 
(bold if positive, if negative) Gen:  Eyes:  ENT:  CVS:  Pulm:  GI:   
:   
MS:  Skin:  Psych:  Endo:   
Hem:  Renal:   
Neuro:    
  
Objective: VITALS:   
Vital signs reviewed; most recent are: 
 
Visit Vitals /63 (BP 1 Location: Right arm, BP Patient Position: At rest) Pulse 82 Temp 98.2 °F (36.8 °C) Resp 22 Ht 5' 2\" (1.575 m) Wt 68.4 kg (150 lb 12.8 oz) SpO2 98% BMI 27.58 kg/m² SpO2 Readings from Last 6 Encounters:  
03/25/19 98% 03/11/19 99% 12/19/18 96% 12/07/18 96% 11/26/18 99% 10/26/18 97% O2 Flow Rate (L/min): 3 l/min Intake/Output Summary (Last 24 hours) at 3/25/2019 1034 Last data filed at 3/25/2019 7614 Gross per 24 hour Intake 650 ml Output  Net 650 ml Exam:  
 
Physical Exam: 
 
Gen:  Well-developed, well-nourished, in no acute distress HEENT:  Pink conjunctivae, PERRL, hearing intact to voice, moist mucous membranes Neck:  Supple, without masses, thyroid non-tender Resp:  No accessory muscle use, bilateral breath sounds without wheezes rales or rhonchi 
Card:  No murmurs, normal S1, S2 without thrills, bruits or peripheral edema Abd:  Soft, non-tender, non-distended, normoactive bowel sounds are present, no mass Lymph:  No cervical or inguinal adenopathy Musc:  No cyanosis or clubbing Skin:  No rashes or ulcers, skin turgor is good Neuro:  Cranial nerves are grossly intact, no focal motor weakness, follows commands appropriately Psych:  Good insight, oriented to person, place and time, alert Labs: 
 
Recent Labs  
  03/25/19 
2670 WBC 20.4* HGB 9.5* HCT 32.9*  
 Recent Labs  
  03/25/19 
0074   
K 3.9  CO2 29 * BUN 22* CREA 1.19* CA 8.5 ALB 3.3* TBILI 2.4* SGOT 18 ALT 17  
 
 No results found for: [place] No results for input(s): PH, PCO2, PO2, HCO3, FIO2 in the last 72 hours. No results for input(s): INR in the last 72 hours. No lab exists for component: INREXT All Micro Results Procedure Component Value Units Date/Time LEGIONELLA PNEUMOPHILA AG, URINE [[id number]] Collected:  03/25/19 7280 Order Status:  Completed Specimen:  Urine Updated:  03/25/19 6609 SNabil [place], UR/CSF [[id number]] Collected:  03/25/19 7886 Order Status:  Completed Specimen:  Other Updated:  03/25/19 4267 RESPIRATORY PANEL,PCR,NASOPHARYNGEAL [[id number]] Collected:  03/25/19 8788 Order Status:  Completed Specimen:  NASOPHARYNGEAL SWAB Updated:  03/25/19 4390 INFLUENZA A & B AG (RAPID TEST) [[id number]] Collected:  03/25/19 6910 Order Status:  Completed Specimen:  Nasopharyngeal from Nasal washing Updated:  03/25/19 4888 Influenza A Antigen NEGATIVE Influenza B Antigen NEGATIVE CULTURE, URINE [[id number]] Order Status:  Sent Specimen:  Cath Urine CULTURE, BLOOD, PAIRED [[id number]] Collected:  03/25/19 8628 Order Status:  Completed Specimen:  Blood Updated:  03/25/19 2821 I have reviewed previous records Assessment and Plan:  
  
Pneumonia / Leukocytosis / Cough - POA, unclear etiology. Viral vs bacterial.  Check cx and viral panel. Empiric levaquin. Check 6 minute walk. Guaifenesin, Singulair, xyzal and tessalon Anemia - POA, mild and will worsen with hydration. Dehydration - POA due to illness. Hydrate carefully in setting of CHF 
 
CHF, systolic chronic / Hypertension - POA, appears stable, but URI placing strain on heart. Check ECHO. Monitor troponin. Continue bystolic, hold norvacs due to illness Hypothyroidism - continue synthroid and check TSH Mixed hyperlipidemia - Continue simvastatin GERD (gastroesophageal reflux disease) - Famotidine Melanoma - Outpatient follow up Dry eye - Restasis Telemetry reviewed:   normal sinus rhythm Risk of deterioration: high Total time spent with patient: 70 Minutes Care Plan discussed with: Patient, Family, Care Manager, Nursing Staff, Consultant/Specialist and >50% of time spent in counseling and coordination of care Discussed:  Care Plan      
___________________________________________________ Attending Physician: Cristine Serrano MD

## 2019-03-26 VITALS
RESPIRATION RATE: 18 BRPM | DIASTOLIC BLOOD PRESSURE: 58 MMHG | OXYGEN SATURATION: 95 % | BODY MASS INDEX: 27.05 KG/M2 | SYSTOLIC BLOOD PRESSURE: 133 MMHG | WEIGHT: 147 LBS | HEIGHT: 62 IN | HEART RATE: 77 BPM | TEMPERATURE: 98.6 F

## 2019-03-26 PROBLEM — J18.9 PNA (PNEUMONIA): Status: ACTIVE | Noted: 2019-03-26

## 2019-03-26 LAB
ANION GAP SERPL CALC-SCNC: 6 MMOL/L (ref 5–15)
BUN SERPL-MCNC: 18 MG/DL (ref 6–20)
BUN/CREAT SERPL: 17 (ref 12–20)
CALCIUM SERPL-MCNC: 8.6 MG/DL (ref 8.5–10.1)
CHLORIDE SERPL-SCNC: 104 MMOL/L (ref 97–108)
CO2 SERPL-SCNC: 27 MMOL/L (ref 21–32)
CREAT SERPL-MCNC: 1.04 MG/DL (ref 0.55–1.02)
ERYTHROCYTE [DISTWIDTH] IN BLOOD BY AUTOMATED COUNT: 15.1 % (ref 11.5–14.5)
FLUID CULTURE, SPNG2: NORMAL
GLUCOSE SERPL-MCNC: 145 MG/DL (ref 65–100)
HCT VFR BLD AUTO: 27.1 % (ref 35–47)
HGB BLD-MCNC: 7.8 G/DL (ref 11.5–16)
L PNEUMO1 AG UR QL IA: NEGATIVE
MAGNESIUM SERPL-MCNC: 1.8 MG/DL (ref 1.6–2.4)
MCH RBC QN AUTO: 26.6 PG (ref 26–34)
MCHC RBC AUTO-ENTMCNC: 28.8 G/DL (ref 30–36.5)
MCV RBC AUTO: 92.5 FL (ref 80–99)
NRBC # BLD: 0 K/UL (ref 0–0.01)
NRBC BLD-RTO: 0 PER 100 WBC
ORGANISM ID, SPNG3: NORMAL
PLATELET # BLD AUTO: 129 K/UL (ref 150–400)
PLEASE NOTE, SPNG4: NORMAL
PMV BLD AUTO: 10.9 FL (ref 8.9–12.9)
POTASSIUM SERPL-SCNC: 4.1 MMOL/L (ref 3.5–5.1)
RBC # BLD AUTO: 2.93 M/UL (ref 3.8–5.2)
S PNEUM AG SPEC QL LA: NEGATIVE
SODIUM SERPL-SCNC: 137 MMOL/L (ref 136–145)
SPECIMEN SOURCE: NORMAL
SPECIMEN SOURCE: NORMAL
SPECIMEN, SPNG1: NORMAL
WBC # BLD AUTO: 14.3 K/UL (ref 3.6–11)

## 2019-03-26 PROCEDURE — 65390000012 HC CONDITION CODE 44 OBSERVATION

## 2019-03-26 PROCEDURE — 94760 N-INVAS EAR/PLS OXIMETRY 1: CPT

## 2019-03-26 PROCEDURE — 83735 ASSAY OF MAGNESIUM: CPT

## 2019-03-26 PROCEDURE — 80048 BASIC METABOLIC PNL TOTAL CA: CPT

## 2019-03-26 PROCEDURE — 36415 COLL VENOUS BLD VENIPUNCTURE: CPT

## 2019-03-26 PROCEDURE — 74011250637 HC RX REV CODE- 250/637: Performed by: INTERNAL MEDICINE

## 2019-03-26 PROCEDURE — 85027 COMPLETE CBC AUTOMATED: CPT

## 2019-03-26 PROCEDURE — 77010033678 HC OXYGEN DAILY

## 2019-03-26 RX ORDER — HYDROCODONE POLISTIREX AND CHLORPHENIRAMINE POLISTIREX 10; 8 MG/5ML; MG/5ML
5 SUSPENSION, EXTENDED RELEASE ORAL
Status: DISCONTINUED | OUTPATIENT
Start: 2019-03-26 | End: 2019-03-26 | Stop reason: HOSPADM

## 2019-03-26 RX ORDER — FERROUS GLUCONATE 324(38)MG
324 TABLET ORAL EVERY OTHER DAY
Qty: 45 TAB | Refills: 0 | Status: SHIPPED | OUTPATIENT
Start: 2019-04-02 | End: 2019-04-09 | Stop reason: ALTCHOICE

## 2019-03-26 RX ORDER — LEVOFLOXACIN 750 MG/1
750 TABLET ORAL
Qty: 3 TAB | Refills: 0 | Status: SHIPPED | OUTPATIENT
Start: 2019-03-27 | End: 2019-03-26

## 2019-03-26 RX ORDER — FERROUS GLUCONATE 324(38)MG
1 TABLET ORAL EVERY OTHER DAY
Status: DISCONTINUED | OUTPATIENT
Start: 2019-03-26 | End: 2019-03-26 | Stop reason: HOSPADM

## 2019-03-26 RX ORDER — MULTIVITAMIN
1 CAPSULE ORAL DAILY
Qty: 30 CAP | Refills: 0 | Status: SHIPPED | OUTPATIENT
Start: 2019-03-26 | End: 2019-03-27

## 2019-03-26 RX ORDER — HYDROCODONE POLISTIREX AND CHLORPHENIRAMINE POLISTIREX 10; 8 MG/5ML; MG/5ML
5 SUSPENSION, EXTENDED RELEASE ORAL
Qty: 115 ML | Refills: 0 | Status: SHIPPED | OUTPATIENT
Start: 2019-03-26 | End: 2019-03-29

## 2019-03-26 RX ADMIN — HYDROCODONE POLISTIREX AND CHLORPHENIRAMINE POLISTIREX 5 ML: 10; 8 SUSPENSION, EXTENDED RELEASE ORAL at 04:38

## 2019-03-26 RX ADMIN — LEVOTHYROXINE SODIUM 88 MCG: 88 TABLET ORAL at 06:04

## 2019-03-26 NOTE — PROGRESS NOTES
Patient and  given discharge instructions and prescriptions. PIV removed. Patient verbalizes understanding of discharge instructions and follow up and medication. Patient's family bringing her clothes. Patient denies assistance dressing and packing. Patient's  will drive her home when clothes arrive.

## 2019-03-26 NOTE — DISCHARGE INSTRUCTIONS
Patient Discharge Instructions    Dalton Quintanilla / [id number] : 1940    Admitted 3/25/2019 Discharged: 3/26/2019     Primary Diagnoses  Problem List as of 3/26/2019 Date Reviewed: 3/25/2019           Leukocytosis   Anemia   Dehydration   Cough   Pneumonia   GERD (gastroesophageal reflux disease)   Hypertension   Hypothyroidism   Mixed hyperlipidemia   Iron deficiency anemia   Melanoma ([facility] [address]          Take Home Medications     · It is important that you take the medication exactly as they are prescribed. · Keep your medication in the bottles provided by the pharmacist and keep a list of the medication names, dosages, and times to be taken in your wallet. · Do not take other medications without consulting your doctor. What to do at Home    Recommended diet: Cardiac Diet    Recommended activity: Activity as tolerated    If you experience worse symptoms, please follow up with your PCP. Follow-up with your PCP in a few weeks    Patient Education        Pneumonia: Care Instructions  Your Care Instructions    Pneumonia is an infection of the lungs. Most cases are caused by infections from bacteria or viruses. Pneumonia may be mild or very severe. If it is caused by bacteria, you will be treated with antibiotics. It may take a few weeks to a few months to recover fully from pneumonia, depending on how sick you were and whether your overall health is good. Follow-up care is a key part of your treatment and safety. Be sure to make and go to all appointments, and call your doctor if you are having problems. It's also a good idea to know your test results and keep a list of the medicines you take. How can you care for yourself at home? · Take your antibiotics exactly as directed. Do not stop taking the medicine just because you are feeling better. You need to take the full course of antibiotics. · Take your medicines exactly as prescribed.  Call your doctor if you think you are having a problem with your medicine. · Get plenty of rest and sleep. You may feel weak and tired for a while, but your energy level will improve with time. · To prevent dehydration, drink plenty of fluids, enough so that your urine is light yellow or clear like water. Choose water and other caffeine-free clear liquids until you feel better. If you have kidney, heart, or liver disease and have to limit fluids, talk with your doctor before you increase the amount of fluids you drink. · Take care of your cough so you can rest. A cough that brings up mucus from your lungs is common with pneumonia. It is one way your body gets rid of the infection. But if coughing keeps you from resting or causes severe fatigue and chest-wall pain, talk to your doctor. He or she may suggest that you take a medicine to reduce the cough. · Use a vaporizer or humidifier to add moisture to your bedroom. Follow the directions for cleaning the machine. · Do not smoke or allow others to smoke around you. Smoke will make your cough last longer. If you need help quitting, talk to your doctor about stop-smoking programs and medicines. These can increase your chances of quitting for good. · Take an over-the-counter pain medicine, such as acetaminophen (Tylenol), ibuprofen (Advil, Motrin), or naproxen (Aleve). Read and follow all instructions on the label. · Do not take two or more pain medicines at the same time unless the doctor told you to. Many pain medicines have acetaminophen, which is Tylenol. Too much acetaminophen (Tylenol) can be harmful. · If you were given a spirometer to measure how well your lungs are working, use it as instructed. This can help your doctor tell how your recovery is going. · To prevent pneumonia in the future, talk to your doctor about getting a flu vaccine (once a year) and a pneumococcal vaccine (one time only for most people). When should you call for help? Call 911 anytime you think you may need emergency care.  For example, call if:    · You have severe trouble breathing.    Call your doctor now or seek immediate medical care if:    · You cough up dark brown or bloody mucus (sputum).     · You have new or worse trouble breathing.     · You are dizzy or lightheaded, or you feel like you may faint.    Watch closely for changes in your health, and be sure to contact your doctor if:    · You have a new or higher fever.     · You are coughing more deeply or more often.     · You are not getting better after 2 days (48 hours).     · You do not get better as expected. Where can you learn more? Go to http://gabi-vielka.info/. Enter [id number] in the search box to learn more about \"Pneumonia: Care Instructions. \"  Current as of: September 5, 2018  Content Version: 11.9  © 9267-0438 [employer]. Care instructions adapted under license by [employer] (which disclaims liability or warranty for this information). If you have questions about a medical condition or this instruction, always ask your healthcare professional. [place] [address] any warranty or liability for your use of this information. Information obtained by :  I understand that if any problems occur once I am at home I am to contact my physician. I understand and acknowledge receipt of the instructions indicated above.                                                                                                                                            Physician's or R.N.'s Signature                                                                  Date/Time                                                                                                                                              Patient or Representative Signature                                                          Date/Time

## 2019-03-26 NOTE — PROGRESS NOTES
Bedside shift change report given to Charmaine (oncoming nurse) by Laura Colin (offgoing nurse). Report included the following information SBAR, Kardex, MAR and Recent Results.

## 2019-03-26 NOTE — PROGRESS NOTES
[facility] Medical Progress Note NAME: Corie Gosselin :  1940 MRM:  [id number] Date/Time: 3/26/2019  9:07 AM 
 
  
Assessment and Plan:  
 
Pneumonia / Leukocytosis / Cough - POA, unclear etiology. Negative testing for viral vs bacterial.  She has improved faster than expected on empiric levaquin. WBC down. Continue Guaifenesin, Singulair, xyzal and tessalon Dyspnea - POA and persistent, but do both pneumonia and anemia. She feels better when wearing oxygen, but she did not have hypoxia during 6 minute walk on room air.   
  
Anemia - POA, mild and as expected it worsened with hydration. She states it is due to hemorroids that were bleeding severely last week. She was placed on xarelto only a few weeks before that. Labs show high B12 and folate and low iron. Start PO iron after completing Levaquin. Hold xarelto. If she continues xarelto use, PCP to follow closely and suggest outpatient GI evaluation for bleeding. 
  
Afib, chronic / Chronic anticoagulation / CHF, systolic chronic / Hypertension - POA, CHF and rate appear stable, but URI placing strain on heart. Unremarkable ECHO. Stable troponin. Continue bystolic, hold norvacs due to illness. Patient to discuss xarelto use with Dr Wayne Jiménez, in setting of bleeding and anemia. Dehydration - POA due to illness. Hydrated carefully in setting of CHF, and now appears eurvolemic 
   
Hypothyroidism - TSH below normal at 0.30. I recommend PCP stop her low dose of synthroid and follow TSH 
  
Mixed hyperlipidemia - LDL 18. She does not have CVA or CAD hx.  I recommend that PCP/cardiology stop simvastatin 
  
GERD (gastroesophageal reflux disease) - No symptoms on Famotidine 
  
Melanoma - Outpatient follow up Subjective: Chief Complaint:  Still paroxysmal cough and VIZCAINO 
 
ROS: 
(bold if positive, if negative) CoughSputumSOB/VIZCAINO Tolerating PT  Tolerating Diet Objective: Last 24hrs VS reviewed since prior progress note. Most recent are: 
 
Visit Vitals /58 (BP 1 Location: Right arm, BP Patient Position: At rest) Pulse 77 Temp 98.6 °F (37 °C) Resp 18 Ht 5' 2\" (1.575 m) Wt 66.7 kg (147 lb) SpO2 95% BMI 26.89 kg/m² SpO2 Readings from Last 6 Encounters:  
03/26/19 95% 03/11/19 99% 12/19/18 96% 12/07/18 96% 11/26/18 99% 10/26/18 97% O2 Flow Rate (L/min): 2 l/min Intake/Output Summary (Last 24 hours) at 3/26/2019 6748 Last data filed at 3/26/2019 6516 Gross per 24 hour Intake 1108.75 ml Output 4 ml Net 1104.75 ml Physical Exam: 
 
Gen:  Well-developed, well-nourished, in no acute distress HEENT:  Pink conjunctivae, PERRL, hearing intact to voice, moist mucous membranes Neck:  Supple, without masses, thyroid non-tender Resp:  No accessory muscle use, coarse breath sounds with upper rhonchi 
Card:  No murmurs, normal S1, S2 without thrills, bruits or peripheral edema Abd:  Soft, non-tender, non-distended, normoactive bowel sounds are present, no mass Lymph:  No cervical or inguinal adenopathy Musc:  No cyanosis or clubbing Skin:  No rashes or ulcers, skin turgor is good Neuro:  Cranial nerves are grossly intact, mild motor weakness, follows commands appropriately Psych:  Good insight, oriented to person, place and time, alert Telemetry reviewed:   AFIB 
__________________________________________________________________ Medications Reviewed: (see below) Medications:  
 
Current Facility-Administered Medications Medication Dose Route Frequency  chlorpheniramine-HYDROcodone (TUSSIONEX) oral suspension 5 mL  5 mL Oral Q12H PRN  
 [START ON 3/27/2019] levoFLOXacin (LEVAQUIN) tablet 750 mg  750 mg Oral Q48H  
 albuterol-ipratropium (DUO-NEB) 2.5 MG-0.5 MG/3 ML  3 mL Nebulization PRN  
 benzonatate (TESSALON) capsule 100 mg  100 mg Oral TID PRN  
 loratadine (CLARITIN) tablet 10 mg  10 mg Oral DAILY  levothyroxine (SYNTHROID) tablet 88 mcg  88 mcg Oral 6am  
 montelukast (SINGULAIR) tablet 10 mg  10 mg Oral DAILY  therapeutic multivitamin (THERAGRAN) tablet 1 Tab  1 Tab Oral DAILY  simvastatin (ZOCOR) tablet 20 mg  20 mg Oral QHS  dextrose 5% - 0.45% NaCl with KCl 20 mEq/L infusion  75 mL/hr IntraVENous CONTINUOUS  
 acetaminophen (TYLENOL) tablet 650 mg  650 mg Oral Q4H PRN  
 diphenhydrAMINE (BENADRYL) capsule 25 mg  25 mg Oral Q4H PRN  
 ondansetron (ZOFRAN) injection 4 mg  4 mg IntraVENous Q4H PRN  
 nebivolol (BYSTOLIC) tablet 5 mg  5 mg Oral QHS  [START ON 3/27/2019] levoFLOXacin (LEVAQUIN) 750 mg in D5W IVPB  750 mg IntraVENous Q48H  
 rivaroxaban (XARELTO) tablet 15 mg  15 mg Oral DAILY WITH DINNER  
 famotidine (PEPCID) tablet 20 mg  20 mg Oral DAILY Lab Data Reviewed: (see below) Lab Review:  
 
Recent Labs  
  03/26/19 
0616 03/25/19 
5338 WBC 14.3* 20.4* HGB 7.8* 9.5* HCT 27.1* 32.9*  
* 154 Recent Labs  
  03/26/19 
8319 03/25/19 
1636  139  
K 4.1 3.9  101 CO2 27 29 * 134* BUN 18 22* CREA 1.04* 1.19* CA 8.6 8.5 MG 1.8  --   
ALB  --  3.3* TBILI  --  2.4* SGOT  --  18 ALT  --  17 No results found for: [facility] No results for input(s): PH, PCO2, PO2, HCO3, FIO2 in the last 72 hours. No results for input(s): INR in the last 72 hours. No lab exists for component: INREXT All Micro Results Procedure Component Value Units Date/Time CULTURE, BLOOD, PAIRED [[id number]] Collected:  03/25/19 1549 Order Status:  Completed Specimen:  Blood Updated:  03/26/19 4880 Special Requests: NO SPECIAL REQUESTS Culture result: NO GROWTH AFTER 20 HOURS     
 CULTURE, URINE [[id number]] Collected:  03/25/19 2116 Order Status:  Completed Specimen:  Cath Urine Updated:  03/25/19 2118 RESPIRATORY PANEL,PCR,NASOPHARYNGEAL [[id number]] Collected:  03/25/19 9873 Order Status:  Completed Specimen:  Nasopharyngeal Updated:  03/25/19 1433 Adenovirus NOT DETECTED Coronavirus 229E NOT DETECTED Coronavirus HKU1 NOT DETECTED Coronavirus CVNL63 NOT DETECTED Coronavirus OC43 NOT DETECTED Metapneumovirus NOT DETECTED Rhinovirus and Enterovirus NOT DETECTED Influenza A NOT DETECTED Influenza A, subtype H1 NOT DETECTED Influenza A, subtype H3 NOT DETECTED INFLUENZA A H1N1 PCR NOT DETECTED Influenza B NOT DETECTED Parainfluenza 1 NOT DETECTED Parainfluenza 2 NOT DETECTED Parainfluenza 3 NOT DETECTED Parainfluenza virus 4 NOT DETECTED     
  RSV by PCR NOT DETECTED Bordetella pertussis - PCR NOT DETECTED Chlamydophila pneumoniae DNA, QL, PCR NOT DETECTED Mycoplasma pneumoniae DNA, QL, PCR NOT DETECTED     
 LEGIONELLA PNEUMOPHILA AG, URINE [[id number]] Collected:  03/25/19 0851 Order Status:  Completed Specimen:  Urine Updated:  03/25/19 1990 SHELBY Clarke, UR/CSF [[id number]] Collected:  03/25/19 5032 Order Status:  Completed Specimen:  Other Updated:  03/25/19 5084 INFLUENZA A & B AG (RAPID TEST) [[id number]] Collected:  03/25/19 1181 Order Status:  Completed Specimen:  Nasopharyngeal from Nasal washing Updated:  03/25/19 6971 Influenza A Antigen NEGATIVE Influenza B Antigen NEGATIVE I have reviewed notes of prior 24hr. Other pertinent lab: none Total time spent with patient: 45 Minutes Care Plan discussed with: Patient, Family, Care Manager, Nursing Staff and >50% of time spent in counseling and coordination of care Discussed:  Care Plan and D/C Planning Prophylaxis:  H2B/PPI Disposition:  Home w/Family 
        
___________________________________________________ Attending Physician: Naz Bryan MD

## 2019-03-26 NOTE — DISCHARGE SUMMARY
Physician Discharge Summary     Patient ID:  Jade Robbins  [id number]  79 y.o.  1940    Admit date: 3/25/2019    Discharge date and time: 3/26/2019    Admission Diagnoses: Pneumonia [J18.9]    Discharge Diagnoses:    Principal Diagnosis     Pneumonia                                             Other Diagnoses:    GERD (gastroesophageal reflux disease) ()    Melanoma ([facility] [address] (12/1/2011)    Hypertension ()    Hypothyroidism ()    Mixed hyperlipidemia ()    Iron deficiency anemia ()    Leukocytosis (3/25/2019)    Anemia (3/25/2019)    Dehydration (3/25/2019)    Cough (3/25/2019)    Pneumonia (3/25/2019)     Hospital Course:   Pneumonia / Leukocytosis / Cough - POA, unclear etiology. Negative testing for viral vs bacterial.  She has improved faster than expected on empiric levaquin.  WBC down. Continue Guaifenesin, Singulair, xyzal and tessalon     Dyspnea - POA and persistent, but do both pneumonia and anemia. She feels better when wearing oxygen, but she did not have hypoxia during 6 minute walk on room air.       Anemia - POA, mild and as expected it worsened with hydration. Anemia is due to both chronic diease and to acute blood loss. She states it is due to hemorroids that were bleeding severely last week. She was placed on xarelto only a few weeks before that. Labs show high B12 and folate and low iron. Start PO iron after completing Levaquin. Hold xarelto. If she continues xarelto use, PCP to follow closely and suggest outpatient GI evaluation for bleeding.     Afib, chronic / Chronic anticoagulation / CHF, systolic chronic / Hypertension - POA, CHF and rate appear stable, but URI placing strain on heart. Unremarkable ECHO.  Stable troponin.  Continue bystolic, hold norvacs due to illness.   Patient to discuss xarelto use with Dr Lm Mcneill, in setting of bleeding and anemia.     Dehydration - POA due to illness.  Hydrated carefully in setting of CHF, and now appears eurvolemic      Hypothyroidism - TSH below normal at 0.30. I recommend PCP stop her low dose of synthroid and follow TSH     Mixed hyperlipidemia - LDL 18. She does not have CVA or CAD hx.  I recommend that PCP/cardiology stop simvastatin     GERD (gastroesophageal reflux disease) - No symptoms on Famotidine     Melanoma - Outpatient follow up     PCP: Allyssa Serra MD    Consults: Cardiology    Significant Diagnostic Studies: See Hospital Course    Discharged home in improved condition. Discharge Exam:  /58 (BP 1 Location: Right arm, BP Patient Position: At rest)   Pulse 77   Temp 98.6 °F (37 °C)   Resp 18   Ht 5' 2\" (1.575 m)   Wt 66.7 kg (147 lb)   SpO2 95%   BMI 26.89 kg/m²      Gen:  Well-developed, well-nourished, in no acute distress  HEENT:  Pink conjunctivae, PERRL, hearing intact to voice, moist mucous membranes  Neck:  Supple, without masses, thyroid non-tender  Resp:  No accessory muscle use, coarse breath sounds with upper rhonchi  Card:  No murmurs, normal S1, S2 without thrills, bruits or peripheral edema  Abd:  Soft, non-tender, non-distended, normoactive bowel sounds are present, no mass  Lymph:  No cervical or inguinal adenopathy  Musc:  No cyanosis or clubbing  Skin:  No rashes or ulcers, skin turgor is good  Neuro:  Cranial nerves are grossly intact, mild motor weakness, follows commands appropriately  Psych:  Good insight, oriented to person, place and time, alert    Patient Instructions:   Current Discharge Medication List      START taking these medications    Details   chlorpheniramine-HYDROcodone (TUSSIONEX) 10-8 mg/5 mL suspension Take 5 mL by mouth every twelve (12) hours as needed for Cough for up to 3 days. Max Daily Amount: 10 mL. Qty: 115 mL, Refills: 0    Associated Diagnoses: Community acquired pneumonia, unspecified laterality      ferrous gluconate 324 mg (38 mg iron) tablet Take 1 Tab by mouth every other day for 90 days.  Start after finishing levaquin  Qty: 39 Tab, Refills: 0         CONTINUE these medications which have NOT CHANGED    Details   levoFLOXacin (LEVAQUIN) 500 mg tablet Take 500 mg by mouth daily. benzonatate (TESSALON) 100 mg capsule Take 100 mg by mouth three (3) times daily as needed for Cough.      ergocalciferol (ERGOCALCIFEROL) 50,000 unit capsule Take 50,000 Units by mouth every Wednesday. mv-mn/folic acid/calcium/vit K (ONE-A-DAY WOMEN'S 50 PLUS PO) Take 1 Tab by mouth daily. L.acid/L.casei/B.bif/B.oleg/FOS (PROBIOTIC BLEND PO) Take 1 Tab by mouth three (3) times daily. nebivolol (BYSTOLIC) 5 mg tablet Take 5 mg by mouth nightly. levothyroxine (SYNTHROID) 88 mcg tablet TAKE 1 TABLET EVERY DAY BEFORE BREAKFAST  Qty: 90 Tab, Refills: 1      simvastatin (ZOCOR) 20 mg tablet Take 1 Tab by mouth nightly. Decreased dose 1/3/19  Qty: 90 Tab, Refills: 3      amLODIPine (NORVASC) 2.5 mg tablet Take 1 Tab by mouth daily. Qty: 90 Tab, Refills: 1      rivaroxaban (XARELTO) 20 mg tab tablet Take 20 mg by mouth daily (with dinner). montelukast (SINGULAIR) 10 mg tablet Take 10 mg by mouth daily. levocetirizine (XYZAL) 5 mg tablet Take 5 mg by mouth nightly. famotidine (PEPCID) 40 mg tablet TAKE 1 TABLET BY MOUTH  TWICE A DAY  Qty: 180 Tab, Refills: 1      acetaminophen (TYLENOL ARTHRITIS PAIN) 650 mg CR tablet Take 1,300 mg by mouth nightly. Activity: Activity as tolerated  Diet: Cardiac Diet  Wound Care: None needed    Follow-up with your PCP and Dr Lizzette King in a few days.   Follow-up tests/labs - none    Signed:  Kennedy Peterson MD  3/26/2019  9:30 AM

## 2019-03-26 NOTE — PROGRESS NOTES
3/26/2019  CARE MANAGEMENT NOTE:  Pt's status was downgraded to OBS. CM provided written and oral explanation of Medicare Outpt OBS and State OBS letters and signed copies were placed into pt's chart. Virtual reviewer communicated change to CM which reflect outpatient observation order written prior to discharge order being written. Code 44 delivered to patient. CM met with the patient and provided to the patient the printed information about her outpatient observation status. The patient was given the flyer entitled, \"Medicare Outpatient Observation: Information & notification. \" All questions were answered, no additional discharge needs identified at this time and patient expects to return to their (home, assisted living facility, relatives home, etc.) after discharge today. Oral and Written notification given to patient and/or caregiver informing them that they are currently an Outpatient receiving care in our facility. Outpatient services include Observation Services under [place] requirements. CM notified Nurse Navigator of pt's discharge. Lucian

## 2019-03-26 NOTE — CDMP QUERY
Pt admitted with  Pneumonia and noted to also have anemiaIf possible,  Can you please further specify the type of anemia & document in the progress notes and d/c summary Anemia due to iron deficiency ? Anemia due to chronic disease, please specify disease 
 
? Other, please specify ? Clinically unable to determine The medical record reflects the following: 
   Risk Factors: \"bleeding hemorrhoids, Iron levels are low, also started on Xarelto Clinical Indicators: Iron @ 14, % iron sat @ 8, SOB Treatment: Labs, MD started pt on oral Iron Thank you, YASMEEN CastilloN, RN, [address] [facility] 
[phone]

## 2019-03-26 NOTE — PROGRESS NOTES
[facility] Dosing Services:  
 
Pharmacist Renal Dosing Progress Note for levaquin Physician Emily Swann The following medication: levaquin was automatically dose-adjusted per [facility] P&T Committee Protocol, with respect to renal function. Consult provided for this   67 y.o. , female , for the indication of CAP. Pt Weight:  
Wt Readings from Last 1 Encounters:  
03/25/19 66.7 kg (147 lb) Previous Regimen Levaquin 750 mg po daily Serum Creatinine Lab Results Component Value Date/Time Creatinine 1.04 (H) 03/26/2019 06:16 AM  
 
   
Creatinine Clearance Estimated Creatinine Clearance: 39.9 mL/min (A) (based on SCr of 1.04 mg/dL (H)). BUN Lab Results Component Value Date/Time BUN 18 03/26/2019 06:16 AM  
 
   
Dosage changed to:  levaquin 750 mg po Q 48 hours Additional notes: 
 
 
Pharmacy to continue to monitor patient daily. Will make dosage adjustments based upon changing renal function. Signed Anthony Robins  information:  [phone]

## 2019-03-26 NOTE — PHYSICIAN ADVISORY
Letter of Status Determination:   
Recommend Changing patient status from INPATIENT to OBSERVATION Pt Name:  Shaunna Yang MR#  [id number] [facility]#   [id number] [facility]  521  @ [facility] Hospitalization date  3/25/2019  6:50 AM  
Current Attending Physician  Nayeli Capellan MD  
Principal diagnosis  Pneumonia Clinicals  Ms. Junior Oliver is a 67 y.o.  female who presented to the Emergency Department complaining of dyspnea. I has worsened over the last 2 days. Symptoms started days ago with sore throat. Then she spent the next day out in the cold at a .  The next day she had cough. The next day she went to urgent med, Dx with levaquin and sent home. She took one dose, didn't feel better and came to our ER. Chest xray is mildly abnormal.  We will admit her for management. Pt with Pneumonia, anemia, dehydration. Improved ,stable [place] MCG criteria STATUS DETERMINATION  On the basis of review of available clinical data, documentation in the chart  It is our recommendation that the patient's status is changed from INPATIENT to OBSERVATION The final decision of the patient's hospitalization status depends on the attending physician's judgment Additional comments Insurance  Payor: Rocky Sanz / Plan: [facility] [employer] MEDICARE CHOICE PPO/PFFS / Product Type: Managed Care Medicare /   
 
  
 
 
Lety Perdomo MD 
Cell: [phone] Physician Advisor Cell Insurance Information [address] [place] [address]. CHOICE PPO/PFFS Phone:   
 Subscriber: Radha Cazares Subscriber#: [id number]  Group#: [id number] Precert#:

## 2019-03-27 ENCOUNTER — PATIENT OUTREACH (OUTPATIENT)
Dept: INTERNAL MEDICINE CLINIC | Age: 79
End: 2019-03-27

## 2019-03-27 LAB
BACTERIA SPEC CULT: NORMAL
CC UR VC: NORMAL
SERVICE CMNT-IMP: NORMAL

## 2019-03-27 NOTE — PROGRESS NOTES
Hospital Discharge Follow-Up Date/Time:  3/27/2019 3:48 PM 
 
Patient was admitted to [facility] on 3/25/19 and discharged on 3/26/19 for pneumonia. The physician discharge summary was available at the time of outreach. Patient was contacted within 2 business days of discharge. Top Challenges reviewed with the provider D/C summary reported systolic CHF, but pt denied having CHF - need clarification D/C summary reported \"TSH below normal at 0.30.  I recommend PCP stop her low dose of synthroid and follow TSH\" will address with PCP. Pt is currently taking synthroid. 
  
D/C summary reported \"Mixed hyperlipidemia - LDL 18.  She does not have CVA or CAD hx.  I recommend that PCP/cardiology stop simvastatin\" Pt is currently taking simvastatin Per d/c summary \"Continue bystolic, hold norvasc due to illness\" pt continues to take Norvasc Appetite poor-fair Call placed to [facility] - will see pt in AM 3/28/19 Pt spoke with cardio today & was advised to d/c Xarelto Method of communication with provider : staff message Inpatient RRAT score: 18 Was this a readmission? no  
Patient stated reason for the readmission: n/a Nurse Navigator (NN) contacted the patient by telephone to perform post hospital discharge assessment. Verified name and  with patient as identifiers. Provided introduction to self, and explanation of the Nurse Navigator role. Reviewed discharge instructions and red flags with patient who verbalized understanding. Patient given an opportunity to ask questions and does not have any further questions or concerns at this time. The patient agrees to contact the PCP office for questions related to their healthcare. NN provided contact information for future reference. Disease Specific:   N/A Summary of patient's top problems: 1. Pneumonia - pt reported she doesn't feel well.  Reporting productive cough with \"bloody mucus. \" Pt reported this is no change since hospital admission. Denies SOB, wheeze or temp. Pt reported sinus pain, pressure & teeth hurt with yellow mucus, blood tinge at times. 2. Anemia - pt is currently holding iron until completing ABX. 3. A-fib - cardio d/c Xarelto today 3/27/19 Home Health orders at discharge: no 
[address]: n/a Date of initial visit: n/a Durable Medical Equipment ordered/company: n/a Durable Medical Equipment received: n/a Barriers to care? None at this time Advance Care Planning:  
Does patient have an Advance Directive:  reviewed and current Medication(s):  
New Medications at Discharge: chlorpheniramine-HYDROcodone (TUSSIONEX) 10-8 mg/5 mL, ferrous gluconate 324 mg (38 mg Changed Medications at Discharge: no 
Discontinued Medications at Discharge: no 
 
Medication reconciliation was performed with patient, who verbalizes understanding of administration of home medications. There were no barriers to obtaining medications identified at this time. Referral to Pharm D needed: no  
 
Current Outpatient Medications Medication Sig  chlorpheniramine-HYDROcodone (TUSSIONEX) 10-8 mg/5 mL suspension Take 5 mL by mouth every twelve (12) hours as needed for Cough for up to 3 days. Max Daily Amount: 10 mL.  [START ON 4/2/2019] ferrous gluconate 324 mg (38 mg iron) tablet Take 1 Tab by mouth every other day for 90 days. Start after finishing levaquin  multivitamin capsule Take 1 Cap by mouth daily for 30 days.  levoFLOXacin (LEVAQUIN) 500 mg tablet Take 500 mg by mouth daily.  benzonatate (TESSALON) 100 mg capsule Take 100 mg by mouth three (3) times daily as needed for Cough.  ergocalciferol (ERGOCALCIFEROL) 50,000 unit capsule Take 50,000 Units by mouth every Wednesday.  mv-mn/folic acid/calcium/vit K (ONE-A-DAY WOMEN'S 50 PLUS PO) Take 1 Tab by mouth daily.  L.acid/L.casei/B.bif/B.oleg/FOS (PROBIOTIC BLEND PO) Take 1 Tab by mouth three (3) times daily.  nebivolol (BYSTOLIC) 5 mg tablet Take 5 mg by mouth nightly.  levothyroxine (SYNTHROID) 88 mcg tablet TAKE 1 TABLET EVERY DAY BEFORE BREAKFAST  simvastatin (ZOCOR) 20 mg tablet Take 1 Tab by mouth nightly. Decreased dose 1/3/19  amLODIPine (NORVASC) 2.5 mg tablet Take 1 Tab by mouth daily.  rivaroxaban (XARELTO) 20 mg tab tablet Take 20 mg by mouth daily (with dinner).  montelukast (SINGULAIR) 10 mg tablet Take 10 mg by mouth daily.  levocetirizine (XYZAL) 5 mg tablet Take 5 mg by mouth nightly.  famotidine (PEPCID) 40 mg tablet TAKE 1 TABLET BY MOUTH  TWICE A DAY  acetaminophen (TYLENOL ARTHRITIS PAIN) 650 mg CR tablet Take 1,300 mg by mouth nightly. No current facility-administered medications for this visit. There are no discontinued medications. [facility] follow up appointment(s):  
Future Appointments Date Time Provider Vaibhav Vasquez 4/9/2019 11:00 AM Migel Esposito MD [address] [address]  
6/12/2019 11:00 AM Papo Howe NP [address]  
6/26/2019  8:15 AM Migel Esposito MD [address] [address] Non-[facility] follow up appointment(s): cardio 4/3/19 [facility]:  scheduled Goals  Attends follow-up appointments as directed. Pt is scheduled to see PCP 4/9/19, earliest appt available. NN will arrange for [facility] to visit pt tomorrow to complete STAN visit. TSB  Supportive resources in place to maintain patient in the community (ie. Home Health, DME equipment, refer to, medication assistant plan, etc.) [facility] is scheduled to see pt 3/28/19 - TSB  Understands red flags post discharge. Pt will take all prescribed meds including ABX until completed. TSB  Understands red flags post discharge. Pt will monitor temp daily & call if notes any change in condition - TSB

## 2019-03-27 NOTE — PROGRESS NOTES
[facility] Will call pt in AM to arrange time for visit 3/28/19. Updated PCP on plan & suggestions on discharge summary per hospitalist. PCP will address at f/u appt. At this time PCP prefers to keep pt on all meds & only d/c xarelto per cardio.

## 2019-03-31 LAB
BACTERIA SPEC CULT: NORMAL
SERVICE CMNT-IMP: NORMAL

## 2019-04-03 RX ORDER — FAMOTIDINE 40 MG/1
TABLET, FILM COATED ORAL
Qty: 180 TAB | Refills: 1 | Status: SHIPPED | OUTPATIENT
Start: 2019-04-03 | End: 2019-09-10 | Stop reason: SDUPTHER

## 2019-04-09 ENCOUNTER — OFFICE VISIT (OUTPATIENT)
Dept: INTERNAL MEDICINE CLINIC | Age: 79
End: 2019-04-09

## 2019-04-09 VITALS
HEIGHT: 62 IN | HEART RATE: 65 BPM | TEMPERATURE: 98.5 F | SYSTOLIC BLOOD PRESSURE: 111 MMHG | WEIGHT: 143.13 LBS | OXYGEN SATURATION: 96 % | DIASTOLIC BLOOD PRESSURE: 69 MMHG | BODY MASS INDEX: 26.34 KG/M2 | RESPIRATION RATE: 18 BRPM

## 2019-04-09 DIAGNOSIS — K64.9 HEMORRHOIDS, UNSPECIFIED HEMORRHOID TYPE: ICD-10-CM

## 2019-04-09 DIAGNOSIS — J18.9 PNEUMONIA OF LEFT LOWER LOBE DUE TO INFECTIOUS ORGANISM: Primary | ICD-10-CM

## 2019-04-09 DIAGNOSIS — I10 ESSENTIAL HYPERTENSION: ICD-10-CM

## 2019-04-09 DIAGNOSIS — I48.0 PAROXYSMAL ATRIAL FIBRILLATION (HCC): ICD-10-CM

## 2019-04-09 DIAGNOSIS — E03.9 HYPOTHYROIDISM, UNSPECIFIED TYPE: ICD-10-CM

## 2019-04-09 DIAGNOSIS — R19.7 DIARRHEA, UNSPECIFIED TYPE: ICD-10-CM

## 2019-04-09 DIAGNOSIS — D50.9 IRON DEFICIENCY ANEMIA, UNSPECIFIED IRON DEFICIENCY ANEMIA TYPE: ICD-10-CM

## 2019-04-09 RX ORDER — LEVOTHYROXINE SODIUM 75 UG/1
75 TABLET ORAL
Qty: 90 TAB | Refills: 1 | Status: SHIPPED | COMMUNITY
Start: 2019-04-09 | End: 2019-07-12 | Stop reason: SDUPTHER

## 2019-04-09 RX ORDER — ASPIRIN 81 MG/1
81 TABLET ORAL DAILY
Qty: 30 TAB | Refills: 11
Start: 2019-04-09 | End: 2019-12-12 | Stop reason: ALTCHOICE

## 2019-04-10 LAB
ERYTHROCYTE [DISTWIDTH] IN BLOOD BY AUTOMATED COUNT: 15.3 % (ref 12.3–15.4)
FERRITIN SERPL-MCNC: 87 NG/ML (ref 15–150)
HCT VFR BLD AUTO: 31.9 % (ref 34–46.6)
HGB BLD-MCNC: 9.1 G/DL (ref 11.1–15.9)
IRON SATN MFR SERPL: 25 % (ref 15–55)
IRON SERPL-MCNC: 68 UG/DL (ref 27–139)
MCH RBC QN AUTO: 25.8 PG (ref 26.6–33)
MCHC RBC AUTO-ENTMCNC: 28.5 G/DL (ref 31.5–35.7)
MCV RBC AUTO: 90 FL (ref 79–97)
PLATELET # BLD AUTO: 258 X10E3/UL (ref 150–379)
RBC # BLD AUTO: 3.53 X10E6/UL (ref 3.77–5.28)
TIBC SERPL-MCNC: 269 UG/DL (ref 250–450)
UIBC SERPL-MCNC: 201 UG/DL (ref 118–369)
WBC # BLD AUTO: 16.9 X10E3/UL (ref 3.4–10.8)

## 2019-04-10 NOTE — PROGRESS NOTES
HISTORY OF PRESENT ILLNESS Chief Complaint Patient presents with  
[facility] Follow Up Pneumonia. Valentine Presents for follow-up Admit date: 3/25/2019 
  
Discharge date and time: 3/26/2019 
  
Admission Diagnoses: Pneumonia [J18.9] 
  Discharge Diagnoses:   
Principal Diagnosis Pneumonia Other Diagnoses: 
  GERD (gastroesophageal reflux disease) () Melanoma ([facility] [address] (12/1/2011) Hypertension () Hypothyroidism () Mixed hyperlipidemia () Iron deficiency anemia () Leukocytosis (3/25/2019) Anemia (3/25/2019) Dehydration (3/25/2019) Cough (3/25/2019) Pneumonia (3/25/2019)  
  
Hospital Course: Pneumonia / Leukocytosis / Cough - POA, unclear etiology. Negative testing for viral vs bacterial.  She has improved faster than expected on empiric levaquin.  WBC down.  Continue Guaifenesin, Singulair, xyzal and tessalon 
  Dyspnea - POA and persistent, but do both pneumonia and anemia.  She feels better when wearing oxygen, but she did not have hypoxia during 6 minute walk on room air.   
  
Anemia - POA, mild and as expected it worsened with hydration. Anemia is due to both chronic diease and to acute blood loss.  She states it is due to hemorroids that were bleeding severely last week. Dayana Mcdowell was placed on xarelto only a few weeks before that.  Labs show high B12 and folate and low iron. Start PO iron after completing Levaquin.  Hold xarelto.  If she continues xarelto use, PCP to follow closely and suggest outpatient GI evaluation for bleeding. 
  
Afib, chronic / Chronic anticoagulation / CHF, systolic chronic / Hypertension - POA, CHF and rate appear stable, but URI placing strain on heart. Unremarkable ECHO.  Stable troponin.  Continue bystolic, hold norvacs due to illness.  Patient to discuss xarelto use with Dr Monica Landers, in setting of bleeding and anemia. 
  
Dehydration - POA due to illness.  Hydrated carefully in setting of CHF, and now appears eurvolemic 
   
Hypothyroidism - TSH below normal at 0.30.  I recommend PCP stop her low dose of synthroid and follow TSH 
  
Mixed hyperlipidemia - LDL 18.  She does not have CVA or CAD hx.  I recommend that PCP/cardiology stop simvastatin 
  
GERD (gastroesophageal reflux disease) - No symptoms on Famotidine 
  
Melanoma - Outpatient follow up Hypertension Hypertension ROS: taking medications as instructed, no medication side effects noted, no TIA's, no chest pain on exertion, no dyspnea on exertion, no swelling of ankles     reports that she has quit smoking. She smoked 1.00 pack per day. She quit smokeless tobacco use about 29 years ago. reports that she drinks about 0.6 oz of alcohol per week. BP Readings from Last 2 Encounters:  
04/09/19 111/69  
03/26/19 133/58 Review of Systems All other systems reviewed and are negative, except as noted in HPI Past Medical and Surgical History 
 has a past medical history of Atrial fibrillation ([facility] [address] (12/19/19), Basal cell carcinoma (7/2012, 7/2015), Diverticulitis of colon (5/2010), DJD (degenerative joint disease), lumbar (`), Endometr hyperplasia w/atypia (1997), GERD (gastroesophageal reflux disease) (5/2010), Hiatal hernia (5/2010), Hypertension (1996), Hypothyroidism (2008), Iron deficiency anemia, Melanoma ([facility] [address] Mixed hyperlipidemia, Nephrolithiasis, PNA (pneumonia) (3/26/2019), TIA (transient ischemic attack) (2007), and Zoster. She also has no past medical history of Arsenic suspected exposure, Malignant hyperthermia due to anesthesia, Nausea & vomiting, Nicotine vapor product user, Non-nicotine vapor product user, Pacemaker, Radiation exposure, Sleep apnea, or Tanning bed exposure. has a past surgical history that includes hx edilberto and bso (1997); hx tonsillectomy; hx colonoscopy (5/2010); hx mohs procedure (09/14/2017); colonoscopy (N/A, 10/19/2017); and hx endoscopy (5/2010). reports that she has quit smoking. She smoked 1.00 pack per day. She quit smokeless tobacco use about 29 years ago. She reports that she drinks about 0.6 oz of alcohol per week. She reports that she does not use drugs. family history includes Cancer in her brother, father, and mother; Hypertension in her brother. Physical Exam  
Nursing note and vitals reviewed. Blood pressure 111/69, pulse 65, temperature 98.5 °F (36.9 °C), temperature source Oral, resp. rate 18, height 5' 2\" (1.575 m), weight 143 lb 2 oz (64.9 kg), SpO2 96 %. Constitutional:  No distress. Eyes: Conjunctivae are normal.  
Ears:  Hearing grossly intact Cardiovascular: Normal rate. regular rhythm, no murmurs or gallops No edema Pulmonary/Chest: Effort normal.   CTAB Musculoskeletal: moves all 4 extremities Neurological: Alert and oriented to person, place, and time. Skin: No rash noted. Psychiatric: Normal mood and affect. Behavior is normal.  
 
Diagnoses and all orders for this visit: 1. Pneumonia of left lower lobe due to infectious organism [facility]) Resolved Currently asymptomatic 
resolved 2. Iron deficiency anemia, unspecified iron deficiency anemia type Severe. Can not tolerate iron supplements due to severe diarrhea (unusual). May be improved off [address]. Will see Dr. Esme Alcantar in GI since anemia is presumed due to slow hemorrhoid bleed. If she is still very anemic, will refer to hematology for iron infusion 
-     CBC W/O DIFF 
-     FERRITIN 
-     IRON PROFILE 
-     REFERRAL TO ONCOLOGY 3. Diarrhea, unspecified type Due to iron per pt 4. Hemorrhoids, unspecified hemorrhoid type 5. Essential hypertension Controlled on current regimen. Continue current medications as written in chart 6. Hypothyroidism, unspecified type - borderline over-controlled. Decrease dose. Labs 2-3 mo 
-     levothyroxine (SYNTHROID) 75 mcg tablet; Take 1 Tab by mouth Daily (before breakfast). Decreased dose 4/9/19 7. Paroxysmal atrial fibrillation ([facility] [address] Currently asymptomatic Seeing Dr. Leigh Ann Estrada. Remain off [facility] for now. Will send him results 
-     aspirin delayed-release 81 mg tablet; Take 1 Tab by mouth daily. There are no Patient Instructions on file for this visit. 
 
lab results and schedule of future lab studies reviewed with patient 
reviewed medications and side effects in detail Return to clinic for further evaluation if new symptoms develop Follow-up and Dispositions · Return in about 2 months (around 6/9/2019) for thyroid test. 
  
 
 
Current Outpatient Medications Medication Sig  
 aspirin delayed-release 81 mg tablet Take 1 Tab by mouth daily.  levothyroxine (SYNTHROID) 75 mcg tablet Take 1 Tab by mouth Daily (before breakfast). Decreased dose 4/9/19  famotidine (PEPCID) 40 mg tablet TAKE 1 TABLET TWICE DAILY  ergocalciferol (ERGOCALCIFEROL) 50,000 unit capsule Take 50,000 Units by mouth every Wednesday.  mv-mn/folic acid/calcium/vit K (ONE-A-DAY WOMEN'S 50 PLUS PO) Take 1 Tab by mouth daily.  L.acid/L.casei/B.bif/B.oleg/FOS (PROBIOTIC BLEND PO) Take 1 Tab by mouth three (3) times daily.  nebivolol (BYSTOLIC) 5 mg tablet Take 5 mg by mouth nightly.  simvastatin (ZOCOR) 20 mg tablet Take 1 Tab by mouth nightly. Decreased dose 1/3/19  amLODIPine (NORVASC) 2.5 mg tablet Take 1 Tab by mouth daily.  montelukast (SINGULAIR) 10 mg tablet Take 10 mg by mouth daily.  levocetirizine (XYZAL) 5 mg tablet Take 5 mg by mouth nightly.  acetaminophen (TYLENOL ARTHRITIS PAIN) 650 mg CR tablet Take 1,300 mg by mouth nightly.  rivaroxaban (XARELTO) 20 mg tab tablet Take 20 mg by mouth daily (with dinner). No current facility-administered medications for this visit.

## 2019-05-12 ENCOUNTER — TELEPHONE (OUTPATIENT)
Dept: INTERNAL MEDICINE CLINIC | Age: 79
End: 2019-05-12

## 2019-05-12 RX ORDER — CEPHALEXIN 500 MG/1
500 CAPSULE ORAL 4 TIMES DAILY
Qty: 28 CAP | Refills: 0 | Status: SHIPPED | OUTPATIENT
Start: 2019-05-12 | End: 2019-06-26 | Stop reason: ALTCHOICE

## 2019-05-13 NOTE — TELEPHONE ENCOUNTER
Patient paged w cellulitis of the leg. Hx of the same in 10/2019. Denies fever or chills. I sent in keflex to [facility] .  Return to clinic for further evaluation if new symptoms develop or if current symptoms worsen or fail to resolve.

## 2019-06-06 ENCOUNTER — OFFICE VISIT (OUTPATIENT)
Dept: DERMATOLOGY | Facility: AMBULATORY SURGERY CENTER | Age: 79
End: 2019-06-06

## 2019-06-06 ENCOUNTER — HOSPITAL ENCOUNTER (OUTPATIENT)
Dept: LAB | Age: 79
Discharge: HOME | End: 2019-06-06

## 2019-06-06 VITALS
SYSTOLIC BLOOD PRESSURE: 130 MMHG | OXYGEN SATURATION: 99 % | WEIGHT: 143 LBS | HEIGHT: 62 IN | TEMPERATURE: 97.9 F | DIASTOLIC BLOOD PRESSURE: 60 MMHG | RESPIRATION RATE: 16 BRPM | BODY MASS INDEX: 26.31 KG/M2 | HEART RATE: 60 BPM

## 2019-06-06 DIAGNOSIS — L57.0 ACTINIC KERATOSIS: ICD-10-CM

## 2019-06-06 DIAGNOSIS — L82.0 INFLAMED SEBORRHEIC KERATOSIS: ICD-10-CM

## 2019-06-06 DIAGNOSIS — D48.5 NEOPLASM OF UNCERTAIN BEHAVIOR OF SKIN OF BACK: Primary | ICD-10-CM

## 2019-06-06 DIAGNOSIS — Z85.828 HISTORY OF NONMELANOMA SKIN CANCER: ICD-10-CM

## 2019-06-06 DIAGNOSIS — Z85.820 PERSONAL HISTORY OF MALIGNANT MELANOMA OF SKIN: ICD-10-CM

## 2019-06-06 DIAGNOSIS — L82.1 SEBORRHEIC KERATOSES: ICD-10-CM

## 2019-06-06 DIAGNOSIS — L81.4 LENTIGINES: ICD-10-CM

## 2019-06-06 RX ORDER — FLUOROURACIL 50 MG/G
CREAM TOPICAL
COMMUNITY
End: 2019-06-26 | Stop reason: ALTCHOICE

## 2019-06-06 RX ORDER — FLUOROURACIL 50 MG/ML
SOLUTION TOPICAL
Qty: 1 BOTTLE | Refills: 1 | Status: SHIPPED | OUTPATIENT
Start: 2019-06-06 | End: 2019-06-26 | Stop reason: ALTCHOICE

## 2019-06-06 NOTE — PROGRESS NOTES
Written by Rebeca Li, as dictated by Aruna Garrett, [address]. Name: Karen Schmidt       Age: 67 y.o. Date: 6/6/2019    Chief Complaint:   Chief Complaint   Patient presents with    Skin Exam     pt states, have several concerns. Cellulitis 3x in the past 6 months and is still sore but med is finished. Subjective:    HPI  Ms. Karen Schmidt is a 67 y.o. female who presents for a full skin exam.  The patient's last skin exam was on 11/26/18 and the patient does have current complaints related to her skin. Patient has concerns about cellulitis on the bilateral lower legs - right leg that is still bothersome and sore. She reports having cellulitis 3 times in the past 6 months in different areas - no hospitalization, resolves with oral antibiotics and no skin insult known prior to the cellulitis. She does not routinely shave her legs anymore. Patient also wants area on the left check assessed. This is a biopsy-proven irritated seborrheic keratosis. She states this is still bothersome and has regrown. She is aware of a crusted lesion on the left posterior shoulder. She complains of itchy scalp and hair loss. The patient's pertinent skin history includes : NMSC, [place]  -BCC, right shoulder, cryotherapy, 11/26/18  -SCCi, left lateral calf, Mohs, 10/2018  -BCC, left postauricular, Mohs, 10/2018  -BCC, left shoulder, Mohs, 10/2018  -BCC, right upper back, curettage, 05/09/18  -BCC, mid upper back, curettage, 05/09/18  -BCC, right mid back, curettage, 05/09/18  -BCC, mid central back, curettage, 05/09/18  -SCC, left anterior shin, curettage, 05/09/18  -MMi, right upper back, excision, 02/28/18  -BCC, left upper back, curettage, 01/24/18  -SCC, left anterior shin, Mohs, 09/14/17  -BCC, right lateral forehead, Mohs, 11/09/15  -BCC, right superior scaphoid fossa, Mohs, 07/28/15  -MM right upper arm and left thigh per pt prior to first visit    ROS: Constitutional: Negative.     Dermatological : positive for - skin lesion changes      Social History     Socioeconomic History    Marital status:      Spouse name: Ernestine Soni Number of children: 2    Years of education: Not on file    Highest education level: Not on file   Occupational History    Not on file   Social Needs    Financial resource strain: Not on file    Food insecurity:     Worry: Not on file     Inability: Not on file    Transportation needs:     Medical: Not on file     Non-medical: Not on file   Tobacco Use    Smoking status: Former Smoker     Packs/day: 1.00    Smokeless tobacco: Former User     Quit date: 1/1/1990   Substance and Sexual Activity    Alcohol use: Yes     Alcohol/week: 0.6 oz     Types: 1 Glasses of wine per week     Comment: 3-4 x a week    Drug use: No    Sexual activity: Never   Lifestyle    Physical activity:     Days per week: Not on file     Minutes per session: Not on file    Stress: Not on file   Relationships    Social connections:     Talks on phone: Not on file     Gets together: Not on file     Attends [employer] service: Not on file     Active member of club or organization: Not on file     Attends meetings of clubs or organizations: Not on file     Relationship status: Not on file    Intimate partner violence:     Fear of current or ex partner: Not on file     Emotionally abused: Not on file     Physically abused: Not on file     Forced sexual activity: Not on file   Other Topics Concern    Not on file   Social History Narrative    Not on file       Family History   Problem Relation Age of Onset    Cancer Mother     Cancer Father     Hypertension Brother     Cancer Brother         myeloma       Past Medical History:   Diagnosis Date    Atrial fibrillation ([facility] [address] 12/19/19    Dr. 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 Georgina Nobles. saw Dr. Gentry Runner Diverticulitis of colon 5/2010    dx on colonoscopy by Dr. Eulalia Paz.   Took flagyl, cipro    DJD (degenerative joint disease), lumbar `    L5?    Endometr hyperplasia w/atypia 1997    GERD (gastroesophageal reflux disease) 5/2010    Hiatal hernia 5/2010    Hypertension 1996    Hypothyroidism 2008    TSH 1.1 6/7/12    Iron deficiency anemia     EGD, colon 5/2010.  hgb 11.3 6/2012    Melanoma ([address]     mulitple    Mixed hyperlipidemia      Tg 166 LDL 87 HDL55 6/2012    Nephrolithiasis     PNA (pneumonia) 3/26/2019    TIA (transient ischemic attack) 2007    R droop and weakness    Zoster        Past Surgical History:   Procedure Laterality Date    COLONOSCOPY N/A 10/19/2017    COLONOSCOPY performed by Catherine Hamilton MD at [address] [facility] HX COLONOSCOPY  5/2010    diverticuLITIS, tubular adenoma  Dr. Gale Mark HX ENDOSCOPY  5/2010    EGD  - hiatal hernia. Dr. Gale Mark HX MOHS PROCEDURES  09/14/2017    SCC L anterior mujica by Dr. Dimple Hinojosa    endometrial pre-cancer on biopsy    HX TONSILLECTOMY         Current Outpatient Medications   Medication Sig Dispense Refill    fluorouracil (EFUDEX) 5 % chemo cream fluorouracil 5 % topical cream      fluorouracil 5 % soln Apply a thin layer twice daily for total of 4 weeks. Indications: Roughened Red Patches of Skin due to Sun Exposure 1 Bottle 1    aspirin delayed-release 81 mg tablet Take 1 Tab by mouth daily. 30 Tab 11    levothyroxine (SYNTHROID) 75 mcg tablet Take 1 Tab by mouth Daily (before breakfast). Decreased dose 4/9/19 90 Tab 1    famotidine (PEPCID) 40 mg tablet TAKE 1 TABLET TWICE DAILY 180 Tab 1    ergocalciferol (ERGOCALCIFEROL) 50,000 unit capsule Take 50,000 Units by mouth every Wednesday.  mv-mn/folic acid/calcium/vit K (ONE-A-DAY WOMEN'S 50 PLUS PO) Take 1 Tab by mouth daily.  L.acid/L.casei/B.bif/B.oleg/FOS (PROBIOTIC BLEND PO) Take 1 Tab by mouth three (3) times daily.  nebivolol (BYSTOLIC) 5 mg tablet Take 5 mg by mouth nightly.  simvastatin (ZOCOR) 20 mg tablet Take 1 Tab by mouth nightly.  Decreased dose 1/3/19 90 Tab 3    amLODIPine (NORVASC) 2.5 mg tablet Take 1 Tab by mouth daily. 90 Tab 1    montelukast (SINGULAIR) 10 mg tablet Take 10 mg by mouth daily.  levocetirizine (XYZAL) 5 mg tablet Take 5 mg by mouth nightly.  acetaminophen (TYLENOL ARTHRITIS PAIN) 650 mg CR tablet Take 1,300 mg by mouth nightly.  cephALEXin (KEFLEX) 500 mg capsule Take 1 Cap by mouth four (4) times daily. 28 Cap 0    rivaroxaban (XARELTO) 20 mg tab tablet Take 20 mg by mouth daily (with dinner). Allergies   Allergen Reactions    Irbesartan Diarrhea    Iron Diarrhea    Pcn [Penicillins] Rash     PCN only. Can take cephalosporins    Sulfacetamide Swelling         Objective:    Visit Vitals  /60 (BP 1 Location: Left arm, BP Patient Position: Sitting)   Pulse 60   Temp 97.9 °F (36.6 °C) (Oral)   Resp 16   Ht 5' 2\" (1.575 m)   Wt 143 lb (64.9 kg)   SpO2 99%   BMI 26.16 kg/m²       Tayla Leon is a 67 y.o. female who appears well and in no distress. She is awake, alert, and oriented. There is no preauricular, submandibular, or cervical lymphadenopathy. A skin examination was performed including her scalp, face (including eyelids), ears, neck, chest, back, abdomen, upper extremities (including digits/nails), lower extremities, breasts, buttocks; genital skin was not examined. There are multiple well healed scars that show no evidence of lesion recurrence. There are well healed melanoma scars on the right upper arm, right upper back, and left thigh that show no evidence of pigment, nodularity, or other evidence lesion reucurrence. There is a 5 x 4 mm pink papule with telangectasia on the left paraspinal midback that favors BCC. There is a 4 x 4 pink macule with telangectasia on the right paraspinal mid-back that favors BCC. There are thin scaled actinic keratoses on the R forearm x 2 and L hand. There are diffuse actinic keratoses on the scalp.  There are scattered waxy macules and keratotic papules consistent with seborrheic keratoses. There are lentigines on sun exposed areas. She has pink intradermal nevi and brown junctional nevi, no concerning features for severe atypia. There are inflamed seborrheic keratoses on the L shoulder, R shoulder x 2, R upper arm and L cheek x 2. There is edema of the right lower leg consistent with cellulitis. Assessment/Plan:  1. Personal history of nonmelanoma and melanoma skin cancer. I discussed sun protection, sunscreen use, the warning signs of skin cancer, the need for self-skin examinations, and the need for regular practitioner exams every 3 months. The patient should follow up sooner as needed if new, changing, or symptomatic skin lesions arise. 2. Neoplasm of Uncertain Behavior, left paraspinal midback, favor BCC. The differential diagnoses were discussed. A shave biopsy followed by curettage was advised to address this lesion with malignant destruction. The procedure was reviewed and verbal and written consent were obtained. The risks of pain, bleeding, infection, scar, and recurrence of the lesion were discussed. I performed the procedure. The site was cleansed and anesthetized with 1% Lidocaine with Epinephrine 1:100,000. A shave removal was performed to remove the lesion in its clinical entirety followed by curettage of the base and a 2 mm margin, resulting in a post curettage defect size of 9 x 8 mm. Drysol was used for hemostasis. The wound was bandaged and care reviewed. The specimen was sent to pathology. I will contact the patient with the results and any further treatment that may be necessary. 3. Neoplasm of Uncertain Behavior, R parapspinal midback, favor BCC. The differential diagnoses were discussed. A shave biopsy followed by curettage was advised to address this lesion with malignant destruction. The procedure was reviewed and verbal and written consent were obtained.   The risks of pain, bleeding, infection, scar, and recurrence of the lesion were discussed. I performed the procedure. The site was cleansed and anesthetized with 1% Lidocaine with Epinephrine 1:100,000. A shave removal was performed to remove the lesion in its clinical entirety followed by curettage of the base and a 2 mm margin, resulting in a post curettage defect size of 8 x 8 mm. Drysol was used for hemostasis. The wound was bandaged and care reviewed. The specimen was sent to pathology. I will contact the patient with the results and any further treatment that may be necessary. 4. Actinic Keratoses. The diagnosis of this precancerous lesion related to sun exposure was reviewed. Verbal consent was obtained. I treated 3 lesions with cryotherapy and post-cryotherapy care was reviewed. I prescribed Efudex solution for actinic keratoses on the scalp BID 4 weeks with breaks in between if needed. Use and side effects were reviewed. 5. Seborrheic keratoses. The diagnosis was reviewed and the patient was reassured that no treatment is needed for these benign lesions. 6. Normal nevi. The diagnosis of normal nevi was reviewed. I discussed sun protection, sunscreen use, the warning signs of skin cancer, the need for self-skin examinations, and the need for regular practitioner exams every 3 months. The patient should follow up sooner as needed if new, changing, or symptomatic skin lesions arise. 7. Solar lentigos. The diagnosis and relationship to sun exposure was reviewed. Sun protection advised. 8. Inflamed seborrheic keratoses. The diagnosis and treatment with liquid nitrogen cryotherapy were reviewed. The risk or persistence or recurrence of the keratosis and the potential for pigment change at the treated site were reviewed. Verbal consent was obtained. I treated 6 lesions with cryotherapy and care was reviewed. 9. Cellulitis, right lower leg. The diagnosis was reviewed.   I recommended bleach baths to assist with her cellulitis and vinegar soaks x 1 week. I also recommended the use of support socks to assist with swelling. This plan was reviewed with the patient and patient agrees. All questions were answered. This scribe documentation was reviewed by me and accurately reflects the examination and decisions made by me. [facility]   OFFICE PROCEDURE PROGRESS NOTE   Chart reviewed for the following:   I, [place] Duane Burrow, [address], have reviewed the History, Physical and updated the Allergic reactions for Jade Robbins. TIME OUT performed immediately prior to start of procedure:   I, Jodie Sharma, have performed the following reviews on Jade Robbins   prior to the start of the procedure:     * Patient was identified by name and date of birth   * Agreement on procedure being performed was verified   * Risks and Benefits explained to the patient   * Procedure site verified and marked as necessary   * Patient was positioned for comfort   * Consent was signed and verified     Time: 11:22 AM  Date of procedure: 6/6/2019  Procedure performed by: Fabrizio Mora.  Lamar Sharma  Provider assisted by: Uzair Burkett LPN  Patient assisted by: self   How tolerated by patient: tolerated the procedure well with no complications   Comments: none

## 2019-06-06 NOTE — PROGRESS NOTES
Identified pt with two pt identifiers(name and ). Reviewed record in preparation for visit and have obtained necessary documentation. All patient medications has been reviewed. Chief Complaint   Patient presents with    Skin Exam     pt states, have several concerns. Cellulitis 3x in the past 6 months and is still sore but med is finished. Health Maintenance Due   Topic    GLAUCOMA SCREENING Q2Y        Vitals:    19 1036   BP: 130/60   Pulse: 60   Resp: 16   Temp: 97.9 °F (36.6 °C)   TempSrc: Oral   SpO2: 99%   Weight: 64.9 kg (143 lb)   Height: 5' 2\" (1.575 m)   PainSc:   0 - No pain       Coordination of Care Questionnaire:   1) Have you been to an emergency room, urgent care, or hospitalized since your last visit?   no       2. Have seen or consulted any other health care provider since your last visit? NO    3) Do you have an Advanced Directive/ Living Will in place? NO  If yes, do we have a copy on file NO  If no, would you like information NO    Patient is accompanied by  I have received verbal consent from Oscar Browning to discuss any/all medical information while they are present in the room.

## 2019-06-10 NOTE — PROGRESS NOTES
I spoke with the pt and she is aware of the diagnoses of [facility] on the back. These were curetted with the biopsy and therefore no further tx is needed at this time. She will f/up as scheduled.

## 2019-06-26 ENCOUNTER — OFFICE VISIT (OUTPATIENT)
Dept: INTERNAL MEDICINE CLINIC | Age: 79
End: 2019-06-26

## 2019-06-26 VITALS
RESPIRATION RATE: 18 BRPM | OXYGEN SATURATION: 94 % | SYSTOLIC BLOOD PRESSURE: 112 MMHG | WEIGHT: 143 LBS | DIASTOLIC BLOOD PRESSURE: 70 MMHG | HEIGHT: 62 IN | TEMPERATURE: 97.9 F | BODY MASS INDEX: 26.31 KG/M2 | HEART RATE: 56 BPM

## 2019-06-26 DIAGNOSIS — E78.2 MIXED HYPERLIPIDEMIA: ICD-10-CM

## 2019-06-26 DIAGNOSIS — I48.0 PAROXYSMAL ATRIAL FIBRILLATION (HCC): ICD-10-CM

## 2019-06-26 DIAGNOSIS — E03.9 HYPOTHYROIDISM, UNSPECIFIED TYPE: ICD-10-CM

## 2019-06-26 DIAGNOSIS — I10 ESSENTIAL HYPERTENSION: ICD-10-CM

## 2019-06-26 DIAGNOSIS — D50.9 IRON DEFICIENCY ANEMIA, UNSPECIFIED IRON DEFICIENCY ANEMIA TYPE: Primary | ICD-10-CM

## 2019-06-26 RX ORDER — ERGOCALCIFEROL 1.25 MG/1
50000 CAPSULE ORAL
Qty: 30 CAP | Refills: 5
Start: 2019-06-26 | End: 2019-12-12 | Stop reason: ALTCHOICE

## 2019-06-26 NOTE — PROGRESS NOTES
HISTORY OF PRESENT ILLNESS    Chief Complaint   Patient presents with    Hypertension     f/u    Thyroid Problem     f/u    Labs     fasting       Presents for follow-up    Capsule stdy started per Dr. Lucas Canas yesterday. Denies blood on stool    Hypothyroidism follow-up  Reports no fatigue  C/o hair loss  denies heat/cold intolerance, bowel/skin changes or CVS symptoms, , feeling excessive energy, tremulousness, palpitations. Thyroid medication has been decreased since last medication check and labs. Lab Results   Component Value Date/Time    TSH 0.33 (L) 03/25/2019 11:17 AM    T4, Free 1.84 (H) 05/18/2018 09:52 AM     Wt Readings from Last 3 Encounters:   06/26/19 143 lb (64.9 kg)   06/06/19 143 lb (64.9 kg)   04/09/19 143 lb 2 oz (64.9 kg)     Hyperlipidemia  Currently she takes smvastatin 20 mg  ROS: taking medications as instructed, no medication side effects noted  No new myalgias, no joint pains, no weakness  No TIA's, no chest pain on exertion, no dyspnea on exertion, no swelling of ankles. Lab Results   Component Value Date/Time    Cholesterol, total 58 03/25/2019 11:17 AM    HDL Cholesterol 22 03/25/2019 11:17 AM    LDL, calculated 17.2 03/25/2019 11:17 AM    VLDL, calculated 18.8 03/25/2019 11:17 AM    Triglyceride 94 03/25/2019 11:17 AM    CHOL/HDL Ratio 2.6 03/25/2019 11:17 AM         Review of Systems   All other systems reviewed and are negative, except as noted in HPI    Past Medical and Surgical History   has a past medical history of Atrial fibrillation ([address] (12/19/19), Basal cell carcinoma (7/2012, 7/2015), Diverticulitis of colon (5/2010), DJD (degenerative joint disease), lumbar (`), Endometr hyperplasia w/atypia (1997), GERD (gastroesophageal reflux disease) (5/2010), Hiatal hernia (5/2010), Hypertension (1996), Hypothyroidism (2008), Iron deficiency anemia, Melanoma ([address] Mixed hyperlipidemia, Nephrolithiasis, PNA (pneumonia) (3/26/2019), Stroke (cerebrum) ([address] (2007), and Zoster.  She also has no past medical history of Arsenic suspected exposure, Malignant hyperthermia due to anesthesia, Nausea & vomiting, Nicotine vapor product user, Non-nicotine vapor product user, Pacemaker, Radiation exposure, Sleep apnea, or Tanning bed exposure. has a past surgical history that includes hx edilberto and bso (1997); hx tonsillectomy; hx colonoscopy (5/2010); hx mohs procedure (09/14/2017); colonoscopy (N/A, 10/19/2017); hx endoscopy (5/2010); and hx endoscopy (05/30/2019). reports that she has quit smoking. She smoked 1.00 pack per day. She quit smokeless tobacco use about 29 years ago. She reports that she drinks about 0.6 oz of alcohol per week. She reports that she does not use drugs. family history includes Cancer in her brother, father, and mother; Hypertension in her brother. Physical Exam   Nursing note and vitals reviewed. Blood pressure 112/70, pulse (!) 56, temperature 97.9 °F (36.6 °C), temperature source Oral, resp. rate 18, height 5' 2\" (1.575 m), weight 143 lb (64.9 kg), SpO2 94 %. Constitutional:  No distress. Eyes: Conjunctivae are normal.   Ears:  Hearing grossly intact  Cardiovascular: Normal rate. regular rhythm, no murmurs or gallops  No edema  Pulmonary/Chest: Effort normal.   CTAB  Musculoskeletal: moves all 4 extremities   Neurological: Alert and oriented to person, place, and time. Skin: No rash noted. Psychiatric: Normal mood and affect. Behavior is normal.     ASSESSMENT and PLAN  Diagnoses and all orders for this visit:    1. Iron deficiency anemia, unspecified iron deficiency anemia type  Hemorrhoids? Off xarelto, but taking asa 81 mg  Capsule study in progress. Not taking iron  -     FERRITIN  -     CBC W/O DIFF  -     IRON PROFILE    2. Hypothyroidism, unspecified type  Unclear - reports hair loss  -     TSH 3RD GENERATION  -     T4, FREE    3. Mixed hyperlipidemia  Very low. Stop simvastatin. Return to clinic in 6 months to repeat your blood work.    No hx ASCVD    4. Essential hypertension- Controlled on current regimen. Continue current medications as written in chart. 5. Paroxysmal atrial fibrillation (HCC)  Currently asymptomatic  Remain off xarelto for now. Seeing Dr. Kaiser Manual          lab results and schedule of future lab studies reviewed with patient  reviewed medications and side effects in detail    Return to clinic for further evaluation if new symptoms develop        Current Outpatient Medications   Medication Sig    aspirin delayed-release 81 mg tablet Take 1 Tab by mouth daily.  levothyroxine (SYNTHROID) 75 mcg tablet Take 1 Tab by mouth Daily (before breakfast). Decreased dose 4/9/19    famotidine (PEPCID) 40 mg tablet TAKE 1 TABLET TWICE DAILY    mv-mn/folic acid/calcium/vit K (ONE-A-DAY WOMEN'S 50 PLUS PO) Take 1 Tab by mouth daily.  nebivolol (BYSTOLIC) 5 mg tablet Take 5 mg by mouth nightly.  amLODIPine (NORVASC) 2.5 mg tablet Take 1 Tab by mouth daily.  montelukast (SINGULAIR) 10 mg tablet Take 10 mg by mouth daily.  levocetirizine (XYZAL) 5 mg tablet Take 5 mg by mouth nightly.  acetaminophen (TYLENOL ARTHRITIS PAIN) 650 mg CR tablet Take 1,300 mg by mouth nightly. No current facility-administered medications for this visit. Discussed the patient's BMI with her. The BMI follow up plan is as follows:     dietary management education, guidance, and counseling  encourage exercise  monitor weight  prescribed dietary intake    An After Visit Summary was printed and given to the patient.

## 2019-06-26 NOTE — PATIENT INSTRUCTIONS
Body Mass Index: Care Instructions Your Care Instructions Body mass index (BMI) can help you see if your weight is raising your risk for health problems. It uses a formula to compare how much you weigh with how tall you are. · A BMI lower than 18.5 is considered underweight. · A BMI between 18.5 and 24.9 is considered healthy. · A BMI between 25 and 29.9 is considered overweight. A BMI of 30 or higher is considered obese. If your BMI is in the normal range, it means that you have a lower risk for weight-related health problems. If your BMI is in the overweight or obese range, you may be at increased risk for weight-related health problems, such as high blood pressure, heart disease, stroke, arthritis or joint pain, and diabetes. If your BMI is in the underweight range, you may be at increased risk for health problems such as fatigue, lower protection (immunity) against illness, muscle loss, bone loss, hair loss, and hormone problems. BMI is just one measure of your risk for weight-related health problems. You may be at higher risk for health problems if you are not active, you eat an unhealthy diet, or you drink too much alcohol or use tobacco products. Follow-up care is a key part of your treatment and safety. Be sure to make and go to all appointments, and call your doctor if you are having problems. It's also a good idea to know your test results and keep a list of the medicines you take. How can you care for yourself at home? · Practice healthy eating habits. This includes eating plenty of fruits, vegetables, whole grains, lean protein, and low-fat dairy. · If your doctor recommends it, get more exercise. Walking is a good choice. Bit by bit, increase the amount you walk every day. Try for at least 30 minutes on most days of the week. · Do not smoke. Smoking can increase your risk for health problems.  If you need help quitting, talk to your doctor about stop-smoking programs and medicines. These can increase your chances of quitting for good. · Limit alcohol to 2 drinks a day for men and 1 drink a day for women. Too much alcohol can cause health problems. If you have a BMI higher than 25 · Your doctor may do other tests to check your risk for weight-related health problems. This may include measuring the distance around your waist. A waist measurement of more than 40 inches in men or 35 inches in women can increase the risk of weight-related health problems. · Talk with your doctor about steps you can take to stay healthy or improve your health. You may need to make lifestyle changes to lose weight and stay healthy, such as changing your diet and getting regular exercise. If you have a BMI lower than 18.5 · Your doctor may do other tests to check your risk for health problems. · Talk with your doctor about steps you can take to stay healthy or improve your health. You may need to make lifestyle changes to gain or maintain weight and stay healthy, such as getting more healthy foods in your diet and doing exercises to build muscle. Where can you learn more? Go to http://gabi-vielka.info/. Enter S176 in the search box to learn more about \"Body Mass Index: Care Instructions. \" Current as of: October 13, 2016 Content Version: 11.4 © 4532-3282 [employer]. Care instructions adapted under license by [employer] (which disclaims liability or warranty for this information). If you have questions about a medical condition or this instruction, always ask your healthcare professional. [address] any warranty or liability for your use of this information.

## 2019-06-27 LAB
ERYTHROCYTE [DISTWIDTH] IN BLOOD BY AUTOMATED COUNT: 14.4 % (ref 12.3–15.4)
FERRITIN SERPL-MCNC: 51 NG/ML (ref 15–150)
HCT VFR BLD AUTO: 34.1 % (ref 34–46.6)
HGB BLD-MCNC: 10.2 G/DL (ref 11.1–15.9)
IRON SATN MFR SERPL: 29 % (ref 15–55)
IRON SERPL-MCNC: 88 UG/DL (ref 27–139)
MCH RBC QN AUTO: 26.7 PG (ref 26.6–33)
MCHC RBC AUTO-ENTMCNC: 29.9 G/DL (ref 31.5–35.7)
MCV RBC AUTO: 89 FL (ref 79–97)
PLATELET # BLD AUTO: 219 X10E3/UL (ref 150–450)
RBC # BLD AUTO: 3.82 X10E6/UL (ref 3.77–5.28)
T4 FREE SERPL-MCNC: 1.22 NG/DL (ref 0.82–1.77)
TIBC SERPL-MCNC: 306 UG/DL (ref 250–450)
TSH SERPL DL<=0.005 MIU/L-ACNC: 6.93 UIU/ML (ref 0.45–4.5)
UIBC SERPL-MCNC: 218 UG/DL (ref 118–369)
WBC # BLD AUTO: 10.3 X10E3/UL (ref 3.4–10.8)

## 2019-07-09 ENCOUNTER — TELEPHONE (OUTPATIENT)
Dept: INTERNAL MEDICINE CLINIC | Age: 79
End: 2019-07-09

## 2019-07-09 NOTE — TELEPHONE ENCOUNTER
Dominick Artie [facility] [employer]  Phone Number: [phone]         Please update pt chart per request.. Thanks :)     ----- Message from [address] [place] [address] [address], Generic sent at 7/8/2019  7:50 PM EDT -----     Appointment Request From: Linnea Stevens     With Provider: Benigno Campbell MD [-Primary Care Physician-]     Preferred Date Range: Any     Preferred Times: Any     Reason: To address the following health maintenance concerns. Glaucoma Screening Q2y     Comments:   I have regular glaucoma screenings at my eye doctor (Dr. Keke Paiz) so please please please stop telling me that my glaucoma screening is overdue.  I just had one on June 5th. ..... and will have another one the next time I go in for my checkup.  My preferred time is NEVER. ...... \     Thanks,   Linnea Stevens

## 2019-07-12 ENCOUNTER — TELEPHONE (OUTPATIENT)
Dept: INTERNAL MEDICINE CLINIC | Age: 79
End: 2019-07-12

## 2019-07-12 DIAGNOSIS — E03.9 HYPOTHYROIDISM, UNSPECIFIED TYPE: ICD-10-CM

## 2019-07-12 RX ORDER — LEVOTHYROXINE SODIUM 75 UG/1
75 TABLET ORAL
Qty: 30 TAB | Refills: 0 | Status: SHIPPED | OUTPATIENT
Start: 2019-07-12 | End: 2019-09-10 | Stop reason: SDUPTHER

## 2019-07-12 NOTE — TELEPHONE ENCOUNTER
Paged by pt will run out of Thyroid medication and mail order will arrive while out of town. Called in 30 day supply to [email or web address].

## 2019-07-13 RX ORDER — MONTELUKAST SODIUM 10 MG/1
TABLET ORAL
Qty: 90 TAB | Refills: 2 | Status: SHIPPED | OUTPATIENT
Start: 2019-07-13 | End: 2020-04-03 | Stop reason: SDUPTHER

## 2019-07-13 RX ORDER — AMLODIPINE BESYLATE 2.5 MG/1
TABLET ORAL
Qty: 90 TAB | Refills: 1 | Status: SHIPPED | OUTPATIENT
Start: 2019-07-13 | End: 2020-02-19

## 2019-07-14 RX ORDER — NEBIVOLOL HYDROCHLORIDE 5 MG/1
TABLET ORAL
Qty: 90 TAB | Refills: 1 | Status: SHIPPED | OUTPATIENT
Start: 2019-07-14 | End: 2020-01-08

## 2019-07-15 ENCOUNTER — TELEPHONE (OUTPATIENT)
Dept: INTERNAL MEDICINE CLINIC | Age: 79
End: 2019-07-15

## 2019-07-15 NOTE — TELEPHONE ENCOUNTER
----- Message from Mandi Durant sent at 7/12/2019  5:38 PM EDT -----  Regarding: Dr. Markus Benavides   Pt is requesting for her nurse Laura Bain to give her a call. Best contact number is [phone].

## 2019-07-16 RX ORDER — CEPHALEXIN 500 MG/1
500 CAPSULE ORAL 4 TIMES DAILY
Qty: 28 CAP | Refills: 0 | Status: SHIPPED | OUTPATIENT
Start: 2019-07-16 | End: 2019-12-12 | Stop reason: ALTCHOICE

## 2019-09-10 DIAGNOSIS — E03.9 HYPOTHYROIDISM, UNSPECIFIED TYPE: ICD-10-CM

## 2019-09-10 RX ORDER — FAMOTIDINE 40 MG/1
TABLET, FILM COATED ORAL
Qty: 180 TAB | Refills: 1 | Status: SHIPPED | OUTPATIENT
Start: 2019-09-10 | End: 2021-06-02 | Stop reason: SDUPTHER

## 2019-09-10 RX ORDER — LEVOTHYROXINE SODIUM 75 UG/1
TABLET ORAL
Qty: 90 TAB | Refills: 1 | Status: SHIPPED | OUTPATIENT
Start: 2019-09-10 | End: 2020-01-08

## 2019-09-12 ENCOUNTER — OFFICE VISIT (OUTPATIENT)
Dept: DERMATOLOGY | Facility: AMBULATORY SURGERY CENTER | Age: 79
End: 2019-09-12

## 2019-09-12 VITALS
OXYGEN SATURATION: 97 % | HEIGHT: 62 IN | HEART RATE: 66 BPM | RESPIRATION RATE: 18 BRPM | WEIGHT: 143 LBS | SYSTOLIC BLOOD PRESSURE: 102 MMHG | DIASTOLIC BLOOD PRESSURE: 60 MMHG | TEMPERATURE: 98.7 F | BODY MASS INDEX: 26.31 KG/M2

## 2019-09-12 DIAGNOSIS — L81.4 LENTIGINES: ICD-10-CM

## 2019-09-12 DIAGNOSIS — L82.1 SEBORRHEIC KERATOSES: ICD-10-CM

## 2019-09-12 DIAGNOSIS — Z85.828 HISTORY OF NONMELANOMA SKIN CANCER: ICD-10-CM

## 2019-09-12 DIAGNOSIS — L82.0 INFLAMED SEBORRHEIC KERATOSIS: ICD-10-CM

## 2019-09-12 DIAGNOSIS — Z85.820 PERSONAL HISTORY OF MALIGNANT MELANOMA OF SKIN: ICD-10-CM

## 2019-09-12 DIAGNOSIS — L57.0 ACTINIC KERATOSES: Primary | ICD-10-CM

## 2019-09-12 NOTE — PROGRESS NOTES
Written by Jorge Quinn, as dictated by Zeinab Paulson, [address]. Name: Alok Lyon       Age: 67 y.o. Date: 9/12/2019    Chief Complaint:   Chief Complaint   Patient presents with    Skin Exam     head, left sideburn, both legs. Subjective:    HPI  Ms. Alok Lyon is a 67 y.o. female who presents for a partial skin exam.  The patient's last skin exam was on 6/6/19 and the patient does have current complaints related to her skin. The patient reports that she did not apply Efudex to the scalp prescribed at last visit due to [id number] [address] and events. On the left cheek, she has lesions that she cannot see but burns when she puts her toner on. The patient also notes lesions on the left and right posterior calf that are bothersome and scaly. She is feeling well and in her usual state of health today. To note, she recently had ingrown toenail removal secondary to recurrence of cellulitis. She states this was successful and she has not had additional episodes of cellulitis since removals were complete. She has no current illnesses, no other skin concerns. Her allergies, medications, medical, and social history are reviewed by me today.     The patient's pertinent skin history includes :NMSC and AK  -BCC, right shoulder, cryotherapy, 11/26/18  -SCCi, left lateral calf, Mohs, 10/2018  -BCC, left postauricular, Mohs, 10/2018  -BCC, left shoulder, Mohs, 10/2018  -BCC, right upper back, curettage, 05/09/18  -BCC, mid upper back, curettage, 05/09/18  -BCC, right mid back, curettage, 05/09/18  -BCC, mid central back, curettage, 05/09/18  -SCC, left anterior shin, curettage, 05/09/18  -MMi, right upper back, excision, 02/28/18  -BCC, left upper back, curettage, 01/24/18  -SCC, left anterior shin, Mohs, 09/14/17  -BCC, right lateral forehead, Mohs, 11/09/15  -BCC, right superior scaphoid fossa, Mohs, 07/28/15  -MM right upper arm and left thigh per pt prior to first visit    ROS: Constitutional: Negative. Dermatological : positive for - skin lesion changes    Social History     Socioeconomic History    Marital status:      Spouse name: Pili Peterson Number of children: 2    Years of education: Not on file    Highest education level: Not on file   Occupational History    Not on file   Social Needs    Financial resource strain: Not on file    Food insecurity:     Worry: Not on file     Inability: Not on file    Transportation needs:     Medical: Not on file     Non-medical: Not on file   Tobacco Use    Smoking status: Former Smoker     Packs/day: 1.00    Smokeless tobacco: Former User     Quit date: 1/1/1990   Substance and Sexual Activity    Alcohol use: Yes     Alcohol/week: 1.0 standard drinks     Types: 1 Glasses of wine per week     Comment: 3-4 x a week    Drug use: No    Sexual activity: Never   Lifestyle    Physical activity:     Days per week: Not on file     Minutes per session: Not on file    Stress: Not on file   Relationships    Social connections:     Talks on phone: Not on file     Gets together: Not on file     Attends Jewish service: Not on file     Active member of club or organization: Not on file     Attends meetings of clubs or organizations: Not on file     Relationship status: Not on file    Intimate partner violence:     Fear of current or ex partner: Not on file     Emotionally abused: Not on file     Physically abused: Not on file     Forced sexual activity: Not on file   Other Topics Concern    Not on file   Social History Narrative    Not on file       Family History   Problem Relation Age of Onset    Cancer Mother     Cancer Father     Hypertension Brother     Cancer Brother         myeloma       Past Medical History:   Diagnosis Date    [facility]rial fibrillation ([facility] 12/19/19    Dr. Anitha Villaseñor cell carcinoma 7/2012, 7/2015    Dr. Komal King. saw Dr. Juventino Lopez Diverticulitis of colon 5/2010    dx on colonoscopy by Dr. Ngozi Rosario.   Took flagyl, cipro    DJD (degenerative joint disease), lumbar `    L5?  Endometr hyperplasia w/atypia 1997    GERD (gastroesophageal reflux disease) 5/2010    Hiatal hernia 5/2010    Hypertension 1996    Hypothyroidism 2008    TSH 1.1 6/7/12    Iron deficiency anemia     EGD, colon 5/2010.  hgb 11.3 6/2012    Melanoma ([facility]     mulitple    Mixed hyperlipidemia      Tg 166 LDL 87 HDL55 6/2012    Nephrolithiasis     PNA (pneumonia) 3/26/2019    Stroke (cerebrum) ([facility] 2007    R droop and weakness for 1 week. vessel rupture (not clot)    Zoster        Past Surgical History:   Procedure Laterality Date    COLONOSCOPY N/A 10/19/2017    COLONOSCOPY performed by Arturo Daniels MD at [facility] HX COLONOSCOPY  5/2010    diverticuLITIS, tubular adenoma  Dr. Aakash Reveles HX ENDOSCOPY  5/2010    EGD  - hiatal hernia. Dr. Aakash Reveles HX ENDOSCOPY  05/30/2019    Dr. Krista Gee  09/14/2017    SCC L anterior mujica by Dr. Nyasia Crawley    endometrial pre-cancer on biopsy    HX TONSILLECTOMY         Current Outpatient Medications   Medication Sig Dispense Refill    Mupirocin 2 % okit as needed.  famotidine (PEPCID) 40 mg tablet TAKE 1 TABLET TWICE DAILY 180 Tab 1    levothyroxine (SYNTHROID) 75 mcg tablet TAKE 1 TABLET BY MOUTH DAILY (BEFORE BREAKFAST). (DECREASED DOSE 4/9/19) 90 Tab 1    BYSTOLIC 5 mg tablet TAKE 1 TABLET DAILY FOR BLOOD PRESSURE,  PULSE, FATIGUE (REPLACES  ATENOLOL) 90 Tab 1    amLODIPine (NORVASC) 2.5 mg tablet TAKE 1 TABLET EVERY DAY 90 Tab 1    montelukast (SINGULAIR) 10 mg tablet TAKE 1 TABLET EVERY DAY 90 Tab 2    ergocalciferol (ERGOCALCIFEROL) 50,000 unit capsule Take 1 Cap by mouth every Wednesday. 30 Cap 5    aspirin delayed-release 81 mg tablet Take 1 Tab by mouth daily. 30 Tab 11    levocetirizine (XYZAL) 5 mg tablet Take 5 mg by mouth nightly.  acetaminophen (TYLENOL ARTHRITIS PAIN) 650 mg CR tablet Take 1,300 mg by mouth nightly.       cephALEXin (KEFLEX) 500 mg capsule Take 1 Cap by mouth four (4) times daily. (Patient not taking: Reported on 9/12/2019) 28 Cap 0    mv-mn/folic acid/calcium/vit K (ONE-A-DAY WOMEN'S 50 PLUS PO) Take 1 Tab by mouth daily. Allergies   Allergen Reactions    Irbesartan Diarrhea    Iron Diarrhea    Pcn [Penicillins] Rash     PCN only. Can take cephalosporins    Sulfacetamide Swelling         Objective:    Visit Vitals  /60 (BP 1 Location: Left arm, BP Patient Position: Sitting)   Pulse 66   Temp 98.7 °F (37.1 °C) (Oral)   Resp 18   Ht 5' 2\" (1.575 m)   Wt 143 lb (64.9 kg)   SpO2 97%   BMI 26.16 kg/m²       Selene Chambers is a 67 y.o. female who appears well and in no distress. She is awake, alert, and oriented. A limited skin examination was performed including her scalp, face (including eyelids), ears, neck, lower legs. She has multiple well healed scars without evidence of lesion recurrence. She has scattered waxy macules and keratotic papules consistent with seborrheic keratoses. She has inflamed seborrheic keratoses on the left and right posterior calf, including the lesions of her concern. There are lentigines on sun exposed areas.  She has actinic keratoses on the right temple, left cheek x 2, forehead x2, and the right mid upper lip, including the lesions of her concern. Assessment/Plan:  1. Personal history of nonmelanoma and melanoma skin cancer. I discussed sun protection, sunscreen use, the warning signs of skin cancer, the need for self-skin examinations, and the need for regular practitioner exams. The patient should follow up sooner as needed if new, changing, or symptomatic skin lesions arise. 2. Seborrheic keratoses. The diagnosis was reviewed and the patient was reassured that no treatment is needed for these benign lesions. 3. Inflamed seborrheic keratoses. The diagnosis and treatment with liquid nitrogen cryotherapy were reviewed.   The risk or persistence or recurrence of the keratosis and the potential for pigment change at the treated site were reviewed. Verbal consent was obtained. I treated 2 lesions with cryotherapy and care was reviewed. 4. Solar lentigos. The diagnosis and relationship to sun exposure was reviewed. Sun protection advised. 5. Actinic Keratoses. The diagnosis of this precancerous lesion related to sun exposure was reviewed. Verbal consent was obtained. I treated 6 lesions with cryotherapy and post-cryotherapy care was reviewed. The patient was also advised to apply Efudex solution to the scalp BID for 2 weeks, take a 1 week break, and continue application for an additional week. Next skin exam:  3 months    Patient deferred FBSE. This plan was reviewed with the patient and patient agrees. All questions were answered. This scribe documentation was reviewed by me and accurately reflects the examination and decisions made by me. [facility]   OFFICE PROCEDURE PROGRESS NOTE   Chart reviewed for the following:   Jaylon DEL TORO, [address], have reviewed the History, Physical and updated the Allergic reactions for [place]. TIME OUT performed immediately prior to start of procedure:   Jodie DEL TORO, have performed the following reviews on [place]   prior to the start of the procedure:     * Patient was identified by name and date of birth   * Agreement on procedure being performed was verified   * Risks and Benefits explained to the patient   * Procedure site verified and marked as necessary   * Patient was positioned for comfort   * Consent was signed and verified     Time: 10:45 AM  Date of procedure: 9/12/2019  Procedure performed by: Carmela Leal.  Shabbir Farias DNP  Provider assisted by: self  Patient assisted by: self   How tolerated by patient: tolerated the procedure well with no complications   Comments: none

## 2019-09-12 NOTE — PROGRESS NOTES
Room 7    Identified pt with two pt identifiers(name and ). Reviewed record in preparation for visit and have obtained necessary documentation. All patient medications has been reviewed. Chief Complaint   Patient presents with    Skin Exam     head, left sideburn, both legs. Health Maintenance Due   Topic    Influenza Age 5 to Adult        Vitals:    19 1013   BP: 102/60   Pulse: 66   Resp: 18   Temp: 98.7 °F (37.1 °C)   TempSrc: Oral   SpO2: 97%   Weight: 64.9 kg (143 lb)   Height: 5' 2\" (1.575 m)   PainSc:   0 - No pain       Coordination of Care Questionnaire:   1) Have you been to an emergency room, urgent care, or hospitalized since your last visit?   no       2. Have seen or consulted any other health care provider since your last visit? NO    3) Do you have an Advanced Directive/ Living Will in place? YES  If yes, do we have a copy on file YES  If no, would you like information NO    Patient is accompanied by self I have received verbal consent from Jayce Munoz to discuss any/all medical information while they are present in the room.

## 2019-11-26 ENCOUNTER — PATIENT OUTREACH (OUTPATIENT)
Dept: CARDIOLOGY CLINIC | Age: 79
End: 2019-11-26

## 2019-11-29 ENCOUNTER — TELEPHONE (OUTPATIENT)
Dept: INTERNAL MEDICINE CLINIC | Age: 79
End: 2019-11-29

## 2019-11-29 NOTE — TELEPHONE ENCOUNTER
----- Message from Marilee Cortes sent at 11/29/2019 11:16 AM EST -----  Regarding: Dr. Sofiya Love: [phone]  Appointment Request From: Mar Kurtz    With Provider: Lizabeth Freire MD [[facility]]    Preferred Date Range: Any    Preferred Times: Any time    Reason for visit: Request an Appointment    Comments:  Dr. Bret Piña, I need an appointment as a follow up to my hospital stay. .. Darvin Samuel PSR scheduled patient with . PCP for hospital follow up on Wednesday, December 11, 2019 11:20 AM. Please call/Queplixharrt message patient to advise if sooner appointment is needed.

## 2019-12-12 ENCOUNTER — OFFICE VISIT (OUTPATIENT)
Dept: INTERNAL MEDICINE CLINIC | Age: 79
End: 2019-12-12

## 2019-12-12 VITALS
DIASTOLIC BLOOD PRESSURE: 75 MMHG | BODY MASS INDEX: 26.31 KG/M2 | WEIGHT: 143 LBS | OXYGEN SATURATION: 98 % | SYSTOLIC BLOOD PRESSURE: 131 MMHG | HEIGHT: 62 IN | TEMPERATURE: 97.6 F | RESPIRATION RATE: 18 BRPM | HEART RATE: 58 BPM

## 2019-12-12 DIAGNOSIS — K64.9 BLEEDING HEMORRHOIDS: ICD-10-CM

## 2019-12-12 DIAGNOSIS — I48.0 PAROXYSMAL ATRIAL FIBRILLATION (HCC): ICD-10-CM

## 2019-12-12 DIAGNOSIS — J18.9 COMMUNITY ACQUIRED PNEUMONIA, UNSPECIFIED LATERALITY: Primary | ICD-10-CM

## 2019-12-12 DIAGNOSIS — D64.9 ANEMIA, UNSPECIFIED TYPE: ICD-10-CM

## 2019-12-12 DIAGNOSIS — I10 ESSENTIAL HYPERTENSION: ICD-10-CM

## 2019-12-12 DIAGNOSIS — D72.829 LEUKOCYTOSIS, UNSPECIFIED TYPE: ICD-10-CM

## 2019-12-12 RX ORDER — AMIODARONE HYDROCHLORIDE 200 MG/1
200 TABLET ORAL 2 TIMES DAILY
Qty: 60 TAB | Refills: 2
Start: 2019-12-12 | End: 2020-03-27 | Stop reason: ALTCHOICE

## 2019-12-12 RX ORDER — AMIODARONE HYDROCHLORIDE 200 MG/1
TABLET ORAL
COMMUNITY
End: 2019-12-12 | Stop reason: SDUPTHER

## 2019-12-12 NOTE — PROGRESS NOTES
,  HISTORY OF PRESENT ILLNESS    Chief Complaint   Patient presents with   [facility] Follow Up     ava 11/16-11/23, double pneumo, need to review lab results       Presents for follow-up. She is with her . She is doing well today. Was admitted to [facility] November 11 to November 23 2019 with community-acquired pneumonia, exacerbation of atrial fibrillation, worsening anemia and leukocytosis. Patient reported a 3-day history of progressive cough, fever, chills and shortness of breath. Was seen by [facility] and then went to the emergency room. Was diagnosed with patchy bibasilar pneumonia based on chest CT. Pulse ox was 90%, WBC 30.2, temp 101.9. She was treated for sepsis and placed on oxygen. Received ceftriaxone and azithromycin antibiotic and plenty of fluids. Her hemoglobin was initially 10.0 when she was in the hospital, but decreased to 7.8. She received 1 unit of packed red blood cells. Hemoccult testing was negative. She did note some intermittent bleeding hemorrhoids. She does have a history of chronic anemia secondary to iron deficiency, but her iron levels were relatively normal.  Consulted with hematologist Dr. Acosta Hernandez who has run additional tests. She was started on amiodarone 20 mg twice daily for atrial fibrillation. Her discharge summary said to stop amlodipine and Bystolic, but she was never told this and is still taking both medications without difficulties. She was also given hydrocortisone suppository for hemorrhoids. Swallowing study was negative for aspiration or any other abnormality. Since discharge, she is doing well. Denies any significant cough or shortness of breath. She is following up with Dr. Dannielle Bravo, her cardiologist, regarding A. fib. He is to take amiodarone. She saw Dr. Christy Mann in GI for follow-up regarding hemorrhoids. Was found to have some nonbleeding internal hemorrhoids.   Referred to surgery Dr. Viky Jordan for elective surgery at some point. Patient denies any further blood in her stool or any rectal pain at this time. She did follow-up with hematology Dr. Linsey Hidalgo this week and additional blood work was done to evaluate her chronic anemia and leukocytosis. She was told that she did not need an iron infusion. She is not taking aspirin. Review of Systems   All other systems reviewed and are negative, except as noted in HPI    Past Medical and Surgical History   has a past medical history of Anemia (12/2019), Atrial fibrillation ([facility] (2019), Basal cell carcinoma (7/2012, 7/2015), CAP (community acquired pneumonia) (11/16/2019), Diverticulitis of colon (5/2010), DJD (degenerative joint disease), lumbar (`), Endometr hyperplasia w/atypia (1997), GERD (gastroesophageal reflux disease) (5/2010), Hiatal hernia (5/2010), Hypertension (1996), Hypothyroidism (2008), Iron deficiency anemia, Melanoma ([facility] Mixed hyperlipidemia, Nephrolithiasis, PNA (pneumonia) (3/26/2019), Stroke (cerebrum) ([facility] (2007), and Zoster. She also has no past medical history of Arsenic suspected exposure, Malignant hyperthermia due to anesthesia, Nausea & vomiting, Nicotine vapor product user, Non-nicotine vapor product user, Pacemaker, Radiation exposure, Sleep apnea, or Tanning bed exposure. has a past surgical history that includes hx edilberto and bso (1997); hx tonsillectomy; hx colonoscopy (5/2010); hx mohs procedure (09/14/2017); colonoscopy (N/A, 10/19/2017); hx endoscopy (5/2010); and hx endoscopy (05/30/2019). reports that she has quit smoking. She smoked 1.00 pack per day. She quit smokeless tobacco use about 29 years ago. She reports current alcohol use of about 1.0 standard drinks of alcohol per week. She reports that she does not use drugs. family history includes Cancer in her brother, father, and mother; Hypertension in her brother. Physical Exam   Nursing note and vitals reviewed.   Blood pressure 131/75, pulse (!) 58, temperature 97.6 °F (36.4 °C), temperature source Oral, resp. rate 18, height 5' 2\" (1.575 m), weight 143 lb (64.9 kg), SpO2 98 %. Constitutional:  No distress. Eyes: Conjunctivae are normal.   Ears:  Hearing grossly intact  Cardiovascular: Normal rate. regular rhythm, no murmurs or gallops  No edema  Pulmonary/Chest: Effort normal.   CTAB  Musculoskeletal: moves all 4 extremities   Neurological: Alert and oriented to person, place, and time. Skin: No rash noted. Psychiatric: Normal mood and affect. Behavior is normal.     ASSESSMENT and PLAN  Diagnoses and all orders for this visit:    1. Community acquired pneumonia, unspecified laterality   Symptomatically completely resolved. She has had 2 pneumonias in the past year. She is up-to-date on all preventative vaccines. 2. Leukocytosis, unspecified type  Significant leukemoid reaction. Seeing hematology. Additional blood work was done this week to evaluate for myeloproliferative disorders. 3. Anemia, unspecified type - she does have a history of chronic iron deficiency anemia but also has a chronic undisclosed anemia. Appreciate hematology recommendations and evaluation. 4. Bleeding hemorrhoids  Currently asymptomatic. She may consult with general surgery in the near consider elective outpatient surgery. 5. Paroxysmal atrial fibrillation (HCC)  Asymptomatic. Follow-up with Dr. Breanna Brewster.  -     amiodarone (CORDARONE) 200 mg tablet; Take 1 Tab by mouth two (2) times a day. Indications: Prevention of Recurrent Atrial Fibrillation  Will need to monitor thyroid function on amiodarone. 6. Essential hypertension  Controlled on current regimen. Continue current medications as written in chart.       lab results and schedule of future lab studies reviewed with patient  reviewed medications and side effects in detail    Return to clinic for further evaluation if new symptoms develop        Current Outpatient Medications   Medication Sig    amiodarone (CORDARONE) 200 mg tablet Take 1 Tab by mouth two (2) times a day. Indications: Prevention of Recurrent Atrial Fibrillation    Mupirocin 2 % okit as needed.  famotidine (PEPCID) 40 mg tablet TAKE 1 TABLET TWICE DAILY    levothyroxine (SYNTHROID) 75 mcg tablet TAKE 1 TABLET BY MOUTH DAILY (BEFORE BREAKFAST). (DECREASED DOSE 3/9/29)    BYSTOLIC 5 mg tablet TAKE 1 TABLET DAILY FOR BLOOD PRESSURE,  PULSE, FATIGUE (REPLACES  ATENOLOL)    amLODIPine (NORVASC) 2.5 mg tablet TAKE 1 TABLET EVERY DAY    montelukast (SINGULAIR) 10 mg tablet TAKE 1 TABLET EVERY DAY    mv-mn/folic acid/calcium/vit K (ONE-A-DAY WOMEN'S 50 PLUS PO) Take 1 Tab by mouth daily.  levocetirizine (XYZAL) 5 mg tablet Take 5 mg by mouth nightly.  acetaminophen (TYLENOL ARTHRITIS PAIN) 650 mg CR tablet Take 1,300 mg by mouth nightly. No current facility-administered medications for this visit.

## 2019-12-26 ENCOUNTER — OFFICE VISIT (OUTPATIENT)
Dept: INTERNAL MEDICINE CLINIC | Age: 79
End: 2019-12-26

## 2019-12-26 VITALS
WEIGHT: 143 LBS | BODY MASS INDEX: 26.31 KG/M2 | OXYGEN SATURATION: 95 % | HEART RATE: 66 BPM | HEIGHT: 62 IN | SYSTOLIC BLOOD PRESSURE: 133 MMHG | TEMPERATURE: 98.4 F | DIASTOLIC BLOOD PRESSURE: 75 MMHG | RESPIRATION RATE: 18 BRPM

## 2019-12-26 DIAGNOSIS — R05.9 COUGH: Primary | ICD-10-CM

## 2019-12-26 RX ORDER — BENZONATATE 200 MG/1
200 CAPSULE ORAL
Qty: 21 CAP | Refills: 2 | Status: SHIPPED | OUTPATIENT
Start: 2019-12-26 | End: 2020-01-02

## 2019-12-26 RX ORDER — LEVOFLOXACIN 500 MG/1
500 TABLET, FILM COATED ORAL DAILY
Qty: 7 TAB | Refills: 0 | Status: CANCELLED | OUTPATIENT
Start: 2019-12-26

## 2019-12-26 RX ORDER — DOXYCYCLINE 100 MG/1
100 CAPSULE ORAL 2 TIMES DAILY
Qty: 20 CAP | Refills: 0 | Status: SHIPPED | OUTPATIENT
Start: 2019-12-26 | End: 2020-03-27 | Stop reason: ALTCHOICE

## 2019-12-26 NOTE — PROGRESS NOTES
HISTORY OF PRESENT ILLNESS    Chief Complaint   Patient presents with    Cough     started coughing yesterday and coughed all night, had double pneumo in Nov, started off the same way     Unfortunately, her  has metastatic lung cancer and will have surgery tomorrow. Presents with mild cough for about 6 days has progressed to a more significant  constant cough over the past 24 hours. She is coughing up some clear yellow sputum. She does have history of pneumonia twice this year, in March and November. She had a bibasilar pneumonia at [facility] in November. Swallow study was negative for aspiration. Appetite is decreased. Does not feel significantly short of breath. Pulse ox is normal in the office today. She is concerned about risk of recurrent pneumonia. Review of Systems   All other systems reviewed and are negative, except as noted in HPI    Past Medical and Surgical History   has a past medical history of Anemia (12/2019), Atrial fibrillation ([address] (2019), Basal cell carcinoma (7/2012, 7/2015), CAP (community acquired pneumonia) (11/16/2019), Diverticulitis of colon (5/2010), DJD (degenerative joint disease), lumbar (`), Endometr hyperplasia w/atypia (1997), GERD (gastroesophageal reflux disease) (5/2010), Hiatal hernia (5/2010), Hypertension (1996), Hypothyroidism (2008), Iron deficiency anemia, Melanoma ([address] Mixed hyperlipidemia, Nephrolithiasis, Stroke (cerebrum) ([address] (2007), and Zoster. She also has no past medical history of Arsenic suspected exposure, Malignant hyperthermia due to anesthesia, Nausea & vomiting, Nicotine vapor product user, Non-nicotine vapor product user, Pacemaker, Radiation exposure, Sleep apnea, or Tanning bed exposure. has a past surgical history that includes hx edilberto and bso (1997); hx tonsillectomy; hx colonoscopy (5/2010); hx mohs procedure (09/14/2017); colonoscopy (N/A, 10/19/2017); hx endoscopy (5/2010); and hx endoscopy (05/30/2019).      reports that she has quit smoking. She smoked 1.00 pack per day. She quit smokeless tobacco use about 30 years ago. She reports current alcohol use of about 1.0 standard drinks of alcohol per week. She reports that she does not use drugs. family history includes Cancer in her brother, father, and mother; Hypertension in her brother. Physical Exam   Nursing note and vitals reviewed. Blood pressure 133/75, pulse 66, temperature 98.4 °F (36.9 °C), temperature source Oral, resp. rate 18, height 5' 2\" (1.575 m), weight 143 lb (64.9 kg), SpO2 95 %. Constitutional: In no distress. Eyes: Conjunctivae are normal.  HEENT:  No LAD or thyromegaly   Cardiovascular: Normal rate. regular rhythm. No murmurs  No edema  Pulmonary/Chest: Effort normal. clear to ausculation blaterally  Musculoskeletal:  no edema. Abd:  Neurological: Alert and oriented. Grossly intact cranial nerves and motor function. Skin: No rash noted. Psychiatric: Normal mood and affect. Behavior is normal.     ASSESSMENT and PLAN  Diagnoses and all orders for this visit:    1. Cough  Mild symptoms which have been greater progressing over the past 24 hours. History of pneumonia twice over the past years she has no warning signs of pneumonia including no fever, no hypoxia.,  No significant shortness of breath, and lungs are clear. That said, she is relatively high risk. Will dose with doxycycline. Could consider cefdinir as next option. Tessalon Perles as needed for cough. She says that narcotic cough steroids did not really help.  -     benzonatate (TESSALON) 200 mg capsule; Take 1 Cap by mouth three (3) times daily as needed for Cough for up to 7 days. -     doxycycline (MONODOX) 100 mg capsule; Take 1 Cap by mouth two (2) times a day.         lab results and schedule of future lab studies reviewed with patient  reviewed medications and side effects in detail    Return to clinic for further evaluation if new symptoms develop or if current symptoms worsen or fail to resolve. There are no Patient Instructions on file for this visit.

## 2020-01-07 ENCOUNTER — OFFICE VISIT (OUTPATIENT)
Dept: DERMATOLOGY | Facility: AMBULATORY SURGERY CENTER | Age: 80
End: 2020-01-07

## 2020-01-07 ENCOUNTER — HOSPITAL ENCOUNTER (OUTPATIENT)
Dept: LAB | Age: 80
Discharge: HOME | End: 2020-01-07

## 2020-01-07 VITALS
RESPIRATION RATE: 14 BRPM | SYSTOLIC BLOOD PRESSURE: 118 MMHG | BODY MASS INDEX: 26.31 KG/M2 | WEIGHT: 143 LBS | HEIGHT: 62 IN | DIASTOLIC BLOOD PRESSURE: 62 MMHG | TEMPERATURE: 97.9 F

## 2020-01-07 DIAGNOSIS — L81.4 LENTIGINES: ICD-10-CM

## 2020-01-07 DIAGNOSIS — L82.1 SEBORRHEIC KERATOSES: ICD-10-CM

## 2020-01-07 DIAGNOSIS — Z85.828 HISTORY OF NONMELANOMA SKIN CANCER: ICD-10-CM

## 2020-01-07 DIAGNOSIS — L57.0 ACTINIC KERATOSIS: ICD-10-CM

## 2020-01-07 DIAGNOSIS — Z85.820 PERSONAL HISTORY OF MALIGNANT MELANOMA OF SKIN: ICD-10-CM

## 2020-01-07 DIAGNOSIS — D48.5 NEOPLASM OF UNCERTAIN BEHAVIOR OF SKIN OF CHEEK: ICD-10-CM

## 2020-01-07 DIAGNOSIS — D48.5 NEOPLASM OF UNCERTAIN BEHAVIOR OF SKIN OF FOREHEAD: Primary | ICD-10-CM

## 2020-01-07 NOTE — PROGRESS NOTES
Written by Cheri Gil, as dictated by [facility] , [address]. Name: Dagoberto Calvert       Age: 67 y.o. Date: 1/7/2020    Chief Complaint:   Chief Complaint   Patient presents with    Skin Exam     Areas of concern:        Subjective:    HPI  Ms. Dagoberto Calvert is a 67 y.o. female who presents for a full skin exam.  The patient's last skin exam was on 9/12/19 and the patient does not have current complaints related to her skin. She reports that she did not apply Efudex solution to the scalp, but her scalp is no longer itching. She is feeling well and in her usual state of health today. No other skin concerns. Her allergies, medications, medical, and social history are reviewed by me today. She reports that she was hospitalized for pneumonia in November. Still has a cough but seeing Pulmonary.  diagnosed with metastatic melanoma and to start immunotherapy in a few weeks. The patient's pertinent skin history includes : NMSC, MM, and AK  -BCC, left paraspinal back, curettage, 6/6/19  -BCC, right paraspinal back, curettage, 6/6/19  -BCC, right shoulder, cryotherapy, 11/26/18  -SCCi, left lateral calf, Mohs, 10/2018  -BCC, left postauricular, Mohs, 10/2018  -BCC, left shoulder, Mohs, 10/2018  -BCC, right upper back, curettage, 05/09/18  -BCC, mid upper back, curettage, 05/09/18  -BCC, right mid back, curettage, 05/09/18  -BCC, mid central back, curettage, 05/09/18  -SCC, left anterior shin, curettage, 05/09/18  -MMi, right upper back, excision, 02/28/18  -BCC, left upper back, curettage, 01/24/18  -SCC, left anterior shin, Mohs, 09/14/17  -BCC, right lateral forehead, Mohs, 11/09/15  -BCC, right superior scaphoid fossa, Mohs, 07/28/15  -MM right upper arm and left thigh per pt prior to first visit    ROS: Constitutional: Negative.     Dermatological : negative    Social History     Socioeconomic History    Marital status:      Spouse name: Kalyani Ny Number of children: 2    Years of education: Not on file    Highest education level: Not on file   Occupational History    Not on file   Social Needs    Financial resource strain: Not on file    Food insecurity:     Worry: Not on file     Inability: Not on file    Transportation needs:     Medical: Not on file     Non-medical: Not on file   Tobacco Use    Smoking status: Former Smoker     Packs/day: 1.00    Smokeless tobacco: Former User     Quit date: 1/1/1990   Substance and Sexual Activity    Alcohol use: Yes     Alcohol/week: 1.0 standard drinks     Types: 1 Glasses of wine per week     Comment: 3-4 x a week    Drug use: No    Sexual activity: Never   Lifestyle    Physical activity:     Days per week: Not on file     Minutes per session: Not on file    Stress: Not on file   Relationships    Social connections:     Talks on phone: Not on file     Gets together: Not on file     Attends Congregation service: Not on file     Active member of club or organization: Not on file     Attends meetings of clubs or organizations: Not on file     Relationship status: Not on file    Intimate partner violence:     Fear of current or ex partner: Not on file     Emotionally abused: Not on file     Physically abused: Not on file     Forced sexual activity: Not on file   Other Topics Concern    Not on file   Social History Narrative    Not on file       Family History   Problem Relation Age of Onset    Cancer Mother     Cancer Father     Hypertension Brother     Cancer Brother         myeloma       Past Medical History:   Diagnosis Date    Anemia 12/2019    Dr. Laurita Coley Atrial fibrillation [facility]) 2019    Dr. De La Garza [facility] cell carcinoma 7/2012, 7/2015    Dr. Chito Torres. saw Dr. Lilliam Carranza CAP (community acquired pneumonia) 11/16/2019    bilateral, patchy. hx PNA 3/2019    Diverticulitis of colon 5/2010    dx on colonoscopy by Dr. Radha Boyle. Took flagyl, cipro    DJD (degenerative joint disease), lumbar `    L5?     Endometr hyperplasia w/atypia 1997    GERD (gastroesophageal reflux disease) 5/2010    Hiatal hernia 5/2010    Hypertension 1996    Hypothyroidism 2008    TSH 1.1 6/7/12    Iron deficiency anemia     EGD, colon 5/2010.  hgb 11.3 6/2012    Melanoma ([address]     mulitple    Mixed hyperlipidemia      Tg 166 LDL 87 HDL55 6/2012    Nephrolithiasis     Skin cancer     Stroke (cerebrum) ([address] 2007    R droop and weakness for 1 week. vessel rupture (not clot)    Sun-damaged skin     Zoster        Past Surgical History:   Procedure Laterality Date    COLONOSCOPY N/A 10/19/2017    COLONOSCOPY performed by Ayaz Celestin MD at [address] [facility] HX COLONOSCOPY  5/2010    diverticuLITIS, tubular adenoma  Dr. Geovani Dickerson HX ENDOSCOPY  5/2010    EGD  - hiatal hernia. Dr. Silvina Cortes  05/30/2019    Dr. Tawanda Ernandez HX [address]  09/14/2017    SCC L anterior mujica by Dr. Chandra Rinne    endometrial pre-cancer on biopsy    HX TONSILLECTOMY         Current Outpatient Medications   Medication Sig Dispense Refill    amiodarone (CORDARONE) 200 mg tablet Take 1 Tab by mouth two (2) times a day. Indications: Prevention of Recurrent Atrial Fibrillation (Patient taking differently: Take 200 mg by mouth daily. Indications: Prevention of Recurrent Atrial Fibrillation) 60 Tab 2    famotidine (PEPCID) 40 mg tablet TAKE 1 TABLET TWICE DAILY 180 Tab 1    levothyroxine (SYNTHROID) 75 mcg tablet TAKE 1 TABLET BY MOUTH DAILY (BEFORE BREAKFAST). (DECREASED DOSE 4/9/19) 90 Tab 1    BYSTOLIC 5 mg tablet TAKE 1 TABLET DAILY FOR BLOOD PRESSURE,  PULSE, FATIGUE (REPLACES  ATENOLOL) 90 Tab 1    amLODIPine (NORVASC) 2.5 mg tablet TAKE 1 TABLET EVERY DAY 90 Tab 1    montelukast (SINGULAIR) 10 mg tablet TAKE 1 TABLET EVERY DAY 90 Tab 2    mv-mn/folic acid/calcium/vit K (ONE-A-DAY WOMEN'S 50 PLUS PO) Take 1 Tab by mouth daily.  levocetirizine (XYZAL) 5 mg tablet Take 5 mg by mouth nightly.       acetaminophen (TYLENOL ARTHRITIS PAIN) 650 mg CR tablet Take 1,300 mg by mouth nightly.  doxycycline (MONODOX) 100 mg capsule Take 1 Cap by mouth two (2) times a day. 20 Cap 0       Allergies   Allergen Reactions    Irbesartan Diarrhea    Iron Diarrhea    Pcn [Penicillins] Rash     PCN only. Can take cephalosporins    Sulfacetamide Swelling         Objective:    Visit Vitals  /62 (BP 1 Location: Left arm, BP Patient Position: Sitting)   Temp 97.9 °F (36.6 °C)   Resp 14   Ht 5' 2\" (1.575 m)   Wt 64.9 kg (143 lb)   BMI 26.16 kg/m²       Obdulia Connor is a 67 y.o. female who appears well and in no distress. She is awake, alert, and oriented. There is no preauricular, submandibular, or cervical lymphadenopathy. A skin examination was performed including her scalp, face (including eyelids), ears, neck, chest, back, abdomen, upper extremities (including digits/nails), lower extremities (except feet), breasts, buttocks; genital skin was not examined. She has multiple well healed scars without evidence of lesion recurrence. She has well healed melanoma scars on the right upper back, left thigh, and right upper arm without pigment, nodularity, or other evidence of lesion recurrence. She has scattered waxy macules and keratotic papules consistent with seborrheic keratoses. She has lentigines on sun exposed areas. She has a thin scaled actinic keratosis on the left upper back and more diffusely still present on the vertex scalp. There is a pink papule on the right mid forehead, 4 x 4 mm and a pink macule 4 x 3 mm on the left cheek r/o BCC. Photos from today's visit:       Right mid forehead  Left cheek    Assessment/Plan:  1. Personal history of melanoma and nonmelanoma skin cancer. I discussed sun protection, sunscreen use, the warning signs of skin cancer, the need for self-skin examinations, and the need for regular practitioner exams.   The patient should follow up sooner as needed if new, changing, or symptomatic skin lesions arise.    2. Seborrheic keratoses. The diagnosis was reviewed and the patient was reassured that no treatment is needed for these benign lesions. 3. Solar lentigos. The diagnosis and relationship to sun exposure was reviewed. Sun protection advised. 4. Neoplasm of Uncertain Behavior, right mid forehead, R/O BCC. The differential diagnoses were discussed. A shave biopsy was advised to sample this lesion. The procedure was reviewed and verbal and written consent were obtained. The risks of pain, bleeding, infection, and scar were discussed. The patient is aware that this is a sample and is intended for diagnosis and not therapy of the skin lesion. I performed the procedure. The site was cleansed and anesthetized with 1% Lidocaine with Epinephrine 1:100,000. A shave biopsy was performed to sample the lesion. Drysol was used for hemostasis. The wound was bandaged and care reviewed. The specimen was sent to pathology. I will contact the patient with the results and any further treatment that may be necessary. 5. Neoplasm of Uncertain Behavior, left cheek, R/O BCC . The differential diagnoses were discussed. A shave biopsy was advised to sample this lesion. The procedure was reviewed and verbal and written consent were obtained. The risks of pain, bleeding, infection, and scar were discussed. The patient is aware that this is a sample and is intended for diagnosis and not therapy of the skin lesion. I performed the procedure. The site was cleansed and anesthetized with 1% Lidocaine with Epinephrine 1:100,000. A shave biopsy was performed to sample the lesion. Drysol was used for hemostasis. The wound was bandaged and care reviewed. The specimen was sent to pathology. I will contact the patient with the results and any further treatment that may be necessary. 6.Actinic Keratoses. The diagnosis of this precancerous lesion related to sun exposure was reviewed.   Verbal consent was obtained. I treated 1 lesions with cryotherapy and post-cryotherapy care was reviewed. Patient still plans to use 5-Fu solution on the scalp. 7.Solar lentigos. The diagnosis and relationship to sun exposure was reviewed. Sun protection advised. Next skin exam:  3 months    This plan was reviewed with the patient and patient agrees. All questions were answered. This scribe documentation was reviewed by me and accurately reflects the examination and decisions made by me. [facility]   OFFICE PROCEDURE PROGRESS NOTE   Chart reviewed for the following:   [place], [address], have reviewed the History, Physical and updated the Allergic reactions for Noman Dings. TIME OUT performed immediately prior to start of procedure:   I, Jodie Cordoba, have performed the following reviews on Noman Dings   prior to the start of the procedure:     * Patient was identified by name and date of birth   * Agreement on procedure being performed was verified   * Risks and Benefits explained to the patient   * Procedure site verified and marked as necessary   * Patient was positioned for comfort   * Consent was signed and verified     Time: 12:00 PM  Date of procedure: 1/7/2020  Procedure performed by: Keysha Salas.  Nik Cordoba  Provider assisted by: Libby Dozier LPN   Patient assisted by: self   How tolerated by patient: tolerated the procedure well with no complications   Comments: none

## 2020-01-08 DIAGNOSIS — E03.9 HYPOTHYROIDISM, UNSPECIFIED TYPE: ICD-10-CM

## 2020-01-08 RX ORDER — NEBIVOLOL HYDROCHLORIDE 5 MG/1
TABLET ORAL
Qty: 90 TAB | Refills: 1 | Status: SHIPPED | OUTPATIENT
Start: 2020-01-08 | End: 2020-08-25 | Stop reason: SDUPTHER

## 2020-01-08 RX ORDER — LEVOTHYROXINE SODIUM 75 UG/1
TABLET ORAL
Qty: 90 TAB | Refills: 1 | Status: SHIPPED | OUTPATIENT
Start: 2020-01-08 | End: 2020-03-27

## 2020-01-09 NOTE — PROGRESS NOTES
I spoke w/ the pt and delivered the good news on her biopsies of DF on the forehead and AK on the cheek.  Will recheck the cheek for recurrence/ persistence at next exam.

## 2020-02-19 RX ORDER — AMLODIPINE BESYLATE 2.5 MG/1
TABLET ORAL
Qty: 90 TAB | Refills: 1 | Status: SHIPPED | OUTPATIENT
Start: 2020-02-19 | End: 2020-07-15

## 2020-03-27 ENCOUNTER — HOSPITAL ENCOUNTER (OUTPATIENT)
Dept: LAB | Age: 80
Discharge: HOME | End: 2020-03-27

## 2020-03-27 ENCOUNTER — OFFICE VISIT (OUTPATIENT)
Dept: INTERNAL MEDICINE CLINIC | Age: 80
End: 2020-03-27

## 2020-03-27 VITALS
OXYGEN SATURATION: 97 % | RESPIRATION RATE: 16 BRPM | SYSTOLIC BLOOD PRESSURE: 130 MMHG | WEIGHT: 140.2 LBS | HEIGHT: 62 IN | HEART RATE: 50 BPM | BODY MASS INDEX: 25.8 KG/M2 | DIASTOLIC BLOOD PRESSURE: 72 MMHG | TEMPERATURE: 97.5 F

## 2020-03-27 DIAGNOSIS — E03.9 HYPOTHYROIDISM, UNSPECIFIED TYPE: ICD-10-CM

## 2020-03-27 DIAGNOSIS — L65.9 HAIR LOSS: ICD-10-CM

## 2020-03-27 DIAGNOSIS — R73.01 IFG (IMPAIRED FASTING GLUCOSE): ICD-10-CM

## 2020-03-27 DIAGNOSIS — K64.9 BLEEDING HEMORRHOIDS: ICD-10-CM

## 2020-03-27 DIAGNOSIS — E78.2 MIXED HYPERLIPIDEMIA: ICD-10-CM

## 2020-03-27 DIAGNOSIS — E55.9 VITAMIN D DEFICIENCY: ICD-10-CM

## 2020-03-27 DIAGNOSIS — R05.9 COUGH: ICD-10-CM

## 2020-03-27 DIAGNOSIS — I48.0 PAROXYSMAL ATRIAL FIBRILLATION (HCC): ICD-10-CM

## 2020-03-27 DIAGNOSIS — D64.9 ANEMIA, UNSPECIFIED TYPE: ICD-10-CM

## 2020-03-27 DIAGNOSIS — D72.829 LEUKOCYTOSIS, UNSPECIFIED TYPE: ICD-10-CM

## 2020-03-27 DIAGNOSIS — I10 ESSENTIAL HYPERTENSION: ICD-10-CM

## 2020-03-27 DIAGNOSIS — Z00.00 MEDICARE ANNUAL WELLNESS VISIT, SUBSEQUENT: Primary | ICD-10-CM

## 2020-03-27 PROBLEM — I48.91 ATRIAL FIBRILLATION (HCC): Status: ACTIVE | Noted: 2019-01-01

## 2020-03-27 LAB
25(OH)D3 SERPL-MCNC: 70.4 NG/ML (ref 30–100)
BASOPHILS # BLD: 0 K/UL (ref 0–0.1)
BASOPHILS NFR BLD: 0 % (ref 0–1)
CHOLEST SERPL-MCNC: 129 MG/DL
DIFFERENTIAL METHOD BLD: ABNORMAL
EOSINOPHIL # BLD: 0.1 K/UL (ref 0–0.4)
EOSINOPHIL NFR BLD: 1 % (ref 0–7)
ERYTHROCYTE [DISTWIDTH] IN BLOOD BY AUTOMATED COUNT: 15.5 % (ref 11.5–14.5)
EST. AVERAGE GLUCOSE BLD GHB EST-MCNC: 126 MG/DL
FERRITIN SERPL-MCNC: 38 NG/ML (ref 26–388)
HBA1C MFR BLD: 6 % (ref 4–5.6)
HCT VFR BLD AUTO: 36.2 % (ref 35–47)
HDLC SERPL-MCNC: 27 MG/DL
HDLC SERPL: 4.8 {RATIO} (ref 0–5)
HGB BLD-MCNC: 10.1 G/DL (ref 11.5–16)
IMM GRANULOCYTES # BLD AUTO: 0 K/UL
IMM GRANULOCYTES NFR BLD AUTO: 0 %
IRON SATN MFR SERPL: 35 % (ref 20–50)
IRON SERPL-MCNC: 103 UG/DL (ref 35–150)
LDLC SERPL CALC-MCNC: 61.2 MG/DL (ref 0–100)
LIPID PROFILE,FLP: ABNORMAL
LYMPHOCYTES # BLD: 1.7 K/UL (ref 0.8–3.5)
LYMPHOCYTES NFR BLD: 13 % (ref 12–49)
MCH RBC QN AUTO: 26 PG (ref 26–34)
MCHC RBC AUTO-ENTMCNC: 27.9 G/DL (ref 30–36.5)
MCV RBC AUTO: 93.3 FL (ref 80–99)
METAMYELOCYTES NFR BLD MANUAL: 3 %
MONOCYTES # BLD: 0.7 K/UL (ref 0–1)
MONOCYTES NFR BLD: 5 % (ref 5–13)
NEUTS BAND NFR BLD MANUAL: 3 % (ref 0–6)
NEUTS SEG # BLD: 10.2 K/UL (ref 1.8–8)
NEUTS SEG NFR BLD: 75 % (ref 32–75)
NRBC # BLD: 0 K/UL (ref 0–0.01)
NRBC BLD-RTO: 0 PER 100 WBC
PLATELET # BLD AUTO: 260 K/UL (ref 150–400)
PMV BLD AUTO: 10.6 FL (ref 8.9–12.9)
RBC # BLD AUTO: 3.88 M/UL (ref 3.8–5.2)
RBC MORPH BLD: ABNORMAL
T4 FREE SERPL-MCNC: 1.5 NG/DL (ref 0.8–1.5)
TIBC SERPL-MCNC: 293 UG/DL (ref 250–450)
TRIGL SERPL-MCNC: 204 MG/DL (ref ?–150)
TSH SERPL DL<=0.05 MIU/L-ACNC: 5.67 UIU/ML (ref 0.36–3.74)
VLDLC SERPL CALC-MCNC: 40.8 MG/DL
WBC # BLD AUTO: 13.1 K/UL (ref 3.6–11)

## 2020-03-27 RX ORDER — ASPIRIN 81 MG/1
81 TABLET ORAL DAILY
Qty: 30 TAB | Refills: 5 | Status: SHIPPED | OUTPATIENT
Start: 2020-03-27 | End: 2020-11-02 | Stop reason: ALTCHOICE

## 2020-03-27 RX ORDER — ALBUTEROL SULFATE 90 UG/1
AEROSOL, METERED RESPIRATORY (INHALATION)
COMMUNITY
Start: 2020-01-02 | End: 2021-02-12

## 2020-03-27 RX ORDER — LEVOTHYROXINE SODIUM 75 UG/1
TABLET ORAL
Qty: 90 TAB | Refills: 1
Start: 2020-03-27 | End: 2020-03-29

## 2020-03-27 RX ORDER — TIOTROPIUM BROMIDE INHALATION SPRAY 3.12 UG/1
2 SPRAY, METERED RESPIRATORY (INHALATION) DAILY
Qty: 3 INHALER | Refills: 4 | Status: SHIPPED | COMMUNITY
Start: 2020-03-27 | End: 2021-06-02 | Stop reason: SDUPTHER

## 2020-03-27 RX ORDER — ERGOCALCIFEROL 1.25 MG/1
CAPSULE ORAL
COMMUNITY
Start: 2020-01-08 | End: 2020-04-03 | Stop reason: SDUPTHER

## 2020-03-27 RX ORDER — FLUTICASONE PROPIONATE 50 MCG
SPRAY, SUSPENSION (ML) NASAL
COMMUNITY
Start: 2020-01-31

## 2020-03-27 RX ORDER — TIOTROPIUM BROMIDE INHALATION SPRAY 3.12 UG/1
SPRAY, METERED RESPIRATORY (INHALATION)
COMMUNITY
Start: 2020-01-29 | End: 2020-03-27 | Stop reason: SDUPTHER

## 2020-03-27 NOTE — PROGRESS NOTES
Coordination of Care  1. Have you been to the ER, urgent care clinic since your last visit? Hospitalized since your last visit? No    2. Have you seen or consulted any other health care providers outside of the [address] [place] [address] since your last visit? Include any pap smears or colon screening. Yes [facility] and [place]     Does the patient need refills?  Yes    Learning Assessment Complete? yes     Chief Complaint   Patient presents with   [address] [facility] Annual Wellness Visit     would like labs drawn     Visit Vitals  /72 (BP 1 Location: Left arm, BP Patient Position: Sitting)   Pulse (!) 50   Temp 97.5 °F (36.4 °C) (Oral)   Resp 16   Ht 5' 2.01\" (1.575 m)   Wt 140 lb 3.2 oz (63.6 kg)   SpO2 97%   BMI 25.64 kg/m²

## 2020-03-27 NOTE — PROGRESS NOTES
This is a Subsequent Medicare Annual Wellness Visit providing Personalized Prevention Plan Services (PPPS) (Performed 12 months after initial AWV and PPPS )    I have reviewed the patient's medical history in detail and updated the computerized patient record. She is doing okay. Her  is undergoing treatments for metastatic melanoma which is causing stress naturally. Seeing Dr. Yolande Box in Hematology for ongoing anemia and significant leukocytosis while she was admitted for pneumonia in December. She last saw him February 3 and blood work that day showed WBC 10.1, hemoglobin 10.1, platelet 608, ANC 6.8. Was recommended to be repeat labs in 6 or 8 weeks. Appointment was canceled because of COVID-19 concerns. Was recommended to consider bone marrow biopsy given her persistent anemia which has been of unclear etiology secondary to EGD, colonoscopy, pill endoscopy all within the past year. However, she does have bleeding hemorrhoids which were banded by Dr. Helen English in February. Denies any notable blood in the stool since that time. She is unable to tolerate oral iron because of significant diarrhea when taking it. She has chronic cough. Is currently seeing pulmonary Dr. Joanna Sepulveda. Was started on Spiriva with some improvement. Unclear if diagnosis is COPD or not. Needs refill of Spiriva since repeat appointment has not been made. Denies any shortness of breath or fever. History of pneumonia in November. Atrial fibrillation. Is seeing Dr. Wayne Jiménez regularly. She is asymptomatic. Is off of Eliquis because of concerns about bleeding from hemorrhoids and anemia. Recently resumed aspirin per his recommendations. Hypothyroidism follow-up  Reports no fatigue. Reports hair loss  denies heat/cold intolerance, bowel/skin changes or CVS symptoms, losing hair, feeling excessive energy, tremulousness, palpitations. Thyroid medication has been decreased since last medication check and labs.    She is taking 75 mcg 6 days/week and 2 pills on . Lab Results   Component Value Date/Time    TSH 6.930 (H) 2019 08:58 AM    T4, Free 1.22 2019 08:58 AM     Hyperlipidemia  On NO MEDS  No new myalgias, no joint pains, no weakness  No TIA's, no chest pain on exertion, no dyspnea on exertion, no swelling of ankles. Lab Results   Component Value Date/Time    Cholesterol, total 58 2019 11:17 AM    HDL Cholesterol 22 2019 11:17 AM    LDL, calculated 17.2 2019 11:17 AM    VLDL, calculated 18.8 2019 11:17 AM    Triglyceride 94 2019 11:17 AM    CHOL/HDL Ratio 2.6 2019 11:17 AM           History     Past Medical History:   Diagnosis Date    Anemia 2019    Dr. Nelson Benton    Atrial fibrillation [facility])     Dr. Shelley Lee cell carcinoma 2012, 2015    [facility] D[facility]. Dr. Tico Sal. saw Dr. Gisel Rasheed CAP (community acquired pneumonia) 2019    bilateral, patchy. hx PNA 3/2019    Cough     pulm Dr. Jose Cameron    Diverticulitis of colon 2010    dx on colonoscopy by Dr. Beryle Levee. Took flagyl, cipro    DJD (degenerative joint disease), lumbar `    L5?  Endometr hyperplasia w/atypia     GERD (gastroesophageal reflux disease) 2010    Hiatal hernia 2010    Hypertension     Hypothyroidism     TSH 1.1 12    Iron deficiency anemia     EGD, colon 2010.  hgb 11.3 2012    Melanoma ([facility]     mulitple    Mixed hyperlipidemia      Tg 166 LDL 87 HDL55 2012    Nephrolithiasis     Stroke (cerebrum) ([facility]     R droop and weakness for 1 week. vessel rupture (not clot)    Sun-damaged skin     Zoster        Past Surgical History:   Procedure Laterality Date    COLONOSCOPY N/A 10/19/2017    COLONOSCOPY performed by Omar Marin MD at [address] [facility] HX COLONOSCOPY  2010    diverticuLITIS, tubular adenoma  Dr. Myrna Avilez HX ENDOSCOPY  2010    EGD  - hiatal hernia.   Dr. Monico Asif  2019    Dr. Amol Cunningham 02/2020    Dr. Douglas Teixeira  09/14/2017    SCC L anterior mujica by Dr. Cindy Vazquez    endometrial pre-cancer on biopsy    HX TONSILLECTOMY         Current Outpatient Medications   Medication Sig    albuterol (PROVENTIL HFA, VENTOLIN HFA, PROAIR HFA) 90 mcg/actuation inhaler     ergocalciferol (ERGOCALCIFEROL) 1,250 mcg (50,000 unit) capsule     fluticasone propionate (FLONASE) 50 mcg/actuation nasal spray     aspirin delayed-release 81 mg tablet Take 1 Tab by mouth daily.  levothyroxine (SYNTHROID) 75 mcg tablet Take 1 pill 6 days per week and 2 pills on Wednesdays (since 6/2019)    Spiriva Respimat 2.5 mcg/actuation inhaler Take 2 Puffs by inhalation daily. Indications: bronchospasm prevention with COPD    amLODIPine (NORVASC) 2.5 mg tablet TAKE 1 TABLET EVERY DAY    BYSTOLIC 5 mg tablet TAKE 1 TABLET DAILY FOR BLOOD PRESSURE,  PULSE, FATIGUE (REPLACES  ATENOLOL)    famotidine (PEPCID) 40 mg tablet TAKE 1 TABLET TWICE DAILY    montelukast (SINGULAIR) 10 mg tablet TAKE 1 TABLET EVERY DAY    mv-mn/folic acid/calcium/vit K (ONE-A-DAY WOMEN'S 50 PLUS PO) Take 1 Tab by mouth daily.  levocetirizine (XYZAL) 5 mg tablet Take 5 mg by mouth nightly.  acetaminophen (TYLENOL ARTHRITIS PAIN) 650 mg CR tablet Take 1,300 mg by mouth nightly. No current facility-administered medications for this visit. Allergies   Allergen Reactions    Irbesartan Diarrhea    Iron Diarrhea    Pcn [Penicillins] Rash     PCN only. Can take cephalosporins    Sulfacetamide Swelling       Family History   Problem Relation Age of Onset    Cancer Mother     Cancer Father     Hypertension Brother     Cancer Brother         myeloma        reports that she has quit smoking. She smoked 1.00 pack per day. She quit smokeless tobacco use about 30 years ago. reports current alcohol use of about 1.0 standard drinks of alcohol per week.       Depression Risk Factor Screening: Alcohol Risk Factor Screening: On any occasion during the past 3 months, have you had more than 3 drinks containing alcohol? No    Do you average more than 14 drinks per week? No      Functional Ability and Level of Safety:     Hearing Loss   mild    Activities of Daily Living   Self-care. Requires assistance with: no ADLs    Fall Risk     Fall Risk Assessment, last 12 mths 3/27/2020   Able to walk? Yes   Fall in past 12 months? No         Abuse Screen   Patient is not abused    Review of Systems   A comprehensive review of systems was negative except for that written in the HPI. Physical Examination     Evaluation of Cognitive Function:  Mood/affect:  neutral, happy  Appearance: age appropriate  Family member/caregiver input: none    Blood pressure 130/72, pulse (!) 50, temperature 97.5 °F (36.4 °C), temperature source Oral, resp. rate 16, height 5' 2.01\" (1.575 m), weight 140 lb 3.2 oz (63.6 kg), SpO2 97 %. General appearance: alert, cooperative, no distress, appears stated age  Neck: supple, symmetrical, trachea midline, no adenopathy, thyroid: not enlarged, symmetric, no tenderness/mass/nodules, no carotid bruit and no JVD  Lungs: clear to auscultation bilaterally  Heart: regular rate and rhythm, S1, S2 normal, no murmur, click, rub or gallop  Extremities: extremities normal, atraumatic, no cyanosis or edema    Patient Care Team:  Ines Bonilla MD as PCP - General (Internal Medicine)  Ines Bonilla MD as PCP - REHABILITATION [facility] Provider  Ivana Perez NP as Nurse Practitioner (Nurse Practitioner)  Homero King MD (Ophthalmology)      Advice/Referrals/Counseling   Education and counseling provided. See below for specific orders    Assessment/Plan   Diagnoses and all orders for this visit:    1. Medicare annual wellness visit, subsequent  She is up-to-date on all preventative screenings. 2. Hypothyroidism, unspecified type  Unclear control on higher dose. Reports hair loss.   -     T4, FREE; Future  -     TSH 3RD GENERATION; Future  -     levothyroxine (SYNTHROID) 75 mcg tablet; Take 1 pill 6 days per week and 2 pills on Wednesdays (since 6/2019)    3. Leukocytosis, unspecified type  Resolved as of labs in February. Likely secondary to pneumonia. -     CBC WITH AUTOMATED DIFF; Future    4. Anemia, unspecified type  Chronic, iron deficient? Bleeding hemorrhoids recently banded. Intolerant to iron supplement. Consider referral back to hematology if persistent is to consider bone marrow biopsy. Asymptomatic otherwise. -     IRON PROFILE; Future  -     FERRITIN; Future  -     CBC WITH AUTOMATED DIFF; Future    5. Bleeding hemorrhoids  Currently asymptomatic status post banding. Recommend follow-up with Dr. Aminta Kapoor. Repeat labs today. 6. Paroxysmal atrial fibrillation [facility])  Follow-up with cardiology. Taking aspirinCurrently asymptomatic  But really should resume Eliquis as needed if tolerable and anemia resolves. -     aspirin delayed-release 81 mg tablet; Take 1 Tab by mouth daily. 7. Essential hypertension  Controlled on current regimen. Continue current medications as written in chart. 8. Mixed hyperlipidemia  Unclear control off of statin. -     LIPID PANEL; Future    9. Hair loss  chronic. Taking collagen with some improvement. Multifactorial.  Check thyroid function and iron levels. Stress also may be playing a role. -     TSH 3RD GENERATION; Future  -     IRON PROFILE; Future    10. Vitamin D deficiency  -     VITAMIN D, 25 HYDROXY; Future    11. IFG (impaired fasting glucose)  The patient is asked to make an attempt to improve diet and exercise patterns to aid in medical management of this problem  -     HEMOGLOBIN A1C WITH EAG; Future    12. Cough  Presumed diagnosis of COPD. Symptomatically improved with Spiriva and albuterol as needed. Refilled per her request.  Can follow-up with pulmonary as needed. -     Spiriva Respimat 2.5 mcg/actuation inhaler;  Take 2 Puffs by inhalation daily. Indications: bronchospasm prevention with COPD    . Potential medication side effects were discussed with the patient; let me know if any occur.   Return for yearly Annual Wellness Visits

## 2020-03-29 DIAGNOSIS — E03.9 HYPOTHYROIDISM, UNSPECIFIED TYPE: ICD-10-CM

## 2020-03-29 RX ORDER — LEVOTHYROXINE SODIUM 88 UG/1
88 TABLET ORAL
Qty: 90 TAB | Refills: 1 | Status: SHIPPED | COMMUNITY
Start: 2020-03-29 | End: 2020-09-29

## 2020-03-29 RX ORDER — LEVOTHYROXINE SODIUM 75 UG/1
TABLET ORAL
Qty: 90 TAB | Refills: 1 | Status: SHIPPED | OUTPATIENT
Start: 2020-03-29 | End: 2020-03-29 | Stop reason: DRUGHIGH

## 2020-03-30 NOTE — PROGRESS NOTES
Results were reviewed  with Patient. advised of new medication dose sent to pharmacy, lab results were sent to Doctor as requested, pt aware

## 2020-04-03 DIAGNOSIS — E55.9 VITAMIN D DEFICIENCY: ICD-10-CM

## 2020-04-03 RX ORDER — ERGOCALCIFEROL 1.25 MG/1
50000 CAPSULE ORAL
Qty: 13 CAP | Refills: 1 | Status: SHIPPED | OUTPATIENT
Start: 2020-04-03 | End: 2020-04-03 | Stop reason: SDUPTHER

## 2020-06-08 RX ORDER — METOPROLOL SUCCINATE 25 MG/1
25 TABLET, EXTENDED RELEASE ORAL DAILY
Qty: 30 TAB | Refills: 0 | Status: SHIPPED | OUTPATIENT
Start: 2020-06-08 | End: 2020-08-25 | Stop reason: ALTCHOICE

## 2020-07-08 RX ORDER — METOPROLOL SUCCINATE 25 MG/1
TABLET, EXTENDED RELEASE ORAL
Qty: 30 TAB | Refills: 0 | OUTPATIENT
Start: 2020-07-08

## 2020-07-13 RX ORDER — FLUCONAZOLE 150 MG/1
150 TABLET ORAL
Qty: 2 TAB | Refills: 0 | Status: SHIPPED | OUTPATIENT
Start: 2020-07-13 | End: 2020-07-13

## 2020-07-15 RX ORDER — AMLODIPINE BESYLATE 2.5 MG/1
TABLET ORAL
Qty: 90 TAB | Refills: 1 | Status: SHIPPED | OUTPATIENT
Start: 2020-07-15 | End: 2021-01-20

## 2020-08-05 ENCOUNTER — OFFICE VISIT (OUTPATIENT)
Dept: OBGYN CLINIC | Age: 80
End: 2020-08-05
Payer: MEDICARE

## 2020-08-05 DIAGNOSIS — N76.0 VAGINITIS AND VULVOVAGINITIS: Primary | ICD-10-CM

## 2020-08-05 PROCEDURE — G8419 CALC BMI OUT NRM PARAM NOF/U: HCPCS | Performed by: OBSTETRICS & GYNECOLOGY

## 2020-08-05 PROCEDURE — 99202 OFFICE O/P NEW SF 15 MIN: CPT | Performed by: OBSTETRICS & GYNECOLOGY

## 2020-08-05 PROCEDURE — 1101F PT FALLS ASSESS-DOCD LE1/YR: CPT | Performed by: OBSTETRICS & GYNECOLOGY

## 2020-08-05 PROCEDURE — G8427 DOCREV CUR MEDS BY ELIG CLIN: HCPCS | Performed by: OBSTETRICS & GYNECOLOGY

## 2020-08-05 PROCEDURE — G8399 PT W/DXA RESULTS DOCUMENT: HCPCS | Performed by: OBSTETRICS & GYNECOLOGY

## 2020-08-05 PROCEDURE — G8536 NO DOC ELDER MAL SCRN: HCPCS | Performed by: OBSTETRICS & GYNECOLOGY

## 2020-08-05 PROCEDURE — 1090F PRES/ABSN URINE INCON ASSESS: CPT | Performed by: OBSTETRICS & GYNECOLOGY

## 2020-08-05 PROCEDURE — G8756 NO BP MEASURE DOC: HCPCS | Performed by: OBSTETRICS & GYNECOLOGY

## 2020-08-05 PROCEDURE — G8432 DEP SCR NOT DOC, RNG: HCPCS | Performed by: OBSTETRICS & GYNECOLOGY

## 2020-08-05 RX ORDER — FLUCONAZOLE 200 MG/1
200 TABLET ORAL
Qty: 4 TAB | Refills: 1 | Status: SHIPPED | OUTPATIENT
Start: 2020-08-05 | End: 2020-08-20

## 2020-08-05 NOTE — PROGRESS NOTES
Vaginitis evaluation    Chief Complaint   Vaginitis      HPI  79 y.o. female complains of itching/irriation for 1 month. No LMP recorded. Patient has had a hysterectomy. She denies additional symptoms at this time. The patient denies aggravating factors. She is not concerned about possible STI exposure at this time. She denies exposure to new chemicals or hygenic agents  Previous treatment included: Diflucan 2 tablets. She believe she might of been on an antibiotic before all this started but cannot remember    Past Medical History:   Diagnosis Date    Anemia 12/2019    Dr. Eric Jarvis Atrial fibrillation [facility]) 2019    Dr. Garzon Outlaw cell carcinoma 7/2012, 7/2015    Margaret Oakley. Dr. Lonny Bruner. saw Dr. Mariana Wells CAP (community acquired pneumonia) 11/16/2019    bilateral, patchy. hx PNA 3/2019    Cough     pulm Dr. Jennifer Dhillon    Diverticulitis of colon 05/2010    dx on colonoscopy by Dr. Alexandria Taylor. Took flagyl, cipro    DJD (degenerative joint disease), lumbar `    L5?  Endometr hyperplasia w/atypia 1997    GERD (gastroesophageal reflux disease) 5/2010    Hiatal hernia 5/2010    Hypertension 1996    Hypothyroidism 2008    TSH 1.1 6/7/12    Iron deficiency anemia     EGD, colon 5/2010.  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 vessel rupture (not clot)    Sun-damaged skin     Zoster      Past Surgical History:   Procedure Laterality Date    COLONOSCOPY N/A 10/19/2017    COLONOSCOPY performed by Nathan Lee MD at [address] HX COLONOSCOPY  5/2010    diverticuLITIS, tubular adenoma  Dr. Morgan Louisa HX ENDOSCOPY  5/2010    EGD  - hiatal hernia.   Dr. Tremayne Scott  05/30/2019    Dr. Celina Oreilly  02/2020    Dr. Selena Azar  09/14/2017    SCC L anterior mujica by Dr. Jessica Reinoso endometrial pre-cancer on biopsy    HX TONSILLECTOMY       Social History     Occupational History    Not on file   Tobacco Use    Smoking status: Former Smoker     Packs/day: 1.00    Smokeless tobacco: Former User     Quit date: 1/1/1990   Substance and Sexual Activity    Alcohol use: Yes     Alcohol/week: 1.0 standard drinks     Types: 1 Glasses of wine per week     Comment: 3-4 x a week    Drug use: No    Sexual activity: Never     Family History   Problem Relation Age of Onset    Cancer Mother     Cancer Father     Hypertension Brother     Cancer Brother         myeloma        Allergies   Allergen Reactions    Irbesartan Diarrhea    Iron Diarrhea    Pcn [Penicillins] Rash     PCN only. Can take cephalosporins    Sulfacetamide Swelling     Prior to Admission medications    Medication Sig Start Date End Date Taking? Authorizing Provider   amLODIPine (NORVASC) 2.5 mg tablet TAKE 1 TABLET EVERY DAY 7/15/20   Carolina Esposito MD   metoprolol succinate (Toprol XL) 25 mg XL tablet Take 1 Tab by mouth daily. Replaces Bystolic 8/9/64 8/1/14   Carolina Esposito MD   montelukast (SINGULAIR) 10 mg tablet Take 1 Tab by mouth daily. 4/3/20   Carolina Esposito MD   ergocalciferol (ERGOCALCIFEROL) 1,250 mcg (50,000 unit) capsule Take 1 Cap by mouth every seven (7) days. 4/3/20   Carolina Esposito MD   levothyroxine (SYNTHROID) 88 mcg tablet Take 1 Tab by mouth Daily (before breakfast). Increased dose 3/29/20 3/29/20   Carolina Esposito MD   albuterol (PROVENTIL HFA, VENTOLIN HFA, PROAIR HFA) 90 mcg/actuation inhaler  1/2/20   Provider, Historical   fluticasone propionate (FLONASE) 50 mcg/actuation nasal spray  1/31/20   Provider, Historical   aspirin delayed-release 81 mg tablet Take 1 Tab by mouth daily. 3/27/20   Carolina Esposito MD   Spiriva Respimat 2.5 mcg/actuation inhaler Take 2 Puffs by inhalation daily.  Indications: bronchospasm prevention with COPD 3/27/20   Jewel Ricci MD   BYSTOLIC 5 mg tablet TAKE 1 TABLET DAILY FOR BLOOD PRESSURE,  PULSE, FATIGUE (REPLACES  ATENOLOL) 1/8/20   Vimal Esposito MD   famotidine (PEPCID) 40 mg tablet TAKE 1 TABLET TWICE DAILY 9/10/19   Vimal Esposito MD   mv-mn/folic acid/calcium/vit K (ONE-A-DAY WOMEN'S 50 PLUS PO) Take 1 Tab by mouth daily. Provider, Historical   levocetirizine (XYZAL) 5 mg tablet Take 5 mg by mouth nightly. Other, MD Elida   acetaminophen (TYLENOL ARTHRITIS PAIN) 650 mg CR tablet Take 1,300 mg by mouth nightly. Provider, Historical                      Review of Systems - History obtained from the patient  Constitutional: negative for weight loss, fever, night sweats  Breast: negative for breast lumps, nipple discharge, galactorrhea  GI: negative for change in bowel habits, abdominal pain, black or bloody stools  : negative for frequency, dysuria, hematuria  MSK: negative for back pain, joint pain, muscle pain  Skin: negative for itching, rash, hives  Neuro: negative for dizziness, headache, confusion, weakness  Psych: negative for anxiety, depression, change in mood  Heme/lymph: negative for bleeding, bruising, pallor       Objective: There were no vitals taken for this visit.     Physical Exam:   PHYSICAL EXAMINATION    Constitutional  · Appearance: well-nourished, well developed, alert, in no acute distress    HENT  · Head and Face: appears normal    Genitourinary  · External Genitalia: atrophic with some mild labial resorption, no discharge present, no tenderness present, no inflammatory lesions present, no masses present  · Vagina:  minimal discharge present, otherwise atrophic vaginal vault without central or paravaginal defects, no inflammatory lesions present, no masses present  · Bladder: non-tender to palpation  · Urethra: appears normal  · Cervix: absent   · Uterus: absent  · Adnexa: no adnexal tenderness present, no adnexal masses present  · Perineum: perineum within normal limits, no evidence of trauma, no rashes or skin lesions present  · Anus: anus within normal limits, no hemorrhoids present  · Inguinal Lymph Nodes: no lymphadenopathy present    Skin  · General Inspection: no rash, no lesions identified    Neurologic/Psychiatric  · Mental Status:  · Orientation: grossly oriented to person, place and time  · Mood and Affect: mood normal, affect appropriate  . No results found for any visits on 08/05/20. Assessment:   monilia vaginitis    Plan:   Treatment: Diflucan, #4. NS sent to rule out Glabrata. ROV prn if symptoms persist or worsen.

## 2020-08-05 NOTE — PATIENT INSTRUCTIONS
Vaginitis: Care Instructions Your Care Instructions Vaginitis is soreness or infection of the vagina. This common problem can cause itching and burning. And it can cause a change in vaginal discharge. Sometimes it can cause pain during sex. Vaginitis may be caused by bacteria, yeast, or other germs. Some infections that cause it are caught from a sexual partner. Bath products, spermicides, and douches can irritate the vagina too. Some women have this problem during and after menopause. A drop in estrogen levels during this time can cause dryness, soreness, and pain during sex. Your doctor can give you medicine to treat an infection. And home care may help you feel better. For certain types of infections, your sex partner must be treated too. Follow-up care is a key part of your treatment and safety. Be sure to make and go to all appointments, and call your doctor if you are having problems. It's also a good idea to know your test results and keep a list of the medicines you take. How can you care for yourself at home? · If your doctor prescribed antibiotics, take them as directed. Do not stop taking them just because you feel better. You need to take the full course of antibiotics. · Take your medicines exactly as prescribed. Call your doctor if you think you are having a problem with your medicine. · Do not eat or drink anything that has alcohol if you are taking metronidazole (Flagyl). · If you have a yeast infection, use over-the-counter products as your doctor tells you to. Or take medicine your doctor prescribes exactly as directed. · Wash your vaginal area daily with water. You also can use a mild, unscented soap if you want. · Do not use scented bath products. And do not use vaginal sprays or douches. · Put a washcloth soaked in cool water on the area to relieve itching. Or you can take cool baths.  
· If you have dryness because of menopause, use estrogen cream or pills that your doctor prescribes. · Ask your doctor about when it is okay to have sex. · Use a personal lubricant before sex if you have dryness. Examples are Astroglide, K-Y Jelly, and Wet Lubricant Gel. · Ask your doctor if your sex partner also needs treatment. When should you call for help? Call your doctor now or seek immediate medical care if: 
· You have a fever and pelvic pain. Watch closely for changes in your health, and be sure to contact your doctor if: 
· You have bleeding other than your period. · You do not get better as expected. Where can you learn more? Go to http://gabi-vielka.info/ Enter E866 in the search box to learn more about \"Vaginitis: Care Instructions. \" Current as of: November 8, 2019               Content Version: 12.5 © 9406-3813 [employer]. Care instructions adapted under license by [employer] (which disclaims liability or warranty for this information). If you have questions about a medical condition or this instruction, always ask your healthcare professional. [address] any warranty or liability for your use of this information.

## 2020-08-08 LAB
A VAGINAE DNA VAG QL NAA+PROBE: NORMAL SCORE
BVAB2 DNA VAG QL NAA+PROBE: NORMAL SCORE
C ALBICANS DNA VAG QL NAA+PROBE: NEGATIVE
C GLABRATA DNA VAG QL NAA+PROBE: NEGATIVE
MEGA1 DNA VAG QL NAA+PROBE: NORMAL SCORE

## 2020-08-25 RX ORDER — NEBIVOLOL HYDROCHLORIDE 5 MG/1
TABLET ORAL
Qty: 90 TAB | Refills: 3 | Status: SHIPPED | COMMUNITY
Start: 2020-08-25 | End: 2021-05-08

## 2020-08-25 RX ORDER — NEBIVOLOL HYDROCHLORIDE 5 MG/1
5 TABLET ORAL DAILY
Qty: 10 TAB | Refills: 0 | Status: SHIPPED | OUTPATIENT
Start: 2020-08-25 | End: 2020-11-02 | Stop reason: SDUPTHER

## 2020-09-29 DIAGNOSIS — E03.9 HYPOTHYROIDISM, UNSPECIFIED TYPE: ICD-10-CM

## 2020-09-29 RX ORDER — LEVOTHYROXINE SODIUM 88 UG/1
TABLET ORAL
Qty: 90 TAB | Refills: 1 | Status: SHIPPED | OUTPATIENT
Start: 2020-09-29 | End: 2021-03-26

## 2020-11-02 ENCOUNTER — OFFICE VISIT (OUTPATIENT)
Dept: INTERNAL MEDICINE CLINIC | Age: 80
End: 2020-11-02
Payer: MEDICARE

## 2020-11-02 VITALS
TEMPERATURE: 97.8 F | WEIGHT: 142.8 LBS | DIASTOLIC BLOOD PRESSURE: 72 MMHG | HEART RATE: 80 BPM | RESPIRATION RATE: 12 BRPM | HEIGHT: 62 IN | SYSTOLIC BLOOD PRESSURE: 127 MMHG | OXYGEN SATURATION: 98 % | BODY MASS INDEX: 26.28 KG/M2

## 2020-11-02 DIAGNOSIS — D50.9 IRON DEFICIENCY ANEMIA, UNSPECIFIED IRON DEFICIENCY ANEMIA TYPE: ICD-10-CM

## 2020-11-02 DIAGNOSIS — E78.2 MIXED HYPERLIPIDEMIA: ICD-10-CM

## 2020-11-02 DIAGNOSIS — D72.829 LEUKOCYTOSIS, UNSPECIFIED TYPE: ICD-10-CM

## 2020-11-02 DIAGNOSIS — E03.9 HYPOTHYROIDISM, UNSPECIFIED TYPE: Primary | ICD-10-CM

## 2020-11-02 DIAGNOSIS — I10 ESSENTIAL HYPERTENSION: ICD-10-CM

## 2020-11-02 DIAGNOSIS — D64.9 ANEMIA, UNSPECIFIED TYPE: ICD-10-CM

## 2020-11-02 DIAGNOSIS — R73.01 IFG (IMPAIRED FASTING GLUCOSE): ICD-10-CM

## 2020-11-02 DIAGNOSIS — I48.0 PAROXYSMAL ATRIAL FIBRILLATION (HCC): ICD-10-CM

## 2020-11-02 DIAGNOSIS — C43.9 MALIGNANT MELANOMA, UNSPECIFIED SITE (HCC): ICD-10-CM

## 2020-11-02 LAB
ALBUMIN SERPL-MCNC: 3.8 G/DL (ref 3.5–5)
ALBUMIN/GLOB SERPL: 1.1 {RATIO} (ref 1.1–2.2)
ALP SERPL-CCNC: 72 U/L (ref 45–117)
ALT SERPL-CCNC: 18 U/L (ref 12–78)
ANION GAP SERPL CALC-SCNC: 4 MMOL/L (ref 5–15)
AST SERPL-CCNC: 11 U/L (ref 15–37)
BASOPHILS # BLD: 0.1 K/UL (ref 0–0.1)
BASOPHILS NFR BLD: 1 % (ref 0–1)
BILIRUB SERPL-MCNC: 0.8 MG/DL (ref 0.2–1)
BUN SERPL-MCNC: 31 MG/DL (ref 6–20)
BUN/CREAT SERPL: 22 (ref 12–20)
CALCIUM SERPL-MCNC: 8.7 MG/DL (ref 8.5–10.1)
CHLORIDE SERPL-SCNC: 112 MMOL/L (ref 97–108)
CO2 SERPL-SCNC: 25 MMOL/L (ref 21–32)
CREAT SERPL-MCNC: 1.43 MG/DL (ref 0.55–1.02)
DIFFERENTIAL METHOD BLD: ABNORMAL
EOSINOPHIL # BLD: 0.1 K/UL (ref 0–0.4)
EOSINOPHIL NFR BLD: 1 % (ref 0–7)
ERYTHROCYTE [DISTWIDTH] IN BLOOD BY AUTOMATED COUNT: 15.5 % (ref 11.5–14.5)
EST. AVERAGE GLUCOSE BLD GHB EST-MCNC: 111 MG/DL
GLOBULIN SER CALC-MCNC: 3.6 G/DL (ref 2–4)
GLUCOSE SERPL-MCNC: 91 MG/DL (ref 65–100)
HBA1C MFR BLD: 5.5 % (ref 4–5.6)
HCT VFR BLD AUTO: 32.4 % (ref 35–47)
HGB BLD-MCNC: 9 G/DL (ref 11.5–16)
IMM GRANULOCYTES # BLD AUTO: 1 K/UL (ref 0–0.04)
IMM GRANULOCYTES NFR BLD AUTO: 8 % (ref 0–0.5)
IRON SATN MFR SERPL: 39 % (ref 20–50)
IRON SERPL-MCNC: 109 UG/DL (ref 35–150)
LYMPHOCYTES # BLD: 1.9 K/UL (ref 0.8–3.5)
LYMPHOCYTES NFR BLD: 16 % (ref 12–49)
MCH RBC QN AUTO: 25.7 PG (ref 26–34)
MCHC RBC AUTO-ENTMCNC: 27.8 G/DL (ref 30–36.5)
MCV RBC AUTO: 92.6 FL (ref 80–99)
MONOCYTES # BLD: 1.6 K/UL (ref 0–1)
MONOCYTES NFR BLD: 13 % (ref 5–13)
NEUTS SEG # BLD: 7.3 K/UL (ref 1.8–8)
NEUTS SEG NFR BLD: 61 % (ref 32–75)
NRBC # BLD: 0 K/UL (ref 0–0.01)
NRBC BLD-RTO: 0 PER 100 WBC
PLATELET # BLD AUTO: 238 K/UL (ref 150–400)
PMV BLD AUTO: 10.7 FL (ref 8.9–12.9)
POTASSIUM SERPL-SCNC: 4.2 MMOL/L (ref 3.5–5.1)
PROT SERPL-MCNC: 7.4 G/DL (ref 6.4–8.2)
RBC # BLD AUTO: 3.5 M/UL (ref 3.8–5.2)
RBC MORPH BLD: ABNORMAL
RBC MORPH BLD: ABNORMAL
SODIUM SERPL-SCNC: 141 MMOL/L (ref 136–145)
T4 FREE SERPL-MCNC: 1.2 NG/DL (ref 0.8–1.5)
TIBC SERPL-MCNC: 277 UG/DL (ref 250–450)
TSH SERPL DL<=0.05 MIU/L-ACNC: 1.19 UIU/ML (ref 0.36–3.74)
WBC # BLD AUTO: 12 K/UL (ref 3.6–11)

## 2020-11-02 PROCEDURE — G8427 DOCREV CUR MEDS BY ELIG CLIN: HCPCS | Performed by: INTERNAL MEDICINE

## 2020-11-02 PROCEDURE — G8419 CALC BMI OUT NRM PARAM NOF/U: HCPCS | Performed by: INTERNAL MEDICINE

## 2020-11-02 PROCEDURE — G8752 SYS BP LESS 140: HCPCS | Performed by: INTERNAL MEDICINE

## 2020-11-02 PROCEDURE — 99214 OFFICE O/P EST MOD 30 MIN: CPT | Performed by: INTERNAL MEDICINE

## 2020-11-02 PROCEDURE — G8399 PT W/DXA RESULTS DOCUMENT: HCPCS | Performed by: INTERNAL MEDICINE

## 2020-11-02 PROCEDURE — G8754 DIAS BP LESS 90: HCPCS | Performed by: INTERNAL MEDICINE

## 2020-11-02 PROCEDURE — G8432 DEP SCR NOT DOC, RNG: HCPCS | Performed by: INTERNAL MEDICINE

## 2020-11-02 PROCEDURE — 1090F PRES/ABSN URINE INCON ASSESS: CPT | Performed by: INTERNAL MEDICINE

## 2020-11-02 PROCEDURE — G8536 NO DOC ELDER MAL SCRN: HCPCS | Performed by: INTERNAL MEDICINE

## 2020-11-02 PROCEDURE — 1101F PT FALLS ASSESS-DOCD LE1/YR: CPT | Performed by: INTERNAL MEDICINE

## 2020-11-02 RX ORDER — OMEPRAZOLE 20 MG/1
CAPSULE, DELAYED RELEASE ORAL
COMMUNITY
Start: 2020-09-27

## 2020-11-02 NOTE — PROGRESS NOTES
HISTORY OF PRESENT ILLNESS    Chief Complaint   Patient presents with    Follow-up     had CPE done in March        Presents for follow-up    Hx melanomas. Seeing Dr. Jacki Crawford. bx pending of scalp. Hypothyroidism follow-up  Reports fatigue  denies heat/cold intolerance, bowel/skin changes or CVS symptoms, losing hair, feeling excessive energy, tremulousness, palpitations. Thyroid medication has been increased since last medication check and labs. Lab Results   Component Value Date/Time    TSH 5.67 (H) 03/27/2020 10:07 AM    T4, Free 1.5 03/27/2020 10:07 AM     Wt Readings from Last 3 Encounters:   11/02/20 142 lb 12.8 oz (64.8 kg)   03/27/20 140 lb 3.2 oz (63.6 kg)   01/07/20 143 lb (64.9 kg)     Was released from pulmonary. Changed to omeprazole and cough is improved. Leukocytosis, anemia. Saw Dr. Cheryl Frost 7/2020 who recommended ongoing 2-3 mo f/u. She cancelled and asks for to do labs. Impaired fasting glucose / Pre-diabetes follow-up  Lab Results   Component Value Date/Time    Glucose 145 (H) 03/26/2019 06:16 AM     Last hemoglobin a1c   Lab Results   Component Value Date/Time    Hemoglobin A1c 6.0 (H) 03/27/2020 10:07 AM   Diabetic diet compliance: compliant most of the time. Patient does not perform home glucose monitoring. Hypertension   Hypertension ROS: taking medications as instructed, no medication side effects noted, no TIA's, no chest pain on exertion, no dyspnea on exertion, no swelling of ankles     reports that she has quit smoking. She smoked 1.00 pack per day. She quit smokeless tobacco use about 30 years ago. reports current alcohol use of about 1.0 standard drinks of alcohol per week.    BP Readings from Last 2 Encounters:   11/02/20 127/72   03/27/20 130/72             Review of Systems   All other systems reviewed and are negative, except as noted in HPI    Past Medical and Surgical History   has a past medical history of Anemia (12/2019), Atrial fibrillation ([facility] [address] (2019), Basal cell carcinoma (7/2012, 7/2015), CAP (community acquired pneumonia) (11/16/2019), Cough, Diverticulitis of colon (05/2010), DJD (degenerative joint disease), lumbar (`), Endometr hyperplasia w/atypia (1997), GERD (gastroesophageal reflux disease) (5/2010), Hiatal hernia (5/2010), Hypertension (1996), Hypothyroidism (2008), Iron deficiency anemia, Melanoma ([facility] [address] Mixed hyperlipidemia, Nephrolithiasis, Stroke (cerebrum) ([facility] [address] (2007), Sun-damaged skin, and Zoster. She also has no past medical history of Arsenic suspected exposure, Malignant hyperthermia due to anesthesia, Nausea & vomiting, Nicotine vapor product user, Non-nicotine vapor product user, Pacemaker, Radiation exposure, Sleep apnea, or Tanning bed exposure. has a past surgical history that includes hx edilberto and bso (1997); hx tonsillectomy; hx colonoscopy (5/2010); hx mohs procedure (09/14/2017); colonoscopy (N/A, 10/19/2017); hx endoscopy (5/2010); hx endoscopy (05/30/2019); and hx hemorrhoidectomy (02/2020). reports that she has quit smoking. She smoked 1.00 pack per day. She quit smokeless tobacco use about 30 years ago. She reports current alcohol use of about 1.0 standard drinks of alcohol per week. She reports that she does not use drugs. family history includes Cancer in her brother, father, and mother; Hypertension in her brother. Physical Exam   Nursing note and vitals reviewed. Blood pressure 127/72, pulse 80, temperature 97.8 °F (36.6 °C), temperature source Oral, resp. rate 12, height 5' 2\" (1.575 m), weight 142 lb 12.8 oz (64.8 kg), SpO2 98 %. Constitutional:  No distress. Eyes: Conjunctivae are normal.   Ears:  Hearing grossly intact  Cardiovascular: Normal rate. regular rhythm, no murmurs or gallops  No edema  Pulmonary/Chest: Effort normal.   CTAB  Musculoskeletal: moves all 4 extremities   Neurological: Alert and oriented to person, place, and time. Skin: No rash noted. Psychiatric: Normal mood and affect. Behavior is normal.     ASSESSMENT and PLAN  Diagnoses and all orders for this visit:    1. Hypothyroidism, unspecified type  based on my history, the overall control of this problem unclear  On higher dose  -     TSH 3RD GENERATION; Future  -     T4, FREE; Future    2. Leukocytosis, unspecified type - repeat labs. May need f/u with Hemaology  -     CBC WITH AUTOMATED DIFF; Future  -     IRON PROFILE; Future    3. Anemia, unspecified type  -     CBC WITH AUTOMATED DIFF; Future  -     IRON PROFILE; Future    4. Iron deficiency anemia, unspecified iron deficiency anemia type    5. Paroxysmal atrial fibrillation (HCC)  Currently asymptomatic    6. IFG (impaired fasting glucose)  The patient is asked to make an attempt to improve diet and exercise patterns to aid in medical management of this problem. -     HEMOGLOBIN A1C WITH EAG; Future    7. Essential hypertension  Controlled on current regimen. Continue current medications as written in chart.  -     METABOLIC PANEL, COMPREHENSIVE; Future    8. Mixed hyperlipidemia  The patient is asked to make an attempt to improve diet and exercise patterns to aid in medical management of this problem. Return to clinic in 6 months to repeat your blood work. 9. Malignant melanoma, unspecified site [facility])  Seeing derm      There are no Patient Instructions on file for this visit.    lab results and schedule of future lab studies reviewed with patient  reviewed medications and side effects in detail    Return to clinic for further evaluation if new symptoms develop        Current Outpatient Medications   Medication Sig    levothyroxine (SYNTHROID) 88 mcg tablet TAKE 1 TABLET DAILY BEFORE BREAKFAST (DOSE INCREASED 5/74/18)    Bystolic 5 mg tablet TAKE 1 TABLET DAILY FOR BLOOD PRESSURE,  PULSE, FATIGUE    amLODIPine (NORVASC) 2.5 mg tablet TAKE 1 TABLET EVERY DAY    montelukast (SINGULAIR) 10 mg tablet Take 1 Tab by mouth daily.     ergocalciferol (ERGOCALCIFEROL) 1,250 mcg (50,000 unit) capsule Take 1 Cap by mouth every seven (7) days.  albuterol (PROVENTIL HFA, VENTOLIN HFA, PROAIR HFA) 90 mcg/actuation inhaler     fluticasone propionate (FLONASE) 50 mcg/actuation nasal spray     Spiriva Respimat 2.5 mcg/actuation inhaler Take 2 Puffs by inhalation daily. Indications: bronchospasm prevention with COPD    famotidine (PEPCID) 40 mg tablet TAKE 1 TABLET TWICE DAILY    mv-mn/folic acid/calcium/vit K (ONE-A-DAY WOMEN'S 50 PLUS PO) Take 1 Tab by mouth daily.  levocetirizine (XYZAL) 5 mg tablet Take 5 mg by mouth nightly.  acetaminophen (TYLENOL ARTHRITIS PAIN) 650 mg CR tablet Take 1,300 mg by mouth nightly.  omeprazole (PRILOSEC) 20 mg capsule      No current facility-administered medications for this visit.

## 2020-11-08 DIAGNOSIS — N17.9 AKI (ACUTE KIDNEY INJURY) (HCC): Primary | ICD-10-CM

## 2020-11-12 ENCOUNTER — TELEPHONE (OUTPATIENT)
Dept: INTERNAL MEDICINE CLINIC | Age: 80
End: 2020-11-12

## 2020-11-12 NOTE — TELEPHONE ENCOUNTER
----- Message from Cristian Leal sent at 11/12/2020  7:24 AM EST -----  Regarding: Dr. Carmenza Loya  Caller's first and last name: Altannease Situ calling from Dr. Carlos Perez office at [facility]   Reason for call: Need patients last physical date written in note sent  Fax to [phone]   Callback required yes/no and why: yes   Best contact number(s): [phone]   Details to clarify the request:

## 2020-11-12 NOTE — TELEPHONE ENCOUNTER
[address] [place] [address],[address]. They needed the most recent encounter note sent over from Dr. Komal Dalal. Patient scheduled for biopsy on 11/20/20. Routed note to their office.     Rowan Nieto, PharmD, BCPS, [facility]

## 2020-11-16 ENCOUNTER — APPOINTMENT (OUTPATIENT)
Dept: ULTRASOUND IMAGING | Age: 80
End: 2020-11-16
Attending: INTERNAL MEDICINE

## 2020-12-28 PROBLEM — C93.10 CMML (CHRONIC MYELOMONOCYTIC LEUKEMIA) (HCC): Status: ACTIVE | Noted: 2020-11-01

## 2020-12-28 PROBLEM — R16.1 SPLENOMEGALY: Status: ACTIVE | Noted: 2020-11-01

## 2020-12-30 DIAGNOSIS — E55.9 VITAMIN D DEFICIENCY: ICD-10-CM

## 2020-12-30 RX ORDER — ERGOCALCIFEROL 1.25 MG/1
50000 CAPSULE ORAL
Qty: 13 CAP | Refills: 2 | Status: SHIPPED | OUTPATIENT
Start: 2020-12-30 | End: 2021-10-01

## 2021-01-20 RX ORDER — MONTELUKAST SODIUM 10 MG/1
TABLET ORAL
Qty: 90 TAB | Refills: 2 | Status: SHIPPED | OUTPATIENT
Start: 2021-01-20 | End: 2021-10-21

## 2021-01-20 RX ORDER — AMLODIPINE BESYLATE 2.5 MG/1
TABLET ORAL
Qty: 90 TAB | Refills: 1 | Status: SHIPPED | OUTPATIENT
Start: 2021-01-20 | End: 2021-02-12

## 2021-02-07 RX ORDER — NITROFURANTOIN 25; 75 MG/1; MG/1
100 CAPSULE ORAL 2 TIMES DAILY
Qty: 10 CAP | Refills: 0 | Status: SHIPPED | OUTPATIENT
Start: 2021-02-07 | End: 2021-02-12 | Stop reason: ALTCHOICE

## 2021-02-10 ENCOUNTER — TELEPHONE (OUTPATIENT)
Dept: INTERNAL MEDICINE CLINIC | Age: 81
End: 2021-02-10

## 2021-02-12 ENCOUNTER — VIRTUAL VISIT (OUTPATIENT)
Dept: INTERNAL MEDICINE CLINIC | Age: 81
End: 2021-02-12
Payer: MEDICARE

## 2021-02-12 DIAGNOSIS — C93.10 CHRONIC MYELOMONOCYTIC LEUKEMIA NOT HAVING ACHIEVED REMISSION (HCC): ICD-10-CM

## 2021-02-12 DIAGNOSIS — R06.2 WHEEZING: ICD-10-CM

## 2021-02-12 DIAGNOSIS — N17.9 AKI (ACUTE KIDNEY INJURY) (HCC): ICD-10-CM

## 2021-02-12 DIAGNOSIS — I48.0 PAROXYSMAL ATRIAL FIBRILLATION (HCC): ICD-10-CM

## 2021-02-12 DIAGNOSIS — N39.0 URINARY TRACT INFECTION WITHOUT HEMATURIA, SITE UNSPECIFIED: ICD-10-CM

## 2021-02-12 DIAGNOSIS — R33.9 URINARY RETENTION: ICD-10-CM

## 2021-02-12 DIAGNOSIS — D64.9 ANEMIA, UNSPECIFIED TYPE: ICD-10-CM

## 2021-02-12 DIAGNOSIS — R19.7 DIARRHEA, UNSPECIFIED TYPE: Primary | ICD-10-CM

## 2021-02-12 DIAGNOSIS — R16.1 SPLENOMEGALY: ICD-10-CM

## 2021-02-12 DIAGNOSIS — I10 ESSENTIAL HYPERTENSION: ICD-10-CM

## 2021-02-12 DIAGNOSIS — R05.9 COUGH: ICD-10-CM

## 2021-02-12 PROCEDURE — 1090F PRES/ABSN URINE INCON ASSESS: CPT | Performed by: INTERNAL MEDICINE

## 2021-02-12 PROCEDURE — G8432 DEP SCR NOT DOC, RNG: HCPCS | Performed by: INTERNAL MEDICINE

## 2021-02-12 PROCEDURE — 1101F PT FALLS ASSESS-DOCD LE1/YR: CPT | Performed by: INTERNAL MEDICINE

## 2021-02-12 PROCEDURE — G8536 NO DOC ELDER MAL SCRN: HCPCS | Performed by: INTERNAL MEDICINE

## 2021-02-12 PROCEDURE — G8399 PT W/DXA RESULTS DOCUMENT: HCPCS | Performed by: INTERNAL MEDICINE

## 2021-02-12 PROCEDURE — G8756 NO BP MEASURE DOC: HCPCS | Performed by: INTERNAL MEDICINE

## 2021-02-12 PROCEDURE — 99215 OFFICE O/P EST HI 40 MIN: CPT | Performed by: INTERNAL MEDICINE

## 2021-02-12 PROCEDURE — G8428 CUR MEDS NOT DOCUMENT: HCPCS | Performed by: INTERNAL MEDICINE

## 2021-02-12 PROCEDURE — G8419 CALC BMI OUT NRM PARAM NOF/U: HCPCS | Performed by: INTERNAL MEDICINE

## 2021-02-12 RX ORDER — AMLODIPINE BESYLATE 5 MG/1
5 TABLET ORAL DAILY
Qty: 90 TAB | Refills: 1 | Status: SHIPPED | COMMUNITY
Start: 2021-02-12 | End: 2021-05-05

## 2021-02-12 RX ORDER — CEFDINIR 300 MG/1
CAPSULE ORAL
COMMUNITY
Start: 2021-02-09 | End: 2021-02-24 | Stop reason: ALTCHOICE

## 2021-02-12 RX ORDER — ALBUTEROL SULFATE 90 UG/1
2 AEROSOL, METERED RESPIRATORY (INHALATION)
Qty: 1 INHALER | Refills: 5 | Status: SHIPPED | OUTPATIENT
Start: 2021-02-12 | End: 2022-06-20 | Stop reason: ALTCHOICE

## 2021-02-12 RX ORDER — AMLODIPINE BESYLATE 5 MG/1
5 TABLET ORAL DAILY
Qty: 90 TAB | Refills: 0 | Status: SHIPPED | OUTPATIENT
Start: 2021-02-12 | End: 2021-02-12

## 2021-02-12 RX ORDER — CHOLESTYRAMINE 4 G/9G
POWDER, FOR SUSPENSION ORAL
COMMUNITY
Start: 2021-02-09 | End: 2021-02-21

## 2021-02-12 NOTE — PROGRESS NOTES
HISTORY OF PRESENT ILLNESS Chief Complaint Patient presents with  
[facility] Follow Up  
 
 
Presents for follow-up Dr Neptali Chapman in Radiation therapy 1/20/21 (3rd tx) to spleen Seeing Dr. Dajuan Morales in Hematology. hgb baseline 7-9 Review of Systems All other systems reviewed and are negative, except as noted in HPI Past Medical and Surgical History 
 has a past medical history of Anemia (12/2019), Atrial fibrillation ([address] (2019), Basal cell carcinoma (7/2012, 7/2015), CAP (community acquired pneumonia) (11/16/2019), CMML (chronic myelomonocytic leukemia) ([address] (11/2020), COPD (chronic obstructive pulmonary disease) ([address] Cough, Diverticulitis of colon (05/2010), DJD (degenerative joint disease), lumbar (`), Endometr hyperplasia w/atypia (1997), GERD (gastroesophageal reflux disease) (5/2010), Hiatal hernia (5/2010), Hydroureter, left (11/16/2020), Hypertension (1996), Hypothyroidism (2008), Iron deficiency anemia, Melanoma ([address] Mixed hyperlipidemia, Nephrolithiasis, Splenomegaly (11/2020), Stroke (cerebrum) ([address] (2007), Sun-damaged skin, and Zoster. She also has no past medical history of Arsenic suspected exposure, Malignant hyperthermia due to anesthesia, Nausea & vomiting, Nicotine vapor product user, Non-nicotine vapor product user, Pacemaker, Radiation exposure, Sleep apnea, or Tanning bed exposure. has a past surgical history that includes hx edilberto and bso (1997); hx tonsillectomy; hx colonoscopy (5/2010); hx mohs procedure (09/14/2017); colonoscopy (N/A, 10/19/2017); hx endoscopy (5/2010); hx endoscopy (05/30/2019); and hx hemorrhoidectomy (02/2020). reports that she has quit smoking. She smoked 1.00 pack per day. She quit smokeless tobacco use about 31 years ago. She reports current alcohol use of about 1.0 standard drinks of alcohol per week. She reports that she does not use drugs. family history includes Cancer in her brother, father, and mother; Hypertension in her brother. Physical Exam  
Nursing note and vitals reviewed. There were no vitals taken for this visit. Constitutional:  No distress. Eyes: Conjunctivae are normal.  
Ears:  Hearing grossly intact Cardiovascular: Normal rate. regular rhythm, no murmurs or gallops No edema Pulmonary/Chest: Effort normal.   CTAB Musculoskeletal: moves all 4 extremities Neurological: Alert and oriented to person, place, and time. Skin: No visible rash noted. Psychiatric: Normal mood and affect. Behavior is normal.  
 
ASSESSMENT and PLAN Diagnoses and all orders for this visit: 1. Diarrhea, unspecified type 2. Chronic myelomonocytic leukemia not having achieved remission ([facility] 3. Paroxysmal atrial fibrillation (HCC) 4. Urinary tract infection without hematuria, site unspecified 5. Urinary retention 6. Anemia, unspecified type 
-     CBC W/O DIFF; Future 
-     IRON PROFILE; Future 7. Essential hypertension -     METABOLIC PANEL, BASIC; Future 8. Wheezing 9. Cough 
-     albuterol (PROVENTIL HFA, VENTOLIN HFA, PROAIR HFA) 90 mcg/actuation inhaler; Take 2 Puffs by inhalation every six (6) hours as needed for Wheezing. 10. Splenomegaly 11. JB (acute kidney injury) ([facility] -     METABOLIC PANEL, BASIC; Future Other orders 
-     amLODIPine (NORVASC) 5 mg tablet; Take 1 Tab by mouth daily. Increased dose 2/12/21 There are no Patient Instructions on file for this visit. 
 
lab results and schedule of future lab studies reviewed with patient 
reviewed medications and side effects in detail Return to clinic for further evaluation if new symptoms develop Current Outpatient Medications Medication Sig  cholestyramine (QUESTRAN) 4 gram packet  cefdinir (OMNICEF) 300 mg capsule  albuterol (PROVENTIL HFA, VENTOLIN HFA, PROAIR HFA) 90 mcg/actuation inhaler Take 2 Puffs by inhalation every six (6) hours as needed for Wheezing.  amLODIPine (NORVASC) 2.5 mg tablet TAKE 1 TABLET EVERY DAY  montelukast (SINGULAIR) 10 mg tablet TAKE 1 TABLET EVERY DAY  ergocalciferol (ERGOCALCIFEROL) 1,250 mcg (50,000 unit) capsule TAKE 1 CAP BY MOUTH EVERY SEVEN (7) DAYS.  omeprazole (PRILOSEC) 20 mg capsule  levothyroxine (SYNTHROID) 88 mcg tablet TAKE 1 TABLET DAILY BEFORE BREAKFAST (DOSE INCREASED 3/29/20)  Bystolic 5 mg tablet TAKE 1 TABLET DAILY FOR BLOOD PRESSURE,  PULSE, FATIGUE  fluticasone propionate (FLONASE) 50 mcg/actuation nasal spray  Spiriva Respimat 2.5 mcg/actuation inhaler Take 2 Puffs by inhalation daily. Indications: bronchospasm prevention with COPD  famotidine (PEPCID) 40 mg tablet TAKE 1 TABLET TWICE DAILY  mv-mn/folic acid/calcium/vit K (ONE-A-DAY WOMEN'S 50 PLUS PO) Take 1 Tab by mouth daily.  levocetirizine (XYZAL) 5 mg tablet Take 5 mg by mouth nightly.  acetaminophen (TYLENOL ARTHRITIS PAIN) 650 mg CR tablet Take 1,300 mg by mouth nightly. No current facility-administered medications for this visit.

## 2021-02-12 NOTE — PROGRESS NOTES
Consent: Joe Navarrete, who was seen by synchronous (real-time) audio-video technology, and/or her healthcare decision maker, is aware that this patient-initiated, Telehealth encounter on 2/12/2021 is a billable service, with coverage as determined by her insurance carrier. She is aware that she may receive a bill and has provided verbal consent to proceed: Yes. 712  Subjective:   Joe Navarrete is a 79 y.o. female who was seen for Hospital Follow Up    Follow-up of hospital admission to [facility] from February 6 to February 9, 2021  She was admitted for diarrhea and urinary retention. Symptoms began about 7 days after her most recent radiation therapy for CMML. Radiation to her spleen is being attempted due to masses splenomegaly. Seeing Dr Antonio Spears in [address] [place] [address] and had 3rd tx 1/20/21. She began to have profuse watery diarrhea and became dehydrated. In addition had some difficulty urinating, but was found to have an acute UTI. In the hospital, diarrhea improved with the use of Questran. C. difficile and stool cultures were negative. 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 UTI was treated with IV ceftriaxone. Culture was pansensitive E. coli was changed  PO Omnicef. Postvoid residual was 0.she denied any further issues with urinary retention. She has chronic anemia with a hemoglobin baseline of 7-9. Hemoglobin decreased to 6.8 while in the hospital and was given a transfusion of 1 unit of packed red blood cells. Hemoglobin improved to 8.2 on discharge. Stools negative for occult blood. Dr. Benito Santiago and GI consulted on her. Seeing Dr. Lyle Castle in Hematology for CMML. She saw him yesterday for a video visit and was asked to follow-up with him in April. He did ask for labs to be done with me however. On day of discharge, she began having some wheezing which was relatively mild. She was given an inhaler with some improvement.   She has been seeing pulmonary and was diagnosed with possible COPD. She has been taking Spiriva daily once again and it seems to be helping the cough some. She denies any fever or shortness of breath over wheezing is persisting some. Is taking Singulair and Xyzal.  He does not have an albuterol inhaler at home. Reports ongoing fatigue. She was hoping a transfusion would make her feel little bit better but it has not. Appetite is fair. Hypertension. She is taking Bystolic 5 mg daily and Amlodipine 5 mg daily. Home blood pressures have bee in the 150s. Current Outpatient Medications   Medication Sig    cholestyramine (QUESTRAN) 4 gram packet     cefdinir (OMNICEF) 300 mg capsule     albuterol (PROVENTIL HFA, VENTOLIN HFA, PROAIR HFA) 90 mcg/actuation inhaler Take 2 Puffs by inhalation every six (6) hours as needed for Wheezing.  amLODIPine (NORVASC) 5 mg tablet Take 1 Tab by mouth daily. Increased dose 2/12/21    montelukast (SINGULAIR) 10 mg tablet TAKE 1 TABLET EVERY DAY    ergocalciferol (ERGOCALCIFEROL) 1,250 mcg (50,000 unit) capsule TAKE 1 CAP BY MOUTH EVERY SEVEN (7) DAYS.  omeprazole (PRILOSEC) 20 mg capsule     levothyroxine (SYNTHROID) 88 mcg tablet TAKE 1 TABLET DAILY BEFORE BREAKFAST (DOSE INCREASED 3/63/12)    Bystolic 5 mg tablet TAKE 1 TABLET DAILY FOR BLOOD PRESSURE,  PULSE, FATIGUE    fluticasone propionate (FLONASE) 50 mcg/actuation nasal spray     Spiriva Respimat 2.5 mcg/actuation inhaler Take 2 Puffs by inhalation daily. Indications: bronchospasm prevention with COPD    famotidine (PEPCID) 40 mg tablet TAKE 1 TABLET TWICE DAILY    mv-mn/folic acid/calcium/vit K (ONE-A-DAY WOMEN'S 50 PLUS PO) Take 1 Tab by mouth daily.  levocetirizine (XYZAL) 5 mg tablet Take 5 mg by mouth nightly.  acetaminophen (TYLENOL ARTHRITIS PAIN) 650 mg CR tablet Take 1,300 mg by mouth nightly. No current facility-administered medications for this visit.         Allergies   Allergen Reactions    Irbesartan Diarrhea    Iron Diarrhea    Pcn [Penicillins] Rash     PCN only. Can take cephalosporins    Sulfacetamide Swelling       Past Medical History:   Diagnosis Date    Anemia 12/2019    Dr. Orestes Dupree Atrial fibrillation [facility]) 2019    Dr. Copeland [place] cell carcinoma 7/2012, 7/2015    Gopi Johnson. Dr. Dayo Abel. saw Dr. Vidal Ozuna CAP (community acquired pneumonia) 11/16/2019    bilateral, patchy. hx PNA 3/2019    CMML (chronic myelomonocytic leukemia) ([address] 11/2020    Dr. Jordan Krabbe. Stage 0.  marrow bx 70% cellularity, no blasts    COPD (chronic obstructive pulmonary disease) ([facility])     Questionable? Dr. Selene Tovar Diverticulitis of colon 05/2010    dx on colonoscopy by Dr. Fatimah Hodges. Took flagyl, cipro    DJD (degenerative joint disease), lumbar `    L5?  Endometr hyperplasia w/atypia 1997    GERD (gastroesophageal reflux disease) 5/2010    Hiatal hernia 5/2010    Hydroureter, left 11/16/2020    due to splenomegaly. Dr. Allie Penn Hypertension 1996    Hypothyroidism 2008    TSH 1.1 6/7/12    Iron deficiency anemia     EGD, colon 5/2010.  hgb 11.3 6/2012    Melanoma [facility]     Dr. Bob Boyd. x3    Mixed hyperlipidemia      Tg 166 LDL 87 HDL55 6/2012    Nephrolithiasis     Splenomegaly 11/2020    23 cm    Stroke (cerebrum) ([facility]) 2007    R droop and weakness for 1 week. vessel rupture (not clot)    Sun-damaged skin     Zoster        ROS  All other systems reviewed and negative, unless mentioned in HPI    Objective:   Vital Signs: (As obtained by patient/caregiver at home)  There were no vitals taken for this visit.      [INSTRUCTIONS:  \"[x]\" Indicates a positive item  \"[]\" Indicates a negative item  -- DELETE ALL ITEMS NOT EXAMINED]    Constitutional: [x] Appears well-developed and well-nourished [x] No apparent distress      [] Abnormal -     Mental status: [x] Alert and awake  [x] Oriented to person/place/time [x] Able to follow commands    [] Abnormal -     Eyes:   EOM    [x]  Normal [] Abnormal -   Sclera  [x]  Normal    [] Abnormal -          Discharge [x]  None visible   [] Abnormal -     HENT: [x] Normocephalic, atraumatic  [] Abnormal -   [x] Mouth/Throat: Mucous membranes are moist    External Ears [x] Normal  [] Abnormal -    Neck: [x] No visualized mass [] Abnormal -     Pulmonary/Chest: [x] Respiratory effort normal   [x] No visualized signs of difficulty breathing or respiratory distress        [] Abnormal -      Musculoskeletal:   [x] Normal gait with no signs of ataxia         [x] Normal range of motion of neck        [] Abnormal -     Neurological:        [x] No Facial Asymmetry (Cranial nerve 7 motor function) (limited exam due to video visit)          [x] No gaze palsy        [] Abnormal -          Skin:        [x] No significant exanthematous lesions or discoloration noted on facial skin         [] Abnormal -            Psychiatric:       [x] Normal Affect [] Abnormal -        [x] No Hallucinations    Other pertinent observable physical exam findings:-        Assessment & Plan:   Diagnoses and all orders for this visit:    1. Diarrhea, unspecified type  Resolved. No longer using Questran. Postradiation diarrhea. Can use medication as needed. Holding further radiation for now. 2. Chronic myelomonocytic leukemia not having achieved remission ([facility]  Her white blood cell count was normal in the hospital, 9.4. Chronic anemia. Platelets also have been normal.  Splenomegaly is a primary issue. Following up with oncology in April. We will repeat CBC. 3. Paroxysmal atrial fibrillation (HCC)  Asymptomatic. 4. Urinary tract infection without hematuria, site unspecified  Symptoms are resolving on Omnicef. Pansensitive E. coli. 5. Urinary retention  Perceived urinary retention. This may have been some of her colon inflammation that was causing this. PVR was 0. Can follow-up with urology if symptoms recur. 6. Anemia, unspecified type  Secondary to CMML.   Repeat CBC and I will check iron labs. Baseline is 7-9. May consider repeat transfusion if below 7. I will consult with her oncologist about the case. -     CBC W/O DIFF; Future  -     IRON PROFILE; Future    7. Essential hypertension  Blood pressure is uncontrolled. Increase amlodipine.  -     METABOLIC PANEL, BASIC; Future  -     amLODIPine (NORVASC) 5 mg tablet; Take 1 Tab by mouth daily. Increased dose 2/12/21    8. Wheezing  Probable COPD with possible possible mild exacerbation. She needs a prescription for albuterol. Continue Spiriva. Follow-up with pulmonary. Could consider a few days of prednisone if symptoms are not improving. Watch closely for infectious symptoms of pneumonia  -     albuterol (PROVENTIL HFA, VENTOLIN HFA, PROAIR HFA) 90 mcg/actuation inhaler; Take 2 Puffs by inhalation every six (6) hours as needed for Wheezing. 9. Cough  -     albuterol (PROVENTIL HFA, VENTOLIN HFA, PROAIR HFA) 90 mcg/actuation inhaler; Take 2 Puffs by inhalation every six (6) hours as needed for Wheezing. 10. Splenomegaly  Secondary to CMML. Followed by oncology and radiation therapy. Unclear if she could tolerate additional radiation. No obstructive symptoms or pain. 11. JB (acute kidney injury) ([facility]  Secondary to diarrhea. Resolved on discharge. Repeat basic metabolic panel on Monday. -     METABOLIC PANEL, BASIC; Future          We discussed the expected course, resolution and complications of the diagnosis(es) in detail. Medication risks, benefits, costs, interactions, and alternatives were discussed as indicated. I advised her to contact the office if her condition worsens, changes or fails to improve as anticipated. She expressed understanding with the diagnosis(es) and plan. Helga Herrera is a 79 y.o. female who was evaluated by an audio-video encounter for concerns as above. Patient identification was verified prior to start of the visit. A caregiver was present when appropriate.  Due to this being a TeleHealth encounter (During BKB-78 public health emergency), evaluation of the following organ systems was limited: Vitals/Constitutional/EENT/Resp/CV/GI//MS/Neuro/Skin/Heme-Lymph-Imm. Pursuant to the emergency declaration under the [address] [place] [address], [facility] waiver authority and the [employer] Act, this Virtual Visit was conducted, with patient's (and/or legal guardian's) consent, to reduce the patient's risk of exposure to COVID-19 and provide necessary medical care. Services were provided through a synchronous discussion virtually to substitute for in-person clinic visit. I was in the office. The patient was at home.      Lani Ibrahim MD

## 2021-02-16 LAB
BUN SERPL-MCNC: 18 MG/DL (ref 8–27)
BUN/CREAT SERPL: 16 (ref 12–28)
CALCIUM SERPL-MCNC: 8.4 MG/DL (ref 8.7–10.3)
CHLORIDE SERPL-SCNC: 106 MMOL/L (ref 96–106)
CO2 SERPL-SCNC: 22 MMOL/L (ref 20–29)
CREAT SERPL-MCNC: 1.16 MG/DL (ref 0.57–1)
ERYTHROCYTE [DISTWIDTH] IN BLOOD BY AUTOMATED COUNT: 14.7 % (ref 11.7–15.4)
GLUCOSE SERPL-MCNC: 97 MG/DL (ref 65–99)
HCT VFR BLD AUTO: 31.3 % (ref 34–46.6)
HGB BLD-MCNC: 9.4 G/DL (ref 11.1–15.9)
INTERPRETATION: NORMAL
IRON SATN MFR SERPL: 30 % (ref 15–55)
IRON SERPL-MCNC: 49 UG/DL (ref 27–139)
MCH RBC QN AUTO: 26.3 PG (ref 26.6–33)
MCHC RBC AUTO-ENTMCNC: 30 G/DL (ref 31.5–35.7)
MCV RBC AUTO: 87 FL (ref 79–97)
PLATELET # BLD AUTO: 195 X10E3/UL (ref 150–450)
POTASSIUM SERPL-SCNC: 4.4 MMOL/L (ref 3.5–5.2)
RBC # BLD AUTO: 3.58 X10E6/UL (ref 3.77–5.28)
SODIUM SERPL-SCNC: 141 MMOL/L (ref 134–144)
TIBC SERPL-MCNC: 164 UG/DL (ref 250–450)
UIBC SERPL-MCNC: 115 UG/DL (ref 118–369)
WBC # BLD AUTO: 10.1 X10E3/UL (ref 3.4–10.8)

## 2021-02-24 ENCOUNTER — OFFICE VISIT (OUTPATIENT)
Dept: INTERNAL MEDICINE CLINIC | Age: 81
End: 2021-02-24
Payer: MEDICARE

## 2021-02-24 VITALS
WEIGHT: 134.2 LBS | DIASTOLIC BLOOD PRESSURE: 68 MMHG | TEMPERATURE: 97.6 F | HEART RATE: 75 BPM | RESPIRATION RATE: 13 BRPM | HEIGHT: 62 IN | SYSTOLIC BLOOD PRESSURE: 122 MMHG | OXYGEN SATURATION: 99 % | BODY MASS INDEX: 24.69 KG/M2

## 2021-02-24 DIAGNOSIS — N39.0 URINARY TRACT INFECTION WITHOUT HEMATURIA, SITE UNSPECIFIED: Primary | ICD-10-CM

## 2021-02-24 LAB
BILIRUB UR QL STRIP: NEGATIVE
GLUCOSE UR-MCNC: NEGATIVE MG/DL
KETONES P FAST UR STRIP-MCNC: NEGATIVE MG/DL
PH UR STRIP: 5.5 [PH] (ref 4.6–8)
PROT UR QL STRIP: NORMAL
SP GR UR STRIP: 1.02 (ref 1–1.03)
UA UROBILINOGEN AMB POC: NORMAL (ref 0.2–1)
URINALYSIS CLARITY POC: NORMAL
URINALYSIS COLOR POC: YELLOW
URINE BLOOD POC: NORMAL
URINE LEUKOCYTES POC: NORMAL
URINE NITRITES POC: NEGATIVE

## 2021-02-24 PROCEDURE — G8536 NO DOC ELDER MAL SCRN: HCPCS | Performed by: INTERNAL MEDICINE

## 2021-02-24 PROCEDURE — 99213 OFFICE O/P EST LOW 20 MIN: CPT | Performed by: INTERNAL MEDICINE

## 2021-02-24 PROCEDURE — 1090F PRES/ABSN URINE INCON ASSESS: CPT | Performed by: INTERNAL MEDICINE

## 2021-02-24 PROCEDURE — G8427 DOCREV CUR MEDS BY ELIG CLIN: HCPCS | Performed by: INTERNAL MEDICINE

## 2021-02-24 PROCEDURE — G8399 PT W/DXA RESULTS DOCUMENT: HCPCS | Performed by: INTERNAL MEDICINE

## 2021-02-24 PROCEDURE — 81003 URINALYSIS AUTO W/O SCOPE: CPT | Performed by: INTERNAL MEDICINE

## 2021-02-24 PROCEDURE — 1101F PT FALLS ASSESS-DOCD LE1/YR: CPT | Performed by: INTERNAL MEDICINE

## 2021-02-24 PROCEDURE — G8432 DEP SCR NOT DOC, RNG: HCPCS | Performed by: INTERNAL MEDICINE

## 2021-02-24 PROCEDURE — G8752 SYS BP LESS 140: HCPCS | Performed by: INTERNAL MEDICINE

## 2021-02-24 PROCEDURE — G8420 CALC BMI NORM PARAMETERS: HCPCS | Performed by: INTERNAL MEDICINE

## 2021-02-24 PROCEDURE — G8754 DIAS BP LESS 90: HCPCS | Performed by: INTERNAL MEDICINE

## 2021-02-24 RX ORDER — GUAIFENESIN 600 MG/1
600 TABLET, EXTENDED RELEASE ORAL 2 TIMES DAILY
COMMUNITY

## 2021-02-25 ENCOUNTER — TELEPHONE (OUTPATIENT)
Dept: INTERNAL MEDICINE CLINIC | Age: 81
End: 2021-02-25

## 2021-02-25 NOTE — TELEPHONE ENCOUNTER
----- Message from David Mahan sent at 2/25/2021 11:49 AM EST -----  Regarding: /telephone  Contact: [phone]  General Message/Vendor Calls    Caller's first and last name:N/A      Reason for call: She would like to know why a rx was called into her pharmacy for diarrhea. She does not have that symptom and is confused why it was sent in for her.        Callback required yes/no and why: Yes      Best contact number(s):[phone]       Details to clarify the request: N/A      [place]

## 2021-02-25 NOTE — PROGRESS NOTES
HISTORY OF PRESENT ILLNESS    Chief Complaint   Patient presents with    Bladder Infection     Recently seen at [facility] for UTI, thinks she still has it. Presents for follow-up of UTI. Today, she reports a 2-day history of mild intermittent dysuria and mild vaginal itching. She has a history of significant UTI and was admitted on February 6 to [facility] for this as well as diarrhea. UTI was found to be pansensitive E. coli. Was treated with IV ceftriaxone and changed to cefdinir. .  There was some concern with urinary retention, this resolved in the hospital.  She does feel like she is emptying her bladder okay. She is concerned mostly that she has not fully clear the UTI. Reports another UTI in January  Denies any fevers, chills, flank pain. Denies any vaginal discharge or significant erythema. Review of Systems   All other systems reviewed and are negative, except as noted in HPI    Past Medical and Surgical History   has a past medical history of Anemia (12/2019), Atrial fibrillation ([address] (2019), Basal cell carcinoma (7/2012, 7/2015), CAP (community acquired pneumonia) (11/16/2019), CMML (chronic myelomonocytic leukemia) ([address] (11/2020), COPD (chronic obstructive pulmonary disease) ([address] Cough, Diverticulitis of colon (05/2010), DJD (degenerative joint disease), lumbar (`), Endometr hyperplasia w/atypia (1997), GERD (gastroesophageal reflux disease) (5/2010), Hiatal hernia (5/2010), Hydroureter, left (11/16/2020), Hypertension (1996), Hypothyroidism (2008), Iron deficiency anemia, Melanoma ([address] Mixed hyperlipidemia, Nephrolithiasis, Splenomegaly (11/2020), Stroke (cerebrum) ([address] (2007), Sun-damaged skin, and Zoster. She also has no past medical history of Arsenic suspected exposure, Malignant hyperthermia due to anesthesia, Nausea & vomiting, Nicotine vapor product user, Non-nicotine vapor product user, Pacemaker, Radiation exposure, Sleep apnea, or Tanning bed exposure.    has a past surgical history that includes hx edilberto and bso (1997); hx tonsillectomy; hx colonoscopy (5/2010); hx mohs procedure (09/14/2017); colonoscopy (N/A, 10/19/2017); hx endoscopy (5/2010); hx endoscopy (05/30/2019); and hx hemorrhoidectomy (02/2020). reports that she has quit smoking. She smoked 1.00 pack per day. She quit smokeless tobacco use about 31 years ago. She reports current alcohol use of about 1.0 standard drinks of alcohol per week. She reports that she does not use drugs. family history includes Cancer in her brother, father, and mother; Hypertension in her brother. Physical Exam   Nursing note and vitals reviewed. Blood pressure 122/68, pulse 75, temperature 97.6 °F (36.4 °C), temperature source Oral, resp. rate 13, height 5' 2\" (1.575 m), weight 134 lb 3.2 oz (60.9 kg), SpO2 99 %. Constitutional:  No distress. Eyes: Conjunctivae are normal.   Ears:  Hearing grossly intact  Cardiovascular: Normal rate. regular rhythm, no murmurs or gallops  No edema  Pulmonary/Chest: Effort normal.   CTAB  Musculoskeletal: moves all 4 extremities   Neurological: Alert and oriented to person, place, and time. Skin: No visible rash noted. Psychiatric: Normal mood and affect. Behavior is normal.     ASSESSMENT and PLAN  Diagnoses and all orders for this visit:    1. Urinary tract infection without hematuria, site unspecified   Urinalysis today shows trace leukocytes and trace red blood cells. She does not have any any overt severe symptoms of UTI. Could have some degree of vaginal irritation. Think plenty of water. Will check urine culture and treat based on that result. Could consider trial of Macrobid if symptoms worsen prior to culture coming back. Could try an over-the-counter medication such as Azo for urinary discomfort. Follow gynecology if not improving.  -     AMB POC URINALYSIS DIP STICK AUTO W/O MICRO  -     CULTURE, URINE;  Future      lab results and schedule of future lab studies reviewed with patient  reviewed medications and side effects in detail    Return to clinic for further evaluation if new symptoms develop      Current Outpatient Medications   Medication Sig    guaiFENesin ER (MUCINEX) 600 mg ER tablet 600 mg two (2) times a day.  fluorometholone (FML FORTE) 0.25 % ophthalmic suspension 1 Drop.  albuterol (PROVENTIL HFA, VENTOLIN HFA, PROAIR HFA) 90 mcg/actuation inhaler Take 2 Puffs by inhalation every six (6) hours as needed for Wheezing.  amLODIPine (NORVASC) 5 mg tablet Take 1 Tab by mouth daily. Increased dose 2/12/21    montelukast (SINGULAIR) 10 mg tablet TAKE 1 TABLET EVERY DAY    ergocalciferol (ERGOCALCIFEROL) 1,250 mcg (50,000 unit) capsule TAKE 1 CAP BY MOUTH EVERY SEVEN (7) DAYS.  omeprazole (PRILOSEC) 20 mg capsule     levothyroxine (SYNTHROID) 88 mcg tablet TAKE 1 TABLET DAILY BEFORE BREAKFAST (DOSE INCREASED 2/76/99)    Bystolic 5 mg tablet TAKE 1 TABLET DAILY FOR BLOOD PRESSURE,  PULSE, FATIGUE    fluticasone propionate (FLONASE) 50 mcg/actuation nasal spray     Spiriva Respimat 2.5 mcg/actuation inhaler Take 2 Puffs by inhalation daily. Indications: bronchospasm prevention with COPD    famotidine (PEPCID) 40 mg tablet TAKE 1 TABLET TWICE DAILY (Patient taking differently: daily.)    mv-mn/folic acid/calcium/vit K (ONE-A-DAY WOMEN'S 50 PLUS PO) Take 1 Tab by mouth daily.  levocetirizine (XYZAL) 5 mg tablet Take 5 mg by mouth nightly.  acetaminophen (TYLENOL ARTHRITIS PAIN) 650 mg CR tablet Take 1,300 mg by mouth nightly. No current facility-administered medications for this visit.

## 2021-02-25 NOTE — TELEPHONE ENCOUNTER
Returned call to pt about cholestyramine. It was refilled but pt states she does not have diarrhea any longer and does not need the medication. Pt advised that she does not have to  the rx if her symptoms have resolved.  She thanks

## 2021-02-27 LAB
BACTERIA SPEC CULT: ABNORMAL
CC UR VC: ABNORMAL
SERVICE CMNT-IMP: ABNORMAL

## 2021-02-28 RX ORDER — CIPROFLOXACIN 500 MG/1
500 TABLET ORAL 2 TIMES DAILY
Qty: 14 TAB | Refills: 0 | Status: SHIPPED | OUTPATIENT
Start: 2021-02-28 | End: 2021-03-31 | Stop reason: SDUPTHER

## 2021-03-01 ENCOUNTER — TELEPHONE (OUTPATIENT)
Dept: INTERNAL MEDICINE CLINIC | Age: 81
End: 2021-03-01

## 2021-03-01 NOTE — TELEPHONE ENCOUNTER
----- Message from Stalin Huynh MD sent at 2/28/2021 10:05 PM EST -----  Let her know I sent in cipro for UTI

## 2021-03-01 NOTE — TELEPHONE ENCOUNTER
Attempted to reach pt to advise, no answer, left message on personal voicemail advising of results and recommendations.

## 2021-03-26 DIAGNOSIS — E03.9 HYPOTHYROIDISM, UNSPECIFIED TYPE: ICD-10-CM

## 2021-03-26 RX ORDER — LEVOTHYROXINE SODIUM 88 UG/1
TABLET ORAL
Qty: 90 TAB | Refills: 1 | Status: SHIPPED | OUTPATIENT
Start: 2021-03-26 | End: 2021-05-18 | Stop reason: ALTCHOICE

## 2021-03-31 RX ORDER — CIPROFLOXACIN 500 MG/1
500 TABLET ORAL 2 TIMES DAILY
Qty: 14 TAB | Refills: 0 | Status: SHIPPED | OUTPATIENT
Start: 2021-03-31 | End: 2021-05-07

## 2021-05-05 DIAGNOSIS — I10 ESSENTIAL HYPERTENSION: ICD-10-CM

## 2021-05-05 RX ORDER — AMLODIPINE BESYLATE 5 MG/1
TABLET ORAL
Qty: 90 TAB | Refills: 1 | Status: SHIPPED | OUTPATIENT
Start: 2021-05-05 | End: 2021-05-08

## 2021-05-07 ENCOUNTER — APPOINTMENT (OUTPATIENT)
Dept: GENERAL RADIOLOGY | Age: 81
DRG: 309 | End: 2021-05-07
Attending: EMERGENCY MEDICINE
Payer: MEDICARE

## 2021-05-07 ENCOUNTER — APPOINTMENT (OUTPATIENT)
Dept: NON INVASIVE DIAGNOSTICS | Age: 81
DRG: 309 | End: 2021-05-07
Attending: HOSPITALIST
Payer: MEDICARE

## 2021-05-07 ENCOUNTER — HOSPITAL ENCOUNTER (INPATIENT)
Age: 81
LOS: 1 days | Discharge: HOME | DRG: 309 | End: 2021-05-08
Attending: EMERGENCY MEDICINE | Admitting: HOSPITALIST
Payer: MEDICARE

## 2021-05-07 DIAGNOSIS — N17.9 AKI (ACUTE KIDNEY INJURY) (HCC): ICD-10-CM

## 2021-05-07 DIAGNOSIS — N18.9 ACUTE KIDNEY INJURY SUPERIMPOSED ON CKD (HCC): ICD-10-CM

## 2021-05-07 DIAGNOSIS — D72.828 OTHER ELEVATED WHITE BLOOD CELL (WBC) COUNT: ICD-10-CM

## 2021-05-07 DIAGNOSIS — R73.9 HYPERGLYCEMIA: ICD-10-CM

## 2021-05-07 DIAGNOSIS — N17.9 ACUTE KIDNEY INJURY SUPERIMPOSED ON CKD (HCC): ICD-10-CM

## 2021-05-07 DIAGNOSIS — R73.9 STEROID-INDUCED HYPERGLYCEMIA: ICD-10-CM

## 2021-05-07 DIAGNOSIS — I48.91 ATRIAL FIBRILLATION WITH RVR (HCC): Primary | ICD-10-CM

## 2021-05-07 DIAGNOSIS — T38.0X5A STEROID-INDUCED HYPERGLYCEMIA: ICD-10-CM

## 2021-05-07 DIAGNOSIS — R07.9 CHEST PAIN, UNSPECIFIED TYPE: ICD-10-CM

## 2021-05-07 DIAGNOSIS — R16.1 SPLENOMEGALY: ICD-10-CM

## 2021-05-07 DIAGNOSIS — C93.10 CHRONIC MYELOMONOCYTIC LEUKEMIA NOT HAVING ACHIEVED REMISSION (HCC): ICD-10-CM

## 2021-05-07 DIAGNOSIS — D63.0 ANEMIA IN NEOPLASTIC DISEASE: ICD-10-CM

## 2021-05-07 PROBLEM — E11.9 DM (DIABETES MELLITUS), TYPE 2 (HCC): Status: ACTIVE | Noted: 2021-05-07

## 2021-05-07 PROBLEM — D72.829 LEUKOCYTOSIS: Status: ACTIVE | Noted: 2021-05-07

## 2021-05-07 PROBLEM — E86.0 DEHYDRATION: Status: ACTIVE | Noted: 2021-05-07

## 2021-05-07 LAB
ALBUMIN SERPL-MCNC: 3.2 G/DL (ref 3.5–5)
ALBUMIN/GLOB SERPL: 1.2 {RATIO} (ref 1.1–2.2)
ALP SERPL-CCNC: 62 U/L (ref 45–117)
ALT SERPL-CCNC: 36 U/L (ref 12–78)
AMPHET UR QL SCN: NEGATIVE
ANION GAP SERPL CALC-SCNC: 10 MMOL/L (ref 5–15)
APPEARANCE UR: CLEAR
AST SERPL-CCNC: 18 U/L (ref 15–37)
ATRIAL RATE: 122 BPM
BACTERIA URNS QL MICRO: NEGATIVE /HPF
BARBITURATES UR QL SCN: NEGATIVE
BASOPHILS # BLD: 0 K/UL (ref 0–0.1)
BASOPHILS NFR BLD: 0 % (ref 0–1)
BENZODIAZ UR QL: NEGATIVE
BILIRUB SERPL-MCNC: 0.7 MG/DL (ref 0.2–1)
BILIRUB UR QL: NEGATIVE
BUN SERPL-MCNC: 44 MG/DL (ref 6–20)
BUN/CREAT SERPL: 28 (ref 12–20)
CALCIUM SERPL-MCNC: 8 MG/DL (ref 8.5–10.1)
CALCULATED R AXIS, ECG10: -11 DEGREES
CALCULATED T AXIS, ECG11: 54 DEGREES
CANNABINOIDS UR QL SCN: NEGATIVE
CHLORIDE SERPL-SCNC: 103 MMOL/L (ref 97–108)
CHOLEST SERPL-MCNC: 104 MG/DL
CO2 SERPL-SCNC: 25 MMOL/L (ref 21–32)
COCAINE UR QL SCN: NEGATIVE
COLOR UR: NORMAL
COMMENT, HOLDF: NORMAL
COMMENT, HOLDF: NORMAL
CREAT SERPL-MCNC: 1.6 MG/DL (ref 0.55–1.02)
DIAGNOSIS, 93000: NORMAL
DIFFERENTIAL METHOD BLD: ABNORMAL
DRUG SCRN COMMENT,DRGCM: NORMAL
EOSINOPHIL # BLD: 0 K/UL (ref 0–0.4)
EOSINOPHIL NFR BLD: 0 % (ref 0–7)
EPITH CASTS URNS QL MICRO: NORMAL /LPF
ERYTHROCYTE [DISTWIDTH] IN BLOOD BY AUTOMATED COUNT: 18.6 % (ref 11.5–14.5)
EST. AVERAGE GLUCOSE BLD GHB EST-MCNC: 140 MG/DL
FERRITIN SERPL-MCNC: 214 NG/ML (ref 8–252)
FERRITIN SERPL-MCNC: 272 NG/ML (ref 8–252)
GLOBULIN SER CALC-MCNC: 2.7 G/DL (ref 2–4)
GLUCOSE BLD STRIP.AUTO-MCNC: 127 MG/DL (ref 65–100)
GLUCOSE BLD STRIP.AUTO-MCNC: 193 MG/DL (ref 65–100)
GLUCOSE BLD STRIP.AUTO-MCNC: 283 MG/DL (ref 65–100)
GLUCOSE BLD STRIP.AUTO-MCNC: 306 MG/DL (ref 65–100)
GLUCOSE BLD STRIP.AUTO-MCNC: 71 MG/DL (ref 65–100)
GLUCOSE SERPL-MCNC: 235 MG/DL (ref 65–100)
GLUCOSE UR STRIP.AUTO-MCNC: NEGATIVE MG/DL
HBA1C MFR BLD: 6.5 % (ref 4–5.6)
HCT VFR BLD AUTO: 28.3 % (ref 35–47)
HDLC SERPL-MCNC: 23 MG/DL
HDLC SERPL: 4.5 {RATIO} (ref 0–5)
HGB BLD-MCNC: 8.4 G/DL (ref 11.5–16)
HGB UR QL STRIP: NEGATIVE
IMM GRANULOCYTES # BLD AUTO: 0 K/UL
IMM GRANULOCYTES NFR BLD AUTO: 0 %
INR PPP: 1.3 (ref 0.9–1.1)
KETONES UR QL STRIP.AUTO: NEGATIVE MG/DL
LDLC SERPL CALC-MCNC: 16.8 MG/DL (ref 0–100)
LEUKOCYTE ESTERASE UR QL STRIP.AUTO: NEGATIVE
LIPID PROFILE,FLP: ABNORMAL
LYMPHOCYTES # BLD: 2 K/UL (ref 0.8–3.5)
LYMPHOCYTES NFR BLD: 3 % (ref 12–49)
MAGNESIUM SERPL-MCNC: 1.4 MG/DL (ref 1.6–2.4)
MCH RBC QN AUTO: 26.3 PG (ref 26–34)
MCHC RBC AUTO-ENTMCNC: 29.7 G/DL (ref 30–36.5)
MCV RBC AUTO: 88.4 FL (ref 80–99)
METAMYELOCYTES NFR BLD MANUAL: 5 %
METHADONE UR QL: NEGATIVE
MONOCYTES # BLD: 7.2 K/UL (ref 0–1)
MONOCYTES NFR BLD: 11 % (ref 5–13)
MYELOCYTES NFR BLD MANUAL: 11 %
NEUTS BAND NFR BLD MANUAL: 10 % (ref 0–6)
NEUTS SEG # BLD: 45.5 K/UL (ref 1.8–8)
NEUTS SEG NFR BLD: 60 % (ref 32–75)
NITRITE UR QL STRIP.AUTO: NEGATIVE
NRBC # BLD: 0 K/UL (ref 0–0.01)
NRBC BLD-RTO: 0 PER 100 WBC
OPIATES UR QL: NEGATIVE
PATH REV BLD -IMP: ABNORMAL
PCP UR QL: NEGATIVE
PERIPHERAL SMEAR,PSM: NORMAL
PH UR STRIP: 5 [PH] (ref 5–8)
PLATELET # BLD AUTO: 330 K/UL (ref 150–400)
PMV BLD AUTO: 12.6 FL (ref 8.9–12.9)
POTASSIUM SERPL-SCNC: 4.5 MMOL/L (ref 3.5–5.1)
PROT SERPL-MCNC: 5.9 G/DL (ref 6.4–8.2)
PROT UR STRIP-MCNC: NEGATIVE MG/DL
PROTHROMBIN TIME: 12.7 SEC (ref 9–11.1)
Q-T INTERVAL, ECG07: 298 MS
QRS DURATION, ECG06: 68 MS
QTC CALCULATION (BEZET), ECG08: 453 MS
RBC # BLD AUTO: 3.2 M/UL (ref 3.8–5.2)
RBC #/AREA URNS HPF: NORMAL /HPF (ref 0–5)
RBC MORPH BLD: ABNORMAL
SAMPLES BEING HELD,HOLD: NORMAL
SAMPLES BEING HELD,HOLD: NORMAL
SERVICE CMNT-IMP: ABNORMAL
SERVICE CMNT-IMP: NORMAL
SODIUM SERPL-SCNC: 138 MMOL/L (ref 136–145)
SP GR UR REFRACTOMETRY: 1 (ref 1–1.03)
TRIGL SERPL-MCNC: 321 MG/DL (ref ?–150)
TROPONIN I SERPL-MCNC: <0.05 NG/ML
TSH SERPL DL<=0.05 MIU/L-ACNC: 0.64 UIU/ML (ref 0.36–3.74)
URATE SERPL-MCNC: 9.3 MG/DL (ref 2.6–6)
UROBILINOGEN UR QL STRIP.AUTO: 0.2 EU/DL (ref 0.2–1)
VENTRICULAR RATE, ECG03: 139 BPM
VLDLC SERPL CALC-MCNC: 64.2 MG/DL
WBC # BLD AUTO: 65 K/UL (ref 3.6–11)
WBC URNS QL MICRO: NORMAL /HPF (ref 0–4)

## 2021-05-07 PROCEDURE — 96372 THER/PROPH/DIAG INJ SC/IM: CPT

## 2021-05-07 PROCEDURE — 74011250636 HC RX REV CODE- 250/636: Performed by: SPECIALIST

## 2021-05-07 PROCEDURE — 96376 TX/PRO/DX INJ SAME DRUG ADON: CPT

## 2021-05-07 PROCEDURE — 84443 ASSAY THYROID STIM HORMONE: CPT

## 2021-05-07 PROCEDURE — 96366 THER/PROPH/DIAG IV INF ADDON: CPT

## 2021-05-07 PROCEDURE — 96365 THER/PROPH/DIAG IV INF INIT: CPT

## 2021-05-07 PROCEDURE — 99285 EMERGENCY DEPT VISIT HI MDM: CPT

## 2021-05-07 PROCEDURE — 74011000250 HC RX REV CODE- 250: Performed by: INTERNAL MEDICINE

## 2021-05-07 PROCEDURE — 85610 PROTHROMBIN TIME: CPT

## 2021-05-07 PROCEDURE — 71045 X-RAY EXAM CHEST 1 VIEW: CPT

## 2021-05-07 PROCEDURE — 80053 COMPREHEN METABOLIC PANEL: CPT

## 2021-05-07 PROCEDURE — 82962 GLUCOSE BLOOD TEST: CPT

## 2021-05-07 PROCEDURE — 74011636637 HC RX REV CODE- 636/637: Performed by: INTERNAL MEDICINE

## 2021-05-07 PROCEDURE — 74011250637 HC RX REV CODE- 250/637: Performed by: SPECIALIST

## 2021-05-07 PROCEDURE — 77030029684 HC NEB SM VOL KT MONA -A

## 2021-05-07 PROCEDURE — 77030038269 HC DRN EXT URIN PURWCK BARD -A

## 2021-05-07 PROCEDURE — 81001 URINALYSIS AUTO W/SCOPE: CPT

## 2021-05-07 PROCEDURE — 84484 ASSAY OF TROPONIN QUANT: CPT

## 2021-05-07 PROCEDURE — 84550 ASSAY OF BLOOD/URIC ACID: CPT

## 2021-05-07 PROCEDURE — 99223 1ST HOSP IP/OBS HIGH 75: CPT | Performed by: INTERNAL MEDICINE

## 2021-05-07 PROCEDURE — 87086 URINE CULTURE/COLONY COUNT: CPT

## 2021-05-07 PROCEDURE — 36415 COLL VENOUS BLD VENIPUNCTURE: CPT

## 2021-05-07 PROCEDURE — 74011250637 HC RX REV CODE- 250/637: Performed by: HOSPITALIST

## 2021-05-07 PROCEDURE — 74011000258 HC RX REV CODE- 258: Performed by: EMERGENCY MEDICINE

## 2021-05-07 PROCEDURE — 74011250636 HC RX REV CODE- 250/636: Performed by: INTERNAL MEDICINE

## 2021-05-07 PROCEDURE — 74011250636 HC RX REV CODE- 250/636: Performed by: EMERGENCY MEDICINE

## 2021-05-07 PROCEDURE — 65610000006 HC RM INTENSIVE CARE

## 2021-05-07 PROCEDURE — 94640 AIRWAY INHALATION TREATMENT: CPT

## 2021-05-07 PROCEDURE — 82728 ASSAY OF FERRITIN: CPT

## 2021-05-07 PROCEDURE — 83735 ASSAY OF MAGNESIUM: CPT

## 2021-05-07 PROCEDURE — 74011000258 HC RX REV CODE- 258: Performed by: SPECIALIST

## 2021-05-07 PROCEDURE — 85025 COMPLETE CBC W/AUTO DIFF WBC: CPT

## 2021-05-07 PROCEDURE — 83036 HEMOGLOBIN GLYCOSYLATED A1C: CPT

## 2021-05-07 PROCEDURE — 74011250637 HC RX REV CODE- 250/637: Performed by: INTERNAL MEDICINE

## 2021-05-07 PROCEDURE — 80061 LIPID PANEL: CPT

## 2021-05-07 PROCEDURE — 80307 DRUG TEST PRSMV CHEM ANLYZR: CPT

## 2021-05-07 PROCEDURE — 93005 ELECTROCARDIOGRAM TRACING: CPT

## 2021-05-07 PROCEDURE — 74011000250 HC RX REV CODE- 250

## 2021-05-07 PROCEDURE — 99233 SBSQ HOSP IP/OBS HIGH 50: CPT | Performed by: CLINICAL NURSE SPECIALIST

## 2021-05-07 RX ORDER — GUAIFENESIN 100 MG/5ML
81 LIQUID (ML) ORAL DAILY
Status: DISCONTINUED | OUTPATIENT
Start: 2021-05-07 | End: 2021-05-08 | Stop reason: HOSPADM

## 2021-05-07 RX ORDER — ARFORMOTEROL TARTRATE 15 UG/2ML
15 SOLUTION RESPIRATORY (INHALATION)
Status: DISCONTINUED | OUTPATIENT
Start: 2021-05-07 | End: 2021-05-08 | Stop reason: HOSPADM

## 2021-05-07 RX ORDER — PANTOPRAZOLE SODIUM 40 MG/1
40 TABLET, DELAYED RELEASE ORAL
Status: DISCONTINUED | OUTPATIENT
Start: 2021-05-07 | End: 2021-05-08 | Stop reason: HOSPADM

## 2021-05-07 RX ORDER — DILTIAZEM HYDROCHLORIDE 5 MG/ML
INJECTION INTRAVENOUS
Status: DISPENSED
Start: 2021-05-07 | End: 2021-05-07

## 2021-05-07 RX ORDER — DILTIAZEM HYDROCHLORIDE 5 MG/ML
10 INJECTION INTRAVENOUS ONCE
Status: COMPLETED | OUTPATIENT
Start: 2021-05-07 | End: 2021-05-07

## 2021-05-07 RX ORDER — SODIUM CHLORIDE 0.9 % (FLUSH) 0.9 %
5-40 SYRINGE (ML) INJECTION EVERY 8 HOURS
Status: DISCONTINUED | OUTPATIENT
Start: 2021-05-07 | End: 2021-05-08 | Stop reason: HOSPADM

## 2021-05-07 RX ORDER — OXYCODONE HYDROCHLORIDE 5 MG/1
5 TABLET ORAL
Status: DISCONTINUED | OUTPATIENT
Start: 2021-05-07 | End: 2021-05-08 | Stop reason: HOSPADM

## 2021-05-07 RX ORDER — MAGNESIUM SULFATE 100 %
4 CRYSTALS MISCELLANEOUS AS NEEDED
Status: DISCONTINUED | OUTPATIENT
Start: 2021-05-07 | End: 2021-05-08 | Stop reason: HOSPADM

## 2021-05-07 RX ORDER — BUDESONIDE 0.5 MG/2ML
500 INHALANT ORAL
Status: DISCONTINUED | OUTPATIENT
Start: 2021-05-07 | End: 2021-05-08 | Stop reason: HOSPADM

## 2021-05-07 RX ORDER — FAMOTIDINE 20 MG/1
20 TABLET, FILM COATED ORAL DAILY
Status: DISCONTINUED | OUTPATIENT
Start: 2021-05-07 | End: 2021-05-08 | Stop reason: HOSPADM

## 2021-05-07 RX ORDER — LEVOTHYROXINE SODIUM 88 UG/1
88 TABLET ORAL
Status: DISCONTINUED | OUTPATIENT
Start: 2021-05-07 | End: 2021-05-08 | Stop reason: HOSPADM

## 2021-05-07 RX ORDER — DILTIAZEM HYDROCHLORIDE 120 MG/1
120 CAPSULE, COATED, EXTENDED RELEASE ORAL DAILY
Status: DISCONTINUED | OUTPATIENT
Start: 2021-05-07 | End: 2021-05-08 | Stop reason: HOSPADM

## 2021-05-07 RX ORDER — DOCUSATE SODIUM 100 MG/1
100 CAPSULE, LIQUID FILLED ORAL 2 TIMES DAILY
Status: DISCONTINUED | OUTPATIENT
Start: 2021-05-07 | End: 2021-05-08 | Stop reason: HOSPADM

## 2021-05-07 RX ORDER — NALOXONE HYDROCHLORIDE 0.4 MG/ML
0.4 INJECTION, SOLUTION INTRAMUSCULAR; INTRAVENOUS; SUBCUTANEOUS AS NEEDED
Status: DISCONTINUED | OUTPATIENT
Start: 2021-05-07 | End: 2021-05-08 | Stop reason: HOSPADM

## 2021-05-07 RX ORDER — MAGNESIUM SULFATE 1 G/100ML
1 INJECTION INTRAVENOUS ONCE
Status: COMPLETED | OUTPATIENT
Start: 2021-05-07 | End: 2021-05-07

## 2021-05-07 RX ORDER — MORPHINE SULFATE 2 MG/ML
2 INJECTION, SOLUTION INTRAMUSCULAR; INTRAVENOUS
Status: DISCONTINUED | OUTPATIENT
Start: 2021-05-07 | End: 2021-05-08 | Stop reason: HOSPADM

## 2021-05-07 RX ORDER — DILTIAZEM HYDROCHLORIDE 5 MG/ML
INJECTION INTRAVENOUS
Status: COMPLETED
Start: 2021-05-07 | End: 2021-05-07

## 2021-05-07 RX ORDER — ATORVASTATIN CALCIUM 20 MG/1
40 TABLET, FILM COATED ORAL DAILY
Status: DISCONTINUED | OUTPATIENT
Start: 2021-05-07 | End: 2021-05-08 | Stop reason: HOSPADM

## 2021-05-07 RX ORDER — ACETAMINOPHEN 325 MG/1
650 TABLET ORAL
Status: DISCONTINUED | OUTPATIENT
Start: 2021-05-07 | End: 2021-05-08 | Stop reason: HOSPADM

## 2021-05-07 RX ORDER — ENOXAPARIN SODIUM 100 MG/ML
60 INJECTION SUBCUTANEOUS EVERY 24 HOURS
Status: DISCONTINUED | OUTPATIENT
Start: 2021-05-08 | End: 2021-05-08 | Stop reason: HOSPADM

## 2021-05-07 RX ORDER — SODIUM CHLORIDE 0.9 % (FLUSH) 0.9 %
5-40 SYRINGE (ML) INJECTION AS NEEDED
Status: DISCONTINUED | OUTPATIENT
Start: 2021-05-07 | End: 2021-05-08 | Stop reason: HOSPADM

## 2021-05-07 RX ORDER — DEXTROSE 50 % IN WATER (D50W) INTRAVENOUS SYRINGE
25-50 AS NEEDED
Status: DISCONTINUED | OUTPATIENT
Start: 2021-05-07 | End: 2021-05-08 | Stop reason: HOSPADM

## 2021-05-07 RX ORDER — MAGNESIUM SULFATE HEPTAHYDRATE 40 MG/ML
2 INJECTION, SOLUTION INTRAVENOUS ONCE
Status: COMPLETED | OUTPATIENT
Start: 2021-05-07 | End: 2021-05-07

## 2021-05-07 RX ORDER — PREDNISONE 20 MG/1
20 TABLET ORAL
Status: DISCONTINUED | OUTPATIENT
Start: 2021-05-07 | End: 2021-05-08 | Stop reason: HOSPADM

## 2021-05-07 RX ORDER — ATENOLOL 25 MG/1
25 TABLET ORAL DAILY
Status: DISCONTINUED | OUTPATIENT
Start: 2021-05-07 | End: 2021-05-07 | Stop reason: SDUPTHER

## 2021-05-07 RX ORDER — ENOXAPARIN SODIUM 100 MG/ML
1 INJECTION SUBCUTANEOUS ONCE
Status: COMPLETED | OUTPATIENT
Start: 2021-05-07 | End: 2021-05-07

## 2021-05-07 RX ORDER — SODIUM CHLORIDE 9 MG/ML
100 INJECTION, SOLUTION INTRAVENOUS CONTINUOUS
Status: DISCONTINUED | OUTPATIENT
Start: 2021-05-07 | End: 2021-05-08

## 2021-05-07 RX ORDER — PREDNISONE 20 MG/1
20 TABLET ORAL DAILY
COMMUNITY
End: 2021-05-18 | Stop reason: ALTCHOICE

## 2021-05-07 RX ORDER — INSULIN LISPRO 100 [IU]/ML
INJECTION, SOLUTION INTRAVENOUS; SUBCUTANEOUS
Status: DISCONTINUED | OUTPATIENT
Start: 2021-05-07 | End: 2021-05-08 | Stop reason: HOSPADM

## 2021-05-07 RX ORDER — ENOXAPARIN SODIUM 100 MG/ML
1 INJECTION SUBCUTANEOUS EVERY 12 HOURS
Status: DISCONTINUED | OUTPATIENT
Start: 2021-05-07 | End: 2021-05-07

## 2021-05-07 RX ADMIN — DILTIAZEM HYDROCHLORIDE 10 MG: 5 INJECTION INTRAVENOUS at 00:55

## 2021-05-07 RX ADMIN — LEVOTHYROXINE SODIUM 88 MCG: 0.09 TABLET ORAL at 08:54

## 2021-05-07 RX ADMIN — MAGNESIUM SULFATE HEPTAHYDRATE 1 G: 1 INJECTION, SOLUTION INTRAVENOUS at 11:53

## 2021-05-07 RX ADMIN — INSULIN LISPRO 4 UNITS: 100 INJECTION, SOLUTION INTRAVENOUS; SUBCUTANEOUS at 21:21

## 2021-05-07 RX ADMIN — Medication 10 ML: at 21:22

## 2021-05-07 RX ADMIN — ENOXAPARIN SODIUM 60 MG: 60 INJECTION SUBCUTANEOUS at 03:11

## 2021-05-07 RX ADMIN — Medication 10 ML: at 15:58

## 2021-05-07 RX ADMIN — PANTOPRAZOLE SODIUM 40 MG: 40 TABLET, DELAYED RELEASE ORAL at 08:54

## 2021-05-07 RX ADMIN — SODIUM CHLORIDE 2.5 MG/HR: 900 INJECTION, SOLUTION INTRAVENOUS at 01:23

## 2021-05-07 RX ADMIN — BUDESONIDE 500 MCG: 0.5 INHALANT RESPIRATORY (INHALATION) at 21:00

## 2021-05-07 RX ADMIN — PREDNISONE 20 MG: 20 TABLET ORAL at 15:58

## 2021-05-07 RX ADMIN — FAMOTIDINE 20 MG: 20 TABLET, FILM COATED ORAL at 09:05

## 2021-05-07 RX ADMIN — DILTIAZEM HYDROCHLORIDE 120 MG: 120 CAPSULE, COATED, EXTENDED RELEASE ORAL at 11:54

## 2021-05-07 RX ADMIN — DEXTROSE MONOHYDRATE 150 MG: 5 INJECTION, SOLUTION INTRAVENOUS at 10:29

## 2021-05-07 RX ADMIN — MAGNESIUM SULFATE HEPTAHYDRATE 2 G: 40 INJECTION, SOLUTION INTRAVENOUS at 10:36

## 2021-05-07 RX ADMIN — ASPIRIN 81 MG: 81 TABLET, CHEWABLE ORAL at 08:38

## 2021-05-07 RX ADMIN — MAGNESIUM SULFATE HEPTAHYDRATE 2 G: 40 INJECTION, SOLUTION INTRAVENOUS at 01:22

## 2021-05-07 RX ADMIN — SODIUM CHLORIDE 1000 ML: 9 INJECTION, SOLUTION INTRAVENOUS at 00:55

## 2021-05-07 RX ADMIN — SODIUM CHLORIDE 100 ML/HR: 9 INJECTION, SOLUTION INTRAVENOUS at 08:38

## 2021-05-07 RX ADMIN — ARFORMOTEROL TARTRATE 15 MCG: 15 SOLUTION RESPIRATORY (INHALATION) at 21:00

## 2021-05-07 NOTE — PROGRESS NOTES
[facility] Dosing Services: 5/7/2021    Pharmacist Renal Dosing Progress Note for Famotidine   Physician Dr. Carlo Whaley    The following medication: Famotidine was automatically dose-adjusted to 20 mg Q24 hours for CrCl <50 ml/min per [facility] P&T Committee Protocol, with respect to renal function. Consult provided for this   [de-identified] y.o. , female , for the indication of GERD. Pt Weight:   Wt Readings from Last 1 Encounters:   05/07/21 59 kg (130 lb)     Previous Regimen 20 mg Q12 hours   Serum Creatinine Lab Results   Component Value Date/Time    Creatinine 1.60 (H) 05/07/2021 01:11 AM       Creatinine Clearance Estimated Creatinine Clearance: 21.9 mL/min (A) (based on SCr of 1.6 mg/dL (H)). BUN Lab Results   Component Value Date/Time    BUN 44 (H) 05/07/2021 01:11 AM         Pharmacy to continue to monitor patient daily. Will make dosage adjustments based upon changing renal function.   Signed Anthony Connell 53 information:  [phone]

## 2021-05-07 NOTE — PROGRESS NOTES
Reason for Readmission:   Paroxsymal atrial fib with RVR,chest pain,steroid induced hyperglycemia           RUR Score/Risk Level:     27%-moderate risk for readmission    PCP: First and Last name:  Dr Malik Serna   Name of Practice:    Are you a current patient: Yes/No: yes   Approximate date of last visit: February,2021   Can you participate in a virtual visit with your PCP: yes  Is a Care Conference indicated:   no    Did you attend your follow up appointment (s): If not, why not: was discharged from [facility] last week and was not set up to see her PCP when discharged arin JW         Resources/supports as identified by patient/family:   Supportive  and daughters       Top Challenges facing patient (as identified by patient/family and CM): Finances/Medication cost?    Pt has [employer]        Support system or lack thereof?  Living arrangements? With spouse     Self-care/ADLs/Cognition? Pt performs self care @ home,Cognitions are intact          Current Advanced Directive/Advance Care Plan:  AMD is scanned into the EMR naming :  Primary decision-maker  Nikita Brandt [phone]  Secondary-Romelia GUEVARA [id number]-[phone]  Tertiary-Francisca Anthony [id number]-[phone]           Plan for utilizing home health: There are no identified home health needs @ this time but will follow for needs             Transition of Care Plan:    Based on readmission, the patient's previous Plan of Care   has been evaluated and/or modified. The current Transition of Care Plan is:         Pt was admitted to [facility] with parosxysmal atrial fib with RVR,substernal chest pain,and hyperglycemia. Pt was treated @ [facility] for pneumonia and was diagnosed with COPD @ [facility]. Pt lives with her  in a two story home with 4 entry steps into their home. Pt only spends time on the first floor .     Pt has a follow-up appointment next Tuesday May 18th with Dr Cydney Wellington.  She sees oncologist,Dr Elie Wu next week also,  Pt states her outpatient cardiokogist is Dr Colton Pires sees Dr Manny More on 5/14/21 @ 12;15 pm.  Plan today:  Per cardiologist,pt will be converted to po diltiazem. In the past,pt told me she was on xarelto for a very short time but had a GIB. Pt is hoping she will only be on aspirin when discharged. At this time,pt does not anticipate any home health,rehab or DME needs. Pt has no DME @ Home. AMD reviewed. ACP completed. Pt will need a PCP follow-up visit made prior to discharge with Dr Jason Lynch.   [employer] [facility] placed as a resource on pt.'s S.    [facility]    Readmission Assessment  Number of days since last admission?: 1-7 days(was discharged from [address] [facility] for pneumonia)  Previous disposition: Home with Family(Pt did not meet criteria for home health or rehab when discharged from [facility])  What was the patient's/caregiver's perception as to why they think they needed to return back to the hospital?: Other (Comment)(pt developed midsternal chest pain and afib with RVR)  Did you visit your Primary Care Physician after you left the hospital, before you returned this time?: No(no PCP appt)  Why weren't you able to visit your PCP?: Did not have an appointment(no appointment was made when pt was discharged home from [facility] with PCP)  Did you see a specialist, such as Cardiac, Pulmonary, Orthopedic Physician, etc. after you left the hospital?: Other (Comment)(Pt was set up by Jordan Batista to see pulmonologist on 5/18/21 ,she will also see oncologist soon,will also follow-up with Dr Anika Mills)  Who advised the patient to return to the hospital?: Self-referral  Does the patient report anything that got in the way of taking their medications?: No  In our efforts to provide the best possible care to you and others like you, can you think of anything that we could have done to help you after you left the hospital the first time, so that you might not have needed to return so soon?: Other (Comment)([facility] as an additional resource)     Allyssa Luis

## 2021-05-07 NOTE — PROGRESS NOTES
0715:Bedside and Verbal shift change report given to AGUILA DACOSTA (oncoming nurse) by Nav DACOSTA (offgoing nurse). Report included the following information SBAR, Kardex, Procedure Summary, Intake/Output, Recent Results, Cardiac Rhythm AFIB and Alarm Parameters . Primary Nurse Sanjuana Smith and NINA , RN performed a dual skin assessment on this patient No impairment noted  0800:assessment done . 0840:given due medication  0900:resting in bed   1000:resting in bed   1029:started amiodarone bolus   1030:pt c/o severe back pain and going down to her legs and check her vitals and its stable . Stopped the medicine and pt said she is allergic to it . explained her her mag is low and and she is getting replacement . She refused to received the medicine and pharmacist notified and paged dr Evangelista quezada . 1130:dr núñez called back and said pt can resume the medicine ,he said the back pain ,leg pain is not from the amiodarone. explained to patient and she still refused . 1200:given due medication . Dr Elliot Mendieta made rounds and made aware with  Her blood pressure . Pt denies any distress or pain at this time . 1300:pt eating lunch  1400:hold cardizem drip and pt resting in bed .   1600:given due medication  1700:eating dinner . 1800:pt ate dinner . Denies any distress or pain at this time . Call light with in the reach of pt .   1920: Bedside and Verbal shift change report given to Stephen dacosta (oncoming nurse) by Irene Love (offgoing nurse). Report included the following information SBAR, Kardex, Procedure Summary, Intake/Output, Recent Results, Cardiac Rhythm AFIB,SR and Alarm Parameters .

## 2021-05-07 NOTE — ED PROVIDER NOTES
81-year-old female presenting ER with report of palpitations and chest pain described as pressure that radiates to the neck. Patient has previous history of proximal atrial fibrillation but normally normal sinus rhythm not currently on any anticoagulation or rate control medications. Patient had recent hospitalization for pneumonia and was discharged last week no longer on antibiotics but still on steroids. She was diagnosed with COPD. Patient also has history of CMML, hypertension, hyperlipidemia, hypothyroid Patient reports this evening at 9:30 PM having sudden onset of palpitations with chest pain described as pressure that radiates to the neck. Mild shortness of breath. Patient reports previous history of A. fib and it feels similar to that time. Not normally in A. fib. Has been seen and followed by cardiology Dr. Manny More. Patient denies any history of A. fib during her recent hospitalization for pneumonia, at [facility]. No lower leg swelling. No report of history of DVT or PE. No numbness Tingling weakness. Patient denies any stimulant use or caffeine use. She has history of radiation treatment to her large spleen as result of the CMML. 1 week ago her WBC count was 119. Past Medical History:  
Diagnosis Date  Anemia 12/2019 Dr. Gemini Queen  Atrial fibrillation ([facility] [address] 2019 Dr. Manny More  Basal cell carcinoma 7/2012, 7/2015 [facility]. Dr. Sole Mays. saw Dr. Zohaib Chandler  CAP (community acquired pneumonia) 11/16/2019  
 bilateral, patchy. hx PNA 3/2019  CMML (chronic myelomonocytic leukemia) ([facility] [address] 11/2020 Dr. Gemini Queen. Stage 0.  marrow bx 70% cellularity, no blasts  COPD (chronic obstructive pulmonary disease) (HCC) Questionable? Dr. Susana Stevens  Cough   
 pulm Dr. Susana Stevens  Diverticulitis of colon 05/2010  
 dx on colonoscopy by Dr. Kamari Tubbs. Took flagyl, cipro  DJD (degenerative joint disease), lumbar `  
 L5?  Endometr hyperplasia w/atypia 1997  GERD (gastroesophageal reflux disease) 5/2010  
 Hiatal hernia 5/2010  Hydroureter, left 11/16/2020  
 due to splenomegaly. Dr. Candy Nicholson  Hypertension 1996  Hypothyroidism 2008 TSH 1.1 6/7/12  Iron deficiency anemia   
 EGD, colon 5/2010.  hgb 11.3 6/2012  Melanoma ([facility] [address] Dr. Radhames Marques. x3  
 Mixed hyperlipidemia Tg 166 LDL 87 HDL55 6/2012  Nephrolithiasis  Splenomegaly 11/2020  
 23 cm  Stroke (cerebrum) [facility]) 2007 R droop and weakness for 1 week. vessel rupture (not clot)  Sun-damaged skin  Zoster Past Surgical History:  
Procedure Laterality Date  COLONOSCOPY N/A 10/19/2017 COLONOSCOPY performed by Tremayne Portillo MD at [address] HX COLONOSCOPY  5/2010  
 diverticuLITIS, tubular adenoma  Dr. Nazanin Cedeno  HX ENDOSCOPY  5/2010 EGD  - hiatal hernia. Dr. Nazanin Cedeno  HX ENDOSCOPY  05/30/2019 Dr. Tucker Madera  HX HEMORRHOIDECTOMY  02/2020 Dr. Rayo Anderws  HX MOHS PROCEDURES  09/14/2017 SCC L anterior mujica by Dr. Jose M Arnold  HX MALLORY AND BSO  1997  
 endometrial pre-cancer on biopsy  HX TONSILLECTOMY Family History:  
Problem Relation Age of Onset  Cancer Mother  Cancer Father  Hypertension Brother  Cancer Brother   
     myeloma Social History Socioeconomic History  Marital status:  Spouse name: Tiff Braber  Number of children: 2  
 Years of education: Not on file  Highest education level: Not on file Occupational History  Not on file Social Needs  Financial resource strain: Not on file  Food insecurity Worry: Not on file Inability: Not on file  Transportation needs Medical: Not on file Non-medical: Not on file Tobacco Use  Smoking status: Former Smoker Packs/day: 1.00  Smokeless tobacco: Former User Quit date: 1/1/1990 Substance and Sexual Activity  Alcohol use: Yes Alcohol/week: 1.0 standard drinks Types: 1 Glasses of wine per week   Comment: 3-4 x a week  Drug use: No  
 Sexual activity: Never Lifestyle  Physical activity Days per week: Not on file Minutes per session: Not on file  Stress: Not on file Relationships  Social connections Talks on phone: Not on file Gets together: Not on file Attends Mosque service: Not on file Active member of club or organization: Not on file Attends meetings of clubs or organizations: Not on file Relationship status: Not on file  Intimate partner violence Fear of current or ex partner: Not on file Emotionally abused: Not on file Physically abused: Not on file Forced sexual activity: Not on file Other Topics Concern  Not on file Social History Narrative  Not on file ALLERGIES: Irbesartan, Iron, Pcn [penicillins], and Sulfacetamide Review of Systems Constitutional: Negative for chills and fever. HENT: Negative for congestion and sore throat. Eyes: Negative for pain. Respiratory: Positive for shortness of breath. Cardiovascular: Positive for chest pain and palpitations. Gastrointestinal: Negative for abdominal pain, diarrhea, nausea and vomiting. Genitourinary: Negative for dysuria and flank pain. Musculoskeletal: Negative for back pain and neck pain. Skin: Negative for rash. Neurological: Negative for dizziness and headaches. Vitals:  
 05/07/21 0037 BP: (!) 116/54 Pulse: (!) 128 Resp: 22 Temp: 98 °F (36.7 °C) SpO2: 96% Weight: 59 kg (130 lb) Height: 5' 1.75\" (1.568 m) Physical Exam 
Constitutional:   
   Appearance: She is well-developed. HENT:  
   Head: Normocephalic. Nose: Nose normal.  
   Mouth/Throat:  
   Pharynx: Oropharynx is clear. Eyes:  
   Conjunctiva/sclera: Conjunctivae normal.  
Neck: Musculoskeletal: Normal range of motion and neck supple. Cardiovascular:  
   Rate and Rhythm: Tachycardia present. Rhythm irregularly irregular.   
   Heart sounds: No murmur. Pulmonary:  
   Effort: Pulmonary effort is normal. No respiratory distress. Breath sounds: Normal breath sounds. Abdominal:  
   General: Bowel sounds are normal.  
   Palpations: Abdomen is soft. Tenderness: There is no abdominal tenderness. Musculoskeletal: Normal range of motion. Skin: 
   General: Skin is warm. Capillary Refill: Capillary refill takes less than 2 seconds. Findings: No rash. Neurological:  
   Mental Status: She is alert and oriented to person, place, and time. Comments: No gross motor or sensory deficits MDM Number of Diagnoses or Management Options JB (acute kidney injury) ([facility] [address] Atrial fibrillation with RVR ([facility] [address] Chest pain, unspecified type Chronic myelomonocytic leukemia not having achieved remission ([facility] [address] Diagnosis management comments: Patient with history of proximal atrial fibrillation, normal sinus rhythm with significant history of hypertension, hyperlipidemia, previous CVA,, CMML with recent hospitalization for pneumonia and discharged 1 week ago presenting ER with sudden onset of palpitations and chest pain. Found to be in A. fib with RVR. Patient started on Cardizem bolus and Cardizem drip. Rate is improved but still in A. fib. Will start patient on Lovenox due to elevated SANTO-Vasc score. Cardiology recommending admission continue Cardizem drip and see if patient cardioverted spontaneously. Patient also has elevated serum creatinine unknown recent previous kidney function. Receiving IV fluids. Patient also has elevated glucose likely secondary to steroids Amount and/or Complexity of Data Reviewed Clinical lab tests: reviewed Tests in the radiology section of CPT®: reviewed Tests in the medicine section of CPT®: reviewed ED Course as of May 07 0159 Fri May 07, 2021  
0039 EKG: A. fib with RVR rate of 139 bpm with narrow complex QRS and normal QTc interval.Normal axis.   Slight ST depressions and lateral leads. No ST elevations. EKG interpreted by Carl Seip, MD 
  
 [ZD] 0112 Hx of CMML  
WBC(!!): 65.0 [ZD] 0135 Currently on steroids Glucose(!): 235 [ZD] 0136 Creatinine(!): 1.60 [ZD] Ada.[place]ouse Spoke with cardiology, Dr. Ludmila Cortes, patient with elevated [id number] score. Recommending that we continue patient on Cardizem drip and anticoagulate with Lovenox. We will see if patient cardiovert's spontaneously. Cardiology will evaluate in the morning if patient still in A. fib we will consider cardioversion at that time. [ZD] ED Course User Index [ZD] Kourtney Rios MD  
 
 
Procedures

## 2021-05-07 NOTE — PROGRESS NOTES
Advance Care Planning     Advance Care Planning Activator (Inpatient)  Conversation Note      Date of ACP Conversation: 05/07/21     Conversation Conducted with:   Patient with Decision Making Capacity    ACP Activator: [id number] Travis Jo Decision Maker:    Current Designated Health Care Decision Maker:     Primary Decision Maker: Joshua Mederos - Spouse - [phone]    Primary Decision Maker: Hector Lagunas - [phone]    Secondary Decision Maker: Lydia Hernandez - Daughter - [phone]    Supplemental (Other) Decision Maker: Sandra Sanchez - Daughter - [phone]      Care Preferences    Ventilation: \"If you were in your present state of health and suddenly became very ill and were unable to breathe on your own, what would your preference be about the use of a ventilator (breathing machine) if it were available to you? \"      If patient would desire the use of a ventilator (breathing machine), answer \"yes\", if not \"no\":yes    \"If your health worsens and it becomes clear that your chance of recovery is unlikely, what would your preference be about the use of a ventilator (breathing machine) if it were available to you? \"     Would the patient desire the use of a ventilator (breathing machine)? YES      Resuscitation  \"CPR works best to restart the heart when there is a sudden event, like a heart attack, in someone who is otherwise healthy. Unfortunately, CPR does not typically restart the heart for people who have serious health conditions or who are very sick. \"    \"In the event your heart stopped as a result of an underlying serious health condition, would you want attempts to be made to restart your heart (answer \"yes\" for attempt to resuscitate) or would you prefer a natural death (answer \"no\" for do not attempt to resuscitate)? \" yes      [] Yes  [] No   Educated Talon / Jeannette Clark regarding differences between Advance Directives and portable DNR orders.     Length of ACP Conversation in minutes:  20    Conversation Outcomes:  [x] ACP discussion completed  [] Existing advance directive reviewed with patient; no changes to patient's previously recorded wishes     [] New Advance Directive completed   [] Portable Do Not Resuscitate prepared for Provider review and signature  [] POLST/POST/MOLST/MOST prepared for Provider review and signature      Follow-up plan:    [] Schedule follow-up conversation to continue planning  [x] Referred individual to Provider for additional questions/concerns   [] Advised patient/agent/surrogate to review completed ACP document and update if needed with changes in condition, patient preferences or care setting     [x] This note routed to one or more involved healthcare providers    I reviewed the AMD pt has on file and scanned into the system also.     Shon Matias

## 2021-05-07 NOTE — PROGRESS NOTES
Pharmacy changed lovenox to 60 mg q24H, for CrCl of 22 ml/min, per anticoagulant protocol. Pharmacy will follow daily.

## 2021-05-07 NOTE — CONSULTS
[facility] at [address], [address] [facility] [address] [facility]  Margarita Speck: [phone]  F: [phone]      Reason for Visit:   Violette Estrada is a [de-identified] y.o. female who is seen for evaluation of CMML    Hematology/Oncology Treatment History:   Follows with Dr. Karla Hernandez / Pham Guillen seen on 4/14/21; note summarized   11/30/2020 BMBx consistent with CMML-0. No increase in blasts and patchy fibrosis. Mutations in ASXXL1 and CBL; however no mutations in BCR-ABL, FGFR or PDGFRA.; 70% cellularity with no increase in blasts. Monocytes increased in peripheral blood persistently above 1000 and greater than 10% of  the total differential     1/15/2021- 1/20/21 received RT to spleen in effort to improve hydronephrosis by shrinking the spleen     Plan:   --CMML-0: currently not transfusion dependent but with noted decrease in Hgb ( 7.4 on day of visit); low threshold to initiate tx if pt were to  become increasingly symptomatic; either from anemia or splenomegaly; discussed Inqovi / Decitabine D 1-5 every 28 days. --splenomegaly: received RT as above in hopes to decrease the spleen to benefit the health of the left kidney; but has not improved the counts  --anemia: Hgb 7.4 in office  --RTC in 1 month: Follow up scheduled for 5/12/21       Labs 4/14/21   WBC 11.8   ANC 8.2  Hgb 7.4  plts  212  Mono % 15  Creat 1.59     History of Present Illness:   Violette Estrada is a pleasant [de-identified] y.o. female with CMML and AF admitted on 5/7/21 for chest pain. She noted pain in chest and neck. Had recent hospitalization at [facility] for PNA. In ED noted to be in Afib with RVR and admitted to ICU. ED labs notable for WBC 65 with ANC 45.5, Hgb 8.4, Creat 1.16, Mg 1.4. ED CXR with no evidence of acute process. Admitted for further eval and management, started on Diltiazem, HR better controlled. She reports feeling better today.  She states she was diagnosed with CMML recently and has been seeing Dr. Karla Hernandez from [facility]. She has follow up next week to start on treatment. Denies SOB or CP currently. Only has dry cough; Denies any fevers at home. Had completed the abx post hospitalization but was still on prednisone taper. Received 1 unit PRBCs during hospitalization.  at bedside. Past Medical History:   Diagnosis Date    Anemia 12/2019    Dr. Gemini Queen. due to CMML. hx iron def    Atrial fibrillation ([facility] [address] 2019    Dr. Maia Bainper cell carcinoma 7/2012, 7/2015    Arletha Neighbours. Dr. Sole Mays. saw Dr. Vahid Pierre CAP (community acquired pneumonia) 11/16/2019    bilateral, patchy. hx PNA 3/2019    CMML (chronic myelomonocytic leukemia) ([facility] 11/2020    Dr. Gemini Queen. Stage 0.  marrow bx 70% cellularity, no blasts    COPD (chronic obstructive pulmonary disease) ([facility])     Questionable? Dr. Argentina Merida Diverticulitis of colon 05/2010    dx on colonoscopy by Dr. Kamari Tubbs. Took flagyl, cipro    DJD (degenerative joint disease), lumbar `    L5?  Endometr hyperplasia w/atypia 1997    GERD (gastroesophageal reflux disease) 5/2010    Hiatal hernia 5/2010    Hydroureter, left 11/16/2020    due to splenomegaly. Dr. Porfirio Mckeon Hypertension 1996    Hypothyroidism 2008    TSH 1.1 6/7/12    Iron deficiency anemia     EGD, colon 5/2010.  hgb 11.3 6/2012    Melanoma ([facility] [address]     Dr. Aparna Sutton. x3    Mixed hyperlipidemia      Tg 166 LDL 87 HDL55 6/2012    Nephrolithiasis     Splenomegaly 11/2020    23 cm.  likely due to CMML    Stroke (cerebrum) ([facility]) 2007    R droop and weakness for 1 week. vessel rupture (not clot)    Sun-damaged skin     Zoster        Past Surgical History:   Procedure Laterality Date    COLONOSCOPY N/A 10/19/2017    COLONOSCOPY performed by To Boyce MD at [address] [facility] HX COLONOSCOPY  5/2010    diverticuLITIS, tubular adenoma  Dr. Emilie Bermeo HX ENDOSCOPY  5/2010    EGD  - hiatal hernia.   Dr. Susanna Leal  05/30/2019    Dr. Arias Favor 02/2020    Dr. Christophe Ritchie  09/14/2017    SCC L anterior mujica by Dr. Verna Jean    endometrial pre-cancer on biopsy    HX TONSILLECTOMY         Social History     Socioeconomic History    Marital status:      Spouse name: Glenn Marquez Number of children: 2    Years of education: Not on file    Highest education level: Not on file   Occupational History    Not on file   Social Needs    Financial resource strain: Not on file    Food insecurity     Worry: Not on file     Inability: Not on file   [employer] needs     Medical: Not on file     Non-medical: Not on file   Tobacco Use    Smoking status: Former Smoker     Packs/day: 1.00    Smokeless tobacco: Former User     Quit date: 1/1/1990   Substance and Sexual Activity    Alcohol use:  Yes     Alcohol/week: 1.0 standard drinks     Types: 1 Glasses of wine per week     Comment: 3-4 x a week    Drug use: No    Sexual activity: Never   Lifestyle    Physical activity     Days per week: Not on file     Minutes per session: Not on file    Stress: Not on file   Relationships    Social connections     Talks on phone: Not on file     Gets together: Not on file     Attends Taoism service: Not on file     Active member of club or organization: Not on file     Attends meetings of clubs or organizations: Not on file     Relationship status: Not on file    Intimate partner violence     Fear of current or ex partner: Not on file     Emotionally abused: Not on file     Physically abused: Not on file     Forced sexual activity: Not on file   Other Topics Concern    Not on file   Social History Narrative    Not on file       Family History   Problem Relation Age of Onset    Cancer Mother     Cancer Father     Hypertension Brother     Cancer Brother         myeloma       Current Facility-Administered Medications   Medication Dose Route Frequency    dilTIAZem (CARDIZEM) 100 mg in 0.9% sodium chloride (MBP/ADV) 100 mL infusion  0-15 mg/hr IntraVENous TITRATE    sodium chloride (NS) flush 5-40 mL  5-40 mL IntraVENous Q8H    sodium chloride (NS) flush 5-40 mL  5-40 mL IntraVENous PRN    acetaminophen (TYLENOL) tablet 650 mg  650 mg Oral Q4H PRN    oxyCODONE IR (ROXICODONE) tablet 5 mg  5 mg Oral Q4H PRN    morphine injection 2 mg  2 mg IntraVENous Q3H PRN    naloxone (NARCAN) injection 0.4 mg  0.4 mg IntraVENous PRN    docusate sodium (COLACE) capsule 100 mg  100 mg Oral BID    aspirin chewable tablet 81 mg  81 mg Oral DAILY    atorvastatin (LIPITOR) tablet 40 mg  40 mg Oral DAILY    [START ON 5/8/2021] enoxaparin (LOVENOX) injection 60 mg  60 mg SubCUTAneous Q24H    glucose chewable tablet 16 g  4 Tab Oral PRN    dextrose (D50W) injection syrg 12.5-25 g  25-50 mL IntraVENous PRN    glucagon (GLUCAGEN) injection 1 mg  1 mg IntraMUSCular PRN    insulin lispro (HUMALOG) injection   SubCUTAneous AC&HS    famotidine (PEPCID) tablet 20 mg  20 mg Oral DAILY    levothyroxine (SYNTHROID) tablet 88 mcg  88 mcg Oral ACB    pantoprazole (PROTONIX) tablet 40 mg  40 mg Oral ACB    0.9% sodium chloride infusion  100 mL/hr IntraVENous CONTINUOUS    arformoteroL (BROVANA) neb solution 15 mcg  15 mcg Nebulization BID RT    budesonide (PULMICORT) 500 mcg/2 ml nebulizer suspension  500 mcg Nebulization BID RT    dilTIAZem ER (CARDIZEM CD) capsule 120 mg  120 mg Oral DAILY    magnesium sulfate 2 g/50 ml IVPB (premix or compounded)  2 g IntraVENous ONCE    Followed by   [facility] magnesium sulfate 1 g/100 ml IVPB (premix or compounded)  1 g IntraVENous ONCE       Allergies   Allergen Reactions    Irbesartan Diarrhea    Iron Diarrhea    Pcn [Penicillins] Rash     PCN only. Can take cephalosporins    Sulfacetamide Swelling          Review of Systems: A complete review of systems was obtained, reviewed. Pertinent findings reviewed above.     Physical Exam:     Visit Vitals  BP (!) 97/41   Pulse 90   Temp 97.8 °F (36.6 °C)   Resp 19   Ht 5' 1.75\" (1.568 m)   Wt 59 kg (130 lb 1.1 oz)   SpO2 96%   BMI 23.98 kg/m²     General: no distress  Eyes: PERRLA, anicteric sclerae  HENT: atraumatic, oropharynx clear  Neck: supple  Lymphatic: no cervical, supraclavicular, or axillary LAD. Respiratory: CTAB, normal respiratory effort  CV: normal rate, regular rhythm, no murmurs, no peripheral edema  GI: soft, nontender, nondistended, no masses. No hepatomegaly. Marked splenomegaly. MS: digits without clubbing or cyanosis. Skin: no rashes, no ecchymoses, no petechia. normal temperature, turgor, and texture. Psych: alert, oriented, appropriate affect, normal judgment/insight    Results:     Lab Results   Component Value Date/Time    WBC 65.0 (HH) 05/07/2021 12:47 AM    HGB 8.4 (L) 05/07/2021 12:47 AM    HCT 28.3 (L) 05/07/2021 12:47 AM    PLATELET 862 48/21/3004 12:47 AM    MCV 88.4 05/07/2021 12:47 AM    ABS. NEUTROPHILS 45.5 (H) 05/07/2021 12:47 AM     Lab Results   Component Value Date/Time    Sodium 138 05/07/2021 01:11 AM    Potassium 4.5 05/07/2021 01:11 AM    Chloride 103 05/07/2021 01:11 AM    CO2 25 05/07/2021 01:11 AM    Glucose 235 (H) 05/07/2021 01:11 AM    BUN 44 (H) 05/07/2021 01:11 AM    Creatinine 1.60 (H) 05/07/2021 01:11 AM    GFR est AA 38 (L) 05/07/2021 01:11 AM    GFR est non-AA 31 (L) 05/07/2021 01:11 AM    Calcium 8.0 (L) 05/07/2021 01:11 AM    Glucose (POC) 127 (H) 05/07/2021 07:49 AM     Lab Results   Component Value Date/Time    Bilirubin, total 0.7 05/07/2021 01:11 AM    ALT (SGPT) 36 05/07/2021 01:11 AM    Alk.  phosphatase 62 05/07/2021 01:11 AM    Protein, total 5.9 (L) 05/07/2021 01:11 AM    Albumin 3.2 (L) 05/07/2021 01:11 AM    Globulin 2.7 05/07/2021 01:11 AM     Lab Results   Component Value Date/Time    Reticulocyte count 2.5 (H) 03/25/2019 11:17 AM    Iron % saturation 30 02/15/2021 01:25 PM    TIBC 164 (L) 02/15/2021 01:25 PM    Ferritin 38 03/27/2020 10:07 AM    Vitamin B12 1,591 (H) 03/25/2019 11:17 AM Folate 77.0 (H) 03/25/2019 11:17 AM    Haptoglobin 156 03/25/2019 11:17 AM     03/25/2019 11:17 AM    TSH 0.64 05/07/2021 01:11 AM     Lab Results   Component Value Date/Time    INR 1.3 (H) 05/07/2021 01:11 AM    D-dimer 0.54 03/25/2019 08:29 AM     Lab Results   Component Value Date/Time     03/25/2019 11:17 AM     5/7/21 XR CHEST  IMPRESSION  No evidence of acute cardiopulmonary process. Assessment/Recommendations:     [de-identified] yr old with PMHx of COPD, HTN, CMML, hypothyroid, hyperlipidemia and Afib  admitted with chest pain. 1. CMML / Leukocytosis:  Follows with Dr. Sudheer Poe from [facility] and is due to start on treatment with Decitabine next week. The current neutrophilia is markedly higher than prior count from last month. Although this may be exacerbated by steroids and recent infection, it is still a bigger jump than expected. Awaiting peripheral smear to ensure no markedly elevated blast count. Aside from that, no specific further workup warranted as her anemia is stable compared to prior and PLT count is normal and adequate. -- f/u Peripheral smear pending  -- Recommend close follow up with primary oncologist Dr. Sudheer Poe at discharge    2. 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 Continue to monitor and transfuse for Hgb < 7    3. Afib with RVR  Management per Cardiology/ Xenakis: dilt gtt converted to po;considering watchman    4. Hypomagnesemia. Receiving repletion    5. Splenomegaly  S/p RT (1/15/-1/20/21)      6. JB:   S/p RT (1/15/-1/20/21) to spleen in effort to improve hydronephrosis of left kidney   Management per primary team.    I have personally seen and evaluated the patient in conjunction with Maryjane Chavarria NP. I find the patient's history and physical exam are consistent with the NP's documentation. I agree with the above assessment and plan, which I have edited if needed.  Certainly agree that WBC count is currently much higher than a few weeks ago even allowing for recent steroids and infection. Given no current infectious symptoms, I recommend patient follow up with Dr. Zahira Russell next week as scheduled for treatment. Please do not hesitate to call with questions/concerns.     Signed By: Yin Funk MD

## 2021-05-07 NOTE — PROGRESS NOTES
1900- Bedside and Verbal shift change report given to Nataliya Echeverria (oncoming nurse) by Rocio Wong (offgoing nurse). Report included the following information SBAR, Kardex, MAR, Recent Results and Cardiac Rhythm A fib- controlled . 2000- Assessment completed. Patient now in NSR with HR in the 60's, BP stable with DBP low, MD aware. On room air satting 96%. A&O x4, follows commands, pupils round/reactive. 2115- patient , received 4units per bedtime correctional     0000- Reassessment completed, no changes. Resting quietly in bed     0600- Gown changed, purewick and canister changed. Keke care completed     0700- Bedside and Verbal shift change report given to Julita Bella RN (oncoming nurse) by Chente Bo RN (offgoing nurse). Report included the following information SBAR, Kardex, [facility], Recent Results and Cardiac Rhythm Sinus Eduardo Parra .

## 2021-05-07 NOTE — VIRTUAL HEALTH
Dr. Govind Clark last office note:    Andrez Loera 1940   Office/Outpatient Visit  Visit Date: Tue, Sep 8, 2020 12:00 am  Provider: Margo Rollins MD (Assistant: Red Neal RN )  Location: [facility] ([facility])[address] [place]. [address] [phone]    Electronically signed by Trang Cano MD on  09/08/2020 12:59:39 PM                           Subjective:    CC: Mrs. Loc Del Angel is a 79year old White female. Her primary care physician is Roxi Benavides MD.  This is a 6  month follow-up visit. Patient verbalized medications unchanged since last office visit. The primary reason for today's visit is evaluation of the following: atrial fibrillation: dx'd in 12/2018; . She has a history of coronary artery disease of the native coronary artery, cerebrovascular accident,  mixed hyperlipidemia, and essential hypertension. *Per pt, she stopped bystolic 5mg qd for a short period of time, [facility] wanted her to try metoprolol succinate 25 qd in its place. PCP is giving her RX for bystolic to resume and she will stop the metoprolol. Has not picked up bystolic yet    HPI:       Atrial Fibrillation  MD Notes: anemia noted, nsr, ? anticoag  Specific Type Regarding parosxymal atrial fibrillation,Her OVA2MW3-IEYo score is 6. Current related medications include Aspirin and a calcium channel blocker and a beta blocker for rate control. A transthoracic ECHO has been done ( performed 11/21/2019 ). EKG Normal.  does not notice heart racing at home. First told was in afib was over a year ago. She thinks she messed up when missed bystolic for 5 days and that was what caused her afib. (cannot remember the date - but did go to the [facility] at that time she states). Never been on stronger blood thinner than ASA. HX of bleeding issues in past - hemorrhoids (has had 2 tied off- may have occasional bleeding since then but not often). We will get med records from [facility] when she went to [place] [address]. and review. Will also get labs from PCP to review. Coronary Artery Disease:   She is here today for routine follow-up. Mrs. Yolis Lund denies history of prior myocardial infarction. Currently, her treatment regimen consists of daily aspirin and Bystolic. The patient has had prior EBT 5/27/15 and Nuclear study 5/15/15. A cardiac catheterization has never been performed. Stopped metoprolol and is going back on [address] suggested to say money she states and she has spoken to PCP about this. Has not picked up Bystolic yet. Hypercholesterolemia: Current treatment includes diet. Regarding hypertension:  Type Primary Hypertension Current nonpharmacologic treatment includes tobacco avoidance/reduction. Currently, her treatment regimen consists of Norvasc.  stays active. BP in office 160/70 and 152/61. Cerebral infarction noted. ROS:   Discussed relevant lab and imaging studies along with the plan of treatment with the nurse. EYES:  Negative for blurred vision and eye pain. E/N/T:  Negative for epistaxis and hoarseness. CARDIOVASCULAR:  Please see HPI. RESPIRATORY:  Negative for cough and hemoptysis. GASTROINTESTINAL:  Negative for abdominal pain and dysphagia. MUSCULOSKELETAL:  Negative for arthralgias and back pain. NEUROLOGICAL:  Negative for headaches, paresthesias, and weakness. HEMATOLOGIC/LYMPHATIC:  Negative for easy bruising, bleeding, and lymphadenopathy. PSYCHIATRIC:  No symptoms reported. Past Medical History / Family History / Social History:     Last Reviewed on 9/08/2020 12:32 PM by Cleo Bowman  Past Medical History:     Atrial fibrillation  Coronary artery disease  Hyperlipidemia: Mixed; Hypertension  Hypomagnesiumia   Colonic Polyps: dx'd in 5/2010; biopsy showed tubular adenoma;   Gastroesophageal Reflux Disease: since 5/2010; w/Hiatal Hernia;    Diverticulitis (5/2010)   Renal Stones: Hematuria (hx of stones since childhood); Osteoarthritis: primarily affecting the low back; Hypothyroidism: dx'd in 2008; Cerebrovascular Accident: x 1; in 7/6/2007; R facial droop/hemiparesis- completely resolved.;   Iron Deficiency Anemia: EGD, colon 5/2010;   Chicken pox: Herpes Zoster (Shingles); Endometrial cancer: S/P surgical resection; s/p MALLORY/BSO; pre-cancerous on Bx;   Skin cancer: dx'd in 12/2011; 7/2012;  S/P surgical resection; melanoma- R shoulder, L thigh; Basal Cell Carcinoma; Melanoma;   Vitamin D Deficiency   INFLUENZA VACCINE: declined     Past Cardiac Procedures:  Echocardiogram:  11/21/2019 Mild LVH. EF 65-70% Mild MR  Nuclear Study:  5/15/2015: Apical perfusion abnormality only during stress, LVEF 79%. Calcium score(EBT)5/27/15 - Calcium score of 37. Mild calcium identified. CURRENT MEDICAL PROVIDERS:  Dermatologist: Micaela Shelby  Gastroenterologist:   Oncologist:      Surgical History:   Surgical/Procedural History:   Hysterectomy: 1997; MALLORY/BSO  Tonsillectomy   Colonoscopy ( 5/2010- diverticulitis by Keshawn Chamberlain )  (5/2010) -EGD- hiatal hernia     Family History:   Father:  Positive for Cancer (unspecified type); Mother:  Positive for Cancer (unspecified type); Brother(s): 1 brother(s) total  ; Positive for Hypertension;   ; Positive for Leukemia ( Myeloma ); Social History:   Social History:   Occupation: Retired   Marital Status:    Children: 2 children     Tobacco/Alcohol/Supplements:   Last Reviewed on 9/08/2020 12:32 PM by Anabel Bynum  TOBACCO/ALCOHOL/SUPPLEMENTS   Tobacco: She has a past history of cigarette smoking; quit 1/1/1990. Alcohol: Drinks approximately 4 times per week. When she drinks, the average quantity of alcohol is 1 glass a couple times a week. drinks. She typically consumes red wine. Caffeine:  She admits to consuming caffeine via tea ( occasional use ) and chocolate ( occasionally ).       Substance Abuse History:   Last Reviewed on 9/08/2020 12:32 PM by Anabel Cooney  Substance Use/Abuse: Unremarkable     Mental Health History:   Last Reviewed on 9/08/2020 12:32 PM by Anabel Cooney    Communicable Diseases (eg STDs): Last Reviewed on 9/08/2020 12:32 PM by Anabel Cooney    Allergies:   Last Reviewed on 9/08/2020 12:32 PM by Anabel Cooney  Penicillins: Rash   Sulfonamides: angioedema     Current Medications:   Last Reviewed on 9/08/2020 12:32 PM by Anabel Cooney  hydrocortisone acetate 25 mg Rectal Suppository [insert 1 suppository (25 mg) by rectal route 2 times per day prn Hemorrhoids ]  famotidine 40 mg oral tablet [1 po qam ]  montelukast 10 mg oral tablet [1 po qam ]  fluticasone propionate 50 mcg/actuation Intranasal Spray, Suspension [inhale 1 spray (50 mcg) in each nostril by intranasal route 2 times per day]  tiotropium bromide 2.5 mcg/actuation Inhalation Mist [inhale 2 puffs (5 mcg) by inhalation route once daily at the same time each day]  Thera Tears Extra   [2-3 daily ]  aspirin 81 mg oral tablet, delayed release (enteric coated) [take 1 tablet (81 mg) by oral route once daily]  levothyroxine 75 mcg oral tablet [1 po qam and 2 po q Geronimo.Paul ]  Vitamin D2 1,250 mcg (50,000 unit) oral capsule [1 po q Geronimo.Paul ]  Tylenol Arthritis 650 mg 2 po qhs 650mg  [2 po qhs ]  amLODIPine 2.5 mg oral tablet [1 po qhs]  XyzaL 5 mg oral tablet [1 po qd]  metoprolol succinate 25 mg oral Tablet, Extended Release 24 hr [take 1 tablet (25 mg) by oral route once in the evening]  omeprazole 20 mg oral tablet, delayed release (enteric coated)    Objective:    Vitals:     Historical:   3/6/2020  BP:   138/57 mm Hg ( (left arm, , sitting, );) 3/6/2020  Wt:   145lbs  Current: 9/8/2020 12:27:19 PM  Ht:  5 ft, 2 in; Wt: 138 lbs;  BMI: 25. 2BP: 160/70 mm Hg (left arm, sitting);  P: 73 bpm (left arm (BP Cuff), sitting);  sCr: 1.23 mg/dL;  GFR: 35.08    Repeat:   12:38:11 PM  BP:   152/61mm Hg (left arm, sitting)   Exams:   GENERAL:  Alert, oriented to person, place and time x3. EYES:  Normal lids without xanthelasma; conjunctiva unremarkable; no scleral icterus. HEENT:  Face symmetric, voice clear, extraocular muscles intact. NECK:  Supple. No bruits, No JVD. LUNGS:  Clear to auscultation. No rales, no wheezing. CARDIAC:  Regular rate and rhythm. PMI not displaced. ABDOMEN:  Soft. Positive bowel sounds. Non-tender. MUSCULOSKELETAL:  Normal range of motion, strength and tone. EXTREMITIES:  No edema. Good muscle tone and strength. SKIN: No significant rashes or lesions; no suspicious moles. NEUROLOGICAL:  No focal deficits, cranial nerves II-XII are grossly intact. PSYCHIATRIC:  Appropriate affect and demeanor; normal speech pattern; grossly normal memory. Lab/Test Results:     Sodium, Serum: 139 (11/27/2019),   Potassium, Serum: 4.9 (11/27/2019),   Glucose Serum: 89 (11/27/2019),   BUN serum/plasma (sCnc): 24 (11/27/2019),   Creatinine, Serum: 1.23 (11/27/2019),   WBC count: 27.99 (11/27/2019),   RBC count: 3.43 (11/27/2019),   Hgb: 9.3 (11/27/2019),   Hct: 31.5 (11/27/2019),   MCV: 91.8 (11/27/2019),   Platelets: 063 (52/66/7778),       Procedures:   Unspecified atrial fibrillation    ECG INTERPRETATION: See scanned EKG for results.         Assessment:     I48.91   Unspecified atrial fibrillation     I25.10   Atherosclerotic heart disease of native coronary artery without angina pectoris     E78.2   Mixed hyperlipidemia     I10   Essential (primary) hypertension     I63   Cerebral infarction       ORDERS:     Procedures Ordered:     33217  Education and train for pt self-mgmt by qualified, nonphysician, ea 30 minutes; individual pt  (Send-Out)          57315  Electrocardiogram, routine with at least 12 leads; with interpretation and report  (In-House)          Other Orders:       Not an eligible candidate for ACE or ARB therapy - documented  (In-House)            LDL-C >= 100 mg/dL  (Send-Out)          7458H5D  NO Statin prescribed due to Medical reasons  (In-House)          1085D  Plan of care to achieve lipid control documented (CAD)  (In-House)          4086F  Aspirin or clopidogrel prescribed (CAD)  (Send-Out)          SANTO MACKENZIE  (Send-Out)          [id number]  Queried Patient for Tobacco Use  (Send-Out)              Plan:     Unspecified atrial fibrillation  CAD: with ACE/ARB Rx without Diabetes or LVSD, Patient not an eligible candidate for ACE or ARB Therapy (documented) Lipid Control LDL >= 100 mg/dL w/ Plan of Care NO Statin d/t Medical Reason NO Statin therapy prescribed d/t Medical Reasons Plan or care achieve lipid control documented with Antiplatelet Therapy Aspirin or Clopidogrel Prescribed Beta Blocker: Prior MI: No and LVEF <40% No   *  Plan of care for atrial fibrillation: Follow up visit [id number] score remains greater than 1. She is tolerating rate and rhythm control with prescribed medications. The patient remains anticoagulated with She tolerating anticoagulation with  Aspirin. .  The patient's medication list was reviewed. Smoking Status:  Nonsmoker     Schedule a follow-up appointment in 12 months. Discussed with patient diet, exercise plan and lifestyle modifications. The above note was transcribed by Kamila Orourke and authenticated by Dr. Gaylen Najjar prior to sign off.         Orders:       Not an eligible candidate for ACE or ARB therapy - documented  (In-House)            LDL-C >= 100 mg/dL  (Send-Out)          5306D6L  NO Statin prescribed due to Medical reasons  (In-House)          0159R  Plan of care to achieve lipid control documented (CAD)  (In-House)          4086F  Aspirin or clopidogrel prescribed (CAD)  (Send-Out)          SANTO MACKENZIE  (Send-Out)          [id number]  Queried Patient for Tobacco Use  (Send-Out)          10679  Education and train for pt self-mgmt by qualified, nonphysician, ea 30 minutes; individual pt  (Send-Out)          34813  Electrocardiogram, routine with at least 12 leads; with interpretation and report  (In-House)            Patient Education Handouts:     COV Atrial Fibrillation      COV Coronary Artery Disease      COV Heart Healthy Diet      COV Hypertension        Patient Recommendations: For  Unspecified atrial fibrillation:    *  Plan of care for atrial fibrillation: Please continue your anticoagulation with Aspirin. Your medication list has been reviewed. Schedule a follow-up visit in 12 months.

## 2021-05-07 NOTE — PROGRESS NOTES
Physician Progress Note      Danish Shearer  CSN #:                  [id number]  :                       1940  ADMIT DATE:       2021 12:27 AM  DISCH DATE:  RESPONDING  PROVIDER #:        Dipika Scales MD        QUERY TEXT:    Stage of Chronic Kidney Disease: Please provide further specificity, if known. Clinical indicators include: cr, bun, ckd, creatinine  Options provided:  -- Chronic kidney disease stage 1  -- Chronic kidney disease stage 2  -- Chronic kidney disease stage 3  -- Chronic kidney disease stage 3a  -- Chronic kidney disease stage 3b  -- Chronic kidney disease stage 4  -- Chronic kidney disease stage 5  -- Chronic kidney disease stage 5, requiring dialysis  -- End stage renal disease  -- Other - I will add my own diagnosis  -- Disagree - Not applicable / Not valid  -- Disagree - Clinically Unable to determine / Unknown        PROVIDER RESPONSE TEXT:    The patient has chronic kidney disease stage 3.       Electronically signed by:  Dipika Scales MD 2021 2:31 PM

## 2021-05-07 NOTE — VIRTUAL HEALTH
Echo from [facility] 4/28/21:    1. Left ventricle: The cavity size was normal. Wall thickness was normal. Systolic function was normal. The estimated ejection fraction was 60-65%. There were no regional wall motion abnormalities. Left ventricular diastolic function parameters were normal for the patient's age. 2. Aortic valve: There was mild regurgitation. Peak velocity (S): 2.4m/sec. Mean gradient (S): 14mm Hg. 3. Mitral valve: There was mild to moderate regurgitation. 4. Left atrium: The atrium was moderately to severely dilated. 5. Atrial septum: No atrial septal defect was identified by color flow Doppler. 6. Pulmonary arteries: PA peak pressure: 81mm Hg (S). 7. Pericardium, extracardiac: A trivial pericardial effusion was identified.

## 2021-05-07 NOTE — ED TRIAGE NOTES
Patient in ER with complaint of \"I started with chest pain around 9:30pm. It is in the center of my chest and my neck hurts. \" Pt denies any other symptoms. Has not taken any medications for symptoms.

## 2021-05-07 NOTE — H&P
[facility]    Hospitalist Admission Note      NAME:  Jj Sykes   :   1940   MRN:  [id number]     PCP:  Laura Loja MD     Date/Time of service:  2021 7:03 AM          Subjective:     CHIEF COMPLAINT: palpitations     HISTORY OF PRESENT ILLNESS:     Ms. Azucena Pascal is a [de-identified] y.o.  female who presented to the Emergency Department complaining of palpitations. Recurrent, but now more persistent and severe. Recent admit and DC from [facility] for PNA. ER finds Afib with RVR, and also finds acute worsening of CMML. We will admit her for management. Past Medical History:   Diagnosis Date    Anemia 2019    Dr. Atkins Less Atrial fibrillation [facility])     Dr. Manning Common cell carcinoma 2012, 2015    Dylon Anand. Dr. Ana Snider. saw Dr. Caity Alcantara CAP (community acquired pneumonia) 2019    bilateral, patchy. hx PNA 3/2019    CMML (chronic myelomonocytic leukemia) ([facility] [address] 2020    Dr. Yesenia Morgan. Stage 0.  marrow bx 70% cellularity, no blasts    COPD (chronic obstructive pulmonary disease) (HCC)     Questionable? Dr. Eve Price Diverticulitis of colon 2010    dx on colonoscopy by Dr. Mejia Albright. Took flagyl, cipro    DJD (degenerative joint disease), lumbar `    L5?  Endometr hyperplasia w/atypia     GERD (gastroesophageal reflux disease) 2010    Hiatal hernia 2010    Hydroureter, left 2020    due to splenomegaly. Dr. Yelitza Shelby Hypertension     Hypothyroidism     TSH 1.1 12    Iron deficiency anemia     EGD, colon 2010.  hgb 11.3 2012    Melanoma ([facility] [address]     Dr. Mary Pelayo. x3    Mixed hyperlipidemia      Tg 166 LDL 87 HDL55 2012    Nephrolithiasis     Splenomegaly 2020    23 cm    Stroke (cerebrum) (HCC)     R droop and weakness for 1 week.   vessel rupture (not clot)    Sun-damaged skin     Zoster         Past Surgical History:   Procedure Laterality Date    COLONOSCOPY N/A 10/19/2017 COLONOSCOPY performed by Rich Aviles MD at [address] HX COLONOSCOPY  5/2010    diverticuLITIS, tubular adenoma  Dr. Thomas Solis HX ENDOSCOPY  5/2010    EGD  - hiatal hernia. Dr. Shreyas Merino  05/30/2019    Dr. Avila Bhatia HX HEMORRHOIDECTOMY  02/2020    Dr. Lilian Nuñez  09/14/2017    SCC L anterior mujica by Dr. Kushal Soares    endometrial pre-cancer on biopsy    HX TONSILLECTOMY         Social History     Tobacco Use    Smoking status: Former Smoker     Packs/day: 1.00    Smokeless tobacco: Former User     Quit date: 1/1/1990   Substance Use Topics    Alcohol use: Yes     Alcohol/week: 1.0 standard drinks     Types: 1 Glasses of wine per week     Comment: 3-4 x a week        Family History   Problem Relation Age of Onset    Cancer Mother     Cancer Father     Hypertension Brother     Cancer Brother         myeloma      Allergies   Allergen Reactions    Irbesartan Diarrhea    Iron Diarrhea    Pcn [Penicillins] Rash     PCN only. Can take cephalosporins    Sulfacetamide Swelling        Prior to Admission medications    Medication Sig Start Date End Date Taking? Authorizing Provider   amLODIPine (NORVASC) 5 mg tablet TAKE 1 TABLET BY MOUTH EVERY DAY 5/5/21  Yes Yancy Andino MD   levothyroxine (SYNTHROID) 88 mcg tablet TAKE 1 TABLET DAILY BEFORE BREAKFAST (DOSE INCREASED 3/29/20) 3/26/21  Yes Maricel Esposito MD   guaiFENesin ER (MUCINEX) 600 mg ER tablet 600 mg two (2) times a day. Yes Provider, Historical   fluorometholone (FML FORTE) 0.25 % ophthalmic suspension 1 Drop. Yes Provider, Historical   albuterol (PROVENTIL HFA, VENTOLIN HFA, PROAIR HFA) 90 mcg/actuation inhaler Take 2 Puffs by inhalation every six (6) hours as needed for Wheezing.  2/12/21  Yes Maricel Esposito MD   montelukast (SINGULAIR) 10 mg tablet TAKE 1 TABLET EVERY DAY 1/20/21  Yes Maricel Esposito MD   ergocalciferol (ERGOCALCIFEROL) 1,250 mcg (50,000 unit) capsule TAKE 1 CAP BY MOUTH EVERY SEVEN (7) DAYS. 12/30/20  Yes David Esposito MD   omeprazole (PRILOSEC) 20 mg capsule  9/27/20  Yes Provider, Historical   Bystolic 5 mg tablet TAKE 1 TABLET DAILY FOR BLOOD PRESSURE,  PULSE, FATIGUE 8/25/20  Yes David Esposito MD   fluticasone propionate [facility]) 50 mcg/actuation nasal spray  1/31/20  Yes Provider, Historical   Spiriva Respimat 2.5 mcg/actuation inhaler Take 2 Puffs by inhalation daily. Indications: bronchospasm prevention with COPD 3/27/20  Yes David Esposito MD   famotidine (PEPCID) 40 mg tablet TAKE 1 TABLET TWICE DAILY  Patient taking differently: daily. 9/10/19  Yes David Esposito MD   levocetirizine (XYZAL) 5 mg tablet Take 5 mg by mouth nightly. Yes Other, MD Elida   acetaminophen (TYLENOL ARTHRITIS PAIN) 650 mg CR tablet Take 1,300 mg by mouth nightly. Yes Provider, Historical   mv-mn/folic acid/calcium/vit K (ONE-A-DAY WOMEN'S 50 PLUS PO) Take 1 Tab by mouth daily.     Provider, Historical       Review of Systems:  (bold if positive, if negative)    Gen:  Eyes:  ENT:  CVS:  Palpitations, chest painPulm:  GI:  :  MS:  Skin:  Psych:  Endo:  Hem:  Renal:  Neuro:        Objective:      VITALS:    Vital signs reviewed; most recent are:    Visit Vitals  BP (!) 107/96   Pulse (!) 107   Temp 98.2 °F (36.8 °C)   Resp 16   Ht 5' 1.75\" (1.568 m)   Wt 59 kg (130 lb)   SpO2 97%   BMI 23.97 kg/m²     SpO2 Readings from Last 6 Encounters:   05/07/21 97%   02/24/21 99%   11/02/20 98%   03/27/20 97%   12/26/19 95%   12/12/19 98%            Intake/Output Summary (Last 24 hours) at 5/7/2021 0703  Last data filed at 5/7/2021 0645  Gross per 24 hour   Intake 1093.33 ml   Output    Net 1093.33 ml        Exam:     Physical Exam:    Gen:  Well-developed, well-nourished, in no acute distress  HEENT:  Pink conjunctivae, PERRL, hearing intact to voice, moist mucous membranes  Neck:  Supple, without masses, thyroid non-tender  Resp:  No accessory muscle use, clear breath sounds without wheezes rales or rhonchi  Card:  No murmurs, tachycardic, irregular, S1, S2 without thrills, bruits or peripheral edema  Abd:  Soft, non-tender, non-distended, normoactive bowel sounds are present, no mass  Lymph:  No cervical or inguinal adenopathy  Musc:  No cyanosis or clubbing  Skin:  No rashes or ulcers, skin turgor is good  Neuro:  Cranial nerves are grossly intact, no focal motor weakness, follows commands appropriately  Psych:  Good insight, oriented to person, place and time, alert     Labs:    Recent Labs     05/07/21  0047   WBC 65.0*   HGB 8.4*   HCT 28.3*        Recent Labs     05/07/21  0111      K 4.5      CO2 25   *   BUN 44*   CREA 1.60*   CA 8.0*   MG 1.4*   ALB 3.2*   TBILI 0.7   ALT 36     No results found for: GLUCPOC  No results for input(s): PH, PCO2, PO2, HCO3, FIO2 in the last 72 hours. Recent Labs     05/07/21  0111   INR 1.3*     All Micro Results     Procedure Component Value Units Date/Time    CULTURE, URINE [[id number]]     Order Status: Sent Specimen: Cath Urine           I have reviewed previous records       Assessment and Plan:      Paroxysmal Atrial fibrillation with RVR / Acute chest pain / Hypertension - POA, unclear trigger, but dehydration and low Mg likely contributed. Also new Rx for albuterol for new Dx COPD may have contributed. TSH normal.  No sign of infection but check UA. ECHO. Cardiology consult. Diltiazem drip for now. Lovenox. Hold Norvasc. Cardiology place on amiodarone due to low BP.       CMML (chronic myelomonocytic leukemia) / Leukocytosis / Splenomegaly / Anemia - POA, worse than baseline. Myelocytes noted on CBC. Check Smear. Consult oncology. She did just have any infection and she was on steroids, but I don't know if that explains 65K WBC. Acute kidney injury superimposed on CKD / Dehydration - POA, hydrate and follow. DM (diabetes mellitus), type 2 - Diabetic diet and counseling. SSI per protocol. Not on home meds.  Check A1c.    COPD - Stable. Jeimy Elm and pulmicort. Prn duonebs. Continue her taper of steroids, especially to avoid hypotensive crisis. Hypothyroidism - continue synthroid    Mixed hyperlipidemia - Not on statin. LDL under 100. No hx of CVA nor CAD to demand tighter control    GERD (gastroesophageal reflux disease) - Pepcid and PPI    Hypomagnesemia - Repleted, follow and replete all lytes      Telemetry reviewed:   AFIB    Risk of deterioration: high      Total time spent with patient: 48 Minutes I personally reviewed chart, notes, data and current medications in the medical record. I have personally examined and treated the patient at bedside during this period.                  Care Plan discussed with: Patient, Nursing Staff and >50% of time spent in counseling and coordination of care    Discussed:  Care Plan       ___________________________________________________    Attending Physician: Gregg Schwarz MD

## 2021-05-07 NOTE — PROGRESS NOTES
Problem: Falls - Risk of  Goal: *Absence of Falls  Description: Document Leanna Belle Fall Risk and appropriate interventions in the flowsheet.   Outcome: Progressing Towards Goal  Note: Fall Risk Interventions:            Medication Interventions: Assess postural VS orthostatic hypotension, Bed/chair exit alarm, Evaluate medications/consider consulting pharmacy, Teach patient to arise slowly

## 2021-05-07 NOTE — PROGRESS NOTES
8829: Bedside and Verbal report given to Betty RN (oncoming nurse) by Sabiha Wayne RN (offgoing nurse). Report included the following information SBAR, Kardex, ED Summary, Intake/Output, Recent Results, Med Rec Status and Cardiac Rhythm afib rvr. Primary Nurse Adriana Ferrer, OLESYA and Christine Kumar RN performed a dual skin assessment on this patient No impairment noted  Damien score is 21  Pt arrived with transport on cardizem drip @ 10mg/hr  CHG bath given pt hooked up to ICU monitors,vitals obtained. Pt in Afib 90s-100s. See flowsheet for VS. admission assessment complete see flowsheet. Pt  brought to bedside. 0700: Bedside and Verbal shift change report given to Kg RN (oncoming nurse) by Debby Arceo RN (offgoing nurse). Report included the following information SBAR, Kardex and MAR.

## 2021-05-07 NOTE — ED PROVIDER NOTES
81-year-old female presenting ER with report of palpitations and chest pain described as pressure that radiates to the neck. Patient has previous history of PAF but normally normal sinus rhythm not currently on any anticoagulation or rate control medications. Patient had recent hospitalization for pneumonia and was discharged last week no longer on antibiotics but still on steroids. She was diagnosed with COPD. Patient also has history of CMML, hypertension, hyperlipidemia, hypothyroid  Patient reports this evening at 9:30 PM having sudden onset of palpitations with chest pain described as pressure that radiates to the neck. Mild shortness of breath. Patient reports previous history of A. fib and it feels similar to that time. Not normally in A. fib. Has been seen and followed by cardiology Dr. Rosa Alonso. Patient denies any history of A. fib during her recent hospitalization for pneumonia, at [facility]. No lower leg swelling. No report of history of DVT or PE. No numbness Tingling weakness. Patient denies any stimulant use or caffeine use. She has history of radiation treatment to her large spleen as result of the CMML. Received past medical records. Patient's previous white count 34 (upon D/C 1 week ago)  Creatinine 1.19 - baseline  Oncologist: Dr. Winter Hsieh             Past Medical History:   Diagnosis Date    Anemia 12/2019    Dr. Luther Gutierrez Atrial fibrillation [facility]) 2019    Dr. Elida Hanks cell carcinoma 7/2012, 7/2015    [facility]. Dr. Dayron Dickey. saw Dr. Danny Moreno CAP (community acquired pneumonia) 11/16/2019    bilateral, patchy. hx PNA 3/2019    CMML (chronic myelomonocytic leukemia) ([facility] [address] 11/2020    Dr. Winter Hsieh. Stage 0.  marrow bx 70% cellularity, no blasts    COPD (chronic obstructive pulmonary disease) (HCC)     Questionable? Dr. Justin Sykes Diverticulitis of colon 05/2010    dx on colonoscopy by Dr. Lovely Lehman.   Took flagyl, cipro    DJD (degenerative joint disease), lumbar `    L5?  Endometr hyperplasia w/atypia 1997    GERD (gastroesophageal reflux disease) 5/2010    Hiatal hernia 5/2010    Hydroureter, left 11/16/2020    due to splenomegaly. Dr. Melanie Lantigua Hypertension 1996    Hypothyroidism 2008    TSH 1.1 6/7/12    Iron deficiency anemia     EGD, colon 5/2010.  hgb 11.3 6/2012    Melanoma ([address]     Dr. Jane Matta. x3    Mixed hyperlipidemia      Tg 166 LDL 87 HDL55 6/2012    Nephrolithiasis     Splenomegaly 11/2020    23 cm    Stroke (cerebrum) (HCC) 2007    R droop and weakness for 1 week. vessel rupture (not clot)    Sun-damaged skin     Zoster        Past Surgical History:   Procedure Laterality Date    COLONOSCOPY N/A 10/19/2017    COLONOSCOPY performed by Nicho De Luna [facility] at [facility] HX COLONOSCOPY  5/2010    diverticuLITIS, tubular adenoma  Dr. Felipa Mcmillan HX ENDOSCOPY  5/2010    EGD  - hiatal hernia.   Dr. Nelson Post  05/30/2019    Dr. Medley Presume  02/2020    Dr. Kathyleen Bosworth  09/14/2017    SCC L anterior mujica by Dr. Nazanin Zhao    endometrial pre-cancer on biopsy    HX TONSILLECTOMY           Family History:   Problem Relation Age of Onset    Cancer Mother     Cancer Father     Hypertension Brother     Cancer Brother         myeloma       Social History     Socioeconomic History    Marital status:      Spouse name: Caniddo Andre Number of children: 2    Years of education: Not on file    Highest education level: Not on file   Occupational History    Not on file   Social Needs    Financial resource strain: Not on file    Food insecurity     Worry: Not on file     Inability: Not on file   [employer] needs     Medical: Not on file     Non-medical: Not on file   Tobacco Use    Smoking status: Former Smoker     Packs/day: 1.00    Smokeless tobacco: Former User     Quit date: 1/1/1990   Substance and Sexual Activity    Alcohol use: Yes     Alcohol/week: 1.0 standard drinks     Types: 1 Glasses of wine per week     Comment: 3-4 x a week    Drug use: No    Sexual activity: Never   Lifestyle    Physical activity     Days per week: Not on file     Minutes per session: Not on file    Stress: Not on file   Relationships    Social connections     Talks on phone: Not on file     Gets together: Not on file     Attends Baptism service: Not on file     Active member of club or organization: Not on file     Attends meetings of clubs or organizations: Not on file     Relationship status: Not on file    Intimate partner violence     Fear of current or ex partner: Not on file     Emotionally abused: Not on file     Physically abused: Not on file     Forced sexual activity: Not on file   Other Topics Concern    Not on file   Social History Narrative    Not on file         ALLERGIES: Irbesartan, Iron, Pcn [penicillins], and Sulfacetamide    Review of Systems   Constitutional: Negative for chills and fever. HENT: Negative for congestion and sore throat. Eyes: Negative for pain. Respiratory: Positive for shortness of breath. Cardiovascular: Positive for chest pain and palpitations. Gastrointestinal: Negative for abdominal pain, diarrhea, nausea and vomiting. Genitourinary: Negative for dysuria and flank pain. Musculoskeletal: Negative for back pain and neck pain. Skin: Negative for rash. Neurological: Negative for dizziness and headaches. Vitals:    05/07/21 0037   BP: (!) 116/54   Pulse: (!) 128   Resp: 22   Temp: 98 °F (36.7 °C)   SpO2: 96%   Weight: 59 kg (130 lb)   Height: 5' 1.75\" (1.568 m)            Physical Exam  Constitutional:       Appearance: She is well-developed. HENT:      Head: Normocephalic. Nose: Nose normal.      Mouth/Throat:      Pharynx: Oropharynx is clear. Eyes:      Conjunctiva/sclera: Conjunctivae normal.   Neck:      Musculoskeletal: Normal range of motion and neck supple.    Cardiovascular: Rate and Rhythm: Tachycardia present. Rhythm irregularly irregular. Heart sounds: No murmur. Pulmonary:      Effort: Pulmonary effort is normal. No respiratory distress. Breath sounds: Normal breath sounds. Abdominal:      General: Bowel sounds are normal.      Palpations: Abdomen is soft. Tenderness: There is no abdominal tenderness. Musculoskeletal: Normal range of motion. Skin:     General: Skin is warm. Capillary Refill: Capillary refill takes less than 2 seconds. Findings: No rash. Neurological:      Mental Status: She is alert and oriented to person, place, and time. Comments: No gross motor or sensory deficits          MDM  Number of Diagnoses or Management Options  JB (acute kidney injury) ([facility] [address]  Atrial fibrillation with RVR (HCC)  Chest pain, unspecified type  Chronic myelomonocytic leukemia not having achieved remission ([facility] [address]  Steroid-induced hyperglycemia  Diagnosis management comments: 81-year-old female presenting ER with palpitations and chest discomfort. Found to be in A. fib with RVR. Patient has significant past with history of hypertension, hyperlipidemia, previous CVA, CMML with recent hospitalization for pneumonia discharged 1 week ago currently still on steroids. Patient started on Cardizem bolus and Cardizem drip. Rate is improved but still in A. fib. Troponin negative. Magnesium slightly low given replacement. Patient also has elevated serum creatinine no recent levels to compare. Received IV fluids. Patient also has elevated glucose likely secondary to steroids.   With cardiology recommending admission on Cardizem drip and anticoagulation due to elevated [id number] score    Total critical care time spent exclusive of procedures:  45         Amount and/or Complexity of Data Reviewed  Clinical lab tests: reviewed  Tests in the radiology section of CPT®: reviewed  Tests in the medicine section of CPT®: reviewed      ED Course as of May 07 2878   Fri May 07, 2021   0039 EKG: A. fib with RVR rate of 139 bpm with narrow complex QRS and normal QTc interval.Normal axis. Slight ST depressions and lateral leads. No ST elevations. EKG interpreted by Mike Tuttle MD      [ZD]   0544 Hx of CMML   WBC(!!): 65.0 [ZD]   4464 Currently on steroids   Glucose(!): 235 [ZD]   0136 Creatinine(!): 1.60 [ZD]   0153 Spoke with cardiology, Dr. Myron Rodriguez, patient with elevated [id number] score. Recommending that we continue patient on Cardizem drip and anticoagulate with Lovenox. We will see if patient cardiovert's spontaneously. Cardiology will evaluate in the morning if patient still in A. fib we will consider cardioversion at that time. [ZD]   6638 Received past medical records. Patient's previous white count 34 (upon D/C 1 week ago)  Creatinine 1.19 - baseline  Oncologist: Dr. Julio Cesar Singer      [ZD]      ED Course User Index  [ZD] Davon Hoyos MD       Procedures               Perfect Serve Consult for Admission  2:09 AM    ED Room Number: Yayo Needle  Patient Name and age:  Carolyn Fire [de-identified] y.o.  female  Working Diagnosis:   1. Atrial fibrillation with RVR (HCC)    2. Chest pain, unspecified type    3. Chronic myelomonocytic leukemia not having achieved remission ([facility] [address]    4. JB (acute kidney injury) ([facility] [address]    5. Steroid-induced hyperglycemia        COVID-19 Suspicion:  no  Sepsis present:  no  Reassessment needed: no  Code Status:  Full Code  Readmission: Other  Isolation Requirements:  no  Recommended Level of Care:  telemetry  Department:[place] [facility] - [phone]  Other:  cardizem ggt.  Consulted cardiology

## 2021-05-07 NOTE — CONSULTS
Gricelda Sanchez MD, [address] [place] [address]  Ying Sweet 34  [phone]    Date of  Admission: 5/7/2021 12:27 AM         IMPRESSION and RECOMMENDATIONS     1. PAF:  Went into AF/RVR last PM.  Now, rate-controlled on dilt @ 5mg. Will convert to po. On only ASA for CVA prophylaxis due to sig hemorrhoidal bleeding on xarelto in past.  We'll need to address this at some point. Perhaps, watchman vs strategy to maintain NSR. Will bolus with amio. If converts, would continue po amio. Replete Mg. I have discussed this plan with the patient. She appears to understand this plan and wishes to proceed ahead. Problem List  Date Reviewed: 5/7/2021          Codes Class Noted    Leukocytosis ICD-10-CM: D72.829  ICD-9-CM: 288.60  5/7/2021        Acute chest pain ICD-10-CM: R07.9  ICD-9-CM: 786.50  5/7/2021        DM (diabetes mellitus), type 2 ([facility] ICD-10-CM: E11.9  ICD-9-CM: 250.00  5/7/2021        * (Principal) Atrial fibrillation with RVR ([facility] ICD-10-CM: I48.91  ICD-9-CM: 427.31  5/7/2021        Acute kidney injury superimposed on CKD ([facility] ICD-10-CM: N17.9, N18.9  ICD-9-CM: 866.00, 585.9  5/7/2021        Dehydration ICD-10-CM: E86.0  ICD-9-CM: 276.51  5/7/2021        Splenomegaly ICD-10-CM: R16.1  ICD-9-CM: 789.2  11/2020        CMML (chronic myelomonocytic leukemia) ([facility] ICD-10-CM: C93.10  ICD-9-CM: 205.10  11/2020    Overview Signed 12/28/2020  6:14 PM by MD Dr. Julio Cesar Lees.   Stage 0.  marrow bx 70% cellularity, no blasts             Paroxysmal atrial fibrillation (HCC) ICD-10-CM: I48.0  ICD-9-CM: 427.31  4/9/2019        Anemia ICD-10-CM: D64.9  ICD-9-CM: 285.9  3/25/2019        GERD (gastroesophageal reflux disease) ICD-10-CM: K21.9  ICD-9-CM: 530.81  Unknown        Hypertension ICD-10-CM: I10  ICD-9-CM: 401.9  Unknown        Hypothyroidism ICD-10-CM: E03.9  ICD-9-CM: 244.9  Unknown        Mixed hyperlipidemia ICD-10-CM: E78.2  ICD-9-CM: 272.2  Unknown        Iron deficiency anemia ICD-10-CM: D50.9  ICD-9-CM: 280.9  Unknown        Hypomagnesemia ICD-10-CM: E83.42  ICD-9-CM: 275.2  Unknown              History of Present Illness:     Taqueria Crowley is a [de-identified] y.o. female with the above problem list who was admitted for Atrial fibrillation with RVR ([facility] [I48.91]  Acute chest pain [R07.9]  CMML (chronic myelomonocytic leukemia) (HCC) [C93.10]  Leukocytosis [D72.829]  DM (diabetes mellitus), type 2 ([facility] [E11.9]  Acute kidney injury superimposed on CKD ([facility] [N17.9, N18.9]  Anemia [D64.9]. Pt presents with britney pain and palps since last PM (~9:30). Feels better after rate-control. She denies shortness of breath, dyspnea on exertion, orthopnea, paroxysmal noctural dyspnea, lower extremity edema, syncope, or near-syncope.     Current Facility-Administered Medications   Medication Dose Route Frequency    dilTIAZem (CARDIZEM) 100 mg in 0.9% sodium chloride (MBP/ADV) 100 mL infusion  0-15 mg/hr IntraVENous TITRATE    sodium chloride (NS) flush 5-40 mL  5-40 mL IntraVENous Q8H    sodium chloride (NS) flush 5-40 mL  5-40 mL IntraVENous PRN    acetaminophen (TYLENOL) tablet 650 mg  650 mg Oral Q4H PRN    oxyCODONE IR (ROXICODONE) tablet 5 mg  5 mg Oral Q4H PRN    morphine injection 2 mg  2 mg IntraVENous Q3H PRN    naloxone (NARCAN) injection 0.4 mg  0.4 mg IntraVENous PRN    docusate sodium (COLACE) capsule 100 mg  100 mg Oral BID    aspirin chewable tablet 81 mg  81 mg Oral DAILY    atorvastatin (LIPITOR) tablet 40 mg  40 mg Oral DAILY    [START ON 5/8/2021] enoxaparin (LOVENOX) injection 60 mg  60 mg SubCUTAneous Q24H    glucose chewable tablet 16 g  4 Tab Oral PRN    dextrose (D50W) injection syrg 12.5-25 g  25-50 mL IntraVENous PRN    glucagon (GLUCAGEN) injection 1 mg  1 mg IntraMUSCular PRN    insulin lispro (HUMALOG) injection   SubCUTAneous AC&HS    famotidine (PEPCID) tablet 20 mg  20 mg Oral DAILY    levothyroxine (SYNTHROID) tablet 88 mcg  88 mcg Oral ACB    pantoprazole (PROTONIX) tablet 40 mg  40 mg Oral ACB    0.9% sodium chloride infusion  100 mL/hr IntraVENous CONTINUOUS    arformoteroL (BROVANA) neb solution 15 mcg  15 mcg Nebulization BID RT    budesonide (PULMICORT) 500 mcg/2 ml nebulizer suspension  500 mcg Nebulization BID RT      Allergies   Allergen Reactions    Irbesartan Diarrhea    Iron Diarrhea    Pcn [Penicillins] Rash     PCN only. Can take cephalosporins    Sulfacetamide Swelling      Family History   Problem Relation Age of Onset    Cancer Mother     Cancer Father     Hypertension Brother     Cancer Brother         myeloma      Social History     Socioeconomic History    Marital status:      Spouse name: Froylan Myles Number of children: 2    Years of education: Not on file    Highest education level: Not on file   Occupational History    Not on file   Social Needs    Financial resource strain: Not on file    Food insecurity     Worry: Not on file     Inability: Not on file   [employer] needs     Medical: Not on file     Non-medical: Not on file   Tobacco Use    Smoking status: Former Smoker     Packs/day: 1.00    Smokeless tobacco: Former User     Quit date: 1/1/1990   Substance and Sexual Activity    Alcohol use:  Yes     Alcohol/week: 1.0 standard drinks     Types: 1 Glasses of wine per week     Comment: 3-4 x a week    Drug use: No    Sexual activity: Never   Lifestyle    Physical activity     Days per week: Not on file     Minutes per session: Not on file    Stress: Not on file   Relationships    Social connections     Talks on phone: Not on file     Gets together: Not on file     Attends [employer] service: Not on file     Active member of club or organization: Not on file     Attends meetings of clubs or organizations: Not on file     Relationship status: Not on file    Intimate partner violence     Fear of current or ex partner: Not on file Emotionally abused: Not on file     Physically abused: Not on file     Forced sexual activity: Not on file   Other Topics Concern    Not on file   Social History Narrative    Not on file       Physical Exam:     Patient Vitals for the past 16 hrs:   BP Temp Pulse Resp SpO2 Height Weight   05/07/21 0907 (!) 97/41     5' 1.75\" (1.568 m) 59 kg (130 lb 1.1 oz)   05/07/21 0830   90 19      05/07/21 0816 (!) 101/59  86 19      05/07/21 0815 (!) 79/33  86 18      05/07/21 0800 (!) 96/53 97.8 °F (36.6 °C) 73 21 96 %     05/07/21 0745 (!) 94/43  (!) 103 18 94 %     05/07/21 0730 (!) 99/40  (!) 112 18 94 %     05/07/21 0715 (!) 93/52  91 18 95 %     05/07/21 0701   (!) 112       05/07/21 0700 (!) 107/96  (!) 107 16 97 %     05/07/21 0645 (!) 108/57 98.2 °F (36.8 °C) (!) 124 28 97 %     05/07/21 0642   (!) 106       05/07/21 0601   96 18 94 %     05/07/21 0600 (!) 108/57  100 18 92 %     05/07/21 0546   99 20 94 %     05/07/21 0545 (!) 112/55  (!) 105 17 93 %     05/07/21 0534   95 18 92 %     05/07/21 0530 (!) 116/58  95 18 93 %     05/07/21 0515 (!) 108/49  100 19 93 %     05/07/21 0500 (!) 100/49  (!) 116 20 94 %     05/07/21 0457   99 20 95 %     05/07/21 0445 (!) 100/48  (!) 104 19 96 %     05/07/21 0430 (!) 121/43  (!) 104 19 96 %     05/07/21 0415 (!) 147/82  (!) 111 17 96 %     05/07/21 0400 109/67  (!) 110 19 95 %     05/07/21 0345 (!) 104/54  (!) 110 20 96 %     05/07/21 0330 (!) 103/44  (!) 107 18 96 %     05/07/21 0328   (!) 108 19 96 %     05/07/21 0315 114/63  (!) 116 19 96 %     05/07/21 0302   (!) 102 20 97 %     05/07/21 0300 105/63  (!) 107 19 95 %     05/07/21 0246   (!) 105 18 96 %     05/07/21 0245 (!) 116/56  (!) 115 19 96 %     05/07/21 0235   (!) 106 20 96 %     05/07/21 0230 118/79  (!) 108 18 96 %     05/07/21 0215 (!) 107/55  (!) 112 18 96 %     05/07/21 0200 (!) 128/49  (!) 114 19 96 %     05/07/21 0145 126/79  (!) 122 23 97 %     05/07/21 0130 (!) 118/55  (!) 121 20 96 %     05/07/21 0115 122/62  (!) 124 19 96 %     05/07/21 0101   (!) 123 20 95 %     05/07/21 0100 (!) 115/50  (!) 138 20 95 %     05/07/21 0059   (!) 126 21 96 %     05/07/21 0058 (!) 89/55  (!) 141 22      05/07/21 0057   (!) 129 24      05/07/21 0056   (!) 134 21      05/07/21 0055 108/89  (!) 128 23      05/07/21 0054 108/89  (!) 133 25 95 %     05/07/21 0045 102/66  (!) 135 22 96 %     05/07/21 0037 (!) 116/54 98 °F (36.7 °C) (!) 141 22 96 % 5' 1.75\" (1.568 m) 59 kg (130 lb)       HEENT Exam:     Normocephalic, atraumatic. EOMI. Oropharynx negative. Neck supple. No lymphadenopathy. Lung Exam:     The patient is not dyspneic. There is no cough. Breath sounds are heard equally in all lung fields. There are no wheezes, rales, rhonchi, or rubs heard on auscultation. Heart Exam:     The rhythm is irregularly irregular. The PMI is in the 5th intercostal space of the MCL. Apical impulse is normal. S1 is regular. S2 is physiologic. There is no S3, S4 gallop, click, or rub. 2/6 SM along LSB. Abdomen Exam:     Bowel sounds are normoactive. Abdomen soft in all quadrants. No tenderness. No palpable masses. No organomegaly. No hernias noted. No bruits or pulsatile mass. Extremities Exam:     The extremities are atraumatic appearing. There is no clubbing, cyanosis, edema, ulcers, varicose veins, rash, erythemia noted in the extremities. The neurovascular status is grossly intact with normal distal sensation and pulses. Vascular Exam:     The radial, brachial, dorsalis pedis, posterior tibial, are equal and strong bilaterally The carotids are equal bilaterally without bruits.           Labs:     Lab Results   Component Value Date/Time    Glucose 235 (H) 05/07/2021 01:11 AM    Sodium 138 05/07/2021 01:11 AM    Potassium 4.5 05/07/2021 01:11 AM Chloride 103 05/07/2021 01:11 AM    CO2 25 05/07/2021 01:11 AM    BUN 44 (H) 05/07/2021 01:11 AM    Creatinine 1.60 (H) 05/07/2021 01:11 AM    Calcium 8.0 (L) 05/07/2021 01:11 AM     Recent Labs     05/07/21 0047   WBC 65.0*   HGB 8.4*   HCT 28.3*        Recent Labs     05/07/21 0111   ALT 36   AP 62   TBILI 0.7   TP 5.9*   ALB 3.2*   GLOB 2.7     Recent Labs     05/07/21 0111   INR 1.3*   PTP 12.7*      No results for input(s): CPK, CKMB, TNIPOC in the last 72 hours.     No lab exists for component: TROPONINI, ITNL  Recent Labs     05/07/21 0111   TROIQ <0.05     Lab Results   Component Value Date/Time    Cholesterol, total 129 03/27/2020 10:07 AM    HDL Cholesterol 27 03/27/2020 10:07 AM    LDL, calculated 61.2 03/27/2020 10:07 AM    Triglyceride 204 (H) 03/27/2020 10:07 AM    CHOL/HDL Ratio 4.8 03/27/2020 10:07 AM       EKG:  AF @ 139, NSSTTW abn

## 2021-05-07 NOTE — DIABETES MGMT
[address] [facility]    CLINICAL NURSE SPECIALIST CONSULT     Initial Presentation   Stefanie Adamson is a [de-identified] y.o. female who presented to the ED 5/7/21 with a 1 day c/o palpitations and chest pain with radiation down the neck. She was hospitalized for pneumonia one week ago and remains on steroid therapy. Afib with RVR noted on EKG and admitted for management. HX:   Past Medical History:   Diagnosis Date    Anemia 12/2019    Dr. Roxana Chahal. due to CMML. hx iron def    Atrial fibrillation ([facility] 2019    Dr. Thomas High cell carcinoma 7/2012, 7/2015    [place] . Dr. Diony Caldwell. saw Dr. Kaden Gonzales CAP (community acquired pneumonia) 11/16/2019    bilateral, patchy. hx PNA 3/2019    CMML (chronic myelomonocytic leukemia) ([facility] 11/2020    Dr. Roxana Chahal. Stage 0.  marrow bx 70% cellularity, no blasts    COPD (chronic obstructive pulmonary disease) ([facility]     Questionable? Dr. Stella Bhagat Diverticulitis of colon 05/2010    dx on colonoscopy by Dr. Nirav Pan. Took flagyl, cipro    DJD (degenerative joint disease), lumbar `    L5?  Endometr hyperplasia w/atypia 1997    GERD (gastroesophageal reflux disease) 5/2010    Hiatal hernia 5/2010    Hydroureter, left 11/16/2020    due to splenomegaly. Dr. Lorna Ng Hypertension 1996    Hypothyroidism 2008    TSH 1.1 6/7/12    Iron deficiency anemia     EGD, colon 5/2010.  hgb 11.3 6/2012    Melanoma ([facility]     Dr. Sandra Rojas. x3    Mixed hyperlipidemia      Tg 166 LDL 87 HDL55 6/2012    Nephrolithiasis     Splenomegaly 11/2020    23 cm.  likely due to CMML    Stroke (cerebrum) ([facility]) 2007    R droop and weakness for 1 week. vessel rupture (not clot)    Sun-damaged skin     Zoster         DX: Atrial fibrillation with RVR    TX: Cardizem, Telemetry    Hospital course   Clinical progress has been uncomplicated. Diabetes    Patient does not have a history of diabetes.   Last A1C 11/20: 5.5%    Family history positive for diabetes. Admission      Consulted by Maureen Gillis MD for advanced diabetes nursing assessment and care, specifically related to   [x] Home management assessment    Subjective   I have never had a problem with my sugar.     PCP Carolyn Matt MD   ECHO performed   Rate controled on dilt- converting to PO  Last A1C 5.5% (11/2/20)  Admission glucose was 235  GFR 31 (BL 45?)  Correctional humalog at normal sensitivity: FORREST    Was discharged one week ago with a prednisone taper: 40mg prednisone x4 days, 20mg prednisone x4 days, 10 mg prednisone x4 days. She had completed 2/4 days of the 20mg prednisone taper. Objective   Physical exam  General Alert, oriented and in no acute distress/ill-appearing. Conversant and cooperative. Vital Signs   Visit Vitals  BP (!) 97/41   Pulse 90   Temp 97.8 °F (36.6 °C)   Resp 19   Ht 5' 1.75\" (1.568 m)   Wt 59 kg (130 lb 1.1 oz)   SpO2 96%   BMI 23.98 kg/m²     Skin  Warm and dry. No acanthosis noted along neckline. Heart   Regular rate and rhythm. No murmurs, rubs or gallops  Lungs  Clear to auscultation without rales or rhonchi  Extremities No foot wounds        Laboratory      CBC WITH AUTOMATED DIFF    Collection Time: 05/07/21 12:47 AM   Result Value Ref Range    WBC 65.0 (HH) 3.6 - 11.0 K/uL    RBC 3.20 (L) 3.80 - 5.20 M/uL    HGB 8.4 (L) 11.5 - 16.0 g/dL    HCT 28.3 (L) 35.0 - 47.0 %    MCV 88.4 80.0 - 99.0 FL    MCH 26.3 26.0 - 34.0 PG    MCHC 29.7 (L) 30.0 - 36.5 g/dL    RDW 18.6 (H) 11.5 - 14.5 %    PLATELET 022 377 - 939 K/uL    MPV 12.6 8.9 - 12.9 FL    NRBC 0.0 0.0  WBC    ABSOLUTE NRBC 0.00 0.00 - 0.01 K/uL    NEUTROPHILS 60 32 - 75 %    BAND NEUTROPHILS 10 (H) 0 - 6 %    LYMPHOCYTES 3 (L) 12 - 49 %    MONOCYTES 11 5 - 13 %    EOSINOPHILS 0 0 - 7 %    BASOPHILS 0 0 - 1 %    METAMYELOCYTES 5 (H) 0 %    MYELOCYTES 11 (H) 0 %    IMMATURE GRANULOCYTES 0 %    ABS. NEUTROPHILS 45.5 (H) 1.8 - 8.0 K/UL    ABS. LYMPHOCYTES 2.0 0.8 - 3.5 K/UL    ABS. MONOCYTES 7.2 (H) 0.0 - 1.0 K/UL    ABS. EOSINOPHILS 0.0 0.0 - 0.4 K/UL    ABS. BASOPHILS 0.0 0.0 - 0.1 K/UL    ABS. IMM. GRANS. 0.0 K/UL    DF AUTOMATED      RBC COMMENTS ANISOCYTOSIS  2+        RBC COMMENTS POIKILOCYTOSIS  1+        RBC COMMENTS TEARDROP CELLS  PRESENT        RBC COMMENTS HYPOCHROMIA  1+        RBC COMMENTS MICROCYTOSIS  2+        Pathologist review Sent for Pathologist Review          METABOLIC PANEL, COMPREHENSIVE    Collection Time: 05/07/21  1:11 AM   Result Value Ref Range    Sodium 138 136 - 145 mmol/L    Potassium 4.5 3.5 - 5.1 mmol/L    Chloride 103 97 - 108 mmol/L    CO2 25 21 - 32 mmol/L    Anion gap 10 5 - 15 mmol/L    Glucose 235 (H) 65 - 100 mg/dL    BUN 44 (H) 6 - 20 MG/DL    Creatinine 1.60 (H) 0.55 - 1.02 MG/DL    BUN/Creatinine ratio 28 (H) 12 - 20      GFR est AA 38 (L) >60 ml/min/1.73m2    GFR est non-AA 31 (L) >60 ml/min/1.73m2    Calcium 8.0 (L) 8.5 - 10.1 MG/DL    Bilirubin, total 0.7 0.2 - 1.0 MG/DL    ALT (SGPT) 36 12 - 78 U/L    AST (SGOT) 18 15 - 37 U/L    Alk. phosphatase 62 45 - 117 U/L    Protein, total 5.9 (L) 6.4 - 8.2 g/dL    Albumin 3.2 (L) 3.5 - 5.0 g/dL    Globulin 2.7 2.0 - 4.0 g/dL    A-G Ratio 1.2 1.1 - 2.2       Blood glucose pattern        Assessment and Plan   Nursing Diagnosis Risk for unstable blood glucose pattern   Nursing Intervention Domain 4009 Decision-making Support   Nursing Interventions Examined current inpatient diabetes control   Explored factors facilitating and impeding inpatient management  Identified self-management practices impeding diabetes control  Explored corrective strategies with patient and responsible inpatient provider   Informed patient of rational for insulin strategy while hospitalized     Evaluation   Kathleen Patton is an [de-identified]year old female with no previous history of diabetes admitted with atrial fibrillation with RVR on Cardizem gtt.   She was admitted one week ago at an OSH for pneumonia and on discharged home with a prednisone taper. Instructions for prednisone: 40mg prednisone x4 days, 20mg prednisone x4 days, 10 mg prednisone x4 days. She had completed 2/4 days of the 20mg prednisone doses. Incidentally, glucose on admission here was 235, therefore with prednisone induced hyperglycemia. Her fasting glucose this morning is 127 and glycemic management will be determined according to her prednisone plans this admission. Recommendations   1. POC glucose ACHS    2. Correctional insulin at normal sensitivity    3. A1C with next set of labs    4. If steroid therapy is resumed and glucose trends over 200, please use insulin to override steroid effect. Glucose will rise for about 12-15 hrs with each prednisone dose- which works very well with NPH (duration of 12-15hrs)  20 mg prednisone= 0.2 units/kg NPH to be given with prednisone   10 mg prednisone= 0.1 units/kg NPH to be given with prednisone     5. If glucose over goal, please ensure consistent carbohydrate diet  Billing Code(s)   [x] 21476     Before making these care recommendations, I personally reviewed the hosptialization record, including laboratory and diagnostic data, medications and examined the patient at bedside (circumstances permitting).   Total minutes: 28    BARBARA Miles  Diabetes Clinical Nurse Specialist  [facility]  Access via [employer]

## 2021-05-07 NOTE — ED NOTES
TRANSFER - OUT REPORT:    Verbal report given to OLESYA Hernández on Cesario Parra  being transferred to [facility], RM 3001-01 for routine progression of care       Report consisted of patients Situation, Background, Assessment and   Recommendations(SBAR). Information from the following report(s) SBAR, ED Summary, Procedure Summary, Intake/Output, MAR, Recent Results, Med Rec Status, Cardiac Rhythm A.fib w/RVR and Procedure Verification was reviewed with the receiving nurse. Lines:   Peripheral IV 05/07/21 Left; Anterior Antecubital (Active)   Site Assessment Clean, dry, & intact 05/07/21 0046   Phlebitis Assessment 0 05/07/21 0046   Infiltration Assessment 0 05/07/21 0046   Dressing Status Clean, dry, & intact 05/07/21 0046       Peripheral IV 05/07/21 Right Forearm (Active)        Opportunity for questions and clarification was provided.       Patient transported with:   Monitor  O2 @ 2 liters

## 2021-05-08 VITALS
OXYGEN SATURATION: 94 % | SYSTOLIC BLOOD PRESSURE: 118 MMHG | DIASTOLIC BLOOD PRESSURE: 45 MMHG | HEART RATE: 63 BPM | WEIGHT: 130.07 LBS | HEIGHT: 62 IN | BODY MASS INDEX: 23.94 KG/M2 | TEMPERATURE: 98.6 F | RESPIRATION RATE: 20 BRPM

## 2021-05-08 LAB
ALBUMIN SERPL-MCNC: 3 G/DL (ref 3.5–5)
ALBUMIN/GLOB SERPL: 1.4 {RATIO} (ref 1.1–2.2)
ALP SERPL-CCNC: 54 U/L (ref 45–117)
ALT SERPL-CCNC: 22 U/L (ref 12–78)
ANION GAP SERPL CALC-SCNC: 4 MMOL/L (ref 5–15)
ANION GAP SERPL CALC-SCNC: 8 MMOL/L (ref 5–15)
AST SERPL-CCNC: 9 U/L (ref 15–37)
BACTERIA SPEC CULT: NORMAL
BASOPHILS # BLD: 0 K/UL (ref 0–0.1)
BASOPHILS NFR BLD: 0 % (ref 0–1)
BILIRUB SERPL-MCNC: 0.6 MG/DL (ref 0.2–1)
BUN SERPL-MCNC: 32 MG/DL (ref 6–20)
BUN SERPL-MCNC: 35 MG/DL (ref 6–20)
BUN/CREAT SERPL: 22 (ref 12–20)
BUN/CREAT SERPL: 30 (ref 12–20)
CALCIUM SERPL-MCNC: 7.6 MG/DL (ref 8.5–10.1)
CALCIUM SERPL-MCNC: 7.8 MG/DL (ref 8.5–10.1)
CC UR VC: NORMAL
CHLORIDE SERPL-SCNC: 108 MMOL/L (ref 97–108)
CHLORIDE SERPL-SCNC: 110 MMOL/L (ref 97–108)
CO2 SERPL-SCNC: 20 MMOL/L (ref 21–32)
CO2 SERPL-SCNC: 24 MMOL/L (ref 21–32)
CREAT SERPL-MCNC: 1.16 MG/DL (ref 0.55–1.02)
CREAT SERPL-MCNC: 1.44 MG/DL (ref 0.55–1.02)
DIFFERENTIAL METHOD BLD: ABNORMAL
EOSINOPHIL # BLD: 0 K/UL (ref 0–0.4)
EOSINOPHIL NFR BLD: 0 % (ref 0–7)
ERYTHROCYTE [DISTWIDTH] IN BLOOD BY AUTOMATED COUNT: 17.6 % (ref 11.5–14.5)
ERYTHROCYTE [DISTWIDTH] IN BLOOD BY AUTOMATED COUNT: 17.9 % (ref 11.5–14.5)
GLOBULIN SER CALC-MCNC: 2.2 G/DL (ref 2–4)
GLUCOSE BLD STRIP.AUTO-MCNC: 221 MG/DL (ref 65–100)
GLUCOSE BLD STRIP.AUTO-MCNC: 289 MG/DL (ref 65–100)
GLUCOSE SERPL-MCNC: 199 MG/DL (ref 65–100)
GLUCOSE SERPL-MCNC: 282 MG/DL (ref 65–100)
HCT VFR BLD AUTO: 24.3 % (ref 35–47)
HCT VFR BLD AUTO: 27.7 % (ref 35–47)
HGB BLD-MCNC: 7 G/DL (ref 11.5–16)
HGB BLD-MCNC: 8.1 G/DL (ref 11.5–16)
HISTORY CHECKED?,CKHIST: NORMAL
IMM GRANULOCYTES # BLD AUTO: 0 K/UL
IMM GRANULOCYTES NFR BLD AUTO: 0 %
LYMPHOCYTES # BLD: 3.1 K/UL (ref 0.8–3.5)
LYMPHOCYTES NFR BLD: 6 % (ref 12–49)
MAGNESIUM SERPL-MCNC: 2.3 MG/DL (ref 1.6–2.4)
MAGNESIUM SERPL-MCNC: 2.8 MG/DL (ref 1.6–2.4)
MCH RBC QN AUTO: 26.6 PG (ref 26–34)
MCH RBC QN AUTO: 26.8 PG (ref 26–34)
MCHC RBC AUTO-ENTMCNC: 28.8 G/DL (ref 30–36.5)
MCHC RBC AUTO-ENTMCNC: 29.2 G/DL (ref 30–36.5)
MCV RBC AUTO: 91.1 FL (ref 80–99)
MCV RBC AUTO: 93.1 FL (ref 80–99)
METAMYELOCYTES NFR BLD MANUAL: 2 %
MONOCYTES # BLD: 1.6 K/UL (ref 0–1)
MONOCYTES NFR BLD: 3 % (ref 5–13)
MYELOCYTES NFR BLD MANUAL: 7 %
NEUTS BAND NFR BLD MANUAL: 15 % (ref 0–6)
NEUTS SEG # BLD: 42.9 K/UL (ref 1.8–8)
NEUTS SEG NFR BLD: 67 % (ref 32–75)
NRBC # BLD: 0 K/UL (ref 0–0.01)
NRBC # BLD: 0 K/UL (ref 0–0.01)
NRBC BLD-RTO: 0 PER 100 WBC
NRBC BLD-RTO: 0 PER 100 WBC
PHOSPHATE SERPL-MCNC: 3.7 MG/DL (ref 2.6–4.7)
PHOSPHATE SERPL-MCNC: 3.9 MG/DL (ref 2.6–4.7)
PLATELET # BLD AUTO: 180 K/UL (ref 150–400)
PLATELET # BLD AUTO: 209 K/UL (ref 150–400)
PMV BLD AUTO: 11.5 FL (ref 8.9–12.9)
PMV BLD AUTO: 11.5 FL (ref 8.9–12.9)
POTASSIUM SERPL-SCNC: 5.4 MMOL/L (ref 3.5–5.1)
POTASSIUM SERPL-SCNC: 5.6 MMOL/L (ref 3.5–5.1)
PROT SERPL-MCNC: 5.2 G/DL (ref 6.4–8.2)
RBC # BLD AUTO: 2.61 M/UL (ref 3.8–5.2)
RBC # BLD AUTO: 3.04 M/UL (ref 3.8–5.2)
RBC MORPH BLD: ABNORMAL
RBC MORPH BLD: ABNORMAL
SERVICE CMNT-IMP: ABNORMAL
SERVICE CMNT-IMP: ABNORMAL
SERVICE CMNT-IMP: NORMAL
SODIUM SERPL-SCNC: 136 MMOL/L (ref 136–145)
SODIUM SERPL-SCNC: 138 MMOL/L (ref 136–145)
WBC # BLD AUTO: 44.1 K/UL (ref 3.6–11)
WBC # BLD AUTO: 52.3 K/UL (ref 3.6–11)
WBC MORPH BLD: ABNORMAL

## 2021-05-08 PROCEDURE — 74011636637 HC RX REV CODE- 636/637: Performed by: INTERNAL MEDICINE

## 2021-05-08 PROCEDURE — 74011250636 HC RX REV CODE- 250/636: Performed by: HOSPITALIST

## 2021-05-08 PROCEDURE — 80053 COMPREHEN METABOLIC PANEL: CPT

## 2021-05-08 PROCEDURE — 74011000250 HC RX REV CODE- 250: Performed by: INTERNAL MEDICINE

## 2021-05-08 PROCEDURE — 99232 SBSQ HOSP IP/OBS MODERATE 35: CPT | Performed by: SPECIALIST

## 2021-05-08 PROCEDURE — P9016 RBC LEUKOCYTES REDUCED: HCPCS

## 2021-05-08 PROCEDURE — 82962 GLUCOSE BLOOD TEST: CPT

## 2021-05-08 PROCEDURE — 74011250636 HC RX REV CODE- 250/636: Performed by: INTERNAL MEDICINE

## 2021-05-08 PROCEDURE — 74011250637 HC RX REV CODE- 250/637: Performed by: SPECIALIST

## 2021-05-08 PROCEDURE — 83735 ASSAY OF MAGNESIUM: CPT

## 2021-05-08 PROCEDURE — 86901 BLOOD TYPING SEROLOGIC RH(D): CPT

## 2021-05-08 PROCEDURE — 84100 ASSAY OF PHOSPHORUS: CPT

## 2021-05-08 PROCEDURE — 74011250637 HC RX REV CODE- 250/637: Performed by: HOSPITALIST

## 2021-05-08 PROCEDURE — 94664 DEMO&/EVAL PT USE INHALER: CPT

## 2021-05-08 PROCEDURE — 36415 COLL VENOUS BLD VENIPUNCTURE: CPT

## 2021-05-08 PROCEDURE — 86923 COMPATIBILITY TEST ELECTRIC: CPT

## 2021-05-08 PROCEDURE — 85025 COMPLETE CBC W/AUTO DIFF WBC: CPT

## 2021-05-08 PROCEDURE — 85027 COMPLETE CBC AUTOMATED: CPT

## 2021-05-08 PROCEDURE — 94640 AIRWAY INHALATION TREATMENT: CPT

## 2021-05-08 PROCEDURE — 36430 TRANSFUSION BLD/BLD COMPNT: CPT

## 2021-05-08 PROCEDURE — 74011250637 HC RX REV CODE- 250/637: Performed by: INTERNAL MEDICINE

## 2021-05-08 RX ORDER — GUAIFENESIN 100 MG/5ML
81 LIQUID (ML) ORAL DAILY
Qty: 30 TAB | Refills: 0 | Status: SHIPPED | OUTPATIENT
Start: 2021-05-09 | End: 2021-05-18 | Stop reason: ALTCHOICE

## 2021-05-08 RX ORDER — SODIUM CHLORIDE 9 MG/ML
250 INJECTION, SOLUTION INTRAVENOUS AS NEEDED
Status: DISCONTINUED | OUTPATIENT
Start: 2021-05-08 | End: 2021-05-08 | Stop reason: HOSPADM

## 2021-05-08 RX ORDER — DILTIAZEM HYDROCHLORIDE 120 MG/1
120 CAPSULE, COATED, EXTENDED RELEASE ORAL DAILY
Qty: 30 CAP | Refills: 0 | Status: SHIPPED | OUTPATIENT
Start: 2021-05-09

## 2021-05-08 RX ADMIN — PANTOPRAZOLE SODIUM 40 MG: 40 TABLET, DELAYED RELEASE ORAL at 08:26

## 2021-05-08 RX ADMIN — DILTIAZEM HYDROCHLORIDE 120 MG: 120 CAPSULE, COATED, EXTENDED RELEASE ORAL at 08:27

## 2021-05-08 RX ADMIN — ATORVASTATIN CALCIUM 40 MG: 20 TABLET, FILM COATED ORAL at 08:26

## 2021-05-08 RX ADMIN — ASPIRIN 81 MG: 81 TABLET, CHEWABLE ORAL at 08:26

## 2021-05-08 RX ADMIN — LEVOTHYROXINE SODIUM 88 MCG: 0.09 TABLET ORAL at 08:27

## 2021-05-08 RX ADMIN — PREDNISONE 20 MG: 20 TABLET ORAL at 08:27

## 2021-05-08 RX ADMIN — Medication 10 ML: at 06:14

## 2021-05-08 RX ADMIN — SODIUM CHLORIDE 100 ML/HR: 9 INJECTION, SOLUTION INTRAVENOUS at 05:50

## 2021-05-08 RX ADMIN — Medication 10 ML: at 13:14

## 2021-05-08 RX ADMIN — ENOXAPARIN SODIUM 60 MG: 60 INJECTION SUBCUTANEOUS at 06:13

## 2021-05-08 RX ADMIN — FAMOTIDINE 20 MG: 20 TABLET, FILM COATED ORAL at 08:26

## 2021-05-08 RX ADMIN — ARFORMOTEROL TARTRATE 15 MCG: 15 SOLUTION RESPIRATORY (INHALATION) at 07:12

## 2021-05-08 RX ADMIN — BUDESONIDE 500 MCG: 0.5 INHALANT RESPIRATORY (INHALATION) at 07:12

## 2021-05-08 NOTE — PROGRESS NOTES
Bedside shift change report given to Jair Luis (oncoming nurse) by Vera Carroll (offgoing nurse). Report included the following information SBAR, Kardex, MAR, Recent Results, Med Rec Status, Cardiac Rhythm Afib and Alarm Parameters . Primary Nurse Geovani Choe and Vera Carroll, RN performed a dual skin assessment on this patient No impairment noted  Damien score is in flowsheet. Patient resting comfortably with spouse at bedside. Patient ambulated to bedside commode with no issues or concerns. This RN was at her side when she ambulated. 1100 Patient has ambulated to bedside commode twice with standby assist.  Maintenance fluids have been discontinued and patient has requested to be discharged home. Dr. Cecy Kam is aware. 1400 Patient receiving one unit of PRBC per order of Dr. Cecy Kam. Patient can be discharged after h&h draw 30 minutes after blood has finished. 1600 Patient was discharged and escorted to private vehicle by staff. All discharge paperwork was given to patient. All IV's sites were removed.

## 2021-05-08 NOTE — PROGRESS NOTES
CARDIOLOGY PROGRESS NOTE    Reginald Heredia MD,  [address], [place], [address] [facility]  Phone [phone]; Fax [phone]  Mobile [phone]   Voice Mail [phone]        2021 9:42 AM       Admit Date:           2021  Admit Diagnosis:  At[facility] [I48.91]; Acute chest pain [R07.9];CMML (chronic myelomonocytic leukemia) ([facility] [C93.10]; Leukocytosis [D72.829];DM (diabetes mellitus), type 2 ([facility] [E11.9]; Acute kidney injury superimposed on CKD ([facility] [N17.9, N18.9]; Anemia [D64.9]  :          1940   MRN:          [id number]      Laura Loja MD       ATTENTION:   This medical record was transcribed using an electronic medical records/speech recognition system. Although proofread, it may and can contain electronic, spelling and other errors. Corrections may be executed at a later time. Please feel free to contact us for any clarifications as needed. Impression Plan/Recommendation   1. Paroxysmal atrial fibrillation  2. Type 2 diabetes  3. Anemia  4. Chronic myelomonocytic leukemia               1.  In a normal sinus rhythm I presume after giving a dose of amiodarone. 2.  Agree with diltiazem at the time of discharge  3. Anticoagulation is not an option secondary to hemorrhoidal bleeding on Xarelto in the past currently treated with aspirin  3. Possible watchman device down the road  4. Hemoglobin is down to 7 may consider transfusion  5. From a cardiology standpoint okay to discharge home       We discussed the expected course, resolution and complications of the diagnosis(es) in detail. Medication risks, benefits, costs, interactions, and alternatives were discussed as indicated. ICD-10-CM ICD-9-CM    1. Atrial fibrillation with RVR (HCC)  I48.91 427.31    2. Chest pain, unspecified type  R07.9 786.50    3. Chronic myelomonocytic leukemia not having achieved remission (HCC)  C93.10 205.10    4.  JB (acute kidney injury) ([facility]  N17.9 584.9    5. Steroid-induced hyperglycemia  R73.9 790.29     T38.0X5A E932.0    6. Hyperglycemia  R73.9 790.29    7. Other elevated white blood cell (WBC) count  D72.828 288.69    8. Splenomegaly  R16.1 789.2    9. Acute kidney injury superimposed on CKD ([facility]  N17.9 866.00     N18.9 585.9    10. Anemia in neoplastic disease  D63.0 285.22        05/08 0701 - 05/08 1900  In: 63 [I.V.:63]  Out: 450 [Urine:450]    Last 3 Recorded Weights in this Encounter    05/07/21 0037 05/07/21 0907   Weight: 130 lb (59 kg) 130 lb 1.1 oz (59 kg)         05/06 1901 - 05/08 0700  In: 4219.8 [P.O.:1050; I.V.:3169.8]  Out: 1900 [Urine:1900]    SUBJECTIVE           Pt presents with neck pain and palps since last PM (~9:30). Feels better after rate-control. Echo at [address] [place] [address] on 4/28/21 EF was 6065% mild AI, mild to moderate MR, left atrium moderate to severely dilated PA pressure was 81 mmHg    Sherman Andrew reports Is doing well. States she has had 2 other episodes of atrial fibrillation in the past.  She is anxious for discharge. .      Current Facility-Administered Medications   Medication Dose Route Frequency    dilTIAZem (CARDIZEM) 100 mg in 0.9% sodium chloride (MBP/ADV) 100 mL infusion  0-15 mg/hr IntraVENous TITRATE    sodium chloride (NS) flush 5-40 mL  5-40 mL IntraVENous Q8H    sodium chloride (NS) flush 5-40 mL  5-40 mL IntraVENous PRN    acetaminophen (TYLENOL) tablet 650 mg  650 mg Oral Q4H PRN    oxyCODONE IR (ROXICODONE) tablet 5 mg  5 mg Oral Q4H PRN    morphine injection 2 mg  2 mg IntraVENous Q3H PRN    naloxone (NARCAN) injection 0.4 mg  0.4 mg IntraVENous PRN    docusate sodium (COLACE) capsule 100 mg  100 mg Oral BID    aspirin chewable tablet 81 mg  81 mg Oral DAILY    atorvastatin (LIPITOR) tablet 40 mg  40 mg Oral DAILY    enoxaparin (LOVENOX) injection 60 mg  60 mg SubCUTAneous Q24H    glucose chewable tablet 16 g  4 Tab Oral PRN    dextrose (D50W) injection syrg 12.5-25 g 25-50 mL IntraVENous PRN    glucagon (GLUCAGEN) injection 1 mg  1 mg IntraMUSCular PRN    insulin lispro (HUMALOG) injection   SubCUTAneous AC&HS    famotidine (PEPCID) tablet 20 mg  20 mg Oral DAILY    levothyroxine (SYNTHROID) tablet 88 mcg  88 mcg Oral ACB    pantoprazole (PROTONIX) tablet 40 mg  40 mg Oral ACB    0.9% sodium chloride infusion  100 mL/hr IntraVENous CONTINUOUS    arformoteroL (BROVANA) neb solution 15 mcg  15 mcg Nebulization BID RT    budesonide (PULMICORT) 500 mcg/2 ml nebulizer suspension  500 mcg Nebulization BID RT    dilTIAZem ER (CARDIZEM CD) capsule 120 mg  120 mg Oral DAILY    predniSONE (DELTASONE) tablet 20 mg  20 mg Oral DAILY WITH BREAKFAST      OBJECTIVE               Intake/Output Summary (Last 24 hours) at 5/8/2021 0625  Last data filed at 5/8/2021 0932  Gross per 24 hour   Intake 3177 ml   Output 2350 ml   Net 827 ml       Review of Systems - History obtained from the patient AS PER  HPI        PHYSICAL EXAM        Visit Vitals  BP (!) 120/34 (BP 1 Location: Left upper arm, BP Patient Position: At rest)   Pulse 61   Temp 97.9 °F (36.6 °C)   Resp 17   Ht 5' 1.75\" (1.568 m)   Wt 130 lb 1.1 oz (59 kg)   SpO2 96%   BMI 23.98 kg/m²       Gen: Well-developed, well-nourished, in no acute distress  alert and oriented x 3  HEENT:  Pink conjunctivae, Hearing grossly normal.No scleral icterus or conjunctival, moist mucous membranes  Neck: Supple,No JVD, No Carotid Bruit, Thyroid- non tender No cervical lymphadenopathy  Resp: No accessory muscle use, Clear breath sounds, No rales or rhonchi  Card: Regular Rate,Ryth   MSK: No cyanosis or clubbing, good capillary refill  Skin: No rashes or ulcers, no bruising  Neuro:  Cranial nerves are grossly intact, moving all four extremities, no focal deficit, follows commands appropriately  Psych:  Good insight, oriented to person, place and time, alert, Nml Affect  LE: No edema       DATA REVIEW      No specialty comments available.         Laboratory and Imaging have been reviewed by me and are notable for  Recent Labs     05/07/21  1630 05/07/21  1056 05/07/21  0111   TROIQ <0.05 <0.05 <0.05     Recent Labs     05/08/21  0510 05/07/21  0111 05/07/21  0047    138  --    K 5.4* 4.5  --    CO2 24 25  --    BUN 35* 44*  --    CREA 1.16* 1.60*  --    * 235*  --    PHOS 3.9  --   --    MG 2.8* 1.4*  --    WBC 44.1*  --  65.0*   HGB 7.0*  --  8.4*   HCT 24.3*  --  28.3*     --  330             Cate Lou MD

## 2021-05-08 NOTE — PROGRESS NOTES
Care Management Interventions  PCP Verified by CM: Yes(Dr Esposito)  Transition of Care Consult (CM Consult): Discharge Planning  Current Support Network: Lives with Spouse  Confirm Follow Up Transport: Family  The Plan for Transition of Care is Related to the Following Treatment Goals : discharge  The Patient and/or Patient Representative was Provided with a Choice of Provider and Agrees with the Discharge Plan?: (pt)  Name of the Patient Representative Who was Provided with a Choice of Provider and Agrees with the Discharge Plan: Jj Sykes  Discharge Location  Discharge Placement: Home with family assistance

## 2021-05-08 NOTE — DISCHARGE INSTRUCTIONS
Cardiology Follow up with Bebe Hinson MD on May 14th, 2021 at 12:15pm.  [address]  [phone]  [facility]/ED Visit Follow-Up Instructions/Information    You may have an in home follow up visit set up with [facility] or may wish to [facility] to set-up a visit:    What are we? [facility] is an in-home urgent care service staffed with emergency trained medical teams. We come to your home in a vehicle stocked with medical supplies and technology. An ER physician is always available if needed. When? As a part of your hospital follow-up, an appointment has been/ or can be set up for us to come see you. Usually, this will be 24-72 hours after you leave the hospital or as needed. [employer] is open 7am-9pm, 7 days a week, 365 days a year, including holidays. Why? We know that you cannot always get to your doctor after being in the hospital and that your doctor is not always available when you need them. Once your workup is complete, we'll call in your prescriptions, update your family doctor, and handle billing with your insurance so you can focus on feeling better, faster without leaving home. How much? We accept most major health insurance plans, including Medicaid, Medicare, and Medicare Advantage [address] [place] [address], and [employer]. We also accept: credit, debit, health savings account (HSA), health reimbursement account (HRA) and flexible spending account (FSA) payments. [employer]'s prices compare to conventional urgent care facilities, but we bring the care to you. How to reach us? Getting care is easy- use our mobile soraya ([employer]), website ([email or web address]) or call us [phone].         HOSPITALIST DISCHARGE INSTRUCTIONS  NAME: Richard Estrada   :  1940   MRN:  [id number]     Date/Time:  2021 11:48 AM    ADMIT DATE: 2021     DISCHARGE DATE: 2021     ADMITTING DIAGNOSIS:  Atrial fibrillation with rapid ventricular response   Acute kidney injury   Anemia     DISCHARGE DIAGNOSIS:  As above     MEDICATIONS:     · It is important that you take the medication exactly as they are prescribed. · Keep your medication in the bottles provided by the pharmacist and keep a list of the medication names, dosages, and times to be taken in your wallet. · Do not take other medications without consulting your doctor. Pain Management: per above medications    What to do at Home    Recommended diet:  Cardiac Diet    Recommended activity: Activity as tolerated    If you experience any of the following symptoms then please call your primary care physician or return to the emergency room if you cannot get hold of your doctor:  Fever, chills, nausea, vomiting, diarrhea, change in mentation, falling, bleeding, shortness of breath, chest pain    Follow Up:  Dr. Rosita Harmon MD  you are to call and set up an appointment to see them in 2 weeks. Follow-up Information     Follow up With Specialties Details Why Contact Info    Angella Moreno MD Cardiology On 5/14/2021 12:15pm [facility]  Cardiology [address]  [phone]      Jonathon Max MD Pulmonary Disease, Emergency Medicine On 5/18/2021  [address] [facility]  [address]    [facility]      Rosita Harmon MD Internal Medicine In 1 week  [address] [address]  [facility]  [phone]            Information obtained by :  I understand that if any problems occur once I am at home I am to contact my physician. I understand and acknowledge receipt of the instructions indicated above.                                                                                                                                            Physician's or R.N.'s Signature Date/Time                                                                                                                                              Patient or Representative Signature                                                          Date/Time

## 2021-05-09 LAB
ABO + RH BLD: NORMAL
BLD PROD TYP BPU: NORMAL
BLOOD GROUP ANTIBODIES SERPL: NORMAL
BPU ID: NORMAL
CROSSMATCH RESULT,%XM: NORMAL
SPECIMEN EXP DATE BLD: NORMAL
STATUS OF UNIT,%ST: NORMAL
UNIT DIVISION, %UDIV: 0

## 2021-05-10 ENCOUNTER — PATIENT OUTREACH (OUTPATIENT)
Dept: CASE MANAGEMENT | Age: 81
End: 2021-05-10

## 2021-05-10 NOTE — PROGRESS NOTES
Care Transitions Initial Follow Up Call    Call within 2 business days of discharge: Yes     Patient: Marlys Hudson Patient : 1940 MRN: [id number]    Last Discharge [address]       Complaint Diagnosis Description Type Department Provider    21 Chest Pain Atrial fibrillation with RVR ([facility] [address] . .. ED to Hosp-Admission (Discharged) (ADMIT) Maria E Beckett MD; Betito Molina,... Was this an external facility discharge? No     Challenges to be reviewed by the provider   Additional needs identified to be addressed with provider yes  medications- start on treatment with Decitabine next week         Method of communication with provider : none    Discussed COVID-19 related testing which was not done at this time. Test results were not done. Advance Care Planning:   Does patient have an Advance Directive: yes, reviewed and current     Inpatient Readmission Risk score: Unplanned Readmit Risk Score: 35    Was this a readmission? no     Patients top risk factors for readmission: None identified at this time COPD and diabetes  Interventions to address risk factors: Scheduled appointment with PCP-    Care Transition Nurse (CTN) contacted the patient by telephone to perform post hospital discharge assessment. Verified name and  with patient as identifiers. Provided introduction to self, and explanation of the CTN role. CTN reviewed discharge instructions, medical action plan and red flags with patient who verbalized understanding. Were discharge instructions available to patient? yes. Reviewed appropriate site of care based on symptoms and resources available to patient including: PCP. Patient given an opportunity to ask questions and does not have any further questions or concerns at this time. The patient agrees to contact the PCP office for questions related to their healthcare.      Medication reconciliation was performed with patient, who verbalizes understanding of administration of home medications. Referral to Pharm D needed: no     Home Health/Outpatient orders at discharge: none    Durable Medical Equipment ordered at discharge: None    Covid Risk Education    Patient has following risk factors of: COPD and diabetes. Education provided regarding infection prevention, and signs and symptoms of COVID-19 and when to seek medical attention with patient who verbalized understanding. Discussed exposure protocols and quarantine From CDC: Are you at higher risk for severe illness?  and given an opportunity for questions and concerns. The patient agrees to contact PCP office for questions related to healthcare. For more information on steps you can take to protect yourself, see CDC's How to Protect Yourself     Was patient discharged with a pulse oximeter? No    Discussed follow-up appointments. If no appointment was previously scheduled, appointment scheduling offered: yes Is follow up appointment scheduled within 7 days of discharge? no   [address] follow up appointment(s):   Future Appointments   Date Time Provider Vaibhav Vasquez   5/18/2021 11:20 AM Gem Esposito MD [facility]     Non-[facility] follow up appointment(s): 5/14 Dr. Candace Dumont, Cardiology  5/18 Dr. Lucrecia Workman, Pulmonary; 5/12  Dr. Roopa Herrera for follow-up call in 10-14 days based on severity of symptoms and risk factors. Plan for next call: follow up appointment-PCP/Cards  CTN provided contact information for future needs. Goals Addressed                 This Visit's Progress     Attend follow up appointments on schedule          05/10/21   Will attend follow up appts       COMPLETED: Attends follow-up appointments as directed. Pt is scheduled to see PCP 4/9/19, earliest appt available. NN will arrange for [facility] to visit pt tomorrow to complete STAN visit. TSB        COMPLETED: Supportive resources in place to maintain patient in the community (ie.  Home Health, DME equipment, refer to, medication assistant plan, etc.)        [facility] is scheduled to see pt 3/28/19 - TSB        COMPLETED: Understands red flags post discharge. Pt will take all prescribed meds including ABX until completed. TSB       COMPLETED: Understands red flags post discharge. Pt will monitor temp daily & call if notes any change in condition - TSB        Understands red flags post discharge.

## 2021-05-18 ENCOUNTER — OFFICE VISIT (OUTPATIENT)
Dept: INTERNAL MEDICINE CLINIC | Age: 81
End: 2021-05-18
Payer: MEDICARE

## 2021-05-18 VITALS
HEIGHT: 62 IN | OXYGEN SATURATION: 98 % | SYSTOLIC BLOOD PRESSURE: 138 MMHG | DIASTOLIC BLOOD PRESSURE: 64 MMHG | RESPIRATION RATE: 12 BRPM | HEART RATE: 63 BPM | BODY MASS INDEX: 22.97 KG/M2 | TEMPERATURE: 98 F | WEIGHT: 124.8 LBS

## 2021-05-18 DIAGNOSIS — R63.4 WEIGHT LOSS: ICD-10-CM

## 2021-05-18 DIAGNOSIS — C93.10 CHRONIC MYELOMONOCYTIC LEUKEMIA NOT HAVING ACHIEVED REMISSION (HCC): ICD-10-CM

## 2021-05-18 DIAGNOSIS — Z87.01 HISTORY OF PNEUMONIA: ICD-10-CM

## 2021-05-18 DIAGNOSIS — I48.0 PAROXYSMAL ATRIAL FIBRILLATION (HCC): Primary | ICD-10-CM

## 2021-05-18 DIAGNOSIS — J44.9 CHRONIC OBSTRUCTIVE PULMONARY DISEASE, UNSPECIFIED COPD TYPE (HCC): ICD-10-CM

## 2021-05-18 DIAGNOSIS — R16.1 SPLENOMEGALY: ICD-10-CM

## 2021-05-18 DIAGNOSIS — E03.9 HYPOTHYROIDISM, UNSPECIFIED TYPE: ICD-10-CM

## 2021-05-18 DIAGNOSIS — D64.9 ANEMIA, UNSPECIFIED TYPE: ICD-10-CM

## 2021-05-18 PROCEDURE — 99495 TRANSJ CARE MGMT MOD F2F 14D: CPT | Performed by: INTERNAL MEDICINE

## 2021-05-18 PROCEDURE — G8427 DOCREV CUR MEDS BY ELIG CLIN: HCPCS | Performed by: INTERNAL MEDICINE

## 2021-05-18 RX ORDER — FLECAINIDE ACETATE 50 MG/1
TABLET ORAL 2 TIMES DAILY
COMMUNITY

## 2021-05-18 RX ORDER — LEVOTHYROXINE SODIUM 75 UG/1
75 TABLET ORAL
Qty: 90 TAB | Refills: 1
Start: 2021-05-18 | End: 2021-06-04 | Stop reason: SDUPTHER

## 2021-05-18 NOTE — PROGRESS NOTES
HISTORY OF PRESENT ILLNESS    Chief Complaint   Patient presents with   [facility] Follow Up     [facility] - pneumonia and afib        Presents for Transitional care management (TCM) visit s/p of hospital admission from 5/7 until 5/08/2021 at [facility] [place].    Nurse Navigator call noted to patient and documented in [facility] on 05/9/21. Hospital record and relevant lab results, test results and consult notes have personally been reviewed by me at this office visit. Medications reviewed and reconciled. Patient was hospitalized for twice. First hospitalized at [facility] with left-sided pneumonia. She was discharged home with Levaquin 750 mg daily x2 days and prednisone taper for COPD exacerbation. She continues to take Spiriva for that. Second hospitalization at [facility] was a May 78 as above for rapid atrial fibrillation with RVR. Fausto Blanchard Medication changes included changing amlodipine to diltiazem, starting Eliquis, changing amiodarone to flecainide. She then says she went to the emergency room at [facility] again on May 9 for another episode of rapid A. fib. She was cardioverted. She saw Dr. Faith Parr in cardiology this week. She had another recent episode and she was told to increase her flecainide to 100 mg twice daily but she is not sure how long to continue that. I have a call into Dr. Faith Parr asked that question. She is seeing Dr. Trang Nicole and pulmonary for possible COPD. Is taking Spiriva and sparing use of albuterol as needed. Will have PFTs to soon. 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 She is anemic and has required at least 3 transfusions during these 2 hospitalizations. Her white blood cell count has been markedly higher, but this may be secondary to recent steroid use and pneumonia and acute infection. Hemoglobin was 7.0 after hydration in the hospital on May 8. Improved to 8.1 on discharge.   Lab Results   Component Value Date/Time    WBC 52.3 ([facility]) 05/08/2021 03:35 PM    HGB 8.1 (L) 05/08/2021 03:35 PM    HCT 27.7 (L) 05/08/2021 03:35 PM    PLATELET 493 13/04/8217 03:35 PM    MCV 91.1 05/08/2021 03:35 PM     She is found to have mild acute kidney injury with creatinine 1.6 on May 7. Improved to 1.44 after hydration. Lab Results   Component Value Date/Time    Sodium 136 05/08/2021 03:33 PM    Potassium 5.6 (H) 05/08/2021 03:33 PM    Chloride 108 05/08/2021 03:33 PM    CO2 20 (L) 05/08/2021 03:33 PM    Anion gap 8 05/08/2021 03:33 PM    Glucose 282 (H) 05/08/2021 03:33 PM    BUN 32 (H) 05/08/2021 03:33 PM    Creatinine 1.44 (H) 05/08/2021 03:33 PM    BUN/Creatinine ratio 22 (H) 05/08/2021 03:33 PM    GFR est AA 42 (L) 05/08/2021 03:33 PM    GFR est non-AA 35 (L) 05/08/2021 03:33 PM    Calcium 7.6 (L) 05/08/2021 03:33 PM       Today, patient is accompanied by her , Cornelia Chapman. She is ambulating slowly but independently. Says that her breathing has returned close to normal but she is still pretty debilitated and feels overall pretty weak. Denies any palpitations. Wearing an apple watch and has had no recurrence of atrial fibrillation. Continues to take double dose of flecainide, 100 mg total twice daily. Tolerating diltiazem. She actually still has some samples left of Xarelto which will run out today but she plans to start Eliquis tomorrow. Not using nebulizer very often.       Review of Systems   All other systems reviewed and are negative, except as noted in HPI    Past Medical and Surgical History   has a past medical history of Anemia (12/2019), Atrial fibrillation ([address] (2019), Basal cell carcinoma (7/2012, 7/2015), CAP (community acquired pneumonia) (11/16/2019), CMML (chronic myelomonocytic [facility]) [facility] (11/2020), COPD (chronic obstructive pulmonary disease) ([address] Diverticulitis of colon (05/2010), DJD (degenerative joint disease), lumbar (`), Endometr hyperplasia w/atypia (1997), GERD (gastroesophageal reflux disease) (5/2010), Hiatal hernia (5/2010), Hydroureter, left (11/16/2020), Hypertension (1996), Hypothyroidism (2008), Iron deficiency anemia, Melanoma ([facility] [address] Mixed hyperlipidemia, Nephrolithiasis, Splenomegaly (11/2020), Stroke (cerebrum) ([facility] [address] (2007), Sun-damaged skin, and Zoster. She also has no past medical history of Arsenic suspected exposure, Malignant hyperthermia due to anesthesia, Nausea & vomiting, Nicotine vapor product user, Non-nicotine vapor product user, Pacemaker, Radiation exposure, Sleep apnea, or Tanning bed exposure. has a past surgical history that includes hx edilberto and bso (1997); hx tonsillectomy; hx colonoscopy (5/2010); hx mohs procedure (09/14/2017); colonoscopy (N/A, 10/19/2017); hx endoscopy (5/2010); hx endoscopy (05/30/2019); and hx hemorrhoidectomy (02/2020). reports that she has quit smoking. She smoked 1.00 pack per day. She quit smokeless tobacco use about 31 years ago. She reports current alcohol use of about 1.0 standard drinks of alcohol per week. She reports that she does not use drugs. family history includes Cancer in her brother, father, and mother; Hypertension in her brother. Physical Exam   Nursing note and vitals reviewed. Blood pressure 138/64, pulse 63, temperature 98 °F (36.7 °C), temperature source Oral, resp. rate 12, height 5' 1.75\" (1.568 m), weight 124 lb 12.8 oz (56.6 kg), SpO2 98 %. Constitutional:  No distress. Eyes: Conjunctivae are normal.   Ears:  Hearing grossly intact  Cardiovascular: Normal rate. regular rhythm, no murmurs or gallops  No edema  Pulmonary/Chest: Effort normal.   CTAB  Musculoskeletal: moves all 4 extremities   Neurological: Alert and oriented to person, place, and time. Skin: No rash noted. Psychiatric: Normal mood and affect. Behavior is normal.     ASSESSMENT and PLAN  Diagnoses and all orders for this visit:    1.  Paroxysmal atrial fibrillation (HCC)  Precipitated event secondary to recent pneumonia, steroid use, anemia. Thyroid function is also borderline overactive. Albuterol use also may have precipitated it. Continue flecainide 100 mg twice daily and diltiazem 120 mg daily. Dr. Saman Phillips may adjust her flecainide dose back to baseline of 50 mg twice daily after he returns for call. I agree with long-term use of anticoagulant therapy unless contraindicated. Starting Eliquis tomorrow. -     METABOLIC PANEL, BASIC; Future  -     MAGNESIUM; Future    2. History of pneumonia  8. Chronic obstructive pulmonary disease, unspecified COPD type ([address]  Chest x-ray at [facility] shows resolved pneumonia. COPD with exacerbation also may have played a role. She is off of steroid therapy right now. She will see Dr. Elvin Pack and pulmonary and may have PFTs in the near future. Continue Spiriva and albuterol only when significantly wheezing because it may trigger her A. fib. Consider Xopenex. 3. Anemia, unspecified type  Significant persistent and refractory anemia requiring recent transfusions. Treatment of underlying blood cell disease pending.  -     CBC WITH AUTOMATED DIFF; Future    4. 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 She has an appointment with hematology Dr. Maria Del Rosario Wyatt NP tomorrow. Will send results. She will be starting oral chemotherapy soon. Significant splenomegaly is playing a role but no indication for splenectomy according to hematology. -     CBC WITH AUTOMATED DIFF; Future    5. Splenomegaly    6. Weight loss  Ongoing significant weight loss. Multifactorial, predominantly because of recent illness unclear contribution of CMML, appetite not been as great. Thyroid function is less likely contributing, but will decrease dose as below.     7. Hypothyroidism, unspecified type  Her TSH is technically in normal limits, but is borderline low and due to weight loss and recent atrial fibrillation, I would prefer to keep it in mid range. Decrease dose  Repeat 2 months  -     T4, FREE; Future  -     levothyroxine (SYNTHROID) 75 mcg tablet; Take 1 Tab by mouth Daily (before breakfast). Decreased 5/18/21    lab results and schedule of future lab studies reviewed with patient  reviewed medications and side effects in detail    Return to clinic for further evaluation if new symptoms develop        Current Outpatient Medications   Medication Sig    apixaban (Eliquis) 2.5 mg tablet Take 2.5 mg by mouth two (2) times a day.  flecainide (TAMBOCOR) 50 mg tablet Take  by mouth two (2) times a day.  levothyroxine (SYNTHROID) 75 mcg tablet Take 1 Tab by mouth Daily (before breakfast). Decreased 5/18/21    dilTIAZem ER (CARDIZEM CD) 120 mg capsule Take 1 Cap by mouth daily.  guaiFENesin ER (MUCINEX) 600 mg ER tablet 600 mg two (2) times a day.  fluorometholone (FML FORTE) 0.25 % ophthalmic suspension 1 Drop.  albuterol (PROVENTIL HFA, VENTOLIN HFA, PROAIR HFA) 90 mcg/actuation inhaler Take 2 Puffs by inhalation every six (6) hours as needed for Wheezing.  montelukast (SINGULAIR) 10 mg tablet TAKE 1 TABLET EVERY DAY    ergocalciferol (ERGOCALCIFEROL) 1,250 mcg (50,000 unit) capsule TAKE 1 CAP BY MOUTH EVERY SEVEN (7) DAYS.  omeprazole (PRILOSEC) 20 mg capsule     fluticasone propionate (FLONASE) 50 mcg/actuation nasal spray     Spiriva Respimat 2.5 mcg/actuation inhaler Take 2 Puffs by inhalation daily. Indications: bronchospasm prevention with COPD    famotidine (PEPCID) 40 mg tablet TAKE 1 TABLET TWICE DAILY (Patient taking differently: daily.)    mv-mn/folic acid/calcium/vit K (ONE-A-DAY WOMEN'S 50 PLUS PO) Take 1 Tab by mouth daily.  levocetirizine (XYZAL) 5 mg tablet Take 5 mg by mouth nightly.  acetaminophen (TYLENOL ARTHRITIS PAIN) 650 mg CR tablet Take 1,300 mg by mouth nightly.      No current facility-administered medications for this visit.

## 2021-05-19 ENCOUNTER — PATIENT OUTREACH (OUTPATIENT)
Dept: CASE MANAGEMENT | Age: 81
End: 2021-05-19

## 2021-05-19 LAB
ANION GAP SERPL CALC-SCNC: 6 MMOL/L (ref 5–15)
BASOPHILS # BLD: 0 K/UL (ref 0–0.1)
BASOPHILS NFR BLD: 0 % (ref 0–1)
BUN SERPL-MCNC: 39 MG/DL (ref 6–20)
BUN/CREAT SERPL: 26 (ref 12–20)
CALCIUM SERPL-MCNC: 8.7 MG/DL (ref 8.5–10.1)
CHLORIDE SERPL-SCNC: 107 MMOL/L (ref 97–108)
CO2 SERPL-SCNC: 25 MMOL/L (ref 21–32)
CREAT SERPL-MCNC: 1.49 MG/DL (ref 0.55–1.02)
DIFFERENTIAL METHOD BLD: ABNORMAL
EOSINOPHIL # BLD: 0 K/UL (ref 0–0.4)
EOSINOPHIL NFR BLD: 0 % (ref 0–7)
ERYTHROCYTE [DISTWIDTH] IN BLOOD BY AUTOMATED COUNT: 17.7 % (ref 11.5–14.5)
GLUCOSE SERPL-MCNC: 92 MG/DL (ref 65–100)
HCT VFR BLD AUTO: 27.4 % (ref 35–47)
HGB BLD-MCNC: 7.7 G/DL (ref 11.5–16)
IMM GRANULOCYTES # BLD AUTO: 0 K/UL
IMM GRANULOCYTES NFR BLD AUTO: 0 %
LYMPHOCYTES # BLD: 0.6 K/UL (ref 0.8–3.5)
LYMPHOCYTES NFR BLD: 4 % (ref 12–49)
MAGNESIUM SERPL-MCNC: 1.8 MG/DL (ref 1.6–2.4)
MCH RBC QN AUTO: 26.3 PG (ref 26–34)
MCHC RBC AUTO-ENTMCNC: 28.1 G/DL (ref 30–36.5)
MCV RBC AUTO: 93.5 FL (ref 80–99)
METAMYELOCYTES NFR BLD MANUAL: 1 %
MONOCYTES # BLD: 0.7 K/UL (ref 0–1)
MONOCYTES NFR BLD: 5 % (ref 5–13)
NEUTS BAND NFR BLD MANUAL: 5 % (ref 0–6)
NEUTS SEG # BLD: 12.6 K/UL (ref 1.8–8)
NEUTS SEG NFR BLD: 85 % (ref 32–75)
NRBC # BLD: 0 K/UL (ref 0–0.01)
NRBC BLD-RTO: 0 PER 100 WBC
PLATELET # BLD AUTO: 152 K/UL (ref 150–400)
PMV BLD AUTO: 12 FL (ref 8.9–12.9)
POTASSIUM SERPL-SCNC: 4.2 MMOL/L (ref 3.5–5.1)
RBC # BLD AUTO: 2.93 M/UL (ref 3.8–5.2)
RBC MORPH BLD: ABNORMAL
SODIUM SERPL-SCNC: 138 MMOL/L (ref 136–145)
T4 FREE SERPL-MCNC: 1.1 NG/DL (ref 0.8–1.5)
WBC # BLD AUTO: 14 K/UL (ref 3.6–11)

## 2021-05-19 NOTE — PROGRESS NOTES
Care Transitions Follow Up Call    Challenges to be reviewed by the provider          Method of communication with provider : none    Care Transition Nurse (CTN) contacted the patient by telephone to follow up after admission on 2021. Verified name and  with patient as identifiers. Addressed changes since last contact: none  Follow up appointment completed? yes   Was follow up appointment scheduled within 7 days of discharge? no     Advance Care Planning:   Does patient have an Advance Directive: yes, reviewed and current      CTN reviewed discharge instructions, medical action plan and red flags with patient and discussed any barriers to care and/or understanding of plan of care after discharge. Discussed appropriate site of care based on symptoms and resources available to patient including: PCP and Specialist. The patient agrees to contact the PCP office for questions related to their healthcare. Patients top risk factors for readmission: none identified at this time   Interventions to address risk factors: Scheduled appointment with Specialist-cards, pulm, oncologist    [facility] follow up appointment(s): No future appointments. Non-[facility] follow up appointment(s): NA    CTN provided contact information for future needs. Plan for follow-up call in 10-14 days based on severity of symptoms and risk factors. Plan for next call: self management-any palpitations     Goals Addressed                 This Visit's Progress     Attend follow up appointments on schedule          05/10/21   Will attend follow up appts    2021   Attended all follow up appts       Understands red flags post discharge.           21   Denies chest pain, palpitations   Taking medications as prescribed

## 2021-05-24 ENCOUNTER — TELEPHONE (OUTPATIENT)
Dept: INTERNAL MEDICINE CLINIC | Age: 81
End: 2021-05-24

## 2021-05-24 NOTE — TELEPHONE ENCOUNTER
----- Message from [facility] sent at 5/24/2021  9:54 AM EDT -----  Regarding: DR. GONZALEZ/TELEPHONE  Patient return call    Caller's first and last name and relationship (if not the patient):      Best contact number(s): [phone]      Whose call is being returned: UNKNOWN      Details to clarify the request: [address]

## 2021-05-24 NOTE — TELEPHONE ENCOUNTER
Pt notified of test results per Dr. Joanne Baxter. Pt verbalizes understanding.  Pt states she is getting transfusion this am.

## 2021-05-24 NOTE — TELEPHONE ENCOUNTER
Pt notified of her test results per Dr. Jaimie Regalado. Pt verbalizes understanding.  Pt states she is having a transfusion this am.

## 2021-06-04 RX ORDER — LEVOTHYROXINE SODIUM 75 UG/1
75 TABLET ORAL
Qty: 90 TABLET | Refills: 1 | Status: SHIPPED | OUTPATIENT
Start: 2021-06-04 | End: 2021-11-04

## 2021-06-08 ENCOUNTER — PATIENT OUTREACH (OUTPATIENT)
Dept: CASE MANAGEMENT | Age: 81
End: 2021-06-08

## 2021-06-08 NOTE — PROGRESS NOTES
Patient has graduated from the Transitions of Care Coordination  program on 6/8/2021. Patient/family has the ability to self-manage at this time Care management goals have been completed. Patient was not referred to the [facility] [place] for further management. Goals Addressed                 This Visit's Progress     COMPLETED: Attend follow up appointments on schedule          05/10/21   Will attend follow up appts    5/19/2021   Attended all follow up appts       COMPLETED: Understands red flags post discharge. 05/19/21   Denies chest pain, palpitations   Taking medications as prescribed            Patient has Care Transition Nurse's contact information for any further questions, concerns, or needs. Patients upcoming visits:  No future appointments.

## 2021-06-24 ENCOUNTER — TELEPHONE (OUTPATIENT)
Dept: INTERNAL MEDICINE CLINIC | Age: 81
End: 2021-06-24

## 2021-06-24 NOTE — TELEPHONE ENCOUNTER
----- Message from Tiburcio Kurtz sent at 6/24/2021  9:18 AM EDT -----  Regarding: MD Vaibhav/Telephone  Patient return call    Caller's first and last name and relationship (if not the patient): Self      Best contact number(s): [phone]      Whose call is being returned: Dr Raquel Persaud nurse. Details to clarify the request: Pt returning call, not sure of what the call was about.       Tiburcio Kurtz

## 2021-06-24 NOTE — TELEPHONE ENCOUNTER
I spoke with patient to advise we didn't contact her today. It could have been an older message, a nurse did call in May and left a VM for her to call back and then the nurse spoke with pt an hour after that message was left. Pt verbalized understanding.

## 2021-06-25 NOTE — TELEPHONE ENCOUNTER
Added on
Can put in at 1:00 tomorrow 2/11. Double book w testosterone nurse visit.   Please get records of ER visit
Can try 4:00 Friday
Patient called requesting STAN appointment with PCP within 3/4 days from discharge 2/9/2021. Please call patient to advise of time/date of appointment.  [phone]
Pt states she is unable to come tomorrow as her  will be bringing her and he has an appt at 130pm for immunotherapy.
fall risk due to weakness

## 2021-09-16 ENCOUNTER — TELEPHONE (OUTPATIENT)
Dept: INTERNAL MEDICINE CLINIC | Age: 81
End: 2021-09-16

## 2021-09-16 NOTE — TELEPHONE ENCOUNTER
Pt informed per her med list to take synthroid 75 mcg. Pt verbalized understanding of information discussed w/ no further questions at this time.

## 2021-09-16 NOTE — TELEPHONE ENCOUNTER
----- Message from [facility] sent at 9/16/2021  2:48 PM EDT -----  Regarding: Dr. Vin Hirsch telephone  Contact: [phone]  General Message/Vendor Calls    Caller's first and last name: n/a      Reason for call: Patient has question on dosage of Levothyroxine medication. Callback required yes/no and why: yes, information needed.       Best contact number(s): [phone]      Details to clarify the request: n/a      [facility]

## 2021-09-26 RX ORDER — CIPROFLOXACIN 250 MG/1
250 TABLET, FILM COATED ORAL 2 TIMES DAILY
Qty: 14 TABLET | Refills: 0 | Status: SHIPPED | OUTPATIENT
Start: 2021-09-26 | End: 2021-10-03

## 2021-10-01 DIAGNOSIS — E55.9 VITAMIN D DEFICIENCY: ICD-10-CM

## 2021-10-01 RX ORDER — ERGOCALCIFEROL 1.25 MG/1
CAPSULE ORAL
Qty: 13 CAPSULE | Refills: 2 | Status: SHIPPED | OUTPATIENT
Start: 2021-10-01 | End: 2022-06-01

## 2021-10-21 RX ORDER — MONTELUKAST SODIUM 10 MG/1
TABLET ORAL
Qty: 90 TABLET | Refills: 2 | Status: SHIPPED | OUTPATIENT
Start: 2021-10-21 | End: 2022-06-01

## 2021-11-04 RX ORDER — LEVOTHYROXINE SODIUM 75 UG/1
75 TABLET ORAL
Qty: 90 TABLET | Refills: 1 | Status: SHIPPED | OUTPATIENT
Start: 2021-11-04 | End: 2022-02-13 | Stop reason: DRUGHIGH

## 2022-02-09 ENCOUNTER — OFFICE VISIT (OUTPATIENT)
Dept: INTERNAL MEDICINE CLINIC | Age: 82
End: 2022-02-09
Payer: MEDICARE

## 2022-02-09 VITALS
OXYGEN SATURATION: 100 % | SYSTOLIC BLOOD PRESSURE: 128 MMHG | HEART RATE: 73 BPM | DIASTOLIC BLOOD PRESSURE: 75 MMHG | HEIGHT: 62 IN | WEIGHT: 130.8 LBS | RESPIRATION RATE: 14 BRPM | BODY MASS INDEX: 24.07 KG/M2 | TEMPERATURE: 97.7 F

## 2022-02-09 DIAGNOSIS — J44.9 CHRONIC OBSTRUCTIVE PULMONARY DISEASE, UNSPECIFIED COPD TYPE (HCC): ICD-10-CM

## 2022-02-09 DIAGNOSIS — M10.9 GOUT, UNSPECIFIED CAUSE, UNSPECIFIED CHRONICITY, UNSPECIFIED SITE: ICD-10-CM

## 2022-02-09 DIAGNOSIS — R73.01 IFG (IMPAIRED FASTING GLUCOSE): ICD-10-CM

## 2022-02-09 DIAGNOSIS — E03.9 ACQUIRED HYPOTHYROIDISM: ICD-10-CM

## 2022-02-09 DIAGNOSIS — Z00.00 MEDICARE ANNUAL WELLNESS VISIT, SUBSEQUENT: Primary | ICD-10-CM

## 2022-02-09 DIAGNOSIS — D69.6 THROMBOCYTOPENIA (HCC): ICD-10-CM

## 2022-02-09 DIAGNOSIS — N39.0 URINARY TRACT INFECTION WITHOUT HEMATURIA, SITE UNSPECIFIED: ICD-10-CM

## 2022-02-09 DIAGNOSIS — C93.10 CHRONIC MYELOMONOCYTIC LEUKEMIA NOT HAVING ACHIEVED REMISSION (HCC): ICD-10-CM

## 2022-02-09 DIAGNOSIS — I48.0 PAROXYSMAL ATRIAL FIBRILLATION (HCC): ICD-10-CM

## 2022-02-09 DIAGNOSIS — I10 PRIMARY HYPERTENSION: ICD-10-CM

## 2022-02-09 PROCEDURE — 99214 OFFICE O/P EST MOD 30 MIN: CPT | Performed by: INTERNAL MEDICINE

## 2022-02-09 PROCEDURE — G8420 CALC BMI NORM PARAMETERS: HCPCS | Performed by: INTERNAL MEDICINE

## 2022-02-09 PROCEDURE — G8510 SCR DEP NEG, NO PLAN REQD: HCPCS | Performed by: INTERNAL MEDICINE

## 2022-02-09 PROCEDURE — 1101F PT FALLS ASSESS-DOCD LE1/YR: CPT | Performed by: INTERNAL MEDICINE

## 2022-02-09 PROCEDURE — G0439 PPPS, SUBSEQ VISIT: HCPCS | Performed by: INTERNAL MEDICINE

## 2022-02-09 PROCEDURE — G8752 SYS BP LESS 140: HCPCS | Performed by: INTERNAL MEDICINE

## 2022-02-09 PROCEDURE — G8754 DIAS BP LESS 90: HCPCS | Performed by: INTERNAL MEDICINE

## 2022-02-09 PROCEDURE — G8536 NO DOC ELDER MAL SCRN: HCPCS | Performed by: INTERNAL MEDICINE

## 2022-02-09 PROCEDURE — G8399 PT W/DXA RESULTS DOCUMENT: HCPCS | Performed by: INTERNAL MEDICINE

## 2022-02-09 PROCEDURE — 1090F PRES/ABSN URINE INCON ASSESS: CPT | Performed by: INTERNAL MEDICINE

## 2022-02-09 PROCEDURE — G8427 DOCREV CUR MEDS BY ELIG CLIN: HCPCS | Performed by: INTERNAL MEDICINE

## 2022-02-09 RX ORDER — ALLOPURINOL 100 MG/1
TABLET ORAL
COMMUNITY
Start: 2022-01-22 | End: 2022-02-09 | Stop reason: SDUPTHER

## 2022-02-09 RX ORDER — CIPROFLOXACIN 250 MG/1
250 TABLET, FILM COATED ORAL 2 TIMES DAILY
Qty: 14 TABLET | Refills: 0 | Status: SHIPPED | OUTPATIENT
Start: 2022-02-09 | End: 2022-06-20 | Stop reason: ALTCHOICE

## 2022-02-09 RX ORDER — CARBOXYMETHYLCELLULOSE SODIUM 2.5 MG/ML
1 SOLUTION/ DROPS OPHTHALMIC
COMMUNITY
End: 2022-06-20 | Stop reason: ALTCHOICE

## 2022-02-09 RX ORDER — AMLODIPINE BESYLATE 2.5 MG/1
TABLET ORAL
COMMUNITY
Start: 2022-02-08 | End: 2022-02-09

## 2022-02-09 RX ORDER — ZOSTER VACCINE RECOMBINANT, ADJUVANTED 50 MCG/0.5
0.5 KIT INTRAMUSCULAR ONCE
Qty: 0.5 ML | Refills: 1 | Status: SHIPPED | OUTPATIENT
Start: 2022-02-09 | End: 2022-02-09

## 2022-02-09 RX ORDER — TETANUS TOXOID, REDUCED DIPHTHERIA TOXOID AND ACELLULAR PERTUSSIS VACCINE, ADSORBED 5; 2.5; 8; 8; 2.5 [IU]/.5ML; [IU]/.5ML; UG/.5ML; UG/.5ML; UG/.5ML
0.5 SUSPENSION INTRAMUSCULAR ONCE
Qty: 1 EACH | Refills: 0 | Status: SHIPPED | OUTPATIENT
Start: 2022-02-09 | End: 2022-02-09

## 2022-02-09 RX ORDER — ALLOPURINOL 100 MG/1
100 TABLET ORAL DAILY
Qty: 90 TABLET | Refills: 1
Start: 2022-02-09 | End: 2022-04-05 | Stop reason: SDUPTHER

## 2022-02-09 RX ORDER — AMLODIPINE BESYLATE 2.5 MG/1
2.5 TABLET ORAL DAILY
Qty: 90 TABLET | Refills: 1
Start: 2022-02-09

## 2022-02-09 RX ORDER — CEDAZURIDINE AND DECITABINE 100; 35 MG/1; MG/1
TABLET, FILM COATED ORAL DAILY
COMMUNITY

## 2022-02-10 LAB
ALBUMIN SERPL-MCNC: 4.1 G/DL (ref 3.5–5)
ALBUMIN/GLOB SERPL: 1.3 {RATIO} (ref 1.1–2.2)
ALP SERPL-CCNC: 84 U/L (ref 45–117)
ALT SERPL-CCNC: 16 U/L (ref 12–78)
ANION GAP SERPL CALC-SCNC: 3 MMOL/L (ref 5–15)
AST SERPL-CCNC: 11 U/L (ref 15–37)
BILIRUB SERPL-MCNC: 1 MG/DL (ref 0.2–1)
BUN SERPL-MCNC: 35 MG/DL (ref 6–20)
BUN/CREAT SERPL: 27 (ref 12–20)
CALCIUM SERPL-MCNC: 9.1 MG/DL (ref 8.5–10.1)
CHLORIDE SERPL-SCNC: 108 MMOL/L (ref 97–108)
CHOLEST SERPL-MCNC: 150 MG/DL
CO2 SERPL-SCNC: 26 MMOL/L (ref 21–32)
CREAT SERPL-MCNC: 1.31 MG/DL (ref 0.55–1.02)
EST. AVERAGE GLUCOSE BLD GHB EST-MCNC: 108 MG/DL
GLOBULIN SER CALC-MCNC: 3.2 G/DL (ref 2–4)
GLUCOSE SERPL-MCNC: 96 MG/DL (ref 65–100)
HBA1C MFR BLD: 5.4 % (ref 4–5.6)
HDLC SERPL-MCNC: 52 MG/DL
HDLC SERPL: 2.9 {RATIO} (ref 0–5)
LDLC SERPL CALC-MCNC: 75 MG/DL (ref 0–100)
POTASSIUM SERPL-SCNC: 4.4 MMOL/L (ref 3.5–5.1)
PROT SERPL-MCNC: 7.3 G/DL (ref 6.4–8.2)
SODIUM SERPL-SCNC: 137 MMOL/L (ref 136–145)
TRIGL SERPL-MCNC: 115 MG/DL (ref ?–150)
TSH SERPL DL<=0.05 MIU/L-ACNC: 5.37 UIU/ML (ref 0.36–3.74)
VLDLC SERPL CALC-MCNC: 23 MG/DL

## 2022-02-10 NOTE — PROGRESS NOTES
This is a Subsequent Medicare Annual Wellness Visit providing Personalized Prevention Plan Services (PPPS) (Performed 12 months after initial AWV and PPPS )    I have reviewed the patient's medical history in detail and updated the computerized patient record. History     Past Medical History:   Diagnosis Date    Anemia 12/2019    Dr. Delisa Chavarria. due to CMML. hx iron def    Atrial fibrillation ([facility] 2019    Dr. Flex Ramirez. stopped [address] due to bleeding/plt. recurrent. s/p cardioversion in ER 5/2021    Basal cell carcinoma 7/2012, 7/2015    Dr. Aimee Fischer. saw Evan Mcnair. Dr. Jerrald Kawasaki, Dr. Tila Tiwari CAP (community acquired pneumonia) 11/16/2019    bilateral, patchy. hx PNA 3/2019, 4/2021    Cataract     Dr. Radha Michelle    [facility] 11/2020    Dr. Leanna Gonzalez. Stage 0.  marrow bx 70% cellularity, no blasts    COPD (chronic obstructive pulmonary disease) ([facility])     saw Dr. Katherne Sicard. Questionable?  Diverticulitis of colon 05/2010    dx on colonoscopy by Dr. Radha Flores. Took flagyl, cipro    DJD (degenerative joint disease), lumbar `    L5?  Endometr hyperplasia w/atypia 1997    GERD (gastroesophageal reflux disease) 5/2010    Hiatal hernia 5/2010    Hydroureter, left 11/16/2020    due to splenomegaly. Dr. Stevo Kumar Hypertension 1996    Hypothyroidism 2008    TSH 1.1 6/7/12    Iron deficiency anemia     EGD, colon 5/2010.  hgb 11.3 6/2012    Melanoma ([facility]     Dr. Aimee Fischer. x3    Mixed hyperlipidemia      Tg 166 LDL 87 HDL55 6/2012    Nephrolithiasis     Splenomegaly 11/2020    23 cm.  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   vessel rupture (not clot)    Sun-damaged skin     Zoster        Past Surgical History:   Procedure Laterality Date    COLONOSCOPY N/A 10/19/2017    COLONOSCOPY performed by Gloria Umanzor MD at [facility] HX COLONOSCOPY  5/2010    diverticuLITIS, tubular adenoma  Dr. Jayson Tavares HX ENDOSCOPY  5/2010 EGD  - hiatal hernia. Dr. Mattie Oliva  05/30/2019    Dr. Chloe Phillips  02/2020    Dr. Sruthi Muhammad  09/14/2017    SCC L anterior mujica by Dr. Hallie Kay    endometrial pre-cancer on biopsy    HX TONSILLECTOMY         Current Outpatient Medications   Medication Sig    decitabine-cedazuridine (Inqovi)  mg tab Take  by mouth daily.  carboxymethylcellulose sodium (TheraTears) 0.25 % drop ophthalmic solution Administer 1 Drop to both eyes.  diphth, pertus,acell,, tetanus (Boostrix Tdap) 2.5-8-5 Lf-mcg-Lf/0.5mL syrg 0.5 mL by IntraMUSCular route once for 1 dose. Indications: vaccination to prevent diphtheria, pertussis and tetanus    Shingrix, PF, 50 mcg/0.5 mL susr injection 0.5 mL by IntraMUSCular route once for 1 dose. Receive 2nd dose in 2-6 months. For Shingles (Zoster) prevention    ciprofloxacin HCl (CIPRO) 250 mg tablet Take 1 Tablet by mouth two (2) times a day.  amLODIPine (NORVASC) 2.5 mg tablet Take 1 Tablet by mouth daily. Started 2/9/22 per Dr. Jose Arredondo allopurinoL (ZYLOPRIM) 100 mg tablet Take 1 Tablet by mouth daily.  levothyroxine (SYNTHROID) 75 mcg tablet TAKE 1 TABLET BY MOUTH DAILY (BEFORE BREAKFAST). DECREASED 5/18/21    montelukast (SINGULAIR) 10 mg tablet TAKE 1 TABLET EVERY DAY    ergocalciferol (ERGOCALCIFEROL) 1,250 mcg (50,000 unit) capsule TAKE 1 CAPSULE BY MOUTH EVERY SEVEN (7) DAYS.  Spiriva with HandiHaler 18 mcg inhalation capsule INHALE 1 PUFF BY MOUTH ONCE A DAY    famotidine (PEPCID) 40 mg tablet Take 1 Tablet by mouth two (2) times a day.  Spiriva Respimat 2.5 mcg/actuation inhaler Take 2 Puffs by inhalation daily. Indications: bronchospasm prevention with COPD    flecainide (TAMBOCOR) 50 mg tablet Take  by mouth two (2) times a day.  dilTIAZem ER (CARDIZEM CD) 120 mg capsule Take 1 Cap by mouth daily.  guaiFENesin ER (MUCINEX) 600 mg ER tablet 600 mg two (2) times a day.     fluorometholone (FML FORTE) 0.25 % ophthalmic suspension 1 Drop.  albuterol (PROVENTIL HFA, VENTOLIN HFA, PROAIR HFA) 90 mcg/actuation inhaler Take 2 Puffs by inhalation every six (6) hours as needed for Wheezing.  omeprazole (PRILOSEC) 20 mg capsule     fluticasone propionate (FLONASE) 50 mcg/actuation nasal spray     mv-mn/folic acid/calcium/vit K (ONE-A-DAY WOMEN'S 50 PLUS PO) Take 1 Tab by mouth daily.  levocetirizine (XYZAL) 5 mg tablet Take 5 mg by mouth nightly.  acetaminophen (TYLENOL ARTHRITIS PAIN) 650 mg CR tablet Take 1,300 mg by mouth nightly. No current facility-administered medications for this visit. Allergies   Allergen Reactions    Irbesartan Diarrhea    Iron Diarrhea    Pcn [Penicillins] Rash     PCN only. Can take cephalosporins    Sulfacetamide Swelling       Family History   Problem Relation Age of Onset    Cancer Mother     Cancer Father     Hypertension Brother     Cancer Brother         myeloma        reports that she has quit smoking. She smoked 1.00 pack per day. She quit smokeless tobacco use about 32 years ago. reports current alcohol use of about 1.0 standard drink of alcohol per week. Depression Risk Factor Screening:       Alcohol Risk Factor Screening: On any occasion during the past 3 months, have you had more than 3 drinks containing alcohol? No    Do you average more than 14 drinks per week? No      Functional Ability and Level of Safety:     Hearing Loss   mild    Activities of Daily Living   Self-care. Requires assistance with: no ADLs    Fall Risk     Fall Risk Assessment, last 12 mths 5/18/2021   Able to walk? Yes   Fall in past 12 months? 0   Do you feel unsteady? 0   Are you worried about falling 0         Abuse Screen   Patient is not abused    Review of Systems   A comprehensive review of systems was negative except for that written in the HPI.     Physical Examination     Evaluation of Cognitive Function:  Mood/affect: neutral, happy  Appearance: age appropriate  Family member/caregiver input: none    Blood pressure 128/75, pulse 73, temperature 97.7 °F (36.5 °C), temperature source Oral, resp. rate 14, height 5' 1.75\" (1.568 m), weight 130 lb 12.8 oz (59.3 kg), SpO2 100 %. General appearance: alert, cooperative, no distress, appears stated age  Neck: supple, symmetrical, trachea midline, no adenopathy, thyroid: not enlarged, symmetric, no tenderness/mass/nodules, no carotid bruit and no JVD  Lungs: clear to auscultation bilaterally  Heart: regular rate and rhythm, S1, S2 normal, no murmur, click, rub or gallop  Extremities: extremities normal, atraumatic, no cyanosis or edema    Patient Care Team:  Sedrick Connor MD as PCP - General (Internal Medicine)  Sedrick Connor MD as PCP [facility]  Anne-Marie Madrid NP as Nurse Practitioner (Nurse Practitioner)  Curtis Patel MD (Ophthalmology)      Advice/Referrals/Counseling   Education and counseling provided. See below for specific orders    Assessment/Plan   Diagnoses and all orders for this visit:    1. Medicare annual wellness visit  -     diphth, pertus,acell,, tetanus (Boostrix Tdap) 2.5-8-5 Lf-mcg-Lf/0.5mL syrg; 0.5 mL by IntraMUSCular route once for 1 dose. Indications: vaccination to prevent diphtheria, pertussis and tetanus  -     Shingrix, PF, 50 mcg/0.5 mL susr injection; 0.5 mL by IntraMUSCular route once for 1 dose. Receive 2nd dose in 2-6 months. For Shingles (Zoster) prevention    2. IFG (impaired fasting glucose)  The patient is asked to make an attempt to improve diet and exercise patterns to aid in medical management of this problem  -     HEMOGLOBIN A1C WITH EAG; Future    3. Acquired hypothyroidism  Currently asymptomatic  Based on my history and available data, the overall control of this problem is unclear. Will adjust medications and plan of care based on further evaluation.   -     TSH 3RD GENERATION; Future    4.  Chronic myelomonocytic leukemia not having achieved remission ([facility]  This condition appears to be stable and is being evaluated and managed by her specialist Dr. Radha Coats. No acute findings today warrant change in management plan. -     METABOLIC PANEL, COMPREHENSIVE; Future    5. Thrombocytopenia ([facility]  This condition appears to be stable and is being evaluated and managed by her specialist.    Recent transfusion. .   No acute findings today warrant change in management plan. -     METABOLIC PANEL, COMPREHENSIVE; Future    6. Paroxysmal atrial fibrillation (HCC)  This condition appears to be stable and is being evaluated and managed by her specialist .   No acute findings today warrant change in management plan. 7. Urinary tract infection without hematuria, site unspecified  Reported s/s of dysuria, frequency, itching. May need exam  -     ciprofloxacin HCl (CIPRO) 250 mg tablet; Take 1 Tablet by mouth two (2) times a day. 8. Chronic obstructive pulmonary disease, unspecified COPD type ([facility]  This condition is controlled on current medication regimen as written in medication list.     9. Primary hypertension  This condition is controlled on current medication regimen as written in medication list.  Medications refilled. -     LIPID PANEL; Future  -     amLODIPine (NORVASC) 2.5 mg tablet; Take 1 Tablet by mouth daily. Started 2/9/22 per Dr. Tee Cifuentes    10. Gout, unspecified cause, unspecified chronicity, unspecified site  This condition is controlled on current medication regimen as written in medication list.  Medications refilled. Labs per oncology  -     allopurinoL (ZYLOPRIM) 100 mg tablet; Take 1 Tablet by mouth daily. Potential medication side effects were discussed with the patient; let me know if any occur.   Return for yearly Annual Wellness Visits

## 2022-02-13 RX ORDER — LEVOTHYROXINE SODIUM 88 UG/1
88 TABLET ORAL
Qty: 90 TABLET | Refills: 1 | Status: SHIPPED | COMMUNITY
Start: 2022-02-13 | End: 2022-06-01

## 2022-03-19 PROBLEM — R16.1 SPLENOMEGALY: Status: ACTIVE | Noted: 2020-11-01

## 2022-03-19 PROBLEM — D64.9 ANEMIA: Status: ACTIVE | Noted: 2019-03-25

## 2022-03-19 PROBLEM — C93.10 CMML (CHRONIC MYELOMONOCYTIC LEUKEMIA) (HCC): Status: ACTIVE | Noted: 2020-11-01

## 2022-03-19 PROBLEM — I48.0 PAROXYSMAL ATRIAL FIBRILLATION (HCC): Status: ACTIVE | Noted: 2019-04-09

## 2022-03-20 PROBLEM — I48.91 ATRIAL FIBRILLATION WITH RVR (HCC): Status: ACTIVE | Noted: 2021-05-07

## 2022-03-22 ENCOUNTER — TELEPHONE (OUTPATIENT)
Dept: INTERNAL MEDICINE CLINIC | Age: 82
End: 2022-03-22

## 2022-03-22 RX ORDER — NEISSERIA MENINGITIDIS GROUP A CAPSULAR POLYSACCHARIDE DIPHTHERIA TOXOID CONJUGATE ANTIGEN, NEISSERIA MENINGITIDIS GROUP C CAPSULAR POLYSACCHARIDE DIPHTHERIA TOXOID CONJUGATE ANTIGEN, NEISSERIA MENINGITIDIS GROUP Y CAPSULAR POLYSACCHARIDE DIPHTHERIA TOXOID CONJUGATE ANTIGEN, AND NEISSERIA MENINGITIDIS GROUP W-135 CAPSULAR POLYSACCHARIDE DIPHTHERIA TOXOID CONJUGATE ANTIGEN 4; 4; 4; 4 UG/.5ML; UG/.5ML; UG/.5ML; UG/.5ML
INJECTION, SOLUTION INTRAMUSCULAR
Qty: 1 VIAL | Refills: 1 | Status: SHIPPED | OUTPATIENT
Start: 2022-03-22 | End: 2022-03-25 | Stop reason: SDUPTHER

## 2022-03-22 RX ORDER — PNEUMOCOCCAL 20-VALENT CONJUGATE VACCINE 2.2; 2.2; 2.2; 2.2; 2.2; 2.2; 2.2; 2.2; 2.2; 2.2; 2.2; 2.2; 2.2; 2.2; 2.2; 2.2; 4.4; 2.2; 2.2; 2.2 UG/.5ML; UG/.5ML; UG/.5ML; UG/.5ML; UG/.5ML; UG/.5ML; UG/.5ML; UG/.5ML; UG/.5ML; UG/.5ML; UG/.5ML; UG/.5ML; UG/.5ML; UG/.5ML; UG/.5ML; UG/.5ML; UG/.5ML; UG/.5ML; UG/.5ML; UG/.5ML
0.5 INJECTION, SUSPENSION INTRAMUSCULAR ONCE
Qty: 1 EACH | Refills: 0 | Status: SHIPPED | OUTPATIENT
Start: 2022-03-22 | End: 2022-03-22

## 2022-03-22 RX ORDER — TETANUS TOXOID, REDUCED DIPHTHERIA TOXOID AND ACELLULAR PERTUSSIS VACCINE, ADSORBED 5; 2.5; 8; 8; 2.5 [IU]/.5ML; [IU]/.5ML; UG/.5ML; UG/.5ML; UG/.5ML
0.5 SUSPENSION INTRAMUSCULAR ONCE
Qty: 1 EACH | Refills: 0 | Status: SHIPPED | OUTPATIENT
Start: 2022-03-22 | End: 2022-03-22

## 2022-03-22 NOTE — TELEPHONE ENCOUNTER
Spoke with patient and updated on Dr. Christina Hanson comments and recommendations. She states understanding. [facility] to [facility] the immunizations as soon as possible that she needs and advised that once she receives them to please contact Dr. Guerita Rios and let him know when they are done. She is advised 8 weeks later she will need to get the second meningitis vaccine. She states understanding and grateful for the call.

## 2022-03-22 NOTE — TELEPHONE ENCOUNTER
Ivania from [employer] is calling as they want to update the doctor on scheduling the patient for surgery to have her spleen removed. They stated she needs some vaccines before the surgery. The rep also wanted the nurse to know the doctor there would like to speak to her PCP. Please call back and advise.

## 2022-03-22 NOTE — TELEPHONE ENCOUNTER
Spoke with Ivania/Nurse from Dr. Mira Daniels [facility] and she reports that he is making a referral to Dr. Duran Becerra for Splenectomy. She reports that they are asking that patient get a pneumococcal and meningococcal vaccines prior. She is also requesting that Dr. Michael Espinal please call Dr. Que Villanueva at [phone]. Dr. Michael Espinal notified.

## 2022-03-22 NOTE — TELEPHONE ENCOUNTER
Records received and message to call Dr. Luis Fernando Yee at [phone]. I spoke with his NP. Patient continues to have abdominal pain from massive splenomegaly and CMML-0. S/p 8 cycles of Inqovi 2/21/22 and tolerated well but holding cycle 9 to prepare for splenectomy. Completed radiation therapy to her spleen in January 2021. Lab work March 21, 2022 showed WBC 3.2, hemoglobin 9.4, platelet 348, glucose 111, BUN 30, creatinine 1.12, calcium 9.4 and normal liver enzymes. She has been referred to Dr. Dmitry Armendariz in general surgery's for splenectomy with hopes of getting this done by the end of April. Plan is to update her pneumococcal vaccine and meningococcal vaccine in preparation first splenectomy. These should be done as soon as possible. Yoel Escobedo, please let patient know that I have sent in prescriptions for a pneumococcal \"Prevnar 20\" pneumonia vaccine, meningitis vaccine, and a tetanus TDAP [facility] [address]. It is very important that she have the Prevnar 20 and meningitis vaccine as soon as possible since these are required prior to her splenectomy surgery. She should pilar her calendar to have a second dose of the meningitis vaccine in 8 weeks which likely will be after her surgery. I recommend that she have her tetanus TDAP vaccine done now as well just to get it over with and these can all be taken together. So, she should have Prevnar 20, meningitis vaccine and Tdap all done now, as soon as possible. Repeat meningitis vaccine in 8 weeks. Please let the surgeon know that these are completed after the consult.

## 2022-03-24 RX ORDER — NEISSERIA MENINGITIDIS SEROGROUP B NHBA FUSION PROTEIN ANTIGEN, NEISSERIA MENINGITIDIS SEROGROUP B FHBP FUSION PROTEIN ANTIGEN AND NEISSERIA MENINGITIDIS SEROGROUP B NADA PROTEIN ANTIGEN 50; 50; 50; 25 UG/.5ML; UG/.5ML; UG/.5ML; UG/.5ML
INJECTION, SUSPENSION INTRAMUSCULAR
Qty: 0.5 ML | Refills: 0 | Status: SHIPPED | OUTPATIENT
Start: 2022-03-24 | End: 2022-03-25 | Stop reason: SDUPTHER

## 2022-03-25 ENCOUNTER — TELEPHONE (OUTPATIENT)
Dept: INTERNAL MEDICINE CLINIC | Age: 82
End: 2022-03-25

## 2022-03-25 RX ORDER — NEISSERIA MENINGITIDIS GROUP A CAPSULAR POLYSACCHARIDE DIPHTHERIA TOXOID CONJUGATE ANTIGEN, NEISSERIA MENINGITIDIS GROUP C CAPSULAR POLYSACCHARIDE DIPHTHERIA TOXOID CONJUGATE ANTIGEN, NEISSERIA MENINGITIDIS GROUP Y CAPSULAR POLYSACCHARIDE DIPHTHERIA TOXOID CONJUGATE ANTIGEN, AND NEISSERIA MENINGITIDIS GROUP W-135 CAPSULAR POLYSACCHARIDE DIPHTHERIA TOXOID CONJUGATE ANTIGEN 4; 4; 4; 4 UG/.5ML; UG/.5ML; UG/.5ML; UG/.5ML
INJECTION, SOLUTION INTRAMUSCULAR
Qty: 1 VIAL | Refills: 1 | Status: SHIPPED | OUTPATIENT
Start: 2022-03-25 | End: 2022-06-20 | Stop reason: ALTCHOICE

## 2022-03-25 RX ORDER — NEISSERIA MENINGITIDIS SEROGROUP B NHBA FUSION PROTEIN ANTIGEN, NEISSERIA MENINGITIDIS SEROGROUP B FHBP FUSION PROTEIN ANTIGEN AND NEISSERIA MENINGITIDIS SEROGROUP B NADA PROTEIN ANTIGEN 50; 50; 50; 25 UG/.5ML; UG/.5ML; UG/.5ML; UG/.5ML
INJECTION, SUSPENSION INTRAMUSCULAR
Qty: 0.5 ML | Refills: 1 | Status: SHIPPED | OUTPATIENT
Start: 2022-03-25 | End: 2022-06-20 | Stop reason: ALTCHOICE

## 2022-03-25 NOTE — TELEPHONE ENCOUNTER
Pandora Leyden DOB [birth year]    Patient  Pamela Meyer called and asked what type of meningitis shot Dr. Gatito Bennett prescribed for his wife. I advised it was the [place]. He verbalized understanding and stated that was all he needed. He stated they were unable to get this shot from a big pharmacy due to their age.

## 2022-04-05 DIAGNOSIS — M10.9 GOUT, UNSPECIFIED CAUSE, UNSPECIFIED CHRONICITY, UNSPECIFIED SITE: ICD-10-CM

## 2022-04-05 RX ORDER — ALLOPURINOL 100 MG/1
100 TABLET ORAL DAILY
Qty: 90 TABLET | Refills: 1 | Status: SHIPPED | OUTPATIENT
Start: 2022-04-05 | End: 2022-08-13 | Stop reason: SDUPTHER

## 2022-05-03 RX ORDER — FAMOTIDINE 40 MG/1
TABLET, FILM COATED ORAL
Qty: 180 TABLET | Refills: 1 | Status: SHIPPED | OUTPATIENT
Start: 2022-05-03

## 2022-05-11 ENCOUNTER — PATIENT MESSAGE (OUTPATIENT)
Dept: INTERNAL MEDICINE CLINIC | Age: 82
End: 2022-05-11

## 2022-05-11 NOTE — TELEPHONE ENCOUNTER
T/C to patien to update that [facility] is scheduled with  for her pre-op for cataract surgeries on 6/20/22 @ 0850 AM. Request for confirmation. No answer and LVM.

## 2022-06-01 DIAGNOSIS — E55.9 VITAMIN D DEFICIENCY: ICD-10-CM

## 2022-06-01 RX ORDER — MONTELUKAST SODIUM 10 MG/1
TABLET ORAL
Qty: 90 TABLET | Refills: 2 | Status: SHIPPED | OUTPATIENT
Start: 2022-06-01

## 2022-06-01 RX ORDER — ERGOCALCIFEROL 1.25 MG/1
CAPSULE ORAL
Qty: 13 CAPSULE | Refills: 2 | Status: SHIPPED | OUTPATIENT
Start: 2022-06-01 | End: 2022-06-20 | Stop reason: ALTCHOICE

## 2022-06-01 RX ORDER — LEVOTHYROXINE SODIUM 88 UG/1
TABLET ORAL
Qty: 90 TABLET | Refills: 1 | Status: SHIPPED | OUTPATIENT
Start: 2022-06-01

## 2022-06-07 DIAGNOSIS — R05.9 COUGH: ICD-10-CM

## 2022-06-07 RX ORDER — TIOTROPIUM BROMIDE INHALATION SPRAY 3.12 UG/1
SPRAY, METERED RESPIRATORY (INHALATION)
Qty: 12 G | Refills: 5 | Status: SHIPPED | OUTPATIENT
Start: 2022-06-07

## 2022-06-20 ENCOUNTER — OFFICE VISIT (OUTPATIENT)
Dept: INTERNAL MEDICINE CLINIC | Age: 82
End: 2022-06-20
Payer: MEDICARE

## 2022-06-20 VITALS
HEART RATE: 68 BPM | RESPIRATION RATE: 14 BRPM | HEIGHT: 60 IN | OXYGEN SATURATION: 96 % | DIASTOLIC BLOOD PRESSURE: 77 MMHG | TEMPERATURE: 97.8 F | BODY MASS INDEX: 26.7 KG/M2 | SYSTOLIC BLOOD PRESSURE: 135 MMHG | WEIGHT: 136 LBS

## 2022-06-20 DIAGNOSIS — E03.9 ACQUIRED HYPOTHYROIDISM: ICD-10-CM

## 2022-06-20 DIAGNOSIS — L30.9 DERMATITIS: ICD-10-CM

## 2022-06-20 DIAGNOSIS — I10 PRIMARY HYPERTENSION: ICD-10-CM

## 2022-06-20 DIAGNOSIS — H25.9 AGE-RELATED CATARACT OF BOTH EYES, UNSPECIFIED AGE-RELATED CATARACT TYPE: Primary | ICD-10-CM

## 2022-06-20 DIAGNOSIS — C93.10 CHRONIC MYELOMONOCYTIC LEUKEMIA NOT HAVING ACHIEVED REMISSION (HCC): ICD-10-CM

## 2022-06-20 PROCEDURE — 99214 OFFICE O/P EST MOD 30 MIN: CPT | Performed by: INTERNAL MEDICINE

## 2022-06-20 PROCEDURE — G8417 CALC BMI ABV UP PARAM F/U: HCPCS | Performed by: INTERNAL MEDICINE

## 2022-06-20 PROCEDURE — G8536 NO DOC ELDER MAL SCRN: HCPCS | Performed by: INTERNAL MEDICINE

## 2022-06-20 PROCEDURE — G8752 SYS BP LESS 140: HCPCS | Performed by: INTERNAL MEDICINE

## 2022-06-20 PROCEDURE — G8510 SCR DEP NEG, NO PLAN REQD: HCPCS | Performed by: INTERNAL MEDICINE

## 2022-06-20 PROCEDURE — 1101F PT FALLS ASSESS-DOCD LE1/YR: CPT | Performed by: INTERNAL MEDICINE

## 2022-06-20 PROCEDURE — G8427 DOCREV CUR MEDS BY ELIG CLIN: HCPCS | Performed by: INTERNAL MEDICINE

## 2022-06-20 PROCEDURE — 1090F PRES/ABSN URINE INCON ASSESS: CPT | Performed by: INTERNAL MEDICINE

## 2022-06-20 PROCEDURE — G8754 DIAS BP LESS 90: HCPCS | Performed by: INTERNAL MEDICINE

## 2022-06-20 PROCEDURE — G8399 PT W/DXA RESULTS DOCUMENT: HCPCS | Performed by: INTERNAL MEDICINE

## 2022-06-20 RX ORDER — TRIAMCINOLONE ACETONIDE 1 MG/G
CREAM TOPICAL 2 TIMES DAILY
Qty: 15 G | Refills: 0 | Status: SHIPPED | OUTPATIENT
Start: 2022-06-20 | End: 2022-07-04

## 2022-06-20 NOTE — LETTER
6/20/2022 5:31 PM    RE:    [address]oh[address] [address] [facility] [address] [id number]      Thank you for agreeing to see Ml Camp    I am referring my patient to you for evaluation of cataract. Please see her  pertinent patient information below. Medical History:     Past Medical History:   Diagnosis Date    Anemia 12/2019    Dr. Ayad Zhu. saw Dr. Susy Jett. due to CMML. hx iron def    Atrial fibrillation ([facility] 2019    Dr. Nirav Randle. Saw Dr. Melissa Mahan. stopped [address] due to bleeding/plt. recurrent. s/p cardioversion in ER 5/2021    Basal cell carcinoma 7/2012, 7/2015    Dr. Laurie Johnston. saw Cathy Prieto. Dr. Drew Cruz, Dr. Arielle Gann CAP (community acquired pneumonia) 11/16/2019    bilateral, patchy. hx PNA 3/2019, 4/2021    Cataract     Dr. Jack Redd    CMML (chronic myelomonocytic leukemia) ([facility] 11/2020    Dr. Sherry Davies. Stage 0.  marrow bx 70% cellularity, no blasts    COPD (chronic obstructive pulmonary disease) ([facility]     saw Dr. Tammie Nuñez. Questionable?  Diverticulitis of colon 05/2010    dx on colonoscopy by Dr. Autumn Cortez. Took flagyl, cipro    DJD (degenerative joint disease), lumbar `    L5?  Endometr hyperplasia w/atypia 1997    GERD (gastroesophageal reflux disease) 5/2010    Hiatal hernia 5/2010    Hydroureter, left 11/16/2020    due to splenomegaly. Dr. Octavia Preciado Hypertension 1996    Hypothyroidism 2008    TSH 1.1 6/7/12    Iron deficiency anemia     EGD, colon 5/2010.  hgb 11.3 6/2012    Melanoma ([facility]     Dr. Laurie Johnston. x3    Mixed hyperlipidemia      Tg 166 LDL 87 HDL55 6/2012    Nephrolithiasis     Splenomegaly 11/2020    23 cm.  likely due to CMML    Stroke (cerebrum) ([facility] 2007    R droop and weakness for 1 week. vessel rupture (not clot)    Sun-damaged skin     Zoster      Allergies: Allergies   Allergen Reactions    Irbesartan Diarrhea    Iron Diarrhea    Pcn [Penicillins] Rash     PCN only.   Can take cephalosporins    Sulfacetamide Swelling      Medications:     Current Outpatient Medications   Medication Sig    triamcinolone acetonide (KENALOG) 0.1 % topical cream Apply  to affected area two (2) times a day for 14 days.  Spiriva Respimat 2.5 mcg/actuation inhaler INHALE 2 PUFFS BY INHALATION DAILY. INDICATIONS: BRONCHOSPASM PREVENTION WITH COPD    levothyroxine (SYNTHROID) 88 mcg tablet TAKE 1 TABLET BY MOUTH DAILY (BEFORE BREAKFAST). DOSE INCREASE    montelukast (SINGULAIR) 10 mg tablet TAKE 1 TABLET EVERY DAY    famotidine (PEPCID) 40 mg tablet TAKE 1 TABLET TWICE DAILY    allopurinoL (ZYLOPRIM) 100 mg tablet Take 1 Tablet by mouth daily.  decitabine-cedazuridine (Inqovi)  mg tab Take  by mouth daily.  amLODIPine (NORVASC) 2.5 mg tablet Take 1 Tablet by mouth daily. Started 2/9/22 per Dr. Damir Sands flecainide (TAMBOCOR) 50 mg tablet Take  by mouth two (2) times a day.  dilTIAZem ER (CARDIZEM CD) 120 mg capsule Take 1 Cap by mouth daily.  guaiFENesin ER (MUCINEX) 600 mg ER tablet 600 mg two (2) times a day.  fluorometholone (FML FORTE) 0.25 % ophthalmic suspension 1 Drop.  omeprazole (PRILOSEC) 20 mg capsule     fluticasone propionate (FLONASE) 50 mcg/actuation nasal spray     mv-mn/folic acid/calcium/vit K (ONE-A-DAY WOMEN'S 50 PLUS PO) Take 1 Tab by mouth daily.  levocetirizine (XYZAL) 5 mg tablet Take 5 mg by mouth nightly.  acetaminophen (TYLENOL ARTHRITIS PAIN) 650 mg CR tablet Take 1,300 mg by mouth nightly. No current facility-administered medications for this visit. Surgical History:     Past Surgical History:   Procedure Laterality Date    COLONOSCOPY N/A 10/19/2017    COLONOSCOPY performed by Gael Ely MD at [address] [facility] HX COLONOSCOPY  5/2010    diverticuLITIS, tubular adenoma  Dr. Vernon Vanessa HX ENDOSCOPY  5/2010    EGD  - hiatal hernia.   Dr. Nirav Jnue  05/30/2019    Dr. Bronson Pride  02/2020    Dr. Kala Geronimo  09/14/2017    SCC L anterior mujica by Dr. Zeinab Mensah [facility] AND [facility]  1997    endometrial pre-cancer on biopsy    HX TONSILLECTOMY             I appreciate your assistance in Ms. Mederos's care  and look forward to your findings and recommendations.         Sincerely,      Dexter Chong MD

## 2022-06-20 NOTE — PROGRESS NOTES
Jabier Thacker was referred for evaluation by:Dr. Yosi Raza for Pre- Op Evaluation. Please see encounter details and orders for consultative summary. She also requires forms to be completed for [facility] independent living. Type of surgery : cataract      Surgery site : bilateral eyes  Anesthesia type: MAC/topical  Date of procedure:  6/30/22, 7/14/22    Allergies: No Known Allergies  Latex allergy: no  Prior reactions to anesthesia:  None    CMML  She elected not to have splenectomy due to no pain. Dr. Charmayne Fortune (retired). Leida Cuevas. Lab 6/13/22  WBC 2.7, Hgb 9.0, plt 283  Glucose 108, BUN 38, Cr 1.37, Na 138, LFT normal  Resuming oral chemo today again    Itchy spots on skin. Mild. Hypertension  Hypertension ROS: taking medications as instructed, no medication side effects noted, no TIA's, no chest pain on exertion, no dyspnea on exertion, no swelling of ankles     reports that she has quit smoking. She smoked 1.00 pack per day. She quit smokeless tobacco use about 32 years ago. reports current alcohol use of about 1.0 standard drink of alcohol per week. BP Readings from Last 2 Encounters:   06/20/22 135/77   02/09/22 128/75     Hypothyroidism follow-up  Reports  fatigue  denies heat/cold intolerance, bowel/skin changes or CVS symptoms, losing hair, feeling excessive energy, tremulousness, palpitations. Thyroid medication has been increased since last medication check and labs.    Lab Results   Component Value Date/Time    TSH 5.37 (H) 02/09/2022 10:44 AM    T4, Free 1.1 05/18/2021 12:30 PM     Wt Readings from Last 3 Encounters:   06/20/22 136 lb (61.7 kg)   02/09/22 130 lb 12.8 oz (59.3 kg)   05/18/21 124 lb 12.8 oz (56.6 kg)     Functional status: she is able to ambulate up 2 flights of step with mild, stable shortness of breath, chest pain  Prior cardiac evaluation:   Afib    Allergies   Allergen Reactions    Irbesartan Diarrhea    Iron Diarrhea    Pcn [Penicillins] Rash PCN only. Can take cephalosporins    Sulfacetamide Swelling       Current Outpatient Medications   Medication Sig    triamcinolone acetonide (KENALOG) 0.1 % topical cream Apply  to affected area two (2) times a day for 14 days.  Spiriva Respimat 2.5 mcg/actuation inhaler INHALE 2 PUFFS BY INHALATION DAILY. INDICATIONS: BRONCHOSPASM PREVENTION WITH COPD    levothyroxine (SYNTHROID) 88 mcg tablet TAKE 1 TABLET BY MOUTH DAILY (BEFORE BREAKFAST). DOSE INCREASE    montelukast (SINGULAIR) 10 mg tablet TAKE 1 TABLET EVERY DAY    famotidine (PEPCID) 40 mg tablet TAKE 1 TABLET TWICE DAILY    allopurinoL (ZYLOPRIM) 100 mg tablet Take 1 Tablet by mouth daily.  decitabine-cedazuridine (Inqovi)  mg tab Take  by mouth daily.  amLODIPine (NORVASC) 2.5 mg tablet Take 1 Tablet by mouth daily. Started 2/9/22 per Dr. Xi Walls flecainide (TAMBOCOR) 50 mg tablet Take  by mouth two (2) times a day.  dilTIAZem ER (CARDIZEM CD) 120 mg capsule Take 1 Cap by mouth daily.  guaiFENesin ER (MUCINEX) 600 mg ER tablet 600 mg two (2) times a day.  fluorometholone (FML FORTE) 0.25 % ophthalmic suspension 1 Drop.  omeprazole (PRILOSEC) 20 mg capsule     fluticasone propionate (FLONASE) 50 mcg/actuation nasal spray     mv-mn/folic acid/calcium/vit K (ONE-A-DAY WOMEN'S 50 PLUS PO) Take 1 Tab by mouth daily.  levocetirizine (XYZAL) 5 mg tablet Take 5 mg by mouth nightly.  acetaminophen (TYLENOL ARTHRITIS PAIN) 650 mg CR tablet Take 1,300 mg by mouth nightly. No current facility-administered medications for this visit. Past Medical History:   Diagnosis Date    Anemia 12/2019    Dr. Jefry Willard. due to CMML. hx iron def    Atrial fibrillation ([address] 2019    Dr. Erica Pulido. stopped [facility] due to bleeding/plt. recurrent. s/p cardioversion in ER 5/2021    Basal cell carcinoma 7/2012, 7/2015    Dr. Lindsey Yoon. saw Vasile Freire.  Dr. Bruce Londono, Dr. [facility] CAP (community acquired pneumonia) 11/16/2019 bilateral, patchy. hx PNA 3/2019, 4/2021    Cataract     Dr. Kellie Palomino    CMML (chronic myelomonocytic leukemia) ([facility] 11/2020    Dr. Sailaja Myers. Stage 0.  marrow bx 70% cellularity, no blasts    COPD (chronic obstructive pulmonary disease) ([facility])     saw Dr. Abby Hudson. Questionable?  Diverticulitis of colon 05/2010    dx on colonoscopy by Dr. Osiel Bonilla. Took flagyl, cipro    DJD (degenerative joint disease), lumbar `    L5?  Endometr hyperplasia w/atypia 1997    GERD (gastroesophageal reflux disease) 5/2010    Hiatal hernia 5/2010    Hydroureter, left 11/16/2020    due to splenomegaly. Dr. Chelo Gann Hypertension 1996    Hypothyroidism 2008    TSH 1.1 6/7/12    Iron deficiency anemia     EGD, colon 5/2010.  hgb 11.3 6/2012    Melanoma ([facility]     Dr. Dhruv Lucas. x3    Mixed hyperlipidemia      Tg 166 LDL 87 HDL55 6/2012    Nephrolithiasis     Splenomegaly 11/2020    23 cm.  likely due to CMML    Stroke (cerebrum) ([facility]) 2007    R droop and weakness for 1 week. vessel rupture (not clot)    Sun-damaged skin     Zoster        Past Surgical History:   Procedure Laterality Date    COLONOSCOPY N/A 10/19/2017    COLONOSCOPY performed by Margarita Vazquez MD at [address] [facility] HX COLONOSCOPY  5/2010    diverticuLITIS, tubular adenoma  Dr. Manuel Goyal HX ENDOSCOPY  5/2010    EGD  - hiatal hernia.   Dr. Steve Blandon  05/30/2019    Dr. Steven Rivera HX HEMORRHOIDECTOMY  02/2020    Dr. Theodore Joseph HX [address]  09/14/2017    SCC L anterior mujica by Dr. Penny Watt    endometrial pre-cancer on biopsy    HX TONSILLECTOMY         Social History     Socioeconomic History    Marital status:      Spouse name: [employer] Number of children: 2    Years of education: Not on file    Highest education level: Not on file   Occupational History    Not on file   Tobacco Use    Smoking status: Former Smoker     Packs/day: 1.00    Smokeless tobacco: Former User     Quit date: 1/1/1990 Substance and Sexual Activity    Alcohol use: Yes     Alcohol/week: 1.0 standard drink     Types: 1 Glasses of wine per week     Comment: 3-4 x a week    Drug use: No    Sexual activity: Never   Other Topics Concern    Not on file   Social History Narrative    Not on file     Social Determinants of Health     Financial Resource Strain:     Difficulty of Paying Living Expenses: Not on file   Food Insecurity:     Worried About Running Out of Food in the Last Year: Not on file    Mia of Food in the Last Year: Not on file   Transportation Needs:     Lack of Transportation (Medical): Not on file    Lack of Transportation (Non-Medical): Not on file   Physical Activity:     Days of Exercise per Week: Not on file    Minutes of Exercise per Session: Not on file   Stress:     Feeling of Stress : Not on file   Social Connections:     Frequency of Communication with Friends and Family: Not on file    Frequency of Social Gatherings with Friends and Family: Not on file    Attends [employer] Services: Not on file    Active Member of [address] [place] [address] or Organizations: Not on file    Attends Club or Organization Meetings: Not on file    Marital Status: Not on file   Intimate Partner Violence:     Fear of Current or Ex-Partner: Not on file    Emotionally Abused: Not on file    Physically Abused: Not on file    Sexually Abused: Not on file   Housing Stability:     Unable to Pay for Housing in the Last Year: Not on file    Number of [place] in the Last Year: Not on file    Unstable Housing in the Last Year: Not on file         PHYSCIAL EXAM  He appears well, alert and oriented x 3, pleasant and cooperative. Blood pressure 135/77, pulse 68, temperature 97.8 °F (36.6 °C), temperature source Oral, resp. rate 14, height 5' (1.524 m), weight 136 lb (61.7 kg), SpO2 96 %. No rashes or significant lesions. Neck supple and free of adenopathy, or masses. No thyromegaly or carotid bruits.  Cranial nerves normal. Lungs are clear to auscultation. Heart sounds are normal with no murmurs, clicks, gallops or rubs. Abdomen is soft, non- tender, with no masses or organomegaly. Extremities, peripheral pulses and reflexes are normal.  .     Diagnoses and all orders for this visit:    1. Age-related cataract of both eyes, unspecified age-related cataract type  Patient is in stable condition and of average, acceptable risk for the proposed surgery. Thank you for the consultation. Fax to Dr. Radha Martin [phone]    2. Dermatitis- mild  -     triamcinolone acetonide (KENALOG) 0.1 % topical cream; Apply  to affected area two (2) times a day for 14 days. 3. Acquired hypothyroidism  Based on my history and available data, the overall control of this problem is unclear. Will adjust medications and plan of care based on further evaluation.   -     TSH 3RD GENERATION; Future  -     T4, FREE; Future    4. Primary hypertension  This condition is controlled on current medication regimen as written in medication list.  Medications refilled. 5. Chronic myelomonocytic leukemia not having achieved remission ([facility]  This condition appears to be stable and is being evaluated and managed by her specialist Dr. Hoda Pardo. No acute findings today warrant change in management plan.

## 2022-06-21 LAB
T4 FREE SERPL-MCNC: 1.1 NG/DL (ref 0.8–1.5)
TSH SERPL DL<=0.05 MIU/L-ACNC: 2.95 UIU/ML (ref 0.36–3.74)

## 2022-07-18 DIAGNOSIS — N39.0 URINARY TRACT INFECTION WITHOUT HEMATURIA, SITE UNSPECIFIED: ICD-10-CM

## 2022-07-18 RX ORDER — CEPHALEXIN 500 MG/1
500 CAPSULE ORAL 3 TIMES DAILY
Qty: 21 CAPSULE | Refills: 0 | Status: SHIPPED | OUTPATIENT
Start: 2022-07-18 | End: 2022-08-30 | Stop reason: ALTCHOICE

## 2022-08-13 DIAGNOSIS — M10.9 GOUT, UNSPECIFIED CAUSE, UNSPECIFIED CHRONICITY, UNSPECIFIED SITE: ICD-10-CM

## 2022-08-14 RX ORDER — ALLOPURINOL 100 MG/1
100 TABLET ORAL DAILY
Qty: 90 TABLET | Refills: 1 | Status: SHIPPED | OUTPATIENT
Start: 2022-08-14 | End: 2022-08-16

## 2022-08-16 DIAGNOSIS — M10.9 GOUT, UNSPECIFIED CAUSE, UNSPECIFIED CHRONICITY, UNSPECIFIED SITE: ICD-10-CM

## 2022-08-16 RX ORDER — ALLOPURINOL 100 MG/1
TABLET ORAL
Qty: 90 TABLET | Refills: 1 | Status: SHIPPED | OUTPATIENT
Start: 2022-08-16

## 2022-08-29 ENCOUNTER — PATIENT MESSAGE (OUTPATIENT)
Dept: INTERNAL MEDICINE CLINIC | Age: 82
End: 2022-08-29

## 2022-08-30 ENCOUNTER — OFFICE VISIT (OUTPATIENT)
Dept: INTERNAL MEDICINE CLINIC | Age: 82
End: 2022-08-30
Payer: MEDICARE

## 2022-08-30 VITALS
TEMPERATURE: 98 F | HEIGHT: 60 IN | OXYGEN SATURATION: 99 % | BODY MASS INDEX: 25.52 KG/M2 | SYSTOLIC BLOOD PRESSURE: 136 MMHG | WEIGHT: 130 LBS | HEART RATE: 75 BPM | RESPIRATION RATE: 15 BRPM | DIASTOLIC BLOOD PRESSURE: 71 MMHG

## 2022-08-30 DIAGNOSIS — H61.23 HEARING LOSS DUE TO CERUMEN IMPACTION, BILATERAL: Primary | ICD-10-CM

## 2022-08-30 DIAGNOSIS — F45.8 NEUROGENIC PRURITUS: ICD-10-CM

## 2022-08-30 PROCEDURE — 1090F PRES/ABSN URINE INCON ASSESS: CPT | Performed by: INTERNAL MEDICINE

## 2022-08-30 PROCEDURE — G8536 NO DOC ELDER MAL SCRN: HCPCS | Performed by: INTERNAL MEDICINE

## 2022-08-30 PROCEDURE — G8427 DOCREV CUR MEDS BY ELIG CLIN: HCPCS | Performed by: INTERNAL MEDICINE

## 2022-08-30 PROCEDURE — G8752 SYS BP LESS 140: HCPCS | Performed by: INTERNAL MEDICINE

## 2022-08-30 PROCEDURE — G8417 CALC BMI ABV UP PARAM F/U: HCPCS | Performed by: INTERNAL MEDICINE

## 2022-08-30 PROCEDURE — 99213 OFFICE O/P EST LOW 20 MIN: CPT | Performed by: INTERNAL MEDICINE

## 2022-08-30 PROCEDURE — 69210 REMOVE IMPACTED EAR WAX UNI: CPT | Performed by: INTERNAL MEDICINE

## 2022-08-30 PROCEDURE — G8754 DIAS BP LESS 90: HCPCS | Performed by: INTERNAL MEDICINE

## 2022-08-30 PROCEDURE — G8510 SCR DEP NEG, NO PLAN REQD: HCPCS | Performed by: INTERNAL MEDICINE

## 2022-08-30 PROCEDURE — 1101F PT FALLS ASSESS-DOCD LE1/YR: CPT | Performed by: INTERNAL MEDICINE

## 2022-08-30 PROCEDURE — G8399 PT W/DXA RESULTS DOCUMENT: HCPCS | Performed by: INTERNAL MEDICINE

## 2022-08-30 RX ORDER — LEVOCETIRIZINE DIHYDROCHLORIDE 5 MG/1
5 TABLET, FILM COATED ORAL 2 TIMES DAILY
Qty: 60 TABLET | Refills: 5
Start: 2022-08-30

## 2022-08-30 RX ORDER — GABAPENTIN 100 MG/1
100 CAPSULE ORAL 2 TIMES DAILY
Qty: 60 CAPSULE | Refills: 0 | Status: SHIPPED | OUTPATIENT
Start: 2022-08-30 | End: 2022-09-14 | Stop reason: SDUPTHER

## 2022-08-30 NOTE — TELEPHONE ENCOUNTER
----- Message from Mira Miller sent at 8/29/2022  5:12 PM EDT -----  Regarding: Ear wax  Dr. Brennon Ashraf friend told me that you have a nurse that does a great job of removing ear wax. I really need her to do her thing on my ears. Please let me know if this is something you can accomplish.   Thank you, Mira Miller

## 2022-08-30 NOTE — PROGRESS NOTES
HISTORY OF PRESENT ILLNESS    Chief Complaint   Patient presents with    Wax in Ear     Ear cleaning     She is with her . They will be moving into [address] [place] [address] in 2 weeks. Presents with loss of hearing in her right greater than left ear. She feels a muffled sensation and mild fullness. History of cerumen impaction. She has not been using any drops at this point but suspects that she has a cerumen impaction again. Is frustrated by loss of hearing. Ongoing pruritus. She has seen dermatology, oncology and now dermatology again tomorrow Dr. Emre Rodríguez  Dermatology previously did not think anything could be done since there was no rash, but now she has a mild rash in her chest and oncology would like her to have a biopsy. States she was given a topical cream by  In dermatology but did not start it since she read about it and it did not say that it helped itching. Oncology said it may be neurogenic. Dermatology said that the itching may be caused by her scratching. She is frustrated. Denies any symptoms of her neck or face. Review of Systems   All other systems reviewed and are negative, except as noted in HPI    Past Medical and Surgical History   has a past medical history of Anemia (12/2019), Atrial fibrillation ([address] (2019), Basal cell carcinoma (7/2012, 7/2015), CAP (community acquired pneumonia) (11/16/2019), Cataract, CMML (chronic myelomonocytic leukemia) ([address] (11/2020), COPD (chronic obstructive pulmonary disease) ([address] Diverticulitis of colon (05/2010), DJD (degenerative joint disease), lumbar (`), Endometr hyperplasia w/atypia (1997), GERD (gastroesophageal reflux disease) (5/2010), Hiatal hernia (5/2010), Hydroureter, left (11/16/2020), Hypertension (1996), Hypothyroidism (2008), Iron deficiency anemia, Melanoma ([address] Mixed hyperlipidemia, Nephrolithiasis, Splenomegaly (11/2020), Stroke (cerebrum) ([address] (2007), Sun-damaged skin, and Zoster.     She has no past medical history of Arsenic suspected exposure, Malignant hyperthermia due to anesthesia, Nausea & vomiting, Nicotine vapor product user, Non-nicotine vapor product user, Pacemaker, Radiation exposure, Sleep apnea, or Tanning bed exposure. has a past surgical history that includes hx edilberto and bso (1997); hx tonsillectomy; hx colonoscopy (5/2010); hx mohs procedure (09/14/2017); colonoscopy (N/A, 10/19/2017); hx endoscopy (5/2010); hx endoscopy (05/30/2019); and hx hemorrhoidectomy (02/2020). reports that she has quit smoking. Her smoking use included cigarettes. She smoked an average of 1 pack per day. She quit smokeless tobacco use about 32 years ago. She reports current alcohol use of about 1.0 standard drink per week. She reports that she does not use drugs. family history includes Cancer in her brother, father, and mother; Hypertension in her brother. Physical Exam   Nursing note and vitals reviewed. Blood pressure 136/71, pulse 75, temperature 98 °F (36.7 °C), temperature source Oral, resp. rate 15, height 5' (1.524 m), weight 130 lb (59 kg), SpO2 99 %. Constitutional: In no distress. Eyes: Conjunctivae are normal.  HEENT:  No LAD or thyromegaly   Complete bilateral cerumen impaction, right more than left. Cardiovascular: Normal rate. regular rhythm. No murmurs  No edema  Pulmonary/Chest: Effort normal. clear to ausculation blaterally  Musculoskeletal:  no edema. Abd:  Neurological: Alert and oriented. Grossly intact cranial nerves and motor function. Skin: Very mild papular rash of chest.  Psychiatric: Normal mood and affect. Behavior is normal.     ASSESSMENT and PLAN  1. Hearing loss due to cerumen impaction, bilateral  -     REMOVE IMPACTED EAR WAX  Ceruminosis is noted. Wax is removed by syringing and manual debridement. Instructions for home care to prevent wax buildup are given. 2. Neurogenic pruritus? Rash looks apparently benign.   Could benefit from Sarna or Lac-Hydrin or some other topical over-the-counter therapy to help reduce dry skin. \  Continue antihistamine twice daily and continue montelukast.  Sees dermatology tomorrow. I support any recommendations that they may, but also consider trial of low-dose gabapentin for neurogenic pruritus. Titrate dose based on tolerability. Warned of side effects of fatigue. Can start by taking just at night for a few days or weeks if she would like. -     gabapentin (NEURONTIN) 100 mg capsule; Take 1 Capsule by mouth two (2) times a day. Max Daily Amount: 200 mg., Print, Disp-60 Capsule, R-0    lab results and schedule of future lab studies reviewed with patient  reviewed medications and side effects in detail    Return to clinic for further evaluation if new symptoms develop or if current symptoms worsen or fail to resolve. There are no Patient Instructions on file for this visit.

## 2022-09-19 ENCOUNTER — TELEPHONE (OUTPATIENT)
Dept: INTERNAL MEDICINE CLINIC | Age: 82
End: 2022-09-19

## 2022-09-19 NOTE — TELEPHONE ENCOUNTER
Spoke with [facility] at [address] [facility] and she reports that it is too soon for a refill that her previous script for 8/30/22 for 60 capsules was filled and the new script can not be filled until 9/27/22. Grateful fo rthe call.

## 2022-09-19 NOTE — TELEPHONE ENCOUNTER
----- Message from Tang Garcia sent at 9/19/2022  8:27 AM EDT -----  Regarding: Gabapentin  Dr. Ghazal Salazar,  Would you please call [facility] and tell them to fill the prescription that you called in. They told me that they couldnt fill it it yet because the one I have is for sixty days. Only you switched it to two pills at times so Im down to one pill.  Thanks, Royanne Klinefelter

## 2022-09-20 DIAGNOSIS — F45.8 NEUROGENIC PRURITUS: ICD-10-CM

## 2022-09-20 RX ORDER — GABAPENTIN 100 MG/1
200 CAPSULE ORAL 3 TIMES DAILY
Qty: 180 CAPSULE | Refills: 0 | Status: SHIPPED | OUTPATIENT
Start: 2022-09-20

## 2023-02-18 RX ORDER — LEVOTHYROXINE SODIUM 88 UG/1
TABLET ORAL
Qty: 90 TABLET | Refills: 1 | Status: SHIPPED | OUTPATIENT
Start: 2023-02-18

## 2023-03-11 ENCOUNTER — PATIENT MESSAGE (OUTPATIENT)
Dept: INTERNAL MEDICINE CLINIC | Age: 83
End: 2023-03-11

## 2023-03-13 ENCOUNTER — OFFICE VISIT (OUTPATIENT)
Dept: INTERNAL MEDICINE CLINIC | Age: 83
End: 2023-03-13
Payer: MEDICARE

## 2023-03-13 ENCOUNTER — TELEPHONE (OUTPATIENT)
Dept: INTERNAL MEDICINE CLINIC | Age: 83
End: 2023-03-13

## 2023-03-13 VITALS
DIASTOLIC BLOOD PRESSURE: 75 MMHG | HEIGHT: 60 IN | SYSTOLIC BLOOD PRESSURE: 129 MMHG | WEIGHT: 121.6 LBS | TEMPERATURE: 98.3 F | RESPIRATION RATE: 14 BRPM | OXYGEN SATURATION: 98 % | HEART RATE: 76 BPM | BODY MASS INDEX: 23.87 KG/M2

## 2023-03-13 DIAGNOSIS — C93.10 CHRONIC MYELOMONOCYTIC LEUKEMIA NOT HAVING ACHIEVED REMISSION (HCC): ICD-10-CM

## 2023-03-13 DIAGNOSIS — E03.9 HYPOTHYROIDISM, UNSPECIFIED TYPE: ICD-10-CM

## 2023-03-13 DIAGNOSIS — N30.90 BLADDER INFECTION: ICD-10-CM

## 2023-03-13 DIAGNOSIS — Z79.01 LONG TERM (CURRENT) USE OF ANTICOAGULANTS: ICD-10-CM

## 2023-03-13 DIAGNOSIS — D69.6 THROMBOCYTOPENIA (HCC): ICD-10-CM

## 2023-03-13 DIAGNOSIS — I82.90 THROMBUS: ICD-10-CM

## 2023-03-13 DIAGNOSIS — B37.31 VAGINA, CANDIDIASIS: Primary | ICD-10-CM

## 2023-03-13 DIAGNOSIS — J44.9 CHRONIC OBSTRUCTIVE PULMONARY DISEASE, UNSPECIFIED COPD TYPE (HCC): ICD-10-CM

## 2023-03-13 DIAGNOSIS — R73.01 IFG (IMPAIRED FASTING GLUCOSE): ICD-10-CM

## 2023-03-13 DIAGNOSIS — I48.0 PAROXYSMAL ATRIAL FIBRILLATION (HCC): ICD-10-CM

## 2023-03-13 LAB
BILIRUB UR QL STRIP: NEGATIVE
GLUCOSE UR-MCNC: NEGATIVE MG/DL
KETONES P FAST UR STRIP-MCNC: NEGATIVE MG/DL
PH UR STRIP: 6 [PH] (ref 4.6–8)
PROT UR QL STRIP: NORMAL
SP GR UR STRIP: 1.01 (ref 1–1.03)
UA UROBILINOGEN AMB POC: NORMAL (ref 0.2–1)
URINALYSIS CLARITY POC: CLEAR
URINALYSIS COLOR POC: YELLOW
URINE BLOOD POC: NEGATIVE
URINE LEUKOCYTES POC: NEGATIVE
URINE NITRITES POC: NEGATIVE

## 2023-03-13 PROCEDURE — G8510 SCR DEP NEG, NO PLAN REQD: HCPCS | Performed by: INTERNAL MEDICINE

## 2023-03-13 PROCEDURE — G8399 PT W/DXA RESULTS DOCUMENT: HCPCS | Performed by: INTERNAL MEDICINE

## 2023-03-13 PROCEDURE — G8420 CALC BMI NORM PARAMETERS: HCPCS | Performed by: INTERNAL MEDICINE

## 2023-03-13 PROCEDURE — 1090F PRES/ABSN URINE INCON ASSESS: CPT | Performed by: INTERNAL MEDICINE

## 2023-03-13 PROCEDURE — G8536 NO DOC ELDER MAL SCRN: HCPCS | Performed by: INTERNAL MEDICINE

## 2023-03-13 PROCEDURE — 81003 URINALYSIS AUTO W/O SCOPE: CPT | Performed by: INTERNAL MEDICINE

## 2023-03-13 PROCEDURE — G8427 DOCREV CUR MEDS BY ELIG CLIN: HCPCS | Performed by: INTERNAL MEDICINE

## 2023-03-13 PROCEDURE — 99214 OFFICE O/P EST MOD 30 MIN: CPT | Performed by: INTERNAL MEDICINE

## 2023-03-13 PROCEDURE — 1101F PT FALLS ASSESS-DOCD LE1/YR: CPT | Performed by: INTERNAL MEDICINE

## 2023-03-13 RX ORDER — MICONAZOLE NITRATE 4 %
CREAM WITH PREFILLED APPLICATOR VAGINAL DAILY
Qty: 1 G | Refills: 0
Start: 2023-03-13

## 2023-03-13 NOTE — PROGRESS NOTES
HISTORY OF PRESENT ILLNESS    Chief Complaint   Patient presents with    Bladder Infection     Itching, burning - off and on for a couple of weeks. Presents for follow-up    Reports mild vaginal itching and burning. Denies significant vaginal discharge. Perhaps mild dysuria at times. Denies any frequency, fevers, chills, flank pain. Does have a history of UTI. Urinalysis today shows negative leukocytes, negative nitrites, negative blood. She is s/p splenectomy 9/23/2023 with Dr. Cinda Hogue for CMML. Hopeful to improve anemia, thrombocytopenia. Followed closely by Dr. Khurram Hawk. Given Medrol dosepak 2/22/23    Gout flare. Given COLCHICINE RX 2/27/23 for 3 days by Dexcom which helped. .    Uric acid 7 per oncology. She is taking allopurinol 100 mg daily. Impaired fasting glucose / Pre-diabetes follow-up  Lab Results   Component Value Date/Time    Glucose 96 02/09/2022 10:44 AM   Last hemoglobin a1c   Lab Results   Component Value Date/Time    Hemoglobin A1c 5.4 02/09/2022 10:44 AM    Diabetic diet compliance: compliant most of the time. Patient does not perform home glucose monitoring. Hypothyroidism follow-up  Reports fatigue  denies heat/cold intolerance, bowel/skin changes or CVS symptoms, losing hair, feeling excessive energy, tremulousness, palpitations. Thyroid medication has been unchanged since last medication check and labs.    Lab Results   Component Value Date/Time    TSH 2.95 06/20/2022 09:35 AM    T4, Free 1.1 06/20/2022 09:35 AM     Wt Readings from Last 3 Encounters:   03/13/23 121 lb 9.6 oz (55.2 kg)   08/30/22 130 lb (59 kg)   06/20/22 136 lb (61.7 kg)       Review of Systems   All other systems reviewed and are negative, except as noted in HPI    Past Medical and Surgical History   has a past medical history of Anemia (12/2019), Atrial fibrillation ([address] (2019), Basal cell carcinoma (7/2012, 7/2015), CAP (community acquired pneumonia) (11/16/2019), Cataract, CMML (chronic myelomonocytic leukemia) ([facility] (11/2020), COPD [facility] obstructive [facility]) [facility] Diverticulitis of colon (05/2010), DJD (degenerative joint disease), lumbar (`), Endometr hyperplasia w/atypia (1997), GERD (gastroesophageal reflux disease) (5/2010), Hiatal hernia (5/2010), Hydroureter, left (11/16/2020), Hypertension (1996), Hypothyroidism (2008), Iron deficiency anemia, Melanoma ([facility] Mixed hyperlipidemia, Nephrolithiasis, Splenomegaly (11/2020), Stroke (cerebrum) ([facility] (2007), Sun-damaged skin, and Zoster. She has no past medical history of Arsenic suspected exposure, Malignant hyperthermia due to anesthesia, Nausea & vomiting, Nicotine vapor product user, Non-nicotine vapor product user, Pacemaker, Radiation exposure, Sleep apnea, or Tanning bed exposure. has a past surgical history that includes hx edilberto and bso (1997); hx tonsillectomy; hx colonoscopy (05/2010); hx mohs procedure (09/14/2017); colonoscopy (N/A, 10/19/2017); hx endoscopy (05/2010); hx endoscopy (05/30/2019); hx hemorrhoidectomy (02/2020); and hx splenectomy (01/23/2023). reports that she has quit smoking. Her smoking use included cigarettes. She smoked an average of 1 pack per day. She quit smokeless tobacco use about 33 years ago. She reports current alcohol use of about 1.0 standard drink per week. She reports that she does not use drugs. family history includes Cancer in her brother, father, and mother; Hypertension in her brother. Physical Exam   Nursing note and vitals reviewed. Blood pressure 129/75, pulse 76, temperature 98.3 °F (36.8 °C), temperature source Oral, resp. rate 14, height 5' (1.524 m), weight 121 lb 9.6 oz (55.2 kg), SpO2 98 %. Constitutional:  No distress. Eyes: Conjunctivae are normal.   Ears:  Hearing grossly intact  Cardiovascular: Normal rate.   regular rhythm, no murmurs or gallops  No edema  Pulmonary/Chest: Effort normal.   CTAB  Musculoskeletal: moves all 4 extremities Neurological: Alert and oriented to person, place, and time. Skin: No visible rash noted. Psychiatric: Normal mood and affect. Behavior is normal.     Assessment and Plan    Diagnoses and all orders for this visit:    1. Vagina, candidiasis  Diagnosis based on symptomatology. ,  Atrophy and dryness may also contribute. No obvious urinary tract infection. Monistat. Consider follow-up with gynecology consultation if not improving.  -     miconazole nitrate (Monistat 3) 200 mg/5 gram (4 %) vaginal cream; Insert  into vagina daily. 2. Bladder infection-ruled out  -     AMB POC URINALYSIS DIP STICK AUTO W/O MICRO    3. 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 Managed by oncology. 4. Chronic obstructive pulmonary disease, unspecified COPD type ([facility]  Borderline diagnosis. Currently asymptomatic. On no current therapy other than Singulair for allergies. 5. Hypothyroidism, unspecified type  Unclear status. Does have ongoing fatigue but stable. Check thyroid function.  -     T4, FREE; Future  -     TSH 3RD GENERATION; Future    6. Thrombocytopenia [facility])  Per oncology. 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 Status post splenectomy. Counts are followed closely by oncology. 7. Chronic myelomonocytic leukemia not having achieved remission ([facility]  This condition appears to be stable and is being evaluated and managed by her specialist Dr. Tora Dancer. No acute findings today warrant change in management plan. 8. Paroxysmal atrial fibrillation (HCC)  Asymptomatic. Stopped anticoagulant therapy in the past because of bleeding, but is tolerating low-dose anticoagulant at this time because of thrombosis. Follow-up with Dr. Annika Louies needed. 9. Thrombus  Details unclear. Mesenteric? On low-dose anticoagulant.     10. IFG (impaired fasting glucose)  The patient is asked to make an attempt to improve diet and exercise patterns to aid in medical management of this problem. Repeat A1c.  -     HEMOGLOBIN A1C WITH EAG; Future    lab results and schedule of future lab studies reviewed with patient  reviewed medications and side effects in detail    Return to clinic for further evaluation if new symptoms develop      Current Outpatient Medications   Medication Sig    apixaban (ELIQUIS) 2.5 mg tablet Eliquis 2.5 mg tablet    miconazole nitrate (Monistat 3) 200 mg/5 gram (4 %) vaginal cream Insert  into vagina daily. levothyroxine (SYNTHROID) 88 mcg tablet TAKE 1 TABLET DAILY (BEFORE BREAKFAST). DOSE INCREASE    gabapentin (NEURONTIN) 100 mg capsule Take 2 Capsules by mouth three (3) times daily. Max Daily Amount: 600 mg.    levocetirizine (XYZAL) 5 mg tablet Take 1 Tablet by mouth two (2) times a day. allopurinoL (ZYLOPRIM) 100 mg tablet TAKE 1 TABLET EVERY DAY    Spiriva Respimat 2.5 mcg/actuation inhaler INHALE 2 PUFFS BY INHALATION DAILY. INDICATIONS: BRONCHOSPASM PREVENTION WITH COPD    montelukast (SINGULAIR) 10 mg tablet TAKE 1 TABLET EVERY DAY    famotidine (PEPCID) 40 mg tablet TAKE 1 TABLET TWICE DAILY    decitabine-cedazuridine (Inqovi)  mg tab Take  by mouth daily. flecainide (TAMBOCOR) 50 mg tablet Take  by mouth two (2) times a day. dilTIAZem ER (CARDIZEM CD) 120 mg capsule Take 1 Cap by mouth daily. guaiFENesin ER (MUCINEX) 600 mg ER tablet 600 mg two (2) times a day. fluorometholone (FML FORTE) 0.25 % ophthalmic suspension 1 Drop.    omeprazole (PRILOSEC) 20 mg capsule     fluticasone propionate (FLONASE) 50 mcg/actuation nasal spray     mv-mn/folic acid/calcium/vit K (ONE-A-DAY WOMEN'S 50 PLUS PO) Take 1 Tab by mouth daily. acetaminophen (TYLENOL) 650 mg TbER Take 1,300 mg by mouth nightly. No current facility-administered medications for this visit.

## 2023-03-13 NOTE — TELEPHONE ENCOUNTER
From: Osman Gleason  To: Shadia Cedillo MD  Sent: 3/11/2023 2:41 PM EST  Subject: UTI    Dr. Porfirio Rodgers,  I think I have a UTI and the prescription you sent me last time was great. Not having a spleen any more makes me more subject to infection. So I think we can chalk this up to that.    Thank you, Kadeem Rios

## 2023-03-14 LAB
EST. AVERAGE GLUCOSE BLD GHB EST-MCNC: 169 MG/DL
HBA1C MFR BLD: 7.5 % (ref 4–5.6)
T4 FREE SERPL-MCNC: 1 NG/DL (ref 0.8–1.5)
TSH SERPL DL<=0.05 MIU/L-ACNC: 8.67 UIU/ML (ref 0.36–3.74)

## 2023-03-15 RX ORDER — METFORMIN HYDROCHLORIDE 500 MG/1
500 TABLET, EXTENDED RELEASE ORAL
Qty: 90 TABLET | Refills: 0 | Status: SHIPPED | OUTPATIENT
Start: 2023-03-15

## 2023-03-15 RX ORDER — LEVOTHYROXINE SODIUM 112 UG/1
112 TABLET ORAL
Qty: 90 TABLET | Refills: 0 | Status: SHIPPED | OUTPATIENT
Start: 2023-03-15

## 2023-03-28 RX ORDER — COLCHICINE 0.6 MG/1
0.6 TABLET ORAL
Qty: 60 TABLET | Refills: 1 | Status: SHIPPED | OUTPATIENT
Start: 2023-03-28

## 2023-04-15 ENCOUNTER — PATIENT MESSAGE (OUTPATIENT)
Dept: INTERNAL MEDICINE CLINIC | Age: 83
End: 2023-04-15

## 2023-04-17 NOTE — TELEPHONE ENCOUNTER
From: Varinder Sutton  To: Gianluca Rose MD  Sent: 4/15/2023 2:08 PM EDT  Subject: My pain    Dr. Anshu Bardales,  My feet are getting worse instead of better. I cant take either one of the medicines that you prescribed. The  Colchicine causes me to have very bad headaches and as you know the Metformin made the nerves in my leg feel like it was going numb and it hurt. So, Id like to make an appt sooner than May so that we can talk about all of this and what options I have to get some relief.   Thank you,  Varinder Sutton

## 2023-04-17 NOTE — TELEPHONE ENCOUNTER
Spoke with patient and updated on appt and she confirms appt for 4/19/23  3:40 PM. Grateful for the call.

## 2023-04-18 ENCOUNTER — HOSPITAL ENCOUNTER (EMERGENCY)
Age: 83
Discharge: ANOTHER HOSPITAL | End: 2023-04-18
Attending: EMERGENCY MEDICINE
Payer: MEDICARE

## 2023-04-18 ENCOUNTER — APPOINTMENT (OUTPATIENT)
Dept: GENERAL RADIOLOGY | Age: 83
End: 2023-04-18
Attending: EMERGENCY MEDICINE
Payer: MEDICARE

## 2023-04-18 VITALS
BODY MASS INDEX: 22.09 KG/M2 | SYSTOLIC BLOOD PRESSURE: 122 MMHG | WEIGHT: 117 LBS | HEIGHT: 61 IN | OXYGEN SATURATION: 100 % | DIASTOLIC BLOOD PRESSURE: 53 MMHG | RESPIRATION RATE: 23 BRPM | TEMPERATURE: 98.5 F | HEART RATE: 111 BPM

## 2023-04-18 DIAGNOSIS — E83.42 HYPOMAGNESEMIA: ICD-10-CM

## 2023-04-18 DIAGNOSIS — D64.9 ANEMIA, UNSPECIFIED TYPE: ICD-10-CM

## 2023-04-18 DIAGNOSIS — I48.91 ATRIAL FIBRILLATION WITH RAPID VENTRICULAR RESPONSE (HCC): Primary | ICD-10-CM

## 2023-04-18 LAB
ABO + RH BLD: NORMAL
ALBUMIN SERPL-MCNC: 3.8 G/DL (ref 3.5–5)
ALBUMIN/GLOB SERPL: 1.2 (ref 1.1–2.2)
ALP SERPL-CCNC: 180 U/L (ref 45–117)
ALT SERPL-CCNC: 79 U/L (ref 12–78)
ANION GAP SERPL CALC-SCNC: 14 MMOL/L (ref 5–15)
ANTI-COMPLEMENT (C3B,C3D): NORMAL
AST SERPL-CCNC: 40 U/L (ref 15–37)
BASOPHILS # BLD: 0 K/UL (ref 0–0.1)
BASOPHILS NFR BLD: 0 % (ref 0–1)
BILIRUB SERPL-MCNC: 1 MG/DL (ref 0.2–1)
BLOOD GROUP ANTIBODIES SERPL: NORMAL
BLOOD GROUP ANTIBODIES SERPL: NORMAL
BUN SERPL-MCNC: 65 MG/DL (ref 6–20)
BUN/CREAT SERPL: 33 (ref 12–20)
CALCIUM SERPL-MCNC: 9 MG/DL (ref 8.5–10.1)
CALCULATED R AXIS, ECG10: -25 DEGREES
CALCULATED T AXIS, ECG11: 168 DEGREES
CHLORIDE SERPL-SCNC: 101 MMOL/L (ref 97–108)
CO2 SERPL-SCNC: 22 MMOL/L (ref 21–32)
CREAT SERPL-MCNC: 1.95 MG/DL (ref 0.55–1.02)
DAT IGG-SP REAG RBC QL: NORMAL
DAT POLY-SP REAG RBC QL: NORMAL
DIAGNOSIS, 93000: NORMAL
DIFFERENTIAL METHOD BLD: ABNORMAL
EOSINOPHIL # BLD: 0.3 K/UL (ref 0–0.4)
EOSINOPHIL NFR BLD: 2 % (ref 0–7)
ERYTHROCYTE [DISTWIDTH] IN BLOOD BY AUTOMATED COUNT: 20.7 % (ref 11.5–14.5)
GLOBULIN SER CALC-MCNC: 3.3 G/DL (ref 2–4)
GLUCOSE SERPL-MCNC: 242 MG/DL (ref 65–100)
HCT VFR BLD AUTO: 25.2 % (ref 35–47)
HGB BLD-MCNC: 7.8 G/DL (ref 11.5–16)
IMM GRANULOCYTES # BLD AUTO: 0 K/UL
IMM GRANULOCYTES NFR BLD AUTO: 0 %
LYMPHOCYTES # BLD: 2.1 K/UL (ref 0.8–3.5)
LYMPHOCYTES NFR BLD: 16 % (ref 12–49)
MAGNESIUM SERPL-MCNC: 1.4 MG/DL (ref 1.6–2.4)
MCH RBC QN AUTO: 24 PG (ref 26–34)
MCHC RBC AUTO-ENTMCNC: 31 G/DL (ref 30–36.5)
MCV RBC AUTO: 77.5 FL (ref 80–99)
METAMYELOCYTES NFR BLD MANUAL: 1 %
MONOCYTES # BLD: 1 K/UL (ref 0–1)
MONOCYTES NFR BLD: 8 % (ref 5–13)
MYELOCYTES NFR BLD MANUAL: 2 %
NEUTS SEG # BLD: 9.3 K/UL (ref 1.8–8)
NEUTS SEG NFR BLD: 71 % (ref 32–75)
NRBC # BLD: 0.62 K/UL (ref 0–0.01)
NRBC BLD-RTO: 4.7 PER 100 WBC
PLATELET # BLD AUTO: 559 K/UL (ref 150–400)
PMV BLD AUTO: 11.9 FL (ref 8.9–12.9)
POTASSIUM SERPL-SCNC: 4.4 MMOL/L (ref 3.5–5.1)
PROT SERPL-MCNC: 7.1 G/DL (ref 6.4–8.2)
Q-T INTERVAL, ECG07: 266 MS
QRS DURATION, ECG06: 82 MS
QTC CALCULATION (BEZET), ECG08: 474 MS
RBC # BLD AUTO: 3.25 M/UL (ref 3.8–5.2)
RBC MORPH BLD: ABNORMAL
SODIUM SERPL-SCNC: 137 MMOL/L (ref 136–145)
SPECIMEN EXP DATE BLD: NORMAL
TROPONIN I SERPL HS-MCNC: 18 NG/L (ref 0–51)
VENTRICULAR RATE, ECG03: 191 BPM
WBC # BLD AUTO: 13.1 K/UL (ref 3.6–11)

## 2023-04-18 PROCEDURE — 71045 X-RAY EXAM CHEST 1 VIEW: CPT

## 2023-04-18 PROCEDURE — 85025 COMPLETE CBC W/AUTO DIFF WBC: CPT

## 2023-04-18 PROCEDURE — 86880 COOMBS TEST DIRECT: CPT

## 2023-04-18 PROCEDURE — 84484 ASSAY OF TROPONIN QUANT: CPT

## 2023-04-18 PROCEDURE — 99285 EMERGENCY DEPT VISIT HI MDM: CPT

## 2023-04-18 PROCEDURE — 86900 BLOOD TYPING SEROLOGIC ABO: CPT

## 2023-04-18 PROCEDURE — 74011000250 HC RX REV CODE- 250: Performed by: EMERGENCY MEDICINE

## 2023-04-18 PROCEDURE — 96375 TX/PRO/DX INJ NEW DRUG ADDON: CPT

## 2023-04-18 PROCEDURE — 93005 ELECTROCARDIOGRAM TRACING: CPT

## 2023-04-18 PROCEDURE — 96365 THER/PROPH/DIAG IV INF INIT: CPT

## 2023-04-18 PROCEDURE — 74011250636 HC RX REV CODE- 250/636: Performed by: EMERGENCY MEDICINE

## 2023-04-18 PROCEDURE — 36415 COLL VENOUS BLD VENIPUNCTURE: CPT

## 2023-04-18 PROCEDURE — 80053 COMPREHEN METABOLIC PANEL: CPT

## 2023-04-18 PROCEDURE — 83735 ASSAY OF MAGNESIUM: CPT

## 2023-04-18 PROCEDURE — 86870 RBC ANTIBODY IDENTIFICATION: CPT

## 2023-04-18 PROCEDURE — 96376 TX/PRO/DX INJ SAME DRUG ADON: CPT

## 2023-04-18 PROCEDURE — 74011000258 HC RX REV CODE- 258: Performed by: EMERGENCY MEDICINE

## 2023-04-18 RX ORDER — DILTIAZEM HYDROCHLORIDE 5 MG/ML
INJECTION INTRAVENOUS
Status: DISCONTINUED
Start: 2023-04-18 | End: 2023-04-18 | Stop reason: HOSPADM

## 2023-04-18 RX ORDER — MAGNESIUM SULFATE 1 G/100ML
1 INJECTION INTRAVENOUS ONCE
Status: COMPLETED | OUTPATIENT
Start: 2023-04-18 | End: 2023-04-18

## 2023-04-18 RX ORDER — DILTIAZEM HYDROCHLORIDE 5 MG/ML
5 INJECTION INTRAVENOUS
Status: COMPLETED | OUTPATIENT
Start: 2023-04-18 | End: 2023-04-18

## 2023-04-18 RX ORDER — SODIUM CHLORIDE 9 MG/ML
75 INJECTION, SOLUTION INTRAVENOUS ONCE
Status: COMPLETED | OUTPATIENT
Start: 2023-04-18 | End: 2023-04-18

## 2023-04-18 RX ORDER — DILTIAZEM HYDROCHLORIDE 5 MG/ML
10 INJECTION INTRAVENOUS
Status: COMPLETED | OUTPATIENT
Start: 2023-04-18 | End: 2023-04-18

## 2023-04-18 RX ADMIN — DILTIAZEM HYDROCHLORIDE 10 MG: 5 INJECTION INTRAVENOUS at 09:23

## 2023-04-18 RX ADMIN — DILTIAZEM HYDROCHLORIDE 5 MG: 5 INJECTION INTRAVENOUS at 10:58

## 2023-04-18 RX ADMIN — SODIUM CHLORIDE 2.5 MG/HR: 900 INJECTION, SOLUTION INTRAVENOUS at 09:58

## 2023-04-18 RX ADMIN — MAGNESIUM SULFATE HEPTAHYDRATE 1 G: 1 INJECTION, SOLUTION INTRAVENOUS at 10:10

## 2023-04-18 RX ADMIN — SODIUM CHLORIDE 75 ML/HR: 9 INJECTION, SOLUTION INTRAVENOUS at 09:27

## 2023-04-18 NOTE — ED NOTES
TRANSFER - OUT REPORT:    Verbal report given to Long Pineda RN(name) on Perez Iverson  being transferred to [facility] ER(unit) for routine progression of care       Report consisted of patients Situation, Background, Assessment and   Recommendations(SBAR). Information from the following report(s) SBAR, ED Summary, MAR, Recent Results, and Cardiac Rhythm Afib RVR  was reviewed with the receiving nurse. Lines:   Peripheral IV 04/18/23 Left Antecubital (Active)        Opportunity for questions and clarification was provided.       Patient transported with:   Critical Care Transport

## 2023-04-18 NOTE — ED TRIAGE NOTES
Pt experiencing palpations for the past hour. Pt apple watch told her afib and HR>160. Pt has afib hx for 2 years. Pt takes elquis. Pt was supposed to get blood transfusion at [address] [place] [address] at 0900 today.

## 2023-04-18 NOTE — ED PROVIDER NOTES
This is an 81-year-old female with past medical history of atrial fibrillation followed by Dr. Terry Meyer, history of CML, COPD presenting to the ER for evaluation of palpitations associate with shortness of breath that started spontaneous about 45 minutes ago. Patient does have a history of anemia and is in process of getting blood transfusion but had high antibody level also was delayed until getting it done this morning as she was getting ready she developed palpitations and shortness of breath and noted to be in atrial fibrillation with rates ranging from the 150s to 190s. On diltiazem, flecainide, Eliquis. Palpitations   Associated symptoms include shortness of breath. Pertinent negatives include no fever and no abdominal pain. Past Medical History:   Diagnosis Date    Anemia 12/2019    Dr. Wendy Rosario. saw Dr. Pavan Maurer. due to CMML. hx iron def    Atrial fibrillation ([facility] [facility] 2019    Dr. Terry Meyer. Saw Dr. Home Tai. stopped [address] due to bleeding/plt. recurrent. s/p cardioversion in ER 5/2021    Basal cell carcinoma 7/2012, 7/2015    Dr. Toney Roche. saw Nya Pacheco. Dr. Alex Morejon, Dr. Ruben Lee    CAP (community acquired pneumonia) 11/16/2019    bilateral, patchy. hx PNA 3/2019, 4/2021    Cataract     Dr. Kvng Real    CMML (chronic myelomonocytic leukemia) ([facility] [address] 11/2020    Dr. Suki Arguello. Stage 0.  marrow bx 70% cellularity, no blasts    COPD (chronic obstructive pulmonary disease) (HCC)     saw Dr. Klarissa Box. Questionable? Diverticulitis of colon 05/2010    dx on colonoscopy by Dr. Miguel Rajan. Took flagyl, cipro    DJD (degenerative joint disease), lumbar `    L5? Endometr hyperplasia w/atypia 1997    GERD (gastroesophageal reflux disease) 05/2010    Hiatal hernia 05/2010    Hydroureter, left 11/16/2020    due to splenomegaly.   Dr. Dee Dee Barnes    Hypertension 1996    Hypothyroidism 2008    TSH 1.1 6/7/12    Iron deficiency anemia     EGD, colon 5/2010.  hgb 11.3 6/2012    Long term (current) use of anticoagulants 02/2023    in abdomen    Melanoma ([facility] [address]     Dr. Sandeep Bray. x3    Mixed hyperlipidemia      Tg 166 LDL 87 HDL55 6/2012    Nephrolithiasis     Splenomegaly 11/2020    23 cm.  likely due to CMML    Stroke (cerebrum) ([facility] [address] 2007    R droop and weakness for 1 week. vessel rupture (not clot)    Sun-damaged skin     Thrombus 02/2023    abdominal clot. Zoster        Past Surgical History:   Procedure Laterality Date    COLONOSCOPY N/A 10/19/2017    COLONOSCOPY performed by Tamiko Latham MD at [facility] [address]    HX COLONOSCOPY  05/2010    diverticuLITIS, tubular adenoma  Dr. Cisneros Axon ENDOSCOPY  05/2010    EGD  - hiatal hernia. Dr. Jaydon Ludwig  05/30/2019    Dr. Kathe Lynne HEMORRHOIDECTOMY  02/2020    Dr. Ermelinda Ramos [address]  09/14/2017    SCC L anterior mujica by Dr. Benita Urrutia  01/23/2023    Dr. Tai Michele. laproscopy    HX MALLORY AND BSO  1997    endometrial pre-cancer on biopsy    HX TONSILLECTOMY           Family History:   Problem Relation Age of Onset    Cancer Mother     Cancer Father     Hypertension Brother     Cancer Brother         myeloma       Social History     Socioeconomic History    Marital status:      Spouse name: Imagene Smith    Number of children: 2    Years of education: Not on file    Highest education level: Not on file   Occupational History    Not on file   Tobacco Use    Smoking status: Former     Packs/day: 1.00     Types: Cigarettes    Smokeless tobacco: Former     Quit date: 1/1/1990   Substance and Sexual Activity    Alcohol use:  Yes     Alcohol/week: 1.0 standard drink     Types: 1 Glasses of wine per week     Comment: 3-4 x a week    Drug use: No    Sexual activity: Never   Other Topics Concern    Not on file   Social History Narrative    Not on file     Social Determinants of Health     Financial Resource Strain: Not on file   Food Insecurity: Not on file   Transportation Needs: Not on file   Physical Activity: Not on file   Stress: Not on file   Social Connections: Not on file   Intimate Partner Violence: Not on file   Housing Stability: Not on file         ALLERGIES: Irbesartan, Iron, Pcn [penicillins], and Sulfacetamide    Review of Systems   Constitutional:  Negative for fever. Respiratory:  Positive for shortness of breath. Cardiovascular:  Positive for palpitations. Gastrointestinal:  Negative for abdominal pain. Vitals:    04/18/23 0912 04/18/23 0919 04/18/23 0930 04/18/23 0935   BP: (!) 162/58 (!) 171/75 (!) 143/45 (!) 139/54   Pulse: (!) 169 (!) 189 (!) 108 (!) 110   Resp: 27 19 19 21   Temp: 97.6 °F (36.4 °C)      SpO2: 98% 99% 97% 99%   Weight: 53.1 kg (117 lb)      Height: 5' 1\" (1.549 m)               Physical Exam  Vitals and nursing note reviewed. Constitutional:       Appearance: Normal appearance. HENT:      Head: Normocephalic and atraumatic. Eyes:      General: No scleral icterus. Cardiovascular:      Rate and Rhythm: Tachycardia present. Rhythm irregular. Pulmonary:      Effort: Pulmonary effort is normal.   Abdominal:      General: Abdomen is flat. Palpations: Abdomen is soft. Tenderness: There is no abdominal tenderness. Skin:     General: Skin is warm and dry. Neurological:      Mental Status: She is alert and oriented to person, place, and time. Psychiatric:         Mood and Affect: Mood normal.        Medical Decision Making  70-year-old female, patient with known history of A-fib on flecainide and diltiazem. Presents with acute onset of tachycardia and palpitations. ECG with diffuse ST segment depressions likely rate related. Of CML and in process of getting transfusion through [facility]    Electrolytes reviewed, magnesium low at 1.4, start repletion. ECG reviewed with hemoglobin adequate at this time, consistent with known history of anemia    Initial bolus of diltiazem, decrease rate appropriately, started on drip as rate seems to be slightly increasing.     Patient/family would like transfer to [facility] facility. Patient's primary cardiologist is Dr. Aubree Burgos transfer center    Amount and/or Complexity of Data Reviewed  Labs: ordered. Radiology: ordered. ECG/medicine tests: ordered. Risk  Prescription drug management. ED Course as of 04/18/23 1225   Tue Apr 18, 2023   9400 ECG obtained April 18, 2023 at 902. Supraventricular tachycardia/atrial fibrillation with RVR rate of 191, normal axis, diffuse ST depressions. No STEMI    Bedside heart rate is variable suspicious for atrial fibrillation.  [SK]   0476 Contacted transfer center for transfer to [place] [address] [SK]   0 Accepted to [facility] ED [SK]      ED Course User Index  [SK] Jason Lennon MD       Critical Care  Performed by: Jason Lennon MD  Authorized by: Jason Lennon MD     Critical care provider statement:     Critical care time (minutes):  30    Critical care time was exclusive of:  Separately billable procedures and treating other patients    Critical care was necessary to treat or prevent imminent or life-threatening deterioration of the following conditions:  Cardiac failure, metabolic crisis and circulatory failure    Critical care was time spent personally by me on the following activities:  Discussions with consultants, evaluation of patient's response to treatment, examination of patient, re-evaluation of patient's condition, ordering and review of laboratory studies, ordering and review of radiographic studies, ordering and performing treatments and interventions and review of old charts    Care discussed with: accepting provider at another facility

## 2023-04-19 LAB
CALCULATED R AXIS, ECG10: -14 DEGREES
CALCULATED R AXIS, ECG10: -21 DEGREES
CALCULATED T AXIS, ECG11: 128 DEGREES
CALCULATED T AXIS, ECG11: 61 DEGREES
DIAGNOSIS, 93000: NORMAL
DIAGNOSIS, 93000: NORMAL
Q-T INTERVAL, ECG07: 294 MS
Q-T INTERVAL, ECG07: 346 MS
QRS DURATION, ECG06: 84 MS
QRS DURATION, ECG06: 86 MS
QTC CALCULATION (BEZET), ECG08: 489 MS
QTC CALCULATION (BEZET), ECG08: 490 MS
VENTRICULAR RATE, ECG03: 120 BPM
VENTRICULAR RATE, ECG03: 167 BPM

## 2023-04-20 RX ORDER — COLCHICINE 0.6 MG/1
TABLET ORAL
Qty: 60 TABLET | Refills: 1 | Status: SHIPPED | OUTPATIENT
Start: 2023-04-20

## 2023-05-02 ENCOUNTER — PATIENT OUTREACH (OUTPATIENT)
Dept: CASE MANAGEMENT | Age: 83
End: 2023-05-02

## 2023-05-09 ENCOUNTER — TELEPHONE (OUTPATIENT)
Age: 83
End: 2023-05-09

## 2023-05-09 NOTE — TELEPHONE ENCOUNTER
----- Message from Alexandria Don sent at 5/9/2023  8:56 AM EDT -----  Subject: Hospital Follow Up    QUESTIONS  What hospital was the Patient Discharged from? [facility]  Date of Discharge? 2023-05-08  Discharge Location? Home  Reason for hospitalization as patient stated? Patient was hospitalized for   chest pain, afib, COPD, and cancer. What question does the patient have, if applicable?   ---------------------------------------------------------------------------  --------------  CALL BACK INFO  What is the best way for the office to contact you? OK to leave message on   voicemail,OK to respond with electronic message via [employer] portal (only   for patients who have registered [employer] account)  Preferred Call Back Phone Number? [phone]  ---------------------------------------------------------------------------  --------------  SCRIPT ANSWERS  Relationship to Patient? Spouse/Partner  Representative Name? Jose A   Additional information verified (besides Name and Date of Birth)?  Address

## 2023-05-10 RX ORDER — FAMOTIDINE 40 MG/1
TABLET, FILM COATED ORAL
Qty: 180 TABLET | Refills: 3 | Status: SHIPPED | OUTPATIENT
Start: 2023-05-10

## 2023-05-10 RX ORDER — LEVOTHYROXINE SODIUM 112 UG/1
TABLET ORAL
Qty: 90 TABLET | Refills: 0 | Status: SHIPPED | OUTPATIENT
Start: 2023-05-10

## 2023-05-12 RX ORDER — TIZANIDINE 2 MG/1
2 TABLET ORAL EVERY 6 HOURS PRN
Qty: 30 TABLET | Refills: 0 | Status: SHIPPED | OUTPATIENT
Start: 2023-05-12

## 2023-05-16 ENCOUNTER — OFFICE VISIT (OUTPATIENT)
Age: 83
End: 2023-05-16
Payer: MEDICARE

## 2023-05-16 VITALS
HEART RATE: 76 BPM | DIASTOLIC BLOOD PRESSURE: 68 MMHG | SYSTOLIC BLOOD PRESSURE: 159 MMHG | HEIGHT: 61 IN | RESPIRATION RATE: 15 BRPM | OXYGEN SATURATION: 94 % | BODY MASS INDEX: 22.11 KG/M2 | TEMPERATURE: 98.1 F

## 2023-05-16 DIAGNOSIS — J44.9 CHRONIC OBSTRUCTIVE PULMONARY DISEASE, UNSPECIFIED COPD TYPE (HCC): ICD-10-CM

## 2023-05-16 DIAGNOSIS — I10 ESSENTIAL (PRIMARY) HYPERTENSION: ICD-10-CM

## 2023-05-16 DIAGNOSIS — M62.830 BACK MUSCLE SPASM: ICD-10-CM

## 2023-05-16 DIAGNOSIS — E11.9 CONTROLLED TYPE 2 DIABETES MELLITUS WITHOUT COMPLICATION, WITHOUT LONG-TERM CURRENT USE OF INSULIN (HCC): ICD-10-CM

## 2023-05-16 DIAGNOSIS — R53.81 DEBILITY: ICD-10-CM

## 2023-05-16 DIAGNOSIS — J15.7 PNEUMONIA OF BOTH LUNGS DUE TO MYCOPLASMA PNEUMONIAE, UNSPECIFIED PART OF LUNG: Primary | ICD-10-CM

## 2023-05-16 DIAGNOSIS — R26.89 IMBALANCE: ICD-10-CM

## 2023-05-16 DIAGNOSIS — D64.9 ANEMIA, UNSPECIFIED TYPE: ICD-10-CM

## 2023-05-16 DIAGNOSIS — R13.12 OROPHARYNGEAL DYSPHAGIA: ICD-10-CM

## 2023-05-16 DIAGNOSIS — C93.10 CHRONIC MYELOMONOCYTIC LEUKEMIA NOT HAVING ACHIEVED REMISSION (HCC): ICD-10-CM

## 2023-05-16 DIAGNOSIS — R63.0 ANOREXIA: ICD-10-CM

## 2023-05-16 DIAGNOSIS — I48.0 PAROXYSMAL ATRIAL FIBRILLATION (HCC): ICD-10-CM

## 2023-05-16 DIAGNOSIS — R06.00 DYSPNEA, UNSPECIFIED TYPE: ICD-10-CM

## 2023-05-16 PROCEDURE — G8427 DOCREV CUR MEDS BY ELIG CLIN: HCPCS | Performed by: INTERNAL MEDICINE

## 2023-05-16 PROCEDURE — G8420 CALC BMI NORM PARAMETERS: HCPCS | Performed by: INTERNAL MEDICINE

## 2023-05-16 PROCEDURE — 99215 OFFICE O/P EST HI 40 MIN: CPT | Performed by: INTERNAL MEDICINE

## 2023-05-16 PROCEDURE — 1036F TOBACCO NON-USER: CPT | Performed by: INTERNAL MEDICINE

## 2023-05-16 PROCEDURE — 1123F ACP DISCUSS/DSCN MKR DOCD: CPT | Performed by: INTERNAL MEDICINE

## 2023-05-16 PROCEDURE — 3078F DIAST BP <80 MM HG: CPT | Performed by: INTERNAL MEDICINE

## 2023-05-16 PROCEDURE — 3051F HG A1C>EQUAL 7.0%<8.0%: CPT | Performed by: INTERNAL MEDICINE

## 2023-05-16 PROCEDURE — G8399 PT W/DXA RESULTS DOCUMENT: HCPCS | Performed by: INTERNAL MEDICINE

## 2023-05-16 PROCEDURE — 1090F PRES/ABSN URINE INCON ASSESS: CPT | Performed by: INTERNAL MEDICINE

## 2023-05-16 PROCEDURE — 3023F SPIROM DOC REV: CPT | Performed by: INTERNAL MEDICINE

## 2023-05-16 PROCEDURE — 3077F SYST BP >= 140 MM HG: CPT | Performed by: INTERNAL MEDICINE

## 2023-05-16 RX ORDER — BENZONATATE 100 MG/1
100 CAPSULE ORAL 3 TIMES DAILY PRN
COMMUNITY

## 2023-05-16 RX ORDER — DILTIAZEM HYDROCHLORIDE 120 MG/1
120 CAPSULE, EXTENDED RELEASE ORAL DAILY
Qty: 30 CAPSULE | Refills: 5
Start: 2023-05-16

## 2023-05-16 RX ORDER — HYDROCODONE BITARTRATE AND HOMATROPINE METHYLBROMIDE 5; 1.5 MG/5ML; MG/5ML
SOLUTION ORAL
COMMUNITY
Start: 2023-05-08 | End: 2023-05-16 | Stop reason: ALTCHOICE

## 2023-05-16 RX ORDER — AMIODARONE HYDROCHLORIDE 200 MG/1
200 TABLET ORAL DAILY
Qty: 90 TABLET | Refills: 1
Start: 2023-05-16

## 2023-05-16 RX ORDER — ALBUTEROL SULFATE 90 UG/1
AEROSOL, METERED RESPIRATORY (INHALATION)
COMMUNITY
Start: 2023-05-08

## 2023-05-16 SDOH — SOCIAL STABILITY: SOCIAL NETWORK: HOW OFTEN DO YOU ATTENT MEETINGS OF THE CLUB OR ORGANIZATION YOU BELONG TO?: NEVER

## 2023-05-16 SDOH — ECONOMIC STABILITY: TRANSPORTATION INSECURITY
IN THE PAST 12 MONTHS, HAS THE LACK OF TRANSPORTATION KEPT YOU FROM MEDICAL APPOINTMENTS OR FROM GETTING MEDICATIONS?: NO

## 2023-05-16 SDOH — SOCIAL STABILITY: SOCIAL NETWORK: HOW OFTEN DO YOU ATTEND CHURCH OR RELIGIOUS SERVICES?: NEVER

## 2023-05-16 SDOH — SOCIAL STABILITY: SOCIAL NETWORK: IN A TYPICAL WEEK, HOW MANY TIMES DO YOU TALK ON THE PHONE WITH FAMILY, FRIENDS, OR NEIGHBORS?: THREE TIMES A WEEK

## 2023-05-16 SDOH — SOCIAL STABILITY: SOCIAL NETWORK: HOW OFTEN DO YOU GET TOGETHER WITH FRIENDS OR RELATIVES?: THREE TIMES A WEEK

## 2023-05-16 SDOH — SOCIAL STABILITY: SOCIAL NETWORK: ARE YOU MARRIED, WIDOWED, DIVORCED, SEPARATED, NEVER MARRIED, OR LIVING WITH A PARTNER?: MARRIED

## 2023-05-16 SDOH — ECONOMIC STABILITY: INCOME INSECURITY: HOW HARD IS IT FOR YOU TO PAY FOR THE VERY BASICS LIKE FOOD, HOUSING, MEDICAL CARE, AND HEATING?: NOT HARD AT ALL

## 2023-05-16 SDOH — ECONOMIC STABILITY: HOUSING INSECURITY: IN THE LAST 12 MONTHS, HOW MANY PLACES HAVE YOU LIVED?: 2

## 2023-05-16 SDOH — HEALTH STABILITY: PHYSICAL HEALTH: ON AVERAGE, HOW MANY DAYS PER WEEK DO YOU ENGAGE IN MODERATE TO STRENUOUS EXERCISE (LIKE A BRISK WALK)?: 0 DAYS

## 2023-05-16 SDOH — HEALTH STABILITY: MENTAL HEALTH: HOW MANY STANDARD DRINKS CONTAINING ALCOHOL DO YOU HAVE ON A TYPICAL DAY?: PATIENT DOES NOT DRINK

## 2023-05-16 SDOH — ECONOMIC STABILITY: INCOME INSECURITY: IN THE LAST 12 MONTHS, WAS THERE A TIME WHEN YOU WERE NOT ABLE TO PAY THE MORTGAGE OR RENT ON TIME?: NO

## 2023-05-16 SDOH — HEALTH STABILITY: MENTAL HEALTH
STRESS IS WHEN SOMEONE FEELS TENSE, NERVOUS, ANXIOUS, OR CAN'T SLEEP AT NIGHT BECAUSE THEIR MIND IS TROUBLED. HOW STRESSED ARE YOU?: NOT AT ALL

## 2023-05-16 SDOH — SOCIAL STABILITY: SOCIAL NETWORK
DO YOU BELONG TO ANY CLUBS OR ORGANIZATIONS SUCH AS CHURCH GROUPS UNIONS, FRATERNAL OR ATHLETIC GROUPS, OR SCHOOL GROUPS?: NO

## 2023-05-16 SDOH — SOCIAL STABILITY: SOCIAL INSECURITY: WITHIN THE LAST YEAR, HAVE YOU BEEN HUMILIATED OR EMOTIONALLY ABUSED IN OTHER WAYS BY YOUR PARTNER OR EX-PARTNER?: NO

## 2023-05-16 SDOH — ECONOMIC STABILITY: HOUSING INSECURITY
IN THE LAST 12 MONTHS, WAS THERE A TIME WHEN YOU DID NOT HAVE A STEADY PLACE TO SLEEP OR SLEPT IN A SHELTER (INCLUDING NOW)?: NO

## 2023-05-16 SDOH — SOCIAL STABILITY: SOCIAL INSECURITY: WITHIN THE LAST YEAR, HAVE YOU BEEN AFRAID OF YOUR PARTNER OR EX-PARTNER?: NO

## 2023-05-16 SDOH — ECONOMIC STABILITY: FOOD INSECURITY: WITHIN THE PAST 12 MONTHS, THE FOOD YOU BOUGHT JUST DIDN'T LAST AND YOU DIDN'T HAVE MONEY TO GET MORE.: NEVER TRUE

## 2023-05-16 SDOH — ECONOMIC STABILITY: TRANSPORTATION INSECURITY
IN THE PAST 12 MONTHS, HAS LACK OF TRANSPORTATION KEPT YOU FROM MEETINGS, WORK, OR FROM GETTING THINGS NEEDED FOR DAILY LIVING?: NO

## 2023-05-16 SDOH — SOCIAL STABILITY: SOCIAL INSECURITY
WITHIN THE LAST YEAR, HAVE YOU BEEN KICKED, HIT, SLAPPED, OR OTHERWISE PHYSICALLY HURT BY YOUR PARTNER OR EX-PARTNER?: NO

## 2023-05-16 SDOH — SOCIAL STABILITY: SOCIAL INSECURITY
WITHIN THE LAST YEAR, HAVE TO BEEN RAPED OR FORCED TO HAVE ANY KIND OF SEXUAL ACTIVITY BY YOUR PARTNER OR EX-PARTNER?: NO

## 2023-05-16 SDOH — HEALTH STABILITY: MENTAL HEALTH: HOW OFTEN DO YOU HAVE A DRINK CONTAINING ALCOHOL?: NEVER

## 2023-05-16 SDOH — ECONOMIC STABILITY: FOOD INSECURITY: WITHIN THE PAST 12 MONTHS, YOU WORRIED THAT YOUR FOOD WOULD RUN OUT BEFORE YOU GOT MONEY TO BUY MORE.: NEVER TRUE

## 2023-05-16 SDOH — HEALTH STABILITY: PHYSICAL HEALTH: ON AVERAGE, HOW MANY MINUTES DO YOU ENGAGE IN EXERCISE AT THIS LEVEL?: 0 MIN

## 2023-05-16 ASSESSMENT — PATIENT HEALTH QUESTIONNAIRE - PHQ9
SUM OF ALL RESPONSES TO PHQ9 QUESTIONS 1 & 2: 0
2. FEELING DOWN, DEPRESSED OR HOPELESS: 0
SUM OF ALL RESPONSES TO PHQ QUESTIONS 1-9: 0
1. LITTLE INTEREST OR PLEASURE IN DOING THINGS: 0
SUM OF ALL RESPONSES TO PHQ QUESTIONS 1-9: 0

## 2023-05-16 NOTE — PROGRESS NOTES
HISTORY OF PRESENT ILLNESS    Chief Complaint   Patient presents with    Follow-Up from [facility] 5.8.23 - chest pain, afib, COPD and cancer. Presents for follow-up. She is accompanied by her . She is sitting in a wheelchair, on 2 L of oxygen. Appears fatigued. Follow-up of hospitalization at [facility] from May 1 through May 20, 2023. Discharge summary reviewed prior to visit. Patient was sent to the hospital from her medical oncology office with Dr. Leone Shell day of admission due to watery diarrhea for 2 weeks, chest pain shortness of breath. Increased coughing also noted. She is on treatment for CMML and was last treated 28 days prior to admission. Also was recently started on amiodarone and feels that the diarrhea may have started when she started amiodarone. Was seeing Dr. Cecilio Regan. In the labs, WBC 23, hemoglobin 8.4, platelets 166, BUN 48, creatinine 1.78, glucose 107, troponin normal.  . Chest x-ray showed new opacities, more pronounced on the right simply edema. EKG with normal sinus rhythm and PVCs. During treatment, she was treated for mild acute kidney injury. Given IV fluids for mild dehydration from loose stools. Pulmonology and hematology were consulted as well as cardiology. She was noted to have episodes of paroxysmal atrial fibrillation in the hospital.  They recommended trying to put her back on amiodarone and was given loading dose and oral therapy again. .  For pneumonia, was retreated with Rocephin and azithromycin. Found to be mycoplasma positive. Chest discomfort secondary to pneumonia and coughing. Given mucolytic's and Tessalon Perles as well as cough syrup which made her very tired. She continued to have mild hypoxia despite treatment. Did qualify for 2 L of nasal cannula oxygen. Has been using 2.5 L at home but 2 L now in the office with sats in the low 90s. Has been diagnosed with diabetes mellitus recently.   Did not tolerate

## 2023-05-23 RX ORDER — COLCHICINE 0.6 MG/1
TABLET ORAL
Qty: 60 TABLET | Refills: 5 | Status: SHIPPED | OUTPATIENT
Start: 2023-05-23

## 2023-05-24 ENCOUNTER — TELEPHONE (OUTPATIENT)
Age: 83
End: 2023-05-24

## 2023-06-08 DIAGNOSIS — M62.838 MUSCLE SPASM: Primary | ICD-10-CM

## 2023-06-08 RX ORDER — TIZANIDINE 2 MG/1
2 TABLET ORAL EVERY 6 HOURS PRN
Qty: 30 TABLET | Refills: 2 | Status: SHIPPED | OUTPATIENT
Start: 2023-06-08

## 2023-06-29 DIAGNOSIS — M10.9 GOUT, UNSPECIFIED CAUSE, UNSPECIFIED CHRONICITY, UNSPECIFIED SITE: ICD-10-CM

## 2023-06-29 DIAGNOSIS — J44.9 CHRONIC OBSTRUCTIVE PULMONARY DISEASE, UNSPECIFIED COPD TYPE (HCC): Primary | ICD-10-CM

## 2023-06-29 RX ORDER — MONTELUKAST SODIUM 10 MG/1
TABLET ORAL
Qty: 90 TABLET | Refills: 2 | Status: SHIPPED | OUTPATIENT
Start: 2023-06-29

## 2023-06-29 RX ORDER — ALLOPURINOL 100 MG/1
TABLET ORAL
Qty: 90 TABLET | Refills: 1 | Status: SHIPPED | OUTPATIENT
Start: 2023-06-29

## 2023-07-17 ENCOUNTER — TELEPHONE (OUTPATIENT)
Age: 83
End: 2023-07-17

## 2023-07-17 NOTE — TELEPHONE ENCOUNTER
Homehealth wants to know if Dr. Vamshi Garcia would be her home health PCP. Call Jamee English [phone].

## 2023-07-17 NOTE — TELEPHONE ENCOUNTER
Spoke with [address] [place] [address] with Haley/Nurse and she is requesting if Stephanie Doran will follow patient for [facility] AN, PT. Dr. Stephanie Gray notified and advised he will follow her for [facility] SN and PT S/P hospitalization at [facility] 7/11/23-7/17/23 for Fluid on Lungs and Pneumonia. Records requested for Hospital follow up on 7/20/23 at 1120 AM. Grateful for the call.

## 2023-07-20 ENCOUNTER — OFFICE VISIT (OUTPATIENT)
Age: 83
End: 2023-07-20
Payer: MEDICARE

## 2023-07-20 VITALS
RESPIRATION RATE: 16 BRPM | BODY MASS INDEX: 21.49 KG/M2 | SYSTOLIC BLOOD PRESSURE: 127 MMHG | WEIGHT: 113.8 LBS | DIASTOLIC BLOOD PRESSURE: 56 MMHG | HEIGHT: 61 IN | HEART RATE: 67 BPM | TEMPERATURE: 98.1 F | OXYGEN SATURATION: 97 %

## 2023-07-20 DIAGNOSIS — N18.30 CONTROLLED TYPE 2 DIABETES MELLITUS WITH STAGE 3 CHRONIC KIDNEY DISEASE, WITHOUT LONG-TERM CURRENT USE OF INSULIN (HCC): ICD-10-CM

## 2023-07-20 DIAGNOSIS — E11.22 CONTROLLED TYPE 2 DIABETES MELLITUS WITH STAGE 3 CHRONIC KIDNEY DISEASE, WITHOUT LONG-TERM CURRENT USE OF INSULIN (HCC): ICD-10-CM

## 2023-07-20 DIAGNOSIS — R09.02 HYPOXIA: ICD-10-CM

## 2023-07-20 DIAGNOSIS — K21.9 GASTROESOPHAGEAL REFLUX DISEASE WITHOUT ESOPHAGITIS: ICD-10-CM

## 2023-07-20 DIAGNOSIS — J44.9 CHRONIC OBSTRUCTIVE PULMONARY DISEASE, UNSPECIFIED COPD TYPE (HCC): ICD-10-CM

## 2023-07-20 DIAGNOSIS — N18.30 STAGE 3 CHRONIC KIDNEY DISEASE, UNSPECIFIED WHETHER STAGE 3A OR 3B CKD (HCC): ICD-10-CM

## 2023-07-20 DIAGNOSIS — D50.8 OTHER IRON DEFICIENCY ANEMIA: ICD-10-CM

## 2023-07-20 DIAGNOSIS — Z09 HOSPITAL DISCHARGE FOLLOW-UP: ICD-10-CM

## 2023-07-20 DIAGNOSIS — I48.0 PAROXYSMAL ATRIAL FIBRILLATION (HCC): ICD-10-CM

## 2023-07-20 DIAGNOSIS — C93.10 CHRONIC MYELOMONOCYTIC LEUKEMIA NOT HAVING ACHIEVED REMISSION (HCC): ICD-10-CM

## 2023-07-20 DIAGNOSIS — J81.0 ACUTE PULMONARY EDEMA (HCC): Primary | ICD-10-CM

## 2023-07-20 DIAGNOSIS — E03.9 ACQUIRED HYPOTHYROIDISM: ICD-10-CM

## 2023-07-20 PROCEDURE — 3023F SPIROM DOC REV: CPT | Performed by: INTERNAL MEDICINE

## 2023-07-20 PROCEDURE — 99214 OFFICE O/P EST MOD 30 MIN: CPT | Performed by: INTERNAL MEDICINE

## 2023-07-20 PROCEDURE — 3078F DIAST BP <80 MM HG: CPT | Performed by: INTERNAL MEDICINE

## 2023-07-20 PROCEDURE — G8428 CUR MEDS NOT DOCUMENT: HCPCS | Performed by: INTERNAL MEDICINE

## 2023-07-20 PROCEDURE — G8420 CALC BMI NORM PARAMETERS: HCPCS | Performed by: INTERNAL MEDICINE

## 2023-07-20 PROCEDURE — 1090F PRES/ABSN URINE INCON ASSESS: CPT | Performed by: INTERNAL MEDICINE

## 2023-07-20 PROCEDURE — 1111F DSCHRG MED/CURRENT MED MERGE: CPT | Performed by: INTERNAL MEDICINE

## 2023-07-20 PROCEDURE — 3074F SYST BP LT 130 MM HG: CPT | Performed by: INTERNAL MEDICINE

## 2023-07-20 PROCEDURE — 1036F TOBACCO NON-USER: CPT | Performed by: INTERNAL MEDICINE

## 2023-07-20 PROCEDURE — 1123F ACP DISCUSS/DSCN MKR DOCD: CPT | Performed by: INTERNAL MEDICINE

## 2023-07-20 PROCEDURE — 3051F HG A1C>EQUAL 7.0%<8.0%: CPT | Performed by: INTERNAL MEDICINE

## 2023-07-20 PROCEDURE — G8399 PT W/DXA RESULTS DOCUMENT: HCPCS | Performed by: INTERNAL MEDICINE

## 2023-07-20 RX ORDER — FUROSEMIDE 20 MG/1
TABLET ORAL
COMMUNITY
Start: 2023-07-17 | End: 2023-07-20

## 2023-07-20 RX ORDER — FUROSEMIDE 20 MG/1
20 TABLET ORAL DAILY
Qty: 90 TABLET | Refills: 1
Start: 2023-07-20

## 2023-07-20 RX ORDER — DILTIAZEM HYDROCHLORIDE 120 MG/1
TABLET, FILM COATED ORAL
COMMUNITY
Start: 2023-07-17 | End: 2023-07-20

## 2023-07-20 RX ORDER — FAMOTIDINE 40 MG/1
40 TABLET, FILM COATED ORAL
Qty: 90 TABLET | Refills: 1 | Status: SHIPPED | OUTPATIENT
Start: 2023-07-20

## 2023-07-20 RX ORDER — DILTIAZEM HYDROCHLORIDE 120 MG/1
120 TABLET, FILM COATED ORAL 2 TIMES DAILY
Qty: 120 TABLET | Refills: 5
Start: 2023-07-20

## 2023-07-20 NOTE — PROGRESS NOTES
HISTORY OF PRESENT ILLNESS    Chief Complaint   Patient presents with    Follow-Up from Hospital     [facility] - fluid in lungs/pneumonia 7.11.23-7.17.23    Headache     When coughing. Presents for follow-up hospitalization at [facility] from July 11 through July 17, 2023 for acute hypoxic respiratory failure. Patient with COPD, CMML, atrial fibrillation. CKD, pulmonary hypertension and history of recurrent pneumonia. Presented to the emergency room with increasing shortness of breath. She had received PRBC infusion and and procrit the day before admission, per oncology team for ongoing significant anemia secondary to CMML. She complained of nonproductive cough, no fever or chills. Found to have oxygen requirement of 4 L when she arrived in the emergency room. Chest x-ray demonstrated diffuse interstitial opacities with consideration of pulmonary edema or atypical infection. She received empiric antibiotic treatment with Rocephin and azithromycin. She was also diuresed and she did improve with decreasing oxygen requirement. Hospitalization complicated by intermittent atrial fibrillation requiring diltiazem drip and diuresis. She converted to normal sinus rhythm and was changed to diltiazem 120 mg twice daily with maintenance of sinus rhythm. On discharge, patient required 3 L to maintain sats greater than 90% on exertion. Sent home with home health and home oxygen set up. Discharged with diltiazem 120 mg twice daily and furosemide 20 mg daily as new prescriptions. On review of records, labs show WBC 23.8 hemoglobin 7.0, platelet 329 on discharge. On July 16, sodium 138, potassium 4.4, BUN 29, creatinine 1.54, glucose 118, calcium 8.7 alkaline phosphatase 231, transaminases normal.  She was recommended to have repeat renal function within 5 days or so. Today, presents with her . She is in a wheelchair. Oxygen attached. Using 1.5 liter O2 now.   She says she is expecting to be

## 2023-07-22 LAB
BUN SERPL-MCNC: 46 MG/DL (ref 8–27)
BUN/CREAT SERPL: 31 (ref 12–28)
CALCIUM SERPL-MCNC: 9.3 MG/DL (ref 8.7–10.3)
CHLORIDE SERPL-SCNC: 99 MMOL/L (ref 96–106)
CO2 SERPL-SCNC: 22 MMOL/L (ref 20–29)
CREAT SERPL-MCNC: 1.5 MG/DL (ref 0.57–1)
EGFRCR SERPLBLD CKD-EPI 2021: 35 ML/MIN/1.73
GLUCOSE SERPL-MCNC: 93 MG/DL (ref 70–99)
HBA1C MFR BLD: 6.6 % (ref 4.8–5.6)
POTASSIUM SERPL-SCNC: 5.2 MMOL/L (ref 3.5–5.2)
REPORT: NORMAL
SODIUM SERPL-SCNC: 139 MMOL/L (ref 134–144)
T4 FREE SERPL-MCNC: 1.42 NG/DL (ref 0.82–1.77)
TSH SERPL DL<=0.005 MIU/L-ACNC: 15.5 UIU/ML (ref 0.45–4.5)

## 2023-07-23 RX ORDER — LEVOTHYROXINE SODIUM 0.12 MG/1
125 TABLET ORAL DAILY
Qty: 90 TABLET | Refills: 1 | Status: SHIPPED | OUTPATIENT
Start: 2023-07-23

## 2023-07-24 ENCOUNTER — TELEPHONE (OUTPATIENT)
Age: 83
End: 2023-07-24

## 2023-07-24 NOTE — TELEPHONE ENCOUNTER
----- Message from Breonna Hidalgo MD sent at 7/23/2023  9:23 PM EDT -----  Results reviewed. Comments regarding abnormalities sent to patient via [employer]. Bhumika, please make sure she sees my note. Increase Levothyroxine (Synthroid) to 125 mcg daily. Return to clinic in 3 months for an appointment to see me and to repeat your blood work.

## 2023-07-24 NOTE — TELEPHONE ENCOUNTER
Spoke with patient and updated on Dr. Lukasz Cerrato comment regarding her recent lab values and ne order for her Levothyroxine 125 mcg daily increase 7/23/24. She has schedule a 3 month follow up for labs and appt wit Dr. Vamshi Garcia for 10/24/23. She states understanding and grateful for the call.

## 2023-08-07 RX ORDER — ERGOCALCIFEROL 1.25 MG/1
CAPSULE ORAL
Qty: 13 CAPSULE | Refills: 1 | Status: SHIPPED | OUTPATIENT
Start: 2023-08-07

## 2023-08-07 RX ORDER — TIOTROPIUM BROMIDE INHALATION SPRAY 3.12 UG/1
SPRAY, METERED RESPIRATORY (INHALATION)
Qty: 12 G | Refills: 5 | Status: SHIPPED | OUTPATIENT
Start: 2023-08-07

## 2023-09-12 ENCOUNTER — TELEPHONE (OUTPATIENT)
Age: 83
End: 2023-09-12

## 2023-09-12 NOTE — TELEPHONE ENCOUNTER
[facility] [phone]  needs to schedule a hospital follow up for patient, I didn't see anything until Oct 25th.

## 2023-09-12 NOTE — TELEPHONE ENCOUNTER
Mat Patel from [address] [facility] is calling to inform the doctor that the patient is now under their care doing home hospital care. She stated that the patient has an acute respiratory failure / congested heart failure along with other conditions. Any question call [phone]  Will fax over paperwork once the patient is discharge.

## 2023-10-17 ENCOUNTER — OFFICE VISIT (OUTPATIENT)
Age: 83
End: 2023-10-17
Payer: MEDICARE

## 2023-10-17 VITALS
HEART RATE: 66 BPM | WEIGHT: 114.2 LBS | RESPIRATION RATE: 16 BRPM | BODY MASS INDEX: 21.56 KG/M2 | TEMPERATURE: 97.5 F | DIASTOLIC BLOOD PRESSURE: 58 MMHG | SYSTOLIC BLOOD PRESSURE: 112 MMHG | OXYGEN SATURATION: 94 % | HEIGHT: 61 IN

## 2023-10-17 DIAGNOSIS — I10 PRIMARY HYPERTENSION: ICD-10-CM

## 2023-10-17 DIAGNOSIS — H66.93 ACUTE BACTERIAL INFECTION OF BOTH MIDDLE EARS: Primary | ICD-10-CM

## 2023-10-17 DIAGNOSIS — H61.23 BILATERAL IMPACTED CERUMEN: ICD-10-CM

## 2023-10-17 PROCEDURE — 3074F SYST BP LT 130 MM HG: CPT | Performed by: NURSE PRACTITIONER

## 2023-10-17 PROCEDURE — 3078F DIAST BP <80 MM HG: CPT | Performed by: NURSE PRACTITIONER

## 2023-10-17 PROCEDURE — 99214 OFFICE O/P EST MOD 30 MIN: CPT | Performed by: NURSE PRACTITIONER

## 2023-10-17 PROCEDURE — 1123F ACP DISCUSS/DSCN MKR DOCD: CPT | Performed by: NURSE PRACTITIONER

## 2023-10-17 RX ORDER — CIPROFLOXACIN AND DEXAMETHASONE 3; 1 MG/ML; MG/ML
4 SUSPENSION/ DROPS AURICULAR (OTIC) 2 TIMES DAILY
Qty: 7.5 ML | Refills: 0 | Status: SHIPPED | OUTPATIENT
Start: 2023-10-17 | End: 2023-10-18

## 2023-10-17 ASSESSMENT — ENCOUNTER SYMPTOMS
RHINORRHEA: 0
EYE REDNESS: 0
SINUS PAIN: 0
NAUSEA: 0
WHEEZING: 0
EYES NEGATIVE: 1
BACK PAIN: 0
STRIDOR: 0
EYE PAIN: 0
VOMITING: 0
SHORTNESS OF BREATH: 0
RESPIRATORY NEGATIVE: 1
SORE THROAT: 0
SINUS PRESSURE: 0
GASTROINTESTINAL NEGATIVE: 1
COUGH: 0
CONSTIPATION: 0
CHEST TIGHTNESS: 0
DIARRHEA: 0
ABDOMINAL PAIN: 0
BLOOD IN STOOL: 0

## 2023-10-17 NOTE — PROGRESS NOTES
Cervical back: Normal range of motion. Right lower leg: No edema. Left lower leg: No edema. Skin:     General: Skin is warm and dry. Neurological:      Mental Status: She is alert and oriented to person, place, and time.    Psychiatric:         Mood and Affect: Mood normal.         Behavior: Behavior normal.

## 2023-10-18 DIAGNOSIS — H66.93 ACUTE BACTERIAL INFECTION OF BOTH MIDDLE EARS: Primary | ICD-10-CM

## 2023-10-24 ENCOUNTER — HOSPITAL ENCOUNTER (OUTPATIENT)
Facility: HOSPITAL | Age: 83
Discharge: HOME | End: 2023-10-27
Payer: MEDICARE

## 2023-10-24 ENCOUNTER — OFFICE VISIT (OUTPATIENT)
Age: 83
End: 2023-10-24
Payer: MEDICARE

## 2023-10-24 VITALS
SYSTOLIC BLOOD PRESSURE: 135 MMHG | RESPIRATION RATE: 17 BRPM | OXYGEN SATURATION: 95 % | WEIGHT: 114.2 LBS | BODY MASS INDEX: 21.56 KG/M2 | HEART RATE: 68 BPM | DIASTOLIC BLOOD PRESSURE: 72 MMHG | TEMPERATURE: 97.5 F | HEIGHT: 61 IN

## 2023-10-24 DIAGNOSIS — C93.10 CHRONIC MYELOMONOCYTIC LEUKEMIA NOT HAVING ACHIEVED REMISSION (HCC): ICD-10-CM

## 2023-10-24 DIAGNOSIS — N18.31 STAGE 3A CHRONIC KIDNEY DISEASE (HCC): ICD-10-CM

## 2023-10-24 DIAGNOSIS — E03.9 ACQUIRED HYPOTHYROIDISM: ICD-10-CM

## 2023-10-24 DIAGNOSIS — M79.671 RIGHT FOOT PAIN: ICD-10-CM

## 2023-10-24 DIAGNOSIS — K21.9 GASTROESOPHAGEAL REFLUX DISEASE WITHOUT ESOPHAGITIS: ICD-10-CM

## 2023-10-24 DIAGNOSIS — E78.2 MIXED HYPERLIPIDEMIA: ICD-10-CM

## 2023-10-24 DIAGNOSIS — I10 PRIMARY HYPERTENSION: ICD-10-CM

## 2023-10-24 DIAGNOSIS — N18.30 CONTROLLED TYPE 2 DIABETES MELLITUS WITH STAGE 3 CHRONIC KIDNEY DISEASE, WITHOUT LONG-TERM CURRENT USE OF INSULIN (HCC): ICD-10-CM

## 2023-10-24 DIAGNOSIS — M79.671 RIGHT FOOT PAIN: Primary | ICD-10-CM

## 2023-10-24 DIAGNOSIS — E11.22 CONTROLLED TYPE 2 DIABETES MELLITUS WITH STAGE 3 CHRONIC KIDNEY DISEASE, WITHOUT LONG-TERM CURRENT USE OF INSULIN (HCC): ICD-10-CM

## 2023-10-24 DIAGNOSIS — R13.12 OROPHARYNGEAL DYSPHAGIA: ICD-10-CM

## 2023-10-24 PROBLEM — J96.01 ACUTE RESPIRATORY FAILURE WITH HYPOXIA (HCC): Status: RESOLVED | Noted: 2023-09-11 | Resolved: 2023-10-24

## 2023-10-24 PROBLEM — R05.1 ACUTE COUGH: Status: ACTIVE | Noted: 2023-09-14

## 2023-10-24 PROBLEM — I48.91 ATRIAL FIBRILLATION WITH RVR (HCC): Status: RESOLVED | Noted: 2021-05-07 | Resolved: 2023-10-24

## 2023-10-24 PROBLEM — J96.01 ACUTE RESPIRATORY FAILURE WITH HYPOXIA (HCC): Status: ACTIVE | Noted: 2023-09-11

## 2023-10-24 PROBLEM — I50.33 ACUTE ON CHRONIC DIASTOLIC (CONGESTIVE) HEART FAILURE (HCC): Status: ACTIVE | Noted: 2023-09-11

## 2023-10-24 PROBLEM — E11.9 TYPE 2 DIABETES MELLITUS (HCC): Status: RESOLVED | Noted: 2023-05-16 | Resolved: 2023-10-24

## 2023-10-24 PROBLEM — I82.90 THROMBUS: Status: RESOLVED | Noted: 2023-02-01 | Resolved: 2023-10-24

## 2023-10-24 PROBLEM — R05.1 ACUTE COUGH: Status: RESOLVED | Noted: 2023-09-14 | Resolved: 2023-10-24

## 2023-10-24 PROBLEM — I50.33 ACUTE ON CHRONIC DIASTOLIC (CONGESTIVE) HEART FAILURE (HCC): Status: RESOLVED | Noted: 2023-09-11 | Resolved: 2023-10-24

## 2023-10-24 PROCEDURE — 3078F DIAST BP <80 MM HG: CPT | Performed by: INTERNAL MEDICINE

## 2023-10-24 PROCEDURE — 1123F ACP DISCUSS/DSCN MKR DOCD: CPT | Performed by: INTERNAL MEDICINE

## 2023-10-24 PROCEDURE — 73630 X-RAY EXAM OF FOOT: CPT

## 2023-10-24 PROCEDURE — 99215 OFFICE O/P EST HI 40 MIN: CPT | Performed by: INTERNAL MEDICINE

## 2023-10-24 PROCEDURE — G8399 PT W/DXA RESULTS DOCUMENT: HCPCS | Performed by: INTERNAL MEDICINE

## 2023-10-24 PROCEDURE — 1090F PRES/ABSN URINE INCON ASSESS: CPT | Performed by: INTERNAL MEDICINE

## 2023-10-24 PROCEDURE — G8428 CUR MEDS NOT DOCUMENT: HCPCS | Performed by: INTERNAL MEDICINE

## 2023-10-24 PROCEDURE — G8484 FLU IMMUNIZE NO ADMIN: HCPCS | Performed by: INTERNAL MEDICINE

## 2023-10-24 PROCEDURE — G8420 CALC BMI NORM PARAMETERS: HCPCS | Performed by: INTERNAL MEDICINE

## 2023-10-24 PROCEDURE — 3075F SYST BP GE 130 - 139MM HG: CPT | Performed by: INTERNAL MEDICINE

## 2023-10-24 PROCEDURE — 3044F HG A1C LEVEL LT 7.0%: CPT | Performed by: INTERNAL MEDICINE

## 2023-10-24 PROCEDURE — 1036F TOBACCO NON-USER: CPT | Performed by: INTERNAL MEDICINE

## 2023-10-24 RX ORDER — DOXYCYCLINE HYCLATE 100 MG
100 TABLET ORAL 2 TIMES DAILY
Qty: 14 TABLET | Refills: 0 | Status: SHIPPED | OUTPATIENT
Start: 2023-10-24 | End: 2023-10-31

## 2023-10-24 NOTE — PATIENT INSTRUCTIONS
[facility]  Dr. Brunilda Ganser Dr. Hartley Silver  [phone] Bhavesh Pate  P: [phone]  [address] [facility]

## 2023-10-25 LAB
T4 FREE SERPL-MCNC: 1.75 NG/DL (ref 0.82–1.77)
TSH SERPL DL<=0.005 MIU/L-ACNC: 7.8 UIU/ML (ref 0.45–4.5)

## 2023-10-27 RX ORDER — LEVOTHYROXINE SODIUM 0.15 MG/1
150 TABLET ORAL DAILY
Qty: 90 TABLET | Refills: 1 | Status: SHIPPED | OUTPATIENT
Start: 2023-10-27

## 2023-10-30 RX ORDER — LEVOTHYROXINE SODIUM 0.15 MG/1
TABLET ORAL
Qty: 90 TABLET | Refills: 1
Start: 2023-10-30

## 2023-12-20 LAB — HBA1C MFR BLD HPLC: 5.5 %

## 2023-12-27 ENCOUNTER — TELEPHONE (OUTPATIENT)
Age: 83
End: 2023-12-27

## 2023-12-27 NOTE — TELEPHONE ENCOUNTER
Spoke with patient and she reports went to [id number] [facility] on 12/20-12/25/23 for SOB and Fluid in Lungs. She is requesting a hospital follow up and scheduled for 1/4/23 at 340 PM. Grateful for the call. Records requested.

## 2023-12-27 NOTE — TELEPHONE ENCOUNTER
----- Message from Gerson Kauffman sent at 12/26/2023  2:05 PM EST -----  Subject: Hospital Follow Up    QUESTIONS  What hospital was the Patient Discharged from? [facility]   Date of Discharge? 2023-12-25  Discharge Location? Home  Reason for hospitalization as patient stated? What question does the patient have, if applicable?   ---------------------------------------------------------------------------  --------------  CALL BACK INFO  What is the best way for the office to contact you? OK to leave message on   voicemail  Preferred Call Back Phone Number? [phone]  ---------------------------------------------------------------------------  --------------  SCRIPT ANSWERS  Relationship to Patient? Spouse/Partner  Representative Name? Clementine Antes   Additional information verified (besides Name and Date of Birth)?  Phone   Number

## 2023-12-28 ENCOUNTER — TELEPHONE (OUTPATIENT)
Age: 83
End: 2023-12-28

## 2023-12-29 ENCOUNTER — TELEPHONE (OUTPATIENT)
Age: 83
End: 2023-12-29

## 2023-12-29 DIAGNOSIS — R05.8 OTHER COUGH: Primary | ICD-10-CM

## 2023-12-29 RX ORDER — CODEINE PHOSPHATE/GUAIFENESIN 10-100MG/5
5 LIQUID (ML) ORAL 3 TIMES DAILY PRN
Qty: 70 ML | Refills: 0 | Status: SHIPPED | OUTPATIENT
Start: 2023-12-29 | End: 2024-01-03

## 2023-12-29 RX ORDER — BENZONATATE 200 MG/1
200 CAPSULE ORAL 3 TIMES DAILY PRN
Qty: 30 CAPSULE | Refills: 0 | Status: SHIPPED | OUTPATIENT
Start: 2023-12-29 | End: 2024-01-05

## 2023-12-29 NOTE — TELEPHONE ENCOUNTER
Given her cardiac conditions, she needs to be very careful with over-the-counter medicines for cough. Would advise tea and honey, cough drops are okay and could consider over-the-counter Coricidin products. I have sent in prescriptions for Tessalon Perles and for guaifenesin with codeine which are okay for her to take in small amounts. I hope she feels better.

## 2023-12-29 NOTE — TELEPHONE ENCOUNTER
Spoke with Jose A/Spouse (HIPAA VERIFIED) and updated on Dr. Delores Bah comments and recommendations and he states understanding. He is asking specifically what over the counter medications or prescribed that she can take to treat her cough and body  aches and congestion. Grateful for the call. Dr. Vanessa Young notified.

## 2023-12-29 NOTE — TELEPHONE ENCOUNTER
Looks like she was admitted on December 20 and tested negative for COVID. Records received do not have a discharge summary, but I assume that she remained negative during her hospitalization recently. Unfortunately, taking Paxlovid for her is risky. She has kidney dysfunction and would have to have the dose adjusted which would be okay, but she is also taking low-dose Eliquis and she would need to hold that for 8 days which will increase her risk of stroke significantly. Monitor oxygen levels closely. May need to consider hospitalization for inpatient treatments that would be safer for her if she is getting progressively ill over the weekend.

## 2023-12-29 NOTE — TELEPHONE ENCOUNTER
----- Message from Gianni Rajan sent at 12/29/2023 11:37 AM EST -----  Subject: Message to Provider    QUESTIONS  Information for Provider?  Patient phoned requesting to speak with Leigh Hodges in   the office - no answer at the office - patient has tested positive for   covid ; would like a call back  ---------------------------------------------------------------------------  --------------  [address] [place]  [phone]; OK to leave message on voicemail  ---------------------------------------------------------------------------  --------------  SCRIPT ANSWERS  undefined

## 2023-12-29 NOTE — TELEPHONE ENCOUNTER
Spoke with patient and Jose A/Spouse (HIPAA VERIFIED) asnd  they report that she can not reschedule her appt due to a conflict with another medical appt. They report that her  home tested positive for COIVD yesterday and she thinks she may also have it. Advised her to home test for COVID and let Dr. Con Curling know the results. They state understanding and grateful for the call.

## 2023-12-29 NOTE — TELEPHONE ENCOUNTER
Spoke with Jose A/Spouse (HIPAA VERIFIED) and updated on Dr. John Jerry comments and recommendation regarding medications for her COVID. He states understanding and grateful for the call.

## 2023-12-29 NOTE — TELEPHONE ENCOUNTER
----- Message from [facility] sent at 12/29/2023 10:41 AM EST -----  Subject: Message to Provider    QUESTIONS  Information for Provider? Lauren Clemons returned a call she received and could not   reach the practice, please call back to [phone]  ---------------------------------------------------------------------------  --------------  [address] [place] [address] [place]  [phone];  Do not leave any message, patient will call back for answer  ---------------------------------------------------------------------------  --------------  SCRIPT ANSWERS  undefined

## 2023-12-29 NOTE — TELEPHONE ENCOUNTER
----- Message from Elaine Mcgrath sent at 12/29/2023 10:01 AM EST -----  Subject: Message to Provider    QUESTIONS  Information for Provider?  Pt called in to return a call she missed from   [facility] at the office; she is available for a call back today.   ---------------------------------------------------------------------------  --------------  Neeraj Martinez [employer]  [phone]; OK to leave message on voicemail  ---------------------------------------------------------------------------  --------------  SCRIPT ANSWERS  undefined

## 2024-01-04 ENCOUNTER — OFFICE VISIT (OUTPATIENT)
Age: 84
End: 2024-01-04

## 2024-01-04 VITALS
SYSTOLIC BLOOD PRESSURE: 135 MMHG | TEMPERATURE: 98.6 F | HEIGHT: 61 IN | HEART RATE: 89 BPM | BODY MASS INDEX: 21.58 KG/M2 | DIASTOLIC BLOOD PRESSURE: 58 MMHG | RESPIRATION RATE: 17 BRPM | OXYGEN SATURATION: 91 %

## 2024-01-04 DIAGNOSIS — J44.9 CHRONIC OBSTRUCTIVE PULMONARY DISEASE, UNSPECIFIED COPD TYPE (HCC): ICD-10-CM

## 2024-01-04 DIAGNOSIS — C93.10 CHRONIC MYELOMONOCYTIC LEUKEMIA NOT HAVING ACHIEVED REMISSION (HCC): ICD-10-CM

## 2024-01-04 DIAGNOSIS — D69.6 THROMBOCYTOPENIA, UNSPECIFIED (HCC): ICD-10-CM

## 2024-01-04 DIAGNOSIS — N18.30 CONTROLLED TYPE 2 DIABETES MELLITUS WITH STAGE 3 CHRONIC KIDNEY DISEASE, WITHOUT LONG-TERM CURRENT USE OF INSULIN (HCC): ICD-10-CM

## 2024-01-04 DIAGNOSIS — U07.1 COVID-19: Primary | ICD-10-CM

## 2024-01-04 DIAGNOSIS — R73.01 IFG (IMPAIRED FASTING GLUCOSE): ICD-10-CM

## 2024-01-04 DIAGNOSIS — I48.0 PAROXYSMAL ATRIAL FIBRILLATION (HCC): ICD-10-CM

## 2024-01-04 DIAGNOSIS — E11.22 CONTROLLED TYPE 2 DIABETES MELLITUS WITH STAGE 3 CHRONIC KIDNEY DISEASE, WITHOUT LONG-TERM CURRENT USE OF INSULIN (HCC): ICD-10-CM

## 2024-01-04 DIAGNOSIS — J18.9 PNEUMONIA DUE TO INFECTIOUS ORGANISM, UNSPECIFIED LATERALITY, UNSPECIFIED PART OF LUNG: ICD-10-CM

## 2024-01-04 DIAGNOSIS — E03.8 OTHER SPECIFIED HYPOTHYROIDISM: ICD-10-CM

## 2024-01-04 RX ORDER — LEVOTHYROXINE SODIUM 0.15 MG/1
TABLET ORAL
Qty: 90 TABLET | Refills: 1
Start: 2024-01-04

## 2024-01-04 RX ORDER — FLUTICASONE PROPIONATE AND SALMETEROL 250; 50 UG/1; UG/1
1 POWDER RESPIRATORY (INHALATION) EVERY 12 HOURS
Qty: 60 EACH | Refills: 3 | Status: SHIPPED | OUTPATIENT
Start: 2024-01-04

## 2024-01-04 RX ORDER — GUAIFENESIN 600 MG/1
1200 TABLET, EXTENDED RELEASE ORAL 2 TIMES DAILY
Qty: 120 TABLET | Refills: 5
Start: 2024-01-04 | End: 2024-07-02

## 2024-01-04 ASSESSMENT — PATIENT HEALTH QUESTIONNAIRE - PHQ9
SUM OF ALL RESPONSES TO PHQ QUESTIONS 1-9: 0
SUM OF ALL RESPONSES TO PHQ9 QUESTIONS 1 & 2: 0
SUM OF ALL RESPONSES TO PHQ QUESTIONS 1-9: 0
SUM OF ALL RESPONSES TO PHQ QUESTIONS 1-9: 0
2. FEELING DOWN, DEPRESSED OR HOPELESS: 0
SUM OF ALL RESPONSES TO PHQ QUESTIONS 1-9: 0
1. LITTLE INTEREST OR PLEASURE IN DOING THINGS: 0

## 2024-01-04 NOTE — PATIENT INSTRUCTIONS
Current Outpatient Medications   Medication Sig    fluticasone-salmeterol (WIXELA INHUB) 250-50 MCG/ACT AEPB diskus inhaler Inhale 1 puff into the lungs in the morning and 1 puff in the evening.    levothyroxine (SYNTHROID) 150 MCG tablet Increased 10/27/23.  Take 1 pill 6 days/week and none on Sunday    guaiFENesin (MUCINEX) 600 MG extended release tablet Take 2 tablets by mouth 2 times daily    benzonatate (TESSALON) 200 MG capsule Take 1 capsule by mouth 3 times daily as needed for Cough    SPIRIVA RESPIMAT 2.5 MCG/ACT AERS inhaler INHALE 2 PUFFS BY INHALATION DAILY. INDICATIONS: BRONCHOSPASM PREVENTION WITH COPD    vitamin D (ERGOCALCIFEROL) 1.25 MG (22282 UT) CAPS capsule TAKE 1 CAPSULE EVERY 7 DAYS    furosemide (LASIX) 20 MG tablet Take 1 tablet by mouth daily (Patient taking differently: Take 1 tablet by mouth daily As needed)    famotidine (PEPCID) 40 MG tablet Take 1 tablet by mouth nightly    montelukast (SINGULAIR) 10 MG tablet TAKE 1 TABLET EVERY DAY    allopurinol (ZYLOPRIM) 100 MG tablet TAKE 1 TABLET EVERY DAY    albuterol sulfate HFA (PROVENTIL;VENTOLIN;PROAIR) 108 (90 Base) MCG/ACT inhaler 2 EVERY 4HRS AS NEEDED FOR SHORTNESS OF BREATH    amiodarone (PACERONE) 200 MG tablet Take 1 tablet by mouth daily For atrial fibrillation    acetaminophen (TYLENOL) 650 MG extended release tablet Take 2 tablets by mouth    apixaban (ELIQUIS) 2.5 MG TABS tablet Eliquis 2.5 mg tablet    fluorometholone (FML) 0.25 % ophthalmic suspension 1 drop    fluticasone (FLONASE) 50 MCG/ACT nasal spray ceived the following from [facility]: Outside name: fluticasone propionate (FLONASE) 50 mcg/actuation nasal spray    gabapentin (NEURONTIN) 100 MG capsule Take 2 capsules by mouth 3 times daily. As needed    levocetirizine (XYZAL) 5 MG tablet Take 1 tablet by mouth 2 times daily    MICONAZOLE NITRATE VAGINAL (MONISTAT 3) 4 % CREA Place vaginally daily    omeprazole (PRILOSEC) 20 MG delayed release capsule

## 2024-01-04 NOTE — PROGRESS NOTES
HISTORY OF PRESENT ILLNESS    Chief Complaint   Patient presents with    Follow-Up from [facility] in [place] 12/20/23-12/25/23 for SOB and fluid in lungs       Presents for Transitional care management (TCM) visit s/p of hospital admission from 12/20 until 12/25/2023 at [facility].    Nurse Navigator or other outreach call noted to patient and documented in [facility] on 12/27/23.     Hospital record and relevant lab results, test results and consult notes have personally been reviewed by me at this office visit.   Medications reviewed and reconciled.       Patient was hospitalized for shortness of breath and pneumonia..  She reported difficulty breathing and wheezing since her most recent transfusion on 12/19, the day prior to admission.  Was not improved with her inhalers.  On arrival to the emergency room, she had a pulse ox of 90% on 5 L of oxygen.    Labs on arrival showed WBC 50.1, hemoglobin 8.9, platelet 839, sodium 137, potassium 4.9, BUN 59, creatinine 1.67, lactic acid 1.1,   TSH 10.52 (elevated) with free T4 1.15 (normal)  Liver enzymes were normal.  Troponin negative.  Hemoglobin A1c 5.5%. 12/21..  Blood glucoses are often in the 200s during hospitalization.  Given steroids.  Viral respiratory panel and mycoplasma were negative.  Legionella, strep pneumo, influenza, COVID-negative in hospital.    Labs on discharge on 12/25 showed hemoglobin of 7.5, BUN 5, creatinine 1.78    Chest x-ray showed nonspecific persistent multifocal patchy airspace disease with small left pleural effusion and no pneumothorax.  Chest CT results reviewed personally by me today.  Showed nonspecific diffuse heterogenous airspace disease, possibly edema or atypical infectious process.  Cardiomegaly with no masses noted.  Echocardiogram showed ejection fraction 55 to 60% with mild aortic stenosis and mild to moderate mitral regurgitation.    She was treated with cefepime and vancomycin..  Treated with supportive care, oxygen

## 2024-01-04 NOTE — PROGRESS NOTES
Chief Complaint   Patient presents with    Follow-Up from Hospital     [facility] 12/20/23-12/25/23 for SOB and fluid in lungs         Weaned oxygen to 1 L and stopped it 2 days ago.    She is walking and sats remain ok.    Cough remains mildly productive.    Has portable concentrator.    Afib  On Amiodarone, metoprolol 50 mg bid prior to hospitalization  Not taking metoprolol now.since pulse low in 50s in hospital  Pulse is 60s now off med.  Seeing Dr. Bartlett 1/17 in EP.   Seeing cardiology Dr. Rodrigues 1/23/24 as new pt.      [facility]  Saw Dr. Ayers this week in Oncology.  BP 150s/   Blood transfusion 2 days ago  tolerated.    Procit today.  Encovy   No checking home BP.  Blood pressure (!) 135/58, pulse 89, temperature 98.6 °F (37 °C), temperature source Oral, resp. rate 17, height 1.549 m (5' 1\"), SpO2 91 %.    Seeing pulmonary

## 2024-01-22 DIAGNOSIS — E03.8 OTHER SPECIFIED HYPOTHYROIDISM: ICD-10-CM

## 2024-01-22 RX ORDER — LEVOTHYROXINE SODIUM 0.15 MG/1
150 TABLET ORAL DAILY
Qty: 90 TABLET | Refills: 1
Start: 2024-01-22

## 2024-02-19 DIAGNOSIS — K21.9 GASTROESOPHAGEAL REFLUX DISEASE WITHOUT ESOPHAGITIS: ICD-10-CM

## 2024-02-19 DIAGNOSIS — M10.9 GOUT, UNSPECIFIED CAUSE, UNSPECIFIED CHRONICITY, UNSPECIFIED SITE: ICD-10-CM

## 2024-02-19 RX ORDER — FAMOTIDINE 40 MG/1
40 TABLET, FILM COATED ORAL NIGHTLY
Qty: 90 TABLET | Refills: 3 | Status: SHIPPED | OUTPATIENT
Start: 2024-02-19

## 2024-02-19 RX ORDER — ALLOPURINOL 100 MG/1
TABLET ORAL
Qty: 90 TABLET | Refills: 3 | Status: SHIPPED | OUTPATIENT
Start: 2024-02-19

## 2024-03-18 ENCOUNTER — OFFICE VISIT (OUTPATIENT)
Age: 84
End: 2024-03-18
Payer: MEDICARE

## 2024-03-18 VITALS
SYSTOLIC BLOOD PRESSURE: 118 MMHG | TEMPERATURE: 97.4 F | HEART RATE: 53 BPM | HEIGHT: 61 IN | DIASTOLIC BLOOD PRESSURE: 60 MMHG | BODY MASS INDEX: 21.58 KG/M2 | OXYGEN SATURATION: 91 % | RESPIRATION RATE: 17 BRPM

## 2024-03-18 DIAGNOSIS — R44.1 VISUAL HALLUCINATIONS: Primary | ICD-10-CM

## 2024-03-18 DIAGNOSIS — G89.29 CHRONIC MIDLINE BACK PAIN, UNSPECIFIED BACK LOCATION: ICD-10-CM

## 2024-03-18 DIAGNOSIS — C93.10 CHRONIC MYELOMONOCYTIC LEUKEMIA NOT HAVING ACHIEVED REMISSION (HCC): ICD-10-CM

## 2024-03-18 DIAGNOSIS — I10 PRIMARY HYPERTENSION: ICD-10-CM

## 2024-03-18 DIAGNOSIS — N18.30 CONTROLLED TYPE 2 DIABETES MELLITUS WITH STAGE 3 CHRONIC KIDNEY DISEASE, WITHOUT LONG-TERM CURRENT USE OF INSULIN (HCC): ICD-10-CM

## 2024-03-18 DIAGNOSIS — D50.8 OTHER IRON DEFICIENCY ANEMIA: ICD-10-CM

## 2024-03-18 DIAGNOSIS — M54.9 CHRONIC MIDLINE BACK PAIN, UNSPECIFIED BACK LOCATION: ICD-10-CM

## 2024-03-18 DIAGNOSIS — R35.89 POLYURIA: ICD-10-CM

## 2024-03-18 DIAGNOSIS — E03.9 HYPOTHYROIDISM, UNSPECIFIED TYPE: ICD-10-CM

## 2024-03-18 DIAGNOSIS — E11.22 CONTROLLED TYPE 2 DIABETES MELLITUS WITH STAGE 3 CHRONIC KIDNEY DISEASE, WITHOUT LONG-TERM CURRENT USE OF INSULIN (HCC): ICD-10-CM

## 2024-03-18 PROBLEM — D64.9 ANEMIA: Status: RESOLVED | Noted: 2019-03-25 | Resolved: 2024-03-18

## 2024-03-18 PROBLEM — A41.9 SEVERE SEPSIS (HCC): Status: ACTIVE | Noted: 2019-11-16

## 2024-03-18 PROBLEM — R06.00 DYSPNEA: Status: ACTIVE | Noted: 2023-07-11

## 2024-03-18 PROBLEM — J81.1 PULMONARY EDEMA: Status: ACTIVE | Noted: 2023-11-26

## 2024-03-18 PROBLEM — M10.9 GOUT ATTACK: Status: ACTIVE | Noted: 2021-11-12

## 2024-03-18 PROBLEM — R19.7 DIARRHEA: Status: RESOLVED | Noted: 2021-02-06 | Resolved: 2024-03-18

## 2024-03-18 PROBLEM — R07.9 CHEST PAIN: Status: ACTIVE | Noted: 2023-05-01

## 2024-03-18 PROBLEM — A41.9 SEVERE SEPSIS (HCC): Status: RESOLVED | Noted: 2019-11-16 | Resolved: 2024-03-18

## 2024-03-18 PROBLEM — J18.9 COMMUNITY ACQUIRED PNEUMONIA: Status: ACTIVE | Noted: 2019-11-17

## 2024-03-18 PROBLEM — R69 ACUTE DISEASE: Status: ACTIVE | Noted: 2021-02-06

## 2024-03-18 PROBLEM — R19.7 DIARRHEA: Status: ACTIVE | Noted: 2021-02-06

## 2024-03-18 PROBLEM — J18.9 COMMUNITY ACQUIRED PNEUMONIA: Status: RESOLVED | Noted: 2019-11-17 | Resolved: 2024-03-18

## 2024-03-18 PROBLEM — R07.9 CHEST PAIN: Status: RESOLVED | Noted: 2023-05-01 | Resolved: 2024-03-18

## 2024-03-18 PROBLEM — I47.19 ATRIAL TACHYCARDIA (HCC): Status: ACTIVE | Noted: 2023-04-18

## 2024-03-18 PROBLEM — R00.2 PALPITATIONS: Status: ACTIVE | Noted: 2023-04-24

## 2024-03-18 PROBLEM — R65.20 SEVERE SEPSIS (HCC): Status: ACTIVE | Noted: 2019-11-16

## 2024-03-18 PROBLEM — M79.671 FOOT PAIN, RIGHT: Status: ACTIVE | Noted: 2021-11-12

## 2024-03-18 PROBLEM — R65.20 SEVERE SEPSIS (HCC): Status: RESOLVED | Noted: 2019-11-16 | Resolved: 2024-03-18

## 2024-03-18 PROBLEM — R69 ACUTE DISEASE: Status: RESOLVED | Noted: 2021-02-06 | Resolved: 2024-03-18

## 2024-03-18 PROBLEM — K64.9 HEMORRHOIDS: Status: ACTIVE | Noted: 2019-11-27

## 2024-03-18 PROCEDURE — G8427 DOCREV CUR MEDS BY ELIG CLIN: HCPCS | Performed by: INTERNAL MEDICINE

## 2024-03-18 PROCEDURE — 99214 OFFICE O/P EST MOD 30 MIN: CPT | Performed by: INTERNAL MEDICINE

## 2024-03-18 PROCEDURE — 3074F SYST BP LT 130 MM HG: CPT | Performed by: INTERNAL MEDICINE

## 2024-03-18 PROCEDURE — 1123F ACP DISCUSS/DSCN MKR DOCD: CPT | Performed by: INTERNAL MEDICINE

## 2024-03-18 PROCEDURE — G8420 CALC BMI NORM PARAMETERS: HCPCS | Performed by: INTERNAL MEDICINE

## 2024-03-18 PROCEDURE — 3078F DIAST BP <80 MM HG: CPT | Performed by: INTERNAL MEDICINE

## 2024-03-18 PROCEDURE — G8399 PT W/DXA RESULTS DOCUMENT: HCPCS | Performed by: INTERNAL MEDICINE

## 2024-03-18 PROCEDURE — 1090F PRES/ABSN URINE INCON ASSESS: CPT | Performed by: INTERNAL MEDICINE

## 2024-03-18 PROCEDURE — G8484 FLU IMMUNIZE NO ADMIN: HCPCS | Performed by: INTERNAL MEDICINE

## 2024-03-18 PROCEDURE — 1036F TOBACCO NON-USER: CPT | Performed by: INTERNAL MEDICINE

## 2024-03-18 RX ORDER — LIDOCAINE 4 G/G
1 PATCH TOPICAL DAILY
Qty: 30 PATCH | Refills: 0 | Status: SHIPPED | OUTPATIENT
Start: 2024-03-18 | End: 2024-04-17

## 2024-03-18 RX ORDER — TRAMADOL HYDROCHLORIDE 50 MG/1
100 TABLET ORAL EVERY 6 HOURS PRN
Qty: 56 TABLET | Refills: 4
Start: 2024-03-18 | End: 2024-04-22

## 2024-03-18 RX ORDER — WARFARIN SODIUM 5 MG/1
TABLET ORAL
COMMUNITY
Start: 2024-03-14

## 2024-03-18 RX ORDER — SUCRALFATE 1 G/1
TABLET ORAL
COMMUNITY
Start: 2024-03-13

## 2024-03-18 NOTE — PROGRESS NOTES
HISTORY OF PRESENT ILLNESS    Chief Complaint   Patient presents with    Diabetes    Hypertension    Other     Upcoming appt with neurology.     Medication Check     Discuss pain meds.    Extremity Weakness     Bilateral legs    Anorexia     Only eats pop and smoothies        Presents for follow-up  Presents today in a wheelchair, accompanied by her .   Unfortunately, she has continued to have a physical decline in health.    S/p cardiac ablation per Dr. Bartlett March 14.  Changed Eliquis to warfarin due to hemorrhoid bleeding.  Will have INR checked this week.  Required blood tranfusion at the time of the procedure.  Chest x-ray on March 14, 2024 showed diffuse opacities which are chronic, difficult to rule out inflammation, versus infection, versus edema.  Labs performed March 14, 2024 by Dr. Bartlett and cardiology showed sodium 134, potassium 5.0, glucose 93, BUN 42, creatinine 1.90, calcium 9.0, INR 1.7.    WBC 32.8, hemoglobin 6.4, platelet 389  She was transfused and repeat hemoglobin was 7.8    IV access is poor. Ask about port.  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    Says she is taking an oral cancer pill.    Reports visual hallucinations for 3 weeks. Seeing branches of a tree in her vision from the left side. Also seeing people.  Hallucinations are not severe or disturbing necessarily. Lasts seconds.  Denies head injury.  Denies new medications.    She does have some degree of polyuria which is chronic, taking furosemide.    Saw optdagoberto Meléndez in optho and was told eyes ok.      Reports dysphagia of pills. Also with water.   Passed swallowing test during recent hospitalization, and this is overall stable.  Decreased appetite.    Edema of ankles. Taking lasix prn with partial relief.     Chronic pain - lower back, ,feet, hands.   Finger tip pain and redness.    Also with subacute lower back pain.  Taking tramadol plus tylenol prn pain per

## 2024-10-06 NOTE — ED NOTES
5th floor charge nurse notified of room assignment to begin admission process. Primary nurse will call for report within 20 minutes [employer] Alert Outreach Telephone Call Attempt     Patient is being outreached by the Care Transitions Program for a clinical alert from the [employer] monitoring program.     Call Attempt Date: 10/6/2024    Call Attempt: First Attempt    Spoke briefly with patient. She states that she has company right now and will call back.

## (undated) DEVICE — CANN NASAL O2 CAPNOGRAPHY AD -- FILTERLINE

## (undated) DEVICE — BAG BELONG PT PERS CLEAR HANDL

## (undated) DEVICE — CATH IV AUTOGRD BC BLU 22GA 25 -- INSYTE

## (undated) DEVICE — Device

## (undated) DEVICE — ADULT SPO2 SENSOR: Brand: NELLCOR

## (undated) DEVICE — 1200 GUARD II KIT W/5MM TUBE W/O VAC TUBE: Brand: GUARDIAN

## (undated) DEVICE — KENDALL RADIOLUCENT FOAM MONITORING ELECTRODE -RECTANGULAR SHAPE: Brand: KENDALL

## (undated) DEVICE — SOLIDIFIER MEDC 1200ML -- CONVERT TO 356117

## (undated) DEVICE — NDL FLTR TIP 5 MIC 18GX1.5IN --

## (undated) DEVICE — KIT COLON W/ 1.1OZ LUB AND 2 END

## (undated) DEVICE — BASIN EMESIS 500CC ROSE 250/CS 60/PLT: Brand: MEDEGEN MEDICAL PRODUCTS, LLC